# Patient Record
Sex: FEMALE | Race: ASIAN | NOT HISPANIC OR LATINO
[De-identification: names, ages, dates, MRNs, and addresses within clinical notes are randomized per-mention and may not be internally consistent; named-entity substitution may affect disease eponyms.]

---

## 2018-04-26 PROBLEM — Z00.00 ENCOUNTER FOR PREVENTIVE HEALTH EXAMINATION: Status: ACTIVE | Noted: 2018-04-26

## 2018-05-02 ENCOUNTER — APPOINTMENT (OUTPATIENT)
Dept: UROLOGY | Facility: CLINIC | Age: 63
End: 2018-05-02
Payer: MEDICAID

## 2018-05-02 VITALS
TEMPERATURE: 98.3 F | WEIGHT: 125 LBS | OXYGEN SATURATION: 98 % | SYSTOLIC BLOOD PRESSURE: 129 MMHG | HEART RATE: 91 BPM | DIASTOLIC BLOOD PRESSURE: 77 MMHG | BODY MASS INDEX: 22.15 KG/M2 | HEIGHT: 63 IN | RESPIRATION RATE: 16 BRPM

## 2018-05-02 DIAGNOSIS — I10 ESSENTIAL (PRIMARY) HYPERTENSION: ICD-10-CM

## 2018-05-02 PROCEDURE — 51798 US URINE CAPACITY MEASURE: CPT

## 2018-05-02 PROCEDURE — 99204 OFFICE O/P NEW MOD 45 MIN: CPT

## 2018-05-02 RX ORDER — CIPROFLOXACIN HYDROCHLORIDE 500 MG/1
500 TABLET, FILM COATED ORAL
Refills: 0 | Status: COMPLETED | COMMUNITY

## 2018-05-02 RX ORDER — DOCUSATE SODIUM 100 MG/1
100 CAPSULE, LIQUID FILLED ORAL
Refills: 0 | Status: ACTIVE | COMMUNITY

## 2018-05-02 RX ORDER — SULFAMETHOXAZOLE AND TRIMETHOPRIM 800; 160 MG/1; MG/1
800-160 TABLET ORAL
Refills: 0 | Status: COMPLETED | COMMUNITY

## 2018-05-04 ENCOUNTER — OTHER (OUTPATIENT)
Age: 63
End: 2018-05-04

## 2018-05-04 RX ORDER — ESTRADIOL 0.1 MG/G
0.1 CREAM VAGINAL
Qty: 1 | Refills: 3 | Status: DISCONTINUED | COMMUNITY
Start: 2018-05-02 | End: 2018-05-04

## 2018-05-06 ENCOUNTER — RESULT REVIEW (OUTPATIENT)
Age: 63
End: 2018-05-06

## 2018-05-06 LAB
APPEARANCE: ABNORMAL
BACTERIA UR CULT: ABNORMAL
BACTERIA: ABNORMAL
BILIRUBIN URINE: NEGATIVE
BLOOD URINE: NEGATIVE
CALCIUM OXALATE CRYSTALS: ABNORMAL
COLOR: YELLOW
GLUCOSE QUALITATIVE U: 1000 MG/DL
HYALINE CASTS: 0 /LPF
KETONES URINE: ABNORMAL
LEUKOCYTE ESTERASE URINE: ABNORMAL
MICROSCOPIC-UA: NORMAL
NITRITE URINE: POSITIVE
PH URINE: 5.5
PROTEIN URINE: ABNORMAL MG/DL
RED BLOOD CELLS URINE: 6 /HPF
SPECIFIC GRAVITY URINE: 1.02
SQUAMOUS EPITHELIAL CELLS: 2 /HPF
UROBILINOGEN URINE: NEGATIVE MG/DL
WHITE BLOOD CELLS URINE: 92 /HPF

## 2018-05-07 ENCOUNTER — MESSAGE (OUTPATIENT)
Age: 63
End: 2018-05-07

## 2018-09-05 ENCOUNTER — APPOINTMENT (OUTPATIENT)
Dept: UROLOGY | Facility: CLINIC | Age: 63
End: 2018-09-05
Payer: MEDICARE

## 2018-09-05 VITALS
BODY MASS INDEX: 21.97 KG/M2 | OXYGEN SATURATION: 99 % | SYSTOLIC BLOOD PRESSURE: 129 MMHG | WEIGHT: 124 LBS | DIASTOLIC BLOOD PRESSURE: 74 MMHG | HEART RATE: 93 BPM | HEIGHT: 63 IN | RESPIRATION RATE: 18 BRPM

## 2018-09-05 PROCEDURE — 99214 OFFICE O/P EST MOD 30 MIN: CPT

## 2018-09-10 ENCOUNTER — RESULT REVIEW (OUTPATIENT)
Age: 63
End: 2018-09-10

## 2018-09-10 LAB
APPEARANCE: ABNORMAL
BACTERIA UR CULT: ABNORMAL
BACTERIA: ABNORMAL
BILIRUBIN URINE: NEGATIVE
BLOOD URINE: ABNORMAL
COLOR: YELLOW
GLUCOSE QUALITATIVE U: 1000 MG/DL
KETONES URINE: NEGATIVE
LEUKOCYTE ESTERASE URINE: ABNORMAL
MICROSCOPIC-UA: NORMAL
NITRITE URINE: POSITIVE
PH URINE: 6
PROTEIN URINE: NEGATIVE MG/DL
RED BLOOD CELLS URINE: 2 /HPF
SPECIFIC GRAVITY URINE: 1.01
SQUAMOUS EPITHELIAL CELLS: 1 /HPF
UROBILINOGEN URINE: NEGATIVE MG/DL
WHITE BLOOD CELLS URINE: 334 /HPF

## 2018-09-19 ENCOUNTER — APPOINTMENT (OUTPATIENT)
Dept: UROLOGY | Facility: CLINIC | Age: 63
End: 2018-09-19
Payer: MEDICARE

## 2018-09-19 VITALS
HEART RATE: 102 BPM | RESPIRATION RATE: 17 BRPM | DIASTOLIC BLOOD PRESSURE: 73 MMHG | TEMPERATURE: 97.7 F | SYSTOLIC BLOOD PRESSURE: 147 MMHG | OXYGEN SATURATION: 97 %

## 2018-09-19 PROCEDURE — 52000 CYSTOURETHROSCOPY: CPT

## 2018-09-21 RX ORDER — SULFAMETHOXAZOLE AND TRIMETHOPRIM 800; 160 MG/1; MG/1
800-160 TABLET ORAL
Qty: 14 | Refills: 0 | Status: COMPLETED | COMMUNITY
Start: 2018-05-06 | End: 2018-09-21

## 2018-11-07 ENCOUNTER — APPOINTMENT (OUTPATIENT)
Dept: UROLOGY | Facility: CLINIC | Age: 63
End: 2018-11-07

## 2018-12-19 ENCOUNTER — APPOINTMENT (OUTPATIENT)
Age: 63
End: 2018-12-19
Payer: MEDICARE

## 2018-12-19 VITALS
TEMPERATURE: 98.4 F | SYSTOLIC BLOOD PRESSURE: 115 MMHG | DIASTOLIC BLOOD PRESSURE: 72 MMHG | BODY MASS INDEX: 21.61 KG/M2 | WEIGHT: 122 LBS | RESPIRATION RATE: 17 BRPM | HEART RATE: 91 BPM | OXYGEN SATURATION: 97 %

## 2018-12-19 PROCEDURE — 99213 OFFICE O/P EST LOW 20 MIN: CPT

## 2019-06-19 ENCOUNTER — APPOINTMENT (OUTPATIENT)
Age: 64
End: 2019-06-19
Payer: MEDICARE

## 2019-06-19 VITALS
SYSTOLIC BLOOD PRESSURE: 120 MMHG | RESPIRATION RATE: 17 BRPM | HEART RATE: 88 BPM | OXYGEN SATURATION: 99 % | DIASTOLIC BLOOD PRESSURE: 71 MMHG | TEMPERATURE: 98.2 F | BODY MASS INDEX: 21.08 KG/M2 | WEIGHT: 119 LBS

## 2019-06-19 PROCEDURE — 99214 OFFICE O/P EST MOD 30 MIN: CPT

## 2019-06-23 NOTE — PHYSICAL EXAM
[General Appearance - Well Developed] : well developed [General Appearance - Well Nourished] : well nourished [Normal Appearance] : normal appearance [Well Groomed] : well groomed [General Appearance - In No Acute Distress] : no acute distress [Costovertebral Angle Tenderness] : no ~M costovertebral angle tenderness [Abdomen Soft] : soft [Abdomen Tenderness] : non-tender [FreeTextEntry1] : deferred today [Urinary Bladder Findings] : the bladder was normal on palpation [Edema] : no peripheral edema [Respiration, Rhythm And Depth] : normal respiratory rhythm and effort [] : no respiratory distress [Exaggerated Use Of Accessory Muscles For Inspiration] : no accessory muscle use [Mood] : the mood was normal [Oriented To Time, Place, And Person] : oriented to person, place, and time [Not Anxious] : not anxious [Affect] : the affect was normal [No Palpable Adenopathy] : no palpable adenopathy [Normal Station and Gait] : the gait and station were normal for the patient's age [No Focal Deficits] : no focal deficits

## 2019-06-23 NOTE — HISTORY OF PRESENT ILLNESS
[FreeTextEntry1] : 62 yo Macedonian speaking F presents with persistent UTI. Had issues with recurrent UTI many years ago after her second child but was doing well for years with no issues. Starting last , has been having some dysuria and found to have UTI. Since then, she has been treated with antibiotics which helps with symptoms but will recur shortly after completing the antibiotics. Review of labworks shows perisistent e.coli UTI twice in February and most recently a few weeks ago. Last course of antibiotics was 10 days ago. Hasn't noticed exacerbation of LUTS but most bothersome is the urethral discomfort. Drinks multiple bottles of water per day and voids every 2 hrs or so. Denies any incontinence, history of gross hematuria, fever, chills, flank pain. Does have issues with constipation and will often have to perform enemas in order to feel comfortable. 2 children, , LMP was more than 10 yrs ago\par \par 18 Interval history: Since last visit, pt has been doing well with no minimal issues. Cysto at last visit was unremarkable\par \par 18 Interval history: 1 month ago had routine UA done with PCP and found to have UTI\par States that she was given abx but never started it because she didn't have any symptoms\par Has been completely asymptomatic for the last 6 months\par Shoulder surgery in  and has needed to take some pain meds recently for this\par Some constipation issues due to the pain meds\par No incontinence, no more urethral discomfort\par Has been double voiding

## 2019-06-23 NOTE — ASSESSMENT
[FreeTextEntry1] : 65 yo F with history of recurrent UTI, vaginal atrophy\par \par - Continue estrace\par - would not treat UTI unless symptomatic\par - reaffirmed behavioral modifications\par - FU in 6 months

## 2019-11-28 ENCOUNTER — TRANSCRIPTION ENCOUNTER (OUTPATIENT)
Age: 64
End: 2019-11-28

## 2019-11-29 ENCOUNTER — INPATIENT (INPATIENT)
Facility: HOSPITAL | Age: 64
LOS: 0 days | Discharge: HOME | DRG: 419 | End: 2019-11-30
Attending: SURGERY | Admitting: SURGERY
Payer: MEDICARE

## 2019-11-29 ENCOUNTER — RESULT REVIEW (OUTPATIENT)
Age: 64
End: 2019-11-29

## 2019-11-29 VITALS
DIASTOLIC BLOOD PRESSURE: 79 MMHG | HEART RATE: 78 BPM | TEMPERATURE: 98 F | SYSTOLIC BLOOD PRESSURE: 191 MMHG | WEIGHT: 119.93 LBS | RESPIRATION RATE: 18 BRPM | OXYGEN SATURATION: 99 %

## 2019-11-29 DIAGNOSIS — K81.9 CHOLECYSTITIS, UNSPECIFIED: ICD-10-CM

## 2019-11-29 DIAGNOSIS — Z98.890 OTHER SPECIFIED POSTPROCEDURAL STATES: Chronic | ICD-10-CM

## 2019-11-29 DIAGNOSIS — Z86.79 PERSONAL HISTORY OF OTHER DISEASES OF THE CIRCULATORY SYSTEM: Chronic | ICD-10-CM

## 2019-11-29 LAB
ALBUMIN SERPL ELPH-MCNC: 4 G/DL — SIGNIFICANT CHANGE UP (ref 3.3–5)
ALP SERPL-CCNC: 154 U/L — HIGH (ref 40–120)
ALT FLD-CCNC: 17 U/L — SIGNIFICANT CHANGE UP (ref 10–45)
ANION GAP SERPL CALC-SCNC: 16 MMOL/L — SIGNIFICANT CHANGE UP (ref 5–17)
APTT BLD: 30 SEC — SIGNIFICANT CHANGE UP (ref 27.5–36.3)
AST SERPL-CCNC: 18 U/L — SIGNIFICANT CHANGE UP (ref 10–40)
BASOPHILS # BLD AUTO: 0.02 K/UL — SIGNIFICANT CHANGE UP (ref 0–0.2)
BASOPHILS NFR BLD AUTO: 0.3 % — SIGNIFICANT CHANGE UP (ref 0–2)
BILIRUB SERPL-MCNC: 0.5 MG/DL — SIGNIFICANT CHANGE UP (ref 0.2–1.2)
BLD GP AB SCN SERPL QL: NEGATIVE — SIGNIFICANT CHANGE UP
BUN SERPL-MCNC: 19 MG/DL — SIGNIFICANT CHANGE UP (ref 7–23)
CALCIUM SERPL-MCNC: 9.2 MG/DL — SIGNIFICANT CHANGE UP (ref 8.4–10.5)
CHLORIDE SERPL-SCNC: 97 MMOL/L — SIGNIFICANT CHANGE UP (ref 96–108)
CO2 SERPL-SCNC: 23 MMOL/L — SIGNIFICANT CHANGE UP (ref 22–31)
CREAT SERPL-MCNC: 0.45 MG/DL — LOW (ref 0.5–1.3)
EOSINOPHIL # BLD AUTO: 0.07 K/UL — SIGNIFICANT CHANGE UP (ref 0–0.5)
EOSINOPHIL NFR BLD AUTO: 1.2 % — SIGNIFICANT CHANGE UP (ref 0–6)
GLUCOSE BLDC GLUCOMTR-MCNC: 135 MG/DL — HIGH (ref 70–99)
GLUCOSE BLDC GLUCOMTR-MCNC: 97 MG/DL — SIGNIFICANT CHANGE UP (ref 70–99)
GLUCOSE SERPL-MCNC: 236 MG/DL — HIGH (ref 70–99)
HCT VFR BLD CALC: 33.9 % — LOW (ref 34.5–45)
HGB BLD-MCNC: 11 G/DL — LOW (ref 11.5–15.5)
IMM GRANULOCYTES NFR BLD AUTO: 0.3 % — SIGNIFICANT CHANGE UP (ref 0–1.5)
INR BLD: 1.13 RATIO — SIGNIFICANT CHANGE UP (ref 0.88–1.16)
LIDOCAIN IGE QN: 43 U/L — SIGNIFICANT CHANGE UP (ref 7–60)
LYMPHOCYTES # BLD AUTO: 1.31 K/UL — SIGNIFICANT CHANGE UP (ref 1–3.3)
LYMPHOCYTES # BLD AUTO: 22.8 % — SIGNIFICANT CHANGE UP (ref 13–44)
MCHC RBC-ENTMCNC: 28 PG — SIGNIFICANT CHANGE UP (ref 27–34)
MCHC RBC-ENTMCNC: 32.4 GM/DL — SIGNIFICANT CHANGE UP (ref 32–36)
MCV RBC AUTO: 86.3 FL — SIGNIFICANT CHANGE UP (ref 80–100)
MONOCYTES # BLD AUTO: 0.39 K/UL — SIGNIFICANT CHANGE UP (ref 0–0.9)
MONOCYTES NFR BLD AUTO: 6.8 % — SIGNIFICANT CHANGE UP (ref 2–14)
NEUTROPHILS # BLD AUTO: 3.94 K/UL — SIGNIFICANT CHANGE UP (ref 1.8–7.4)
NEUTROPHILS NFR BLD AUTO: 68.6 % — SIGNIFICANT CHANGE UP (ref 43–77)
NRBC # BLD: 0 /100 WBCS — SIGNIFICANT CHANGE UP (ref 0–0)
PLATELET # BLD AUTO: 155 K/UL — SIGNIFICANT CHANGE UP (ref 150–400)
POTASSIUM SERPL-MCNC: 3.8 MMOL/L — SIGNIFICANT CHANGE UP (ref 3.5–5.3)
POTASSIUM SERPL-SCNC: 3.8 MMOL/L — SIGNIFICANT CHANGE UP (ref 3.5–5.3)
PROT SERPL-MCNC: 7.8 G/DL — SIGNIFICANT CHANGE UP (ref 6–8.3)
PROTHROM AB SERPL-ACNC: 13.1 SEC — HIGH (ref 10–12.9)
RBC # BLD: 3.93 M/UL — SIGNIFICANT CHANGE UP (ref 3.8–5.2)
RBC # FLD: 12.8 % — SIGNIFICANT CHANGE UP (ref 10.3–14.5)
RH IG SCN BLD-IMP: POSITIVE — SIGNIFICANT CHANGE UP
RH IG SCN BLD-IMP: POSITIVE — SIGNIFICANT CHANGE UP
SODIUM SERPL-SCNC: 136 MMOL/L — SIGNIFICANT CHANGE UP (ref 135–145)
TROPONIN T, HIGH SENSITIVITY RESULT: <6 NG/L — SIGNIFICANT CHANGE UP (ref 0–51)
WBC # BLD: 5.75 K/UL — SIGNIFICANT CHANGE UP (ref 3.8–10.5)
WBC # FLD AUTO: 5.75 K/UL — SIGNIFICANT CHANGE UP (ref 3.8–10.5)

## 2019-11-29 PROCEDURE — 47562 LAPAROSCOPIC CHOLECYSTECTOMY: CPT

## 2019-11-29 PROCEDURE — 88304 TISSUE EXAM BY PATHOLOGIST: CPT | Mod: 26

## 2019-11-29 PROCEDURE — 99285 EMERGENCY DEPT VISIT HI MDM: CPT

## 2019-11-29 PROCEDURE — 93010 ELECTROCARDIOGRAM REPORT: CPT

## 2019-11-29 PROCEDURE — 99221 1ST HOSP IP/OBS SF/LOW 40: CPT | Mod: 57

## 2019-11-29 PROCEDURE — 74177 CT ABD & PELVIS W/CONTRAST: CPT | Mod: 26

## 2019-11-29 PROCEDURE — 71045 X-RAY EXAM CHEST 1 VIEW: CPT | Mod: 26

## 2019-11-29 PROCEDURE — 76705 ECHO EXAM OF ABDOMEN: CPT | Mod: 26,RT

## 2019-11-29 RX ORDER — INSULIN LISPRO 100/ML
VIAL (ML) SUBCUTANEOUS
Refills: 0 | Status: DISCONTINUED | OUTPATIENT
Start: 2019-11-29 | End: 2019-11-30

## 2019-11-29 RX ORDER — ENOXAPARIN SODIUM 100 MG/ML
40 INJECTION SUBCUTANEOUS DAILY
Refills: 0 | Status: DISCONTINUED | OUTPATIENT
Start: 2019-11-29 | End: 2019-11-30

## 2019-11-29 RX ORDER — DEXTROSE 50 % IN WATER 50 %
25 SYRINGE (ML) INTRAVENOUS ONCE
Refills: 0 | Status: DISCONTINUED | OUTPATIENT
Start: 2019-11-29 | End: 2019-11-30

## 2019-11-29 RX ORDER — DEXTROSE 50 % IN WATER 50 %
12.5 SYRINGE (ML) INTRAVENOUS ONCE
Refills: 0 | Status: DISCONTINUED | OUTPATIENT
Start: 2019-11-29 | End: 2019-11-30

## 2019-11-29 RX ORDER — OXYCODONE HYDROCHLORIDE 5 MG/1
10 TABLET ORAL EVERY 6 HOURS
Refills: 0 | Status: DISCONTINUED | OUTPATIENT
Start: 2019-11-29 | End: 2019-11-30

## 2019-11-29 RX ORDER — ACETAMINOPHEN 500 MG
975 TABLET ORAL EVERY 6 HOURS
Refills: 0 | Status: DISCONTINUED | OUTPATIENT
Start: 2019-11-29 | End: 2019-11-29

## 2019-11-29 RX ORDER — SODIUM CHLORIDE 9 MG/ML
1000 INJECTION, SOLUTION INTRAVENOUS
Refills: 0 | Status: DISCONTINUED | OUTPATIENT
Start: 2019-11-29 | End: 2019-11-29

## 2019-11-29 RX ORDER — HYDROMORPHONE HYDROCHLORIDE 2 MG/ML
0.5 INJECTION INTRAMUSCULAR; INTRAVENOUS; SUBCUTANEOUS
Refills: 0 | Status: DISCONTINUED | OUTPATIENT
Start: 2019-11-29 | End: 2019-11-30

## 2019-11-29 RX ORDER — ASPIRIN/CALCIUM CARB/MAGNESIUM 324 MG
81 TABLET ORAL DAILY
Refills: 0 | Status: DISCONTINUED | OUTPATIENT
Start: 2019-11-29 | End: 2019-11-29

## 2019-11-29 RX ORDER — MORPHINE SULFATE 50 MG/1
4 CAPSULE, EXTENDED RELEASE ORAL ONCE
Refills: 0 | Status: DISCONTINUED | OUTPATIENT
Start: 2019-11-29 | End: 2019-11-29

## 2019-11-29 RX ORDER — SODIUM CHLORIDE 9 MG/ML
1000 INJECTION, SOLUTION INTRAVENOUS
Refills: 0 | Status: DISCONTINUED | OUTPATIENT
Start: 2019-11-29 | End: 2019-11-30

## 2019-11-29 RX ORDER — MORPHINE SULFATE 50 MG/1
2 CAPSULE, EXTENDED RELEASE ORAL ONCE
Refills: 0 | Status: DISCONTINUED | OUTPATIENT
Start: 2019-11-29 | End: 2019-11-29

## 2019-11-29 RX ORDER — DEXTROSE 50 % IN WATER 50 %
15 SYRINGE (ML) INTRAVENOUS ONCE
Refills: 0 | Status: DISCONTINUED | OUTPATIENT
Start: 2019-11-29 | End: 2019-11-30

## 2019-11-29 RX ORDER — ACETAMINOPHEN 500 MG
1000 TABLET ORAL EVERY 6 HOURS
Refills: 0 | Status: DISCONTINUED | OUTPATIENT
Start: 2019-11-29 | End: 2019-11-30

## 2019-11-29 RX ORDER — INSULIN LISPRO 100/ML
VIAL (ML) SUBCUTANEOUS AT BEDTIME
Refills: 0 | Status: DISCONTINUED | OUTPATIENT
Start: 2019-11-29 | End: 2019-11-30

## 2019-11-29 RX ORDER — AMLODIPINE BESYLATE 2.5 MG/1
5 TABLET ORAL DAILY
Refills: 0 | Status: DISCONTINUED | OUTPATIENT
Start: 2019-11-29 | End: 2019-11-30

## 2019-11-29 RX ORDER — OXYCODONE HYDROCHLORIDE 5 MG/1
5 TABLET ORAL EVERY 4 HOURS
Refills: 0 | Status: DISCONTINUED | OUTPATIENT
Start: 2019-11-29 | End: 2019-11-30

## 2019-11-29 RX ORDER — ONDANSETRON 8 MG/1
4 TABLET, FILM COATED ORAL ONCE
Refills: 0 | Status: COMPLETED | OUTPATIENT
Start: 2019-11-29 | End: 2019-11-29

## 2019-11-29 RX ORDER — GLUCAGON INJECTION, SOLUTION 0.5 MG/.1ML
1 INJECTION, SOLUTION SUBCUTANEOUS ONCE
Refills: 0 | Status: DISCONTINUED | OUTPATIENT
Start: 2019-11-29 | End: 2019-11-30

## 2019-11-29 RX ADMIN — ONDANSETRON 4 MILLIGRAM(S): 8 TABLET, FILM COATED ORAL at 07:00

## 2019-11-29 RX ADMIN — MORPHINE SULFATE 4 MILLIGRAM(S): 50 CAPSULE, EXTENDED RELEASE ORAL at 07:38

## 2019-11-29 RX ADMIN — MORPHINE SULFATE 4 MILLIGRAM(S): 50 CAPSULE, EXTENDED RELEASE ORAL at 07:18

## 2019-11-29 RX ADMIN — OXYCODONE HYDROCHLORIDE 5 MILLIGRAM(S): 5 TABLET ORAL at 18:23

## 2019-11-29 RX ADMIN — SODIUM CHLORIDE 30 MILLILITER(S): 9 INJECTION, SOLUTION INTRAVENOUS at 23:02

## 2019-11-29 RX ADMIN — HYDROMORPHONE HYDROCHLORIDE 0.5 MILLIGRAM(S): 2 INJECTION INTRAMUSCULAR; INTRAVENOUS; SUBCUTANEOUS at 23:15

## 2019-11-29 RX ADMIN — HYDROMORPHONE HYDROCHLORIDE 0.5 MILLIGRAM(S): 2 INJECTION INTRAMUSCULAR; INTRAVENOUS; SUBCUTANEOUS at 23:00

## 2019-11-29 RX ADMIN — MORPHINE SULFATE 2 MILLIGRAM(S): 50 CAPSULE, EXTENDED RELEASE ORAL at 07:10

## 2019-11-29 RX ADMIN — ONDANSETRON 4 MILLIGRAM(S): 8 TABLET, FILM COATED ORAL at 23:02

## 2019-11-29 RX ADMIN — OXYCODONE HYDROCHLORIDE 5 MILLIGRAM(S): 5 TABLET ORAL at 17:53

## 2019-11-29 RX ADMIN — AMLODIPINE BESYLATE 5 MILLIGRAM(S): 2.5 TABLET ORAL at 17:53

## 2019-11-29 RX ADMIN — MORPHINE SULFATE 2 MILLIGRAM(S): 50 CAPSULE, EXTENDED RELEASE ORAL at 07:00

## 2019-11-29 RX ADMIN — SODIUM CHLORIDE 100 MILLILITER(S): 9 INJECTION, SOLUTION INTRAVENOUS at 12:58

## 2019-11-29 NOTE — ED PROVIDER NOTE - ATTENDING CONTRIBUTION TO CARE
sudden onset RUQ/R lower rib pain along with n/v, rad to back. pt has hx of cholelithiasis - biggest concern is for biliary colic vs cholecystitis or pancreatitis. will do labs, RUQ u/s. if neg, may need further abd imaging.  will obtain cxr, trop, EKG unremarkable -low concern for pulmonary or cardiac pathology, not consistent with PE.     At the end of my shift, I signed off the care of the patient to oncoming physician. Plan is for await U/S, labs, re-eval. dispo pending w/u.

## 2019-11-29 NOTE — H&P ADULT - HISTORY OF PRESENT ILLNESS
ACS Surgery  [id number]    HPI:  65 yo woman with a hx of DM type II, HTN presents with RUQ pain beginning at 4 am today, described as 10/10, constant, with associated N/V, and constipation. She has felt pain in this area for 1 year but it was previously self-resolving.    ED Course: Afebrile, VSS. Labs wnl. RUQ US reveals hepatic steatosis and GB sludge without stones or cholecystitis; CT demonstrates cholelithiasis, a stone in the cystic duct, and signs of acute cholecystitis. Received morphine 6 mg, zofran.

## 2019-11-29 NOTE — H&P ADULT - ASSESSMENT
65 yo woman with a hx of DM type II, HTN presenting with symptomatic cholelithiasis with a stone in the gallbladder neck.    - admit to acute care surgery, Dr. Sotelo  - added on for OR, lap bhavna  - pain control  - NPO, IVF  - IV abx: cefotetan  - DVT PPx: lovenox  - ISS    Patient discussed with Dr. Sotelo    ACS  [id number] 63 yo woman with a hx of DM type II, HTN presenting with symptomatic cholelithiasis with a stone in the gallbladder neck.    - admit to acute care surgery, Dr. Sotelo  - added on for OR, lap bhavna  - pain control  - NPO, IVF  - no antibiotics indicated  - DVT PPx: lovenox  - ISS    Patient discussed with Dr. Sotelo    ACS  [id number] 63 yo woman with a hx of DM type II, HTN presenting with symptomatic cholelithiasis with a stone in the gallbladder neck.    - admit to acute care surgery, Dr. Sotelo  - added on for OR, lap bhavna  - pain control  - cont home ASA, amlodipine  - NPO, IVF  - no antibiotics indicated  - DVT PPx: lovenox  - ISS    Patient discussed with Dr. Sotelo    ACS  [id number] 63 yo woman with a hx of DM type II, HTN presenting with symptomatic cholelithiasis with a stone in the gallbladder neck.    - admit to acute care surgery, Dr. Sotelo  - added on for OR, lap bhavna  - pain control  - cont home amlodipine  - holding home enalapril, prophylactic ASA  - NPO, IVF  - no antibiotics indicated  - DVT PPx: lovenox  - ISS    Patient discussed with Dr. Sotelo    ACS  [id number] 63 yo woman with a hx of DM type II, HTN presenting with symptomatic cholelithiasis with a stone in the gallbladder neck.    - admit to acute care surgery, Dr. Sotelo  - added on for OR, lap bhavna  - Consent in chart, Serbian via  phone  - pain control  - cont home amlodipine  - holding home enalapril, prophylactic ASA  - NPO, IVF  - no antibiotics indicated  - DVT PPx: lovenox  - ISS    Patient discussed with Dr. Sotelo    ACS  [id number]

## 2019-11-29 NOTE — ED PROVIDER NOTE - OBJECTIVE STATEMENT
64F PMH HTN, cholelithiasis p/w R rib and RUQ pain since 4am (2hrs PTA). Pain woke her from sleep. Described as stabbing, radiates to R upper back. Worse with movement and with deep breaths. No improvement with Tylenol. Associated with n/v. Denies trauma to area. No f/c, CP, SOB, dysuria, constipation, diarrhea. 64F PMH HTN, DM, cholelithiasis p/w R lower rib and RUQ pain since 4am (2hrs PTA). Pain woke her from sleep. Described as stabbing, radiates to R upper back. Worse with movement and with deep breaths. No improvement with Tylenol. Associated with n/v. Denies trauma to area. No f/c, CP, SOB, dysuria, constipation, diarrhea.

## 2019-11-29 NOTE — ED ADULT TRIAGE NOTE - CHIEF COMPLAINT QUOTE
Patient c/o right lower chest vs RUQ pain accompanied with SOB/ nausea/vomiting and belching, symptoms started at 4 am

## 2019-11-29 NOTE — H&P ADULT - NSICDXPASTSURGICALHX_GEN_ALL_CORE_FT
PAST SURGICAL HISTORY:  H/O shoulder surgery     History of varicose veins of lower extremity s/p surgery in July 2019

## 2019-11-29 NOTE — ED ADULT NURSE NOTE - OBJECTIVE STATEMENT
63 y/o female history of HTN and DM presents to the ED from home c/o chest pain . Patient states that since 4 AM this morning she has had right sided chest pain that radiates down to her right upper abdomen and her back. Patient states that she has been feeling SOB. Patient states that she has been feeling nauseous and has been vomiting since 4 AM this morning. Describes pain as a "poking sensation". Patient states she takes baby aspirin. Denies blood in vomit. Denies sick contacts at home.  Denies fever, chills,  weakness, diarrhea/constipation, numbness/tingling, urinary s/s. Patient A&Ox3, in no respiratory distress. Strong peripheral pulses. Placed on CM in ED, NSR on CM. Abdomen soft and non distended. Tender to palpation of the right upper quadrant. EKG performed in triage.

## 2019-11-29 NOTE — ED PROVIDER NOTE - CLINICAL SUMMARY MEDICAL DECISION MAKING FREE TEXT BOX
Arik, PGY1 - 64F PMH HTN, cholelithiasis p/w R anterior rib/ RUQ pain x 2hrs PTA. Radiates to R upper back. +R anterior lower rib and RUQ TTP. DDx biliary colic/cholecystitis/cholangitis, pancreatitis, PUD, GERD, PNA, ACS/MI. Plan: labs, ekg, CXR, RUQUS, pain control, zofran. If CXR and RUQUS negative, will proceed to obtain CT A/P. Arik, PGY1 - 64F PMH HTN, cholelithiasis p/w R lower rib/ RUQ pain x 2hrs PTA. Radiates to R upper back. +R anterior lower rib and RUQ TTP. EKG NSR. DDx includes biliary colic/cholecystitis/cholangitis, pancreatitis, PUD, GERD, ACS/MI, PNA. Low suspicion for PE. Plan: labs, CXR, RUQUS, pain control, zofran. If CXR and RUQUS negative, may pursue CT A/P. Arik, PGY1 - 64F PMH HTN, DM, cholelithiasis p/w R lower rib/ RUQ pain x 2hrs PTA. Radiates to R upper back. +R anterior lower rib and RUQ TTP. EKG NSR. DDx includes biliary colic/cholecystitis/cholangitis, pancreatitis, PUD, GERD, ACS/MI, PNA. Low suspicion for PE. Plan: labs, CXR, RUQUS, pain control, zofran. If CXR and RUQUS negative, may pursue CT A/P.

## 2019-11-29 NOTE — PRE-ANESTHESIA EVALUATION ADULT - NSANTHOSAYNRD_GEN_A_CORE
No. MARY ANNE screening performed.  STOP BANG Legend: 0-2 = LOW Risk; 3-4 = INTERMEDIATE Risk; 5-8 = HIGH Risk

## 2019-11-29 NOTE — BRIEF OPERATIVE NOTE - OPERATION/FINDINGS
Small, cirrhotic-appearing liver. Tense, inflamed gallbladder aspirated to decompress the gallbladder prior to dissection. Critical view of safety obtained; cystic duct and cystic artery triply clipped and transected. No bile spillage.

## 2019-11-29 NOTE — ED PROVIDER NOTE - PROGRESS NOTE DETAILS
ATTG: : patient endorsed to me by Dr. Spear at 7 am. awaiting US results, and labs. still with pain after initial morphine dose. repeat medication and re eval. Lizeth, PGY-1: Stone in cystic duct per CT, GI and surgery paged. GI will come evaluate the patient. Lizeth, PGY-1: Stone in cystic duct per CT concerning for cholecystitis, GI and surgery paged. GI and surgery will come evaluate the patient. Lizeth, PGY-1: Stone in cystic duct per CT concerning for cholecystitis, afebrile/no WBC. GI and surgery paged. Spoke with GI fellow (who looked at CT w/ attending)--gallbladder "too hot" to touch by GI/gallbladder wall edema, "about to perforate", states surgery should come see the patient. Lizeth, PGY-1: Re-paged surgery. Lizeth, PGY-1: Spoke with surgery resident. Pending final recs, she will speak with her attending.

## 2019-11-29 NOTE — H&P ADULT - ATTENDING COMMENTS
Pt seen and examined. Agree with A/P. Taken to OR for lap bhavna, consistent with acute cholecystitis. Will advance diet, pain control, home in am.

## 2019-11-29 NOTE — ED PROVIDER NOTE - PHYSICAL EXAMINATION
I have reviewed the triage vital signs.  Const: AAOx3, sitting uncomfortably, in NAD  Eyes: PERRL, no conjunctival injection  HENT: NCAT, Neck supple without meningismus, oral mucosa moist  CV: RRR, no obvious murmurs, rubs, or gallops appreciated  Resp: CTAB, no respiratory distress, no wheezes, rales, or rhonchi  GI: Abdomen soft, nondistended, +RUQ TTP, no rebound, no guarding, no CVA tenderness  Extremities: No peripheral edema,  2+ radial and DP pulses  Skin: Warm, well perfused, no rash  MSK: +R lower anterior rib TTP. No gross deformities appreciated  Neuro: No focal sensory or motor deficits, CN 2-12 grossly intact  Psych: Appropriate mood and affect

## 2019-11-29 NOTE — H&P ADULT - NSHPPHYSICALEXAM_GEN_ALL_CORE
ICU Vital Signs Last 24 Hrs  T(C): 37.1 (29 Nov 2019 11:09), Max: 37.1 (29 Nov 2019 11:09)  T(F): 98.8 (29 Nov 2019 11:09), Max: 98.8 (29 Nov 2019 11:09)  HR: 89 (29 Nov 2019 11:09) (72 - 89)  BP: 132/76 (29 Nov 2019 11:09) (132/76 - 191/79)  BP(mean): --  ABP: --  ABP(mean): --  RR: 18 (29 Nov 2019 11:09) (14 - 18)  SpO2: 99% (29 Nov 2019 11:09) (99% - 99%)      GEN: NAD, resting quietly  PULM: symmetric chest rise bilaterally, no increased WOB  CV: regular rate, peripheral pulses intact  ABD: soft, non-distended, RUQ TTP, no rebound, no guarding, no peritoneal signs  EXTR: no cyanosis or edema, moving all extremities

## 2019-11-29 NOTE — ED PROVIDER NOTE - NS ED ROS FT
General: Denies fevers or chills  Eyes: Denies vision changes  ENMT: Denies trouble swallowing or speaking, or sore throat  CV: Denies chest pain or palpitations  Resp: Denies cough or SOB  GI: +Abdominal pain, nausea, vomiting. No diarrhea, or constipation   : Denies painful urination, increased urinary frequency, or blood in urine  Skin: Denies new rashes  Neuro: Denies headache, numbness, or weakness  MSK: +R anterior lower rib pain. Denies recent trauma or joint pain

## 2019-11-29 NOTE — H&P ADULT - NSHPLABSRESULTS_GEN_ALL_CORE
CBC (11-29 @ 07:12)                              11.0<L>                         5.75    )----------------(  155        68.6  % Neutrophils, 22.8  % Lymphocytes, ANC: 3.94                                33.9<L>                BMP (11-29 @ 07:12)             136     |  97      |  19    		Ca++ --      Ca 9.2                ---------------------------------( 236<H>		Mg --                 3.8     |  23      |  0.45<L>			Ph --        LFTs (11-29 @ 07:12)      TPro 7.8 / Alb 4.0 / TBili 0.5 / DBili -- / AST 18 / ALT 17 / AlkPhos 154<H>    Coags (11-29 @ 07:12)  aPTT 30.0 / INR 1.13 / PT 13.1<H>          IMAGING:  < from: CT Abdomen and Pelvis w/ IV Cont (11.29.19 @ 09:45) >    IMPRESSION:     Cholelithiasis with mild gallbladder wall edema. 4 mm cystic duct   calculus. These findings are concerning for acute cholecystitis.    The subcentimeter hypoechoic nodules seen on recent ultrasound are not   well visualized on this examination.    < end of copied text >

## 2019-11-30 ENCOUNTER — TRANSCRIPTION ENCOUNTER (OUTPATIENT)
Age: 64
End: 2019-11-30

## 2019-11-30 VITALS
RESPIRATION RATE: 18 BRPM | DIASTOLIC BLOOD PRESSURE: 61 MMHG | OXYGEN SATURATION: 95 % | HEART RATE: 85 BPM | SYSTOLIC BLOOD PRESSURE: 129 MMHG | TEMPERATURE: 98 F

## 2019-11-30 LAB
ANION GAP SERPL CALC-SCNC: 14 MMOL/L — SIGNIFICANT CHANGE UP (ref 5–17)
BUN SERPL-MCNC: 19 MG/DL — SIGNIFICANT CHANGE UP (ref 7–23)
CALCIUM SERPL-MCNC: 8.3 MG/DL — LOW (ref 8.4–10.5)
CHLORIDE SERPL-SCNC: 100 MMOL/L — SIGNIFICANT CHANGE UP (ref 96–108)
CO2 SERPL-SCNC: 23 MMOL/L — SIGNIFICANT CHANGE UP (ref 22–31)
CREAT SERPL-MCNC: 0.54 MG/DL — SIGNIFICANT CHANGE UP (ref 0.5–1.3)
GLUCOSE BLDC GLUCOMTR-MCNC: 127 MG/DL — HIGH (ref 70–99)
GLUCOSE BLDC GLUCOMTR-MCNC: 188 MG/DL — HIGH (ref 70–99)
GLUCOSE SERPL-MCNC: 143 MG/DL — HIGH (ref 70–99)
HCT VFR BLD CALC: 29.9 % — LOW (ref 34.5–45)
HGB BLD-MCNC: 9.7 G/DL — LOW (ref 11.5–15.5)
MAGNESIUM SERPL-MCNC: 2 MG/DL — SIGNIFICANT CHANGE UP (ref 1.6–2.6)
MCHC RBC-ENTMCNC: 28.3 PG — SIGNIFICANT CHANGE UP (ref 27–34)
MCHC RBC-ENTMCNC: 32.4 GM/DL — SIGNIFICANT CHANGE UP (ref 32–36)
MCV RBC AUTO: 87.2 FL — SIGNIFICANT CHANGE UP (ref 80–100)
PHOSPHATE SERPL-MCNC: 4.5 MG/DL — SIGNIFICANT CHANGE UP (ref 2.5–4.5)
PLATELET # BLD AUTO: 149 K/UL — LOW (ref 150–400)
POTASSIUM SERPL-MCNC: 3.5 MMOL/L — SIGNIFICANT CHANGE UP (ref 3.5–5.3)
POTASSIUM SERPL-SCNC: 3.5 MMOL/L — SIGNIFICANT CHANGE UP (ref 3.5–5.3)
RBC # BLD: 3.43 M/UL — LOW (ref 3.8–5.2)
RBC # FLD: 13.2 % — SIGNIFICANT CHANGE UP (ref 10.3–14.5)
SODIUM SERPL-SCNC: 137 MMOL/L — SIGNIFICANT CHANGE UP (ref 135–145)
WBC # BLD: 7.09 K/UL — SIGNIFICANT CHANGE UP (ref 3.8–10.5)
WBC # FLD AUTO: 7.09 K/UL — SIGNIFICANT CHANGE UP (ref 3.8–10.5)

## 2019-11-30 PROCEDURE — C1889: CPT

## 2019-11-30 PROCEDURE — 80048 BASIC METABOLIC PNL TOTAL CA: CPT

## 2019-11-30 PROCEDURE — 96374 THER/PROPH/DIAG INJ IV PUSH: CPT

## 2019-11-30 PROCEDURE — 71045 X-RAY EXAM CHEST 1 VIEW: CPT

## 2019-11-30 PROCEDURE — 86901 BLOOD TYPING SEROLOGIC RH(D): CPT

## 2019-11-30 PROCEDURE — 84484 ASSAY OF TROPONIN QUANT: CPT

## 2019-11-30 PROCEDURE — 96375 TX/PRO/DX INJ NEW DRUG ADDON: CPT

## 2019-11-30 PROCEDURE — 85027 COMPLETE CBC AUTOMATED: CPT

## 2019-11-30 PROCEDURE — 74177 CT ABD & PELVIS W/CONTRAST: CPT

## 2019-11-30 PROCEDURE — 82962 GLUCOSE BLOOD TEST: CPT

## 2019-11-30 PROCEDURE — 86900 BLOOD TYPING SEROLOGIC ABO: CPT

## 2019-11-30 PROCEDURE — 84100 ASSAY OF PHOSPHORUS: CPT

## 2019-11-30 PROCEDURE — 86850 RBC ANTIBODY SCREEN: CPT

## 2019-11-30 PROCEDURE — 93005 ELECTROCARDIOGRAM TRACING: CPT

## 2019-11-30 PROCEDURE — 85610 PROTHROMBIN TIME: CPT

## 2019-11-30 PROCEDURE — 88304 TISSUE EXAM BY PATHOLOGIST: CPT

## 2019-11-30 PROCEDURE — 80053 COMPREHEN METABOLIC PANEL: CPT

## 2019-11-30 PROCEDURE — 83735 ASSAY OF MAGNESIUM: CPT

## 2019-11-30 PROCEDURE — 83690 ASSAY OF LIPASE: CPT

## 2019-11-30 PROCEDURE — 76705 ECHO EXAM OF ABDOMEN: CPT

## 2019-11-30 PROCEDURE — 85730 THROMBOPLASTIN TIME PARTIAL: CPT

## 2019-11-30 PROCEDURE — 99285 EMERGENCY DEPT VISIT HI MDM: CPT | Mod: 25

## 2019-11-30 RX ORDER — POTASSIUM CHLORIDE 20 MEQ
20 PACKET (EA) ORAL
Refills: 0 | Status: COMPLETED | OUTPATIENT
Start: 2019-11-30 | End: 2019-11-30

## 2019-11-30 RX ORDER — OXYCODONE HYDROCHLORIDE 5 MG/1
1 TABLET ORAL
Qty: 10 | Refills: 0
Start: 2019-11-30

## 2019-11-30 RX ORDER — ASPIRIN/CALCIUM CARB/MAGNESIUM 324 MG
1 TABLET ORAL
Qty: 0 | Refills: 0 | DISCHARGE

## 2019-11-30 RX ORDER — TAMSULOSIN HYDROCHLORIDE 0.4 MG/1
0.4 CAPSULE ORAL ONCE
Refills: 0 | Status: COMPLETED | OUTPATIENT
Start: 2019-11-30 | End: 2019-11-30

## 2019-11-30 RX ORDER — TAMSULOSIN HYDROCHLORIDE 0.4 MG/1
0.4 CAPSULE ORAL AT BEDTIME
Refills: 0 | Status: DISCONTINUED | OUTPATIENT
Start: 2019-11-30 | End: 2019-11-30

## 2019-11-30 RX ORDER — LUBIPROSTONE 24 UG/1
1 CAPSULE, GELATIN COATED ORAL
Qty: 0 | Refills: 0 | DISCHARGE

## 2019-11-30 RX ADMIN — Medication 400 MILLIGRAM(S): at 03:22

## 2019-11-30 RX ADMIN — Medication 1000 MILLIGRAM(S): at 03:47

## 2019-11-30 RX ADMIN — TAMSULOSIN HYDROCHLORIDE 0.4 MILLIGRAM(S): 0.4 CAPSULE ORAL at 11:55

## 2019-11-30 RX ADMIN — Medication 400 MILLIGRAM(S): at 11:17

## 2019-11-30 RX ADMIN — Medication 20 MILLIEQUIVALENT(S): at 15:51

## 2019-11-30 RX ADMIN — Medication 1000 MILLIGRAM(S): at 11:33

## 2019-11-30 RX ADMIN — Medication 20 MILLIEQUIVALENT(S): at 11:17

## 2019-11-30 RX ADMIN — ENOXAPARIN SODIUM 40 MILLIGRAM(S): 100 INJECTION SUBCUTANEOUS at 11:18

## 2019-11-30 RX ADMIN — OXYCODONE HYDROCHLORIDE 5 MILLIGRAM(S): 5 TABLET ORAL at 08:02

## 2019-11-30 RX ADMIN — OXYCODONE HYDROCHLORIDE 5 MILLIGRAM(S): 5 TABLET ORAL at 08:32

## 2019-11-30 RX ADMIN — Medication 1: at 12:06

## 2019-11-30 NOTE — DISCHARGE NOTE PROVIDER - NSDCMRMEDTOKEN_GEN_ALL_CORE_FT
amLODIPine 5 mg oral tablet: 1 tab(s) orally once a day  bacitracin 500 units/g topical ointment: Apply topically to affected area 4 times a day  enalapril 10 mg oral tablet: 1 tab(s) orally once a day  glimepiride 2 mg oral tablet: 1 tab(s) orally once a day  metFORMIN 500 mg oral tablet: 1 tab(s) orally 2 times a day  oxyCODONE 5 mg oral tablet: 1 tab(s) orally every 6 hours MDD:4

## 2019-11-30 NOTE — DISCHARGE NOTE PROVIDER - NSDCFUADDAPPT_GEN_ALL_CORE_FT
Please follow up with Dr. Sotelo within 1 week after discharge from the hospital. You may call [phone] to schedule an appointment.    Please followup with your Primary Care Physician within one to two weeks to update your medical records regarding this hospitalization and to review all your current medications and dosages.  Call their office for appointment.

## 2019-11-30 NOTE — DISCHARGE NOTE PROVIDER - HOSPITAL COURSE
The patient had a laparoscopic cholecystectomy for symptomatic cholelithiasis with a stone in the gallbladder neck. Post operative the patient was sent to the PACU, the patient was hemodynamically stable and sent to a surgical floor. The patient's  pain was controlled by IV pain medications transitioned to po narcotics. The patient was advanced to regular diet and tolerated it well. The patient was hemodynamically stable and placed on home medications. The patient was told to follow up with Dr. Sotelo in 2 weeks and had no other issues.

## 2019-11-30 NOTE — PROGRESS NOTE ADULT - ASSESSMENT
64yF POD1 Laparoscopic cholecystectomy for symptomatic cholelithiasis with a stone in the gallbladder neck. hx of DM type II, HTN. Recovering appropriately on the floor.    - C/w Regular diet  - F/u UOP  - pain control  - cont home amlodipine  - holding home enalapril, prophylactic ASA  - no antibiotics indicated  - DVT PPx: lovenox  - ISS  - D/C home today    ACS  [id number]

## 2019-11-30 NOTE — PROGRESS NOTE ADULT - ATTENDING COMMENTS
S/p Lx CCy  Urinary retention this morning  Pt straight cathed  Will re-evaluate for D/c in afternoon  If still unable to void, will start flomax

## 2019-11-30 NOTE — DISCHARGE NOTE NURSING/CASE MANAGEMENT/SOCIAL WORK - PATIENT PORTAL LINK FT
You can access the FollowMyHealth Patient Portal offered by [facility] by registering at the following website: [email or web address]. By joining "[employer]’s FollowMyHealth portal, you will also be able to view your health information using other applications (apps) compatible with our system.

## 2019-11-30 NOTE — PROGRESS NOTE ADULT - SUBJECTIVE AND OBJECTIVE BOX
TRAUMA SURGERY DAILY PROGRESS NOTE:    Overnight:  Underwent laparoscopic cholecystectomy    Subjective:  Patient reports pain is well controlled. Denies N/V. Tolerating diet.       Vital Signs Last 24 Hrs  T(C): 36.8 (30 Nov 2019 05:19), Max: 37.4 (29 Nov 2019 17:52)  T(F): 98.2 (30 Nov 2019 05:19), Max: 99.3 (29 Nov 2019 17:52)  HR: 71 (30 Nov 2019 05:19) (71 - 89)  BP: 94/56 (30 Nov 2019 05:19) (94/56 - 176/76)  BP(mean): 90 (29 Nov 2019 23:30) (90 - 102)  RR: 18 (30 Nov 2019 05:19) (14 - 18)  SpO2: 96% (30 Nov 2019 05:19) (95% - 100%)    Exam:  Gen: NAD, resting in bed  Resp: Airway patent, non-labored respirations  Abd: Soft, appropriately tender, ND.  Umbilicus dressing c/d/I  Band aids over additional port sites c/d/i.  No signs of fluid collection or hematoma  Ext: No edema, WWP  Neuro: AAOx3, no focal deficits    LABS:                        11.0   5.75  )-----------( 155      ( 29 Nov 2019 07:12 )             33.9     11-29    136  |  97  |  19  ----------------------------<  236<H>  3.8   |  23  |  0.45<L>    Ca    9.2      29 Nov 2019 07:12    TPro  7.8  /  Alb  4.0  /  TBili  0.5  /  DBili  x   /  AST  18  /  ALT  17  /  AlkPhos  154<H>  11-29

## 2019-11-30 NOTE — PROVIDER CONTACT NOTE (OTHER) - SITUATION
CATHETER N/A 9/21/2018    LAPAROSCOPIC PERITONEAL DIALYSIS CATHETER REPLACEMENT performed by Suzan Lopez MD at [facility] ENDOSCOPY  01/06/2016    UPPER GASTROINTESTINAL ENDOSCOPY  01/29/2017    possible candida, otherwise normal appearing    VASCULAR SURGERY  aprx 2 years ago    2 stents placed, each side of groin       Immunization History:   Immunization History   Administered Date(s) Administered    Influenza Virus Vaccine 12/27/2012, 10/07/2014, 11/20/2015    Influenza, Intradermal, Quadrivalent, Preservative Free 11/16/2016    Influenza, MDCK Quadv, IM, PF (Flucelvax 4 yrs and older) 10/14/2017    Influenza, Chace Bulls, 6 mo and older, IM, PF (Flulaval, Fluarix) 09/15/2018    Pneumococcal 13-valent Conjugate ([id number]) 10/14/2017    Pneumococcal Polysaccharide ([id number]) 10/07/2014       Active Problems:  Patient Active Problem List   Diagnosis Code    Acute respiratory failure with hypoxia and hypercapnia ([facility]) J96.01, J96.02    Chronic dCHF (grade 2 LVDD) I50.9    Chronic obstructive pulmonary disease ([facility]) J44.9    PVD (peripheral vascular disease) ([facility]) I73.9    Cardiomyopathy ([facility] I42.9    Sleep apnea G47.30    Bicuspid aortic valve Q23.1    Bilateral hilar adenopathy syndrome R59.0    Claudication in peripheral vascular disease ([facility]) I73.9    PVD (peripheral vascular disease) ([facility] I73.9    Essential hypertension I10    Diabetic neuropathy ([facility]) E11.40    Type 2 diabetes, uncontrolled, with neuropathy ([facility]) E11.40, E11.65    Passive smoke exposure Z77.22    Depression with anxiety F41.8    Pneumonia of right upper lobe due to infectious organism ([facility] J18.1    DM (diabetes mellitus) ([facility] E11.9    Hypertensive heart disease with congestive heart failure with preserved left ventricular function ([facility]) I11.0, I50.30    CHF exacerbation ([facility]) I50.9    Chest pain R07.9    Coronary artery disease involving native coronary artery of native heart with angina pectoris ([facility] I25.119    Obesity (BMI 30-39. 9) E66.9    ZAINAB on CPAP G47.33, Z99.89    Degeneration of lumbar or lumbosacral intervertebral disc M51.37    Lumbar radiculopathy M54.16    Lumbosacral spondylosis without myelopathy C79.725    Biliary colic A87.79    Symptomatic cholelithiasis K80.20    Gastroparesis due to DM ([facility]) E11.43, K31.84    Angina, class IV ([facility]) I20.9    Dyspnea R06.00    Elevated brain natriuretic peptide (BNP) level R79.89    Dyslipidemia E78.5    Tobacco smoking Z72.0    Respiratory distress R06.03    Hypoxia R09.02    Chest pressure R07.89    Hypertensive urgency I16.0    Acute on chronic combined systolic and diastolic heart failure ([facility]) I50.43    Ischemic cardiomyopathy I25.5    Chronic renal failure, stage 5 ([facility]) N18.5    Tobacco abuse Z72.0    CKD stage 4 due to type 2 diabetes mellitus ([facility]) E11.22, N18.4    CVA (cerebral vascular accident) ([facility] I63.9    Arterial ischemic stroke, ICA, right, acute ([facility]) I63.231    Type 2 diabetes mellitus without complication, without long-term current use of insulin ([facility]) E11.9    HTN (hypertension), benign I10    ZAINAB (obstructive sleep apnea) G47.33    Diarrhea R19.7    Pleural effusion J90    Chronic anemia D64.9    Aortic valve stenosis I35.0    Hypervolemia E87.70    Hyperkalemia E87.5    Morbid obesity ([facility]) E66.01    Mucus plugging of bronchi J98.09    Hemodialysis-associated hypotension I95.3    Left arm pain M79.602    Dizziness R42    ESRD (end stage renal disease) on dialysis ([facility]) N18.6, Z99.2    Hypotension due to drugs I95.2    Acute diastolic CHF (congestive heart failure) ([facility]) I50.31    Neuromuscular disorder ([facility]) G70.9    Acute combined systolic and diastolic CHF, NYHA class 4 ([facility]) I50.41    Renovascular hypertension I15.0    Mixed hyperlipidemia E78.2    Cigarette nicotine dependence in remission F17.211    Peritoneal dialysis status ([facility] Z99.2 Bladder scan showed greater than 750cc of urine after post void. pt verbalizes feeling unable to empty bladder. CVSN:[id number]}    Routes of Feeding: {CHP DME Other Feedings:177881910}  Liquids: {Slp liquid thickness:09550}  Daily Fluid Restriction: {CHP DME Yes amt example:180769887}  Last Modified Barium Swallow with Video (Video Swallowing Test): {Done Not Done QRPJ:617407221}    Treatments at the Time of Hospital Discharge:   Respiratory Treatments: ***  Oxygen Therapy:  {Therapy; copd oxygen:26199}  Ventilator:    {MH CC Vent WZIT:776861315}    Rehab Therapies: {THERAPEUTIC INTERVENTION:0549408211}  Weight Bearing Status/Restrictions: [facility] CC Weight Bearin}  Other Medical Equipment (for information only, NOT a DME order):  {EQUIPMENT:490596837}  Other Treatments: ***    Patient's personal belongings (please select all that are sent with patient):  {CHP DME Belongings:213447295}    RN SIGNATURE:  {Esignature:787789327}    CASE MANAGEMENT/SOCIAL WORK SECTION    Inpatient Status Date: ***    Readmission Risk Assessment Score:  Readmission Risk              Risk of Unplanned Readmission:        52           Discharging to Facility/ Agency   Name:  [facility] care    Address: [address] [facility]  Phone: [phone]  Fax: [phone]  ·     Dialysis Facility (if applicable)   · Name:[facility]   · Address: [facility]   · Dialysis Schedule: [facility]   · Phone:[phone]  · QBF:[phone]    / signature: Electronically signed by Sasha Diego RN on 19 at 12:02 PM    PHYSICIAN SECTION    Prognosis: Fair    Condition at Discharge: Stable    Rehab Potential (if transferring to Rehab): Fair    Recommended Labs or Other Treatments After Discharge: home care: skilled nursing visits. Home Care Vitals Program for Management of COPD    Physician Certification: I certify the above information and transfer of Pramod Ornelas  is necessary for the continuing treatment of the diagnosis listed and that he requires Home Care for less 30 days.      Update Admission H&P: No change in H&P    PHYSICIAN SIGNATURE:  Electronically signed by Thierno Seo MD on 4/16/19 at 10:29 AM

## 2019-11-30 NOTE — DISCHARGE NOTE NURSING/CASE MANAGEMENT/SOCIAL WORK - NSDCFUADDAPPT_GEN_ALL_CORE_FT
Please follow up with Dr. Sotelo within 1 week after discharge from the hospital. You may call [phone] to schedule an appointment.    Please followup with your Primary Care Physician within one to two weeks to update your medical records regarding this hospitalization and to review all your current medications and dosages.  Call their office for appointment.

## 2019-11-30 NOTE — DISCHARGE NOTE PROVIDER - CARE PROVIDER_API CALL
Korey Sotelo)  Surgery  [address] [place] [address]  Phone: [phone]  Fax: [phone]  Follow Up Time: 1 week

## 2019-11-30 NOTE — DISCHARGE NOTE PROVIDER - NSDCFUADDINST_GEN_ALL_CORE_FT
You may take 650-1000 mg of tylenol every 4-6 hours. Do not exceed 4 grams of tylenol daily. Take oxycodone as needed for severe pain, one tab every 4-6 hours. Do not exceed 6 tabs daily.     You have bandages overlying your incisions called steri strips. Do not remove the steri strips. They will fall off on their own and if not, they will be removed in the office. When showering, avoid rubbing soap into the steri strips and pat dry afterwards. After surgery, some blood may escape from under the tape, which is normal.

## 2019-12-01 ENCOUNTER — OUTPATIENT (OUTPATIENT)
Dept: OUTPATIENT SERVICES | Facility: HOSPITAL | Age: 64
LOS: 1 days | End: 2019-12-01
Payer: MEDICARE

## 2019-12-01 DIAGNOSIS — Z86.79 PERSONAL HISTORY OF OTHER DISEASES OF THE CIRCULATORY SYSTEM: Chronic | ICD-10-CM

## 2019-12-01 DIAGNOSIS — Z98.890 OTHER SPECIFIED POSTPROCEDURAL STATES: Chronic | ICD-10-CM

## 2019-12-01 PROCEDURE — G9001: CPT

## 2019-12-03 LAB — SURGICAL PATHOLOGY STUDY: SIGNIFICANT CHANGE UP

## 2019-12-04 DIAGNOSIS — Z71.89 OTHER SPECIFIED COUNSELING: ICD-10-CM

## 2019-12-04 PROBLEM — I10 ESSENTIAL (PRIMARY) HYPERTENSION: Chronic | Status: ACTIVE | Noted: 2019-11-29

## 2019-12-04 PROBLEM — E11.9 TYPE 2 DIABETES MELLITUS WITHOUT COMPLICATIONS: Chronic | Status: ACTIVE | Noted: 2019-11-29

## 2019-12-04 PROBLEM — K80.20 CALCULUS OF GALLBLADDER WITHOUT CHOLECYSTITIS WITHOUT OBSTRUCTION: Chronic | Status: ACTIVE | Noted: 2019-11-29

## 2019-12-18 ENCOUNTER — APPOINTMENT (OUTPATIENT)
Dept: UROLOGY | Facility: CLINIC | Age: 64
End: 2019-12-18
Payer: MEDICARE

## 2019-12-18 VITALS
RESPIRATION RATE: 17 BRPM | HEART RATE: 95 BPM | SYSTOLIC BLOOD PRESSURE: 111 MMHG | BODY MASS INDEX: 20.55 KG/M2 | DIASTOLIC BLOOD PRESSURE: 67 MMHG | TEMPERATURE: 98.2 F | WEIGHT: 116 LBS | OXYGEN SATURATION: 99 %

## 2019-12-18 PROCEDURE — 99213 OFFICE O/P EST LOW 20 MIN: CPT

## 2019-12-19 ENCOUNTER — APPOINTMENT (OUTPATIENT)
Dept: TRAUMA SURGERY | Facility: CLINIC | Age: 64
End: 2019-12-19
Payer: MEDICARE

## 2019-12-19 VITALS
HEIGHT: 63 IN | DIASTOLIC BLOOD PRESSURE: 70 MMHG | HEART RATE: 87 BPM | BODY MASS INDEX: 20.02 KG/M2 | SYSTOLIC BLOOD PRESSURE: 116 MMHG | WEIGHT: 113 LBS

## 2019-12-19 DIAGNOSIS — K81.1 CHRONIC CHOLECYSTITIS: ICD-10-CM

## 2019-12-19 PROCEDURE — 99024 POSTOP FOLLOW-UP VISIT: CPT

## 2019-12-23 ENCOUNTER — RESULT REVIEW (OUTPATIENT)
Age: 64
End: 2019-12-23

## 2019-12-23 LAB
APPEARANCE: CLEAR
BACTERIA UR CULT: ABNORMAL
BACTERIA: ABNORMAL
BILIRUBIN URINE: NEGATIVE
BLOOD URINE: NEGATIVE
COLOR: NORMAL
GLUCOSE QUALITATIVE U: NEGATIVE
HYALINE CASTS: 0 /LPF
KETONES URINE: NEGATIVE
LEUKOCYTE ESTERASE URINE: ABNORMAL
MICROSCOPIC-UA: NORMAL
NITRITE URINE: POSITIVE
PH URINE: 6.5
PROTEIN URINE: NEGATIVE
RED BLOOD CELLS URINE: 2 /HPF
SPECIFIC GRAVITY URINE: 1.01
SQUAMOUS EPITHELIAL CELLS: 3 /HPF
UROBILINOGEN URINE: NORMAL
WHITE BLOOD CELLS URINE: 9 /HPF

## 2019-12-26 ENCOUNTER — RX CHANGE (OUTPATIENT)
Age: 64
End: 2019-12-26

## 2019-12-26 RX ORDER — NITROFURANTOIN (MONOHYDRATE/MACROCRYSTALS) 25; 75 MG/1; MG/1
100 CAPSULE ORAL
Qty: 14 | Refills: 0 | Status: DISCONTINUED | COMMUNITY
Start: 2019-12-23 | End: 2019-12-26

## 2020-01-01 NOTE — HISTORY OF PRESENT ILLNESS
[FreeTextEntry1] : 64 yo Czech speaking F presents with persistent UTI. Had issues with recurrent UTI many years ago after her second child but was doing well for years with no issues. Starting last , has been having some dysuria and found to have UTI. Since then, she has been treated with antibiotics which helps with symptoms but will recur shortly after completing the antibiotics. Review of labworks shows perisistent e.coli UTI twice in February and most recently a few weeks ago. Last course of antibiotics was 10 days ago. Hasn't noticed exacerbation of LUTS but most bothersome is the urethral discomfort. Drinks multiple bottles of water per day and voids every 2 hrs or so. Denies any incontinence, history of gross hematuria, fever, chills, flank pain. Does have issues with constipation and will often have to perform enemas in order to feel comfortable. 2 children, , LMP was more than 10 yrs ago\par \par 18 Interval history: Since last visit, pt has been doing well with no minimal issues. Cysto at last visit was unremarkable\par \par 19 Interval history: 1 month ago had routine UA done with PCP and found to have UTI\par States that she was given abx but never started it because she didn't have any symptoms\par Has been completely asymptomatic for the last 6 months\par Shoulder surgery in  and has needed to take some pain meds recently for this\par Some constipation issues due to the pain meds\par No incontinence, no more urethral discomfort\par Has been double voiding\par \par 19 Interval history: s/p cholecystectomy and had some postop urinary retention\par Now voiding well. Still doing double voids\par A little dysuria\par Still having constipation\par Drinking 7-8 cups of water

## 2020-01-01 NOTE — ASSESSMENT
[FreeTextEntry1] : 65 yo F with history of recurrent UTI, recent urinary retention and currently with dysuria\par \par - UA, culture\par - Reaffirmed behavioral modifications\par - Keep FU in 6 months but if symptoms persist, follow-up sooner

## 2020-01-01 NOTE — PHYSICAL EXAM
[General Appearance - Well Developed] : well developed [General Appearance - Well Nourished] : well nourished [General Appearance - In No Acute Distress] : no acute distress [Normal Appearance] : normal appearance [Well Groomed] : well groomed [Abdomen Soft] : soft [Abdomen Tenderness] : non-tender [Costovertebral Angle Tenderness] : no ~M costovertebral angle tenderness [Urinary Bladder Findings] : the bladder was normal on palpation [FreeTextEntry1] : deferred today [Edema] : no peripheral edema [Respiration, Rhythm And Depth] : normal respiratory rhythm and effort [] : no respiratory distress [Exaggerated Use Of Accessory Muscles For Inspiration] : no accessory muscle use [Mood] : the mood was normal [Oriented To Time, Place, And Person] : oriented to person, place, and time [Affect] : the affect was normal [Not Anxious] : not anxious [Normal Station and Gait] : the gait and station were normal for the patient's age [No Focal Deficits] : no focal deficits [No Palpable Adenopathy] : no palpable adenopathy

## 2020-06-17 ENCOUNTER — APPOINTMENT (OUTPATIENT)
Age: 65
End: 2020-06-17

## 2020-07-25 ENCOUNTER — TRANSCRIPTION ENCOUNTER (OUTPATIENT)
Age: 65
End: 2020-07-25

## 2020-07-25 ENCOUNTER — RESULT REVIEW (OUTPATIENT)
Age: 65
End: 2020-07-25

## 2020-07-25 ENCOUNTER — INPATIENT (INPATIENT)
Facility: HOSPITAL | Age: 65
LOS: 19 days | Discharge: HOME | DRG: 25 | End: 2020-08-14
Attending: NEUROLOGICAL SURGERY | Admitting: NEUROLOGICAL SURGERY
Payer: MEDICARE

## 2020-07-25 VITALS
HEIGHT: 62.2 IN | RESPIRATION RATE: 18 BRPM | SYSTOLIC BLOOD PRESSURE: 177 MMHG | HEART RATE: 88 BPM | DIASTOLIC BLOOD PRESSURE: 77 MMHG | OXYGEN SATURATION: 98 % | TEMPERATURE: 98 F | WEIGHT: 111.99 LBS

## 2020-07-25 DIAGNOSIS — S06.5X9A TRAUMATIC SUBDURAL HEMORRHAGE WITH LOSS OF CONSCIOUSNESS OF UNSPECIFIED DURATION, INITIAL ENCOUNTER: ICD-10-CM

## 2020-07-25 DIAGNOSIS — Z86.79 PERSONAL HISTORY OF OTHER DISEASES OF THE CIRCULATORY SYSTEM: Chronic | ICD-10-CM

## 2020-07-25 DIAGNOSIS — Z98.890 OTHER SPECIFIED POSTPROCEDURAL STATES: Chronic | ICD-10-CM

## 2020-07-25 LAB
ALBUMIN SERPL ELPH-MCNC: 4.2 G/DL — SIGNIFICANT CHANGE UP (ref 3.3–5)
ALP SERPL-CCNC: 141 U/L — HIGH (ref 40–120)
ALT FLD-CCNC: 31 U/L — SIGNIFICANT CHANGE UP (ref 10–45)
ANION GAP SERPL CALC-SCNC: 16 MMOL/L — SIGNIFICANT CHANGE UP (ref 5–17)
APTT BLD: 24.6 SEC — LOW (ref 27.5–35.5)
AST SERPL-CCNC: 34 U/L — SIGNIFICANT CHANGE UP (ref 10–40)
BASE EXCESS BLDV CALC-SCNC: 2.3 MMOL/L — HIGH (ref -2–2)
BASOPHILS # BLD AUTO: 0 K/UL — SIGNIFICANT CHANGE UP (ref 0–0.2)
BASOPHILS NFR BLD AUTO: 0 % — SIGNIFICANT CHANGE UP (ref 0–2)
BILIRUB SERPL-MCNC: 0.2 MG/DL — SIGNIFICANT CHANGE UP (ref 0.2–1.2)
BLD GP AB SCN SERPL QL: NEGATIVE — SIGNIFICANT CHANGE UP
BUN SERPL-MCNC: 17 MG/DL — SIGNIFICANT CHANGE UP (ref 7–23)
CA-I SERPL-SCNC: 1.23 MMOL/L — SIGNIFICANT CHANGE UP (ref 1.12–1.3)
CALCIUM SERPL-MCNC: 9.7 MG/DL — SIGNIFICANT CHANGE UP (ref 8.4–10.5)
CHLORIDE BLDV-SCNC: 104 MMOL/L — SIGNIFICANT CHANGE UP (ref 96–108)
CHLORIDE SERPL-SCNC: 100 MMOL/L — SIGNIFICANT CHANGE UP (ref 96–108)
CO2 BLDV-SCNC: 27 MMOL/L — SIGNIFICANT CHANGE UP (ref 22–30)
CO2 SERPL-SCNC: 24 MMOL/L — SIGNIFICANT CHANGE UP (ref 22–31)
CREAT SERPL-MCNC: 0.4 MG/DL — LOW (ref 0.5–1.3)
EOSINOPHIL # BLD AUTO: 0.43 K/UL — SIGNIFICANT CHANGE UP (ref 0–0.5)
EOSINOPHIL NFR BLD AUTO: 3 % — SIGNIFICANT CHANGE UP (ref 0–6)
GAS PNL BLDA: SIGNIFICANT CHANGE UP
GAS PNL BLDA: SIGNIFICANT CHANGE UP
GAS PNL BLDV: 140 MMOL/L — SIGNIFICANT CHANGE UP (ref 135–145)
GAS PNL BLDV: SIGNIFICANT CHANGE UP
GAS PNL BLDV: SIGNIFICANT CHANGE UP
GIANT PLATELETS BLD QL SMEAR: PRESENT — SIGNIFICANT CHANGE UP
GLUCOSE BLDV-MCNC: 230 MG/DL — HIGH (ref 70–99)
GLUCOSE SERPL-MCNC: 230 MG/DL — HIGH (ref 70–99)
HCO3 BLDV-SCNC: 26 MMOL/L — SIGNIFICANT CHANGE UP (ref 21–29)
HCT VFR BLD CALC: 34.1 % — LOW (ref 34.5–45)
HCT VFR BLDA CALC: 35 % — LOW (ref 39–50)
HGB BLD CALC-MCNC: 11.3 G/DL — LOW (ref 11.5–15.5)
HGB BLD-MCNC: 10.7 G/DL — LOW (ref 11.5–15.5)
INR BLD: 1.04 RATIO — SIGNIFICANT CHANGE UP (ref 0.88–1.16)
LACTATE BLDV-MCNC: 3.3 MMOL/L — HIGH (ref 0.7–2)
LG PLATELETS BLD QL AUTO: SLIGHT — SIGNIFICANT CHANGE UP
LYMPHOCYTES # BLD AUTO: 22 % — SIGNIFICANT CHANGE UP (ref 13–44)
LYMPHOCYTES # BLD AUTO: 3.13 K/UL — SIGNIFICANT CHANGE UP (ref 1–3.3)
MAGNESIUM SERPL-MCNC: 1.8 MG/DL — SIGNIFICANT CHANGE UP (ref 1.6–2.6)
MANUAL SMEAR VERIFICATION: SIGNIFICANT CHANGE UP
MCHC RBC-ENTMCNC: 25.3 PG — LOW (ref 27–34)
MCHC RBC-ENTMCNC: 31.4 GM/DL — LOW (ref 32–36)
MCV RBC AUTO: 80.6 FL — SIGNIFICANT CHANGE UP (ref 80–100)
METAMYELOCYTES # FLD: 1 % — HIGH (ref 0–0)
MONOCYTES # BLD AUTO: 0.71 K/UL — SIGNIFICANT CHANGE UP (ref 0–0.9)
MONOCYTES NFR BLD AUTO: 5 % — SIGNIFICANT CHANGE UP (ref 2–14)
NEUTROPHILS # BLD AUTO: 8.83 K/UL — HIGH (ref 1.8–7.4)
NEUTROPHILS NFR BLD AUTO: 62 % — SIGNIFICANT CHANGE UP (ref 43–77)
NRBC # BLD: 0 /100 — SIGNIFICANT CHANGE UP (ref 0–0)
PCO2 BLDV: 38 MMHG — SIGNIFICANT CHANGE UP (ref 35–50)
PH BLDV: 7.45 — SIGNIFICANT CHANGE UP (ref 7.35–7.45)
PLAT MORPH BLD: ABNORMAL
PLATELET # BLD AUTO: 206 K/UL — SIGNIFICANT CHANGE UP (ref 150–400)
PO2 BLDV: 72 MMHG — HIGH (ref 25–45)
POTASSIUM BLDV-SCNC: 3.3 MMOL/L — LOW (ref 3.5–5.3)
POTASSIUM SERPL-MCNC: 3.1 MMOL/L — LOW (ref 3.5–5.3)
POTASSIUM SERPL-SCNC: 3.1 MMOL/L — LOW (ref 3.5–5.3)
PROT SERPL-MCNC: 7.9 G/DL — SIGNIFICANT CHANGE UP (ref 6–8.3)
PROTHROM AB SERPL-ACNC: 12.3 SEC — SIGNIFICANT CHANGE UP (ref 10.6–13.6)
RBC # BLD: 4.23 M/UL — SIGNIFICANT CHANGE UP (ref 3.8–5.2)
RBC # FLD: 15.1 % — HIGH (ref 10.3–14.5)
RBC BLD AUTO: SIGNIFICANT CHANGE UP
RH IG SCN BLD-IMP: POSITIVE — SIGNIFICANT CHANGE UP
SAO2 % BLDV: 92 % — HIGH (ref 67–88)
SODIUM SERPL-SCNC: 140 MMOL/L — SIGNIFICANT CHANGE UP (ref 135–145)
VARIANT LYMPHS # BLD: 7 % — HIGH (ref 0–6)
WBC # BLD: 14.24 K/UL — HIGH (ref 3.8–10.5)
WBC # FLD AUTO: 14.24 K/UL — HIGH (ref 3.8–10.5)

## 2020-07-25 PROCEDURE — 71260 CT THORAX DX C+: CPT | Mod: 26

## 2020-07-25 PROCEDURE — 99223 1ST HOSP IP/OBS HIGH 75: CPT

## 2020-07-25 PROCEDURE — 88304 TISSUE EXAM BY PATHOLOGIST: CPT | Mod: 26

## 2020-07-25 PROCEDURE — 71045 X-RAY EXAM CHEST 1 VIEW: CPT | Mod: 26

## 2020-07-25 PROCEDURE — 74177 CT ABD & PELVIS W/CONTRAST: CPT | Mod: 26

## 2020-07-25 PROCEDURE — 31500 INSERT EMERGENCY AIRWAY: CPT

## 2020-07-25 PROCEDURE — 72125 CT NECK SPINE W/O DYE: CPT | Mod: 26

## 2020-07-25 PROCEDURE — 70450 CT HEAD/BRAIN W/O DYE: CPT | Mod: 26

## 2020-07-25 PROCEDURE — 99291 CRITICAL CARE FIRST HOUR: CPT | Mod: 25

## 2020-07-25 RX ORDER — ETOMIDATE 2 MG/ML
20 INJECTION INTRAVENOUS ONCE
Refills: 0 | Status: COMPLETED | OUTPATIENT
Start: 2020-07-25 | End: 2020-07-25

## 2020-07-25 RX ORDER — DESMOPRESSIN ACETATE 0.1 MG/1
16 TABLET ORAL ONCE
Refills: 0 | Status: DISCONTINUED | OUTPATIENT
Start: 2020-07-25 | End: 2020-07-25

## 2020-07-25 RX ORDER — VECURONIUM BROMIDE 20 MG/1
10 INJECTION, POWDER, FOR SOLUTION INTRAVENOUS ONCE
Refills: 0 | Status: COMPLETED | OUTPATIENT
Start: 2020-07-25 | End: 2020-07-25

## 2020-07-25 RX ORDER — SUCCINYLCHOLINE CHLORIDE 100 MG/5ML
100 SYRINGE (ML) INTRAVENOUS ONCE
Refills: 0 | Status: COMPLETED | OUTPATIENT
Start: 2020-07-25 | End: 2020-07-25

## 2020-07-25 RX ORDER — MIDAZOLAM HYDROCHLORIDE 1 MG/ML
2 INJECTION, SOLUTION INTRAMUSCULAR; INTRAVENOUS ONCE
Refills: 0 | Status: DISCONTINUED | OUTPATIENT
Start: 2020-07-25 | End: 2020-07-25

## 2020-07-25 RX ORDER — FENTANYL CITRATE 50 UG/ML
50 INJECTION INTRAVENOUS ONCE
Refills: 0 | Status: DISCONTINUED | OUTPATIENT
Start: 2020-07-25 | End: 2020-07-25

## 2020-07-25 RX ORDER — DESMOPRESSIN ACETATE 0.1 MG/1
30 TABLET ORAL ONCE
Refills: 0 | Status: DISCONTINUED | OUTPATIENT
Start: 2020-07-25 | End: 2020-07-25

## 2020-07-25 RX ADMIN — FENTANYL CITRATE 50 MICROGRAM(S): 50 INJECTION INTRAVENOUS at 20:27

## 2020-07-25 RX ADMIN — ETOMIDATE 20 MILLIGRAM(S): 2 INJECTION INTRAVENOUS at 20:11

## 2020-07-25 RX ADMIN — VECURONIUM BROMIDE 10 MILLIGRAM(S): 20 INJECTION, POWDER, FOR SOLUTION INTRAVENOUS at 20:28

## 2020-07-25 RX ADMIN — Medication 100 MILLIGRAM(S): at 20:11

## 2020-07-25 RX ADMIN — FENTANYL CITRATE 50 MICROGRAM(S): 50 INJECTION INTRAVENOUS at 21:00

## 2020-07-25 RX ADMIN — MIDAZOLAM HYDROCHLORIDE 2 MILLIGRAM(S): 1 INJECTION, SOLUTION INTRAMUSCULAR; INTRAVENOUS at 20:25

## 2020-07-25 NOTE — PRE-ANESTHESIA EVALUATION ADULT - NSANTHPMHFT_GEN_ALL_CORE
s/p fall 2 hrs ago no LOC; feels dizzy, alert and oriented x3 in triage; h/o psoriasis, cataract, s/p lap bhavna for cholecystitis

## 2020-07-25 NOTE — ED PROVIDER NOTE - ATTENDING CONTRIBUTION TO CARE
I performed a history and physical exam of the patient and discussed their management with the resident. I reviewed the resident's note and agree with the documented findings and plan of care. My medical decision making and observations are found above.    Patient is 65F hx dm, htn p/w head injury. Patient became acutely unresponsive in the ER, was wheeled to trauma bay. Was not protecting airway with GCS 5, motor 3 eyes 1, verbal1. Level 1 trauma activated. intubated for airway protection. primary survey notable for gcs 5 , sluggish 3+ pupils b/l, but otherwise unremarkable. Story per daughter - patient tripped over chair, hit back of head, unclear LOC, was vomiting enroute to ER and was initial awake an responsive during triage.    Gen: unresponsive, eyes closed, gargling upper airway noises  Head: NCAT  HEENT: no obvious foreign bodies. PERRL, oral mucosa moist, normal conjunctiva, oropharynx clear without exudate or erythema. no evidence of basilar skull fracture.   Lung: CTAB, no respiratory distress, no wheezing, rales, rhonchi  CV: normal s1/s2, rrr, no murmurs, Normal perfusion, pulses 2+ throughout  Abd: soft, NTND, no CVA tenderness  MSK: No edema, no visible deformities, full range of motion in all 4 extremities  Neuro: +babinski b/l, posturing in all extremities,   Skin: No rash     Patient is ill appearing, worsening mental status. intubated for airway protection. likely head bleed s/p mechanical fall. no other obvious signs of trauma and hemodynamically stable. level 1 trauma activation. primary and secondary survey intact. appreciate trauma/nsgy recs. admit I performed a history and physical exam of the patient and discussed their management with the PA. I reviewed the PA's note and agree with the documented findings and plan of care. My medical decision making and observations are found above.    Patient is 65F hx dm, htn p/w head injury. Patient became acutely unresponsive in the ER, was wheeled to trauma bay. Was not protecting airway with GCS 5, motor 3 eyes 1, verbal1. Level 1 trauma activated. intubated for airway protection. primary survey notable for gcs 5 , sluggish 3+ pupils b/l, but otherwise unremarkable. Story per daughter - patient tripped over chair, hit back of head, unclear LOC, was vomiting enroute to ER and was initial awake an responsive during triage.    Gen: unresponsive, eyes closed, gargling upper airway noises  Head: NCAT  HEENT: no obvious foreign bodies. PERRL, oral mucosa moist, normal conjunctiva, oropharynx clear without exudate or erythema. no evidence of basilar skull fracture.   Lung: CTAB, no respiratory distress, no wheezing, rales, rhonchi  CV: normal s1/s2, rrr, no murmurs, Normal perfusion, pulses 2+ throughout  Abd: soft, NTND, no CVA tenderness  MSK: No edema, no visible deformities, full range of motion in all 4 extremities  Neuro: +babinski b/l, posturing in all extremities,   Skin: No rash     Patient is ill appearing, worsening mental status. intubated for airway protection. likely head bleed s/p mechanical fall. no other obvious signs of trauma and hemodynamically stable. level 1 trauma activation. primary and secondary survey intact. appreciate trauma/nsgy recs. admit

## 2020-07-25 NOTE — ED ADULT NURSE NOTE - NS ED NURSE TRANSPORT WITH
Cardiac Monitor/Defib/ACLS/Rescue Kit/O2/BVM/ventilator/oxygen/IV pump/pulse ox/Blood Products/ACLS Rescue Kit

## 2020-07-25 NOTE — ED PROVIDER NOTE - CRITICAL CARE INDICATION, MLM
patient was critically ill... Patient was critically ill with a high probability of imminent or life threatening deterioration. acute sdh

## 2020-07-25 NOTE — ED PROVIDER NOTE - CADM POA PRESS ULCER

## 2020-07-25 NOTE — CHART NOTE - NSCHARTNOTEFT_GEN_A_CORE
CAPRINI SCORE [CLOT] Score on Admission for     AGE RELATED RISK FACTORS                                                       MOBILITY RELATED FACTORS  [ ] Age 41-60 years                                            (1 Point)                  [ ] Bed rest                                                        (1 Point)  [X] Age: 61-74 years                                           (2 Points)                [ ] Plaster cast                                                   (2 Points)  [ ] Age= 75 years                                              (3 Points)                  [ ] Bed bound for more than 72 hours         (2 Points)    DISEASE RELATED RISK FACTORS                                               GENDER SPECIFIC FACTORS  [ ] Edema in the lower extremities                       (1 Point)           [ ] Pregnancy                                                          (1 Point)  [X] Varicose veins                                               (1 Point)                  [ ] Post-partum < 6 weeks                                   (1 Point)             [ ] BMI > 25 Kg/m2                                            (1 Point)                  [ ] Hormonal therapy  or oral contraception   (1 Point)                 [ ] Sepsis (in the previous month)                        (1 Point)            [ ] History of pregnancy complications             (1 point)  [ ] Pneumonia or serious lung disease                                            [ ] Unexplained or recurrent               (1 Point)           (in the previous month)                               (1 Point)  [ ] Abnormal pulmonary function test                     (1 Point)                 SURGERY RELATED RISK FACTORS (include planned surgeries)  [ ] Acute myocardial infarction                              (1 Point)                 [ ]  Section                                             (1 Point)  [ ] Congestive heart failure (in the previous month)  (1 Point)        [ ] Minor surgery                                                  (1 Point)   [ ] Inflammatory bowel disease                             (1 Point)                 [ ] Arthroscopic surgery                                        (2 Points)  [ ] Central venous access                                      (2 Points)  [ ] History / presence of malignancy                   (2 points)   [X] General surgery lasting more than 45 minutes   (2 Points)       [ ] Stroke (in the previous month)                          (5 Points)               [ ] Elective arthroplasty                                         (5 Points)                                                                                                                                               HEMATOLOGY RELATED FACTORS                                                 TRAUMA RELATED RISK FACTORS  [ ] Prior episodes of VTE                                     (3 Points)                [ ] Fracture of the hip, pelvis, or leg                       (5 Points)  [ ] Positive family history for VTE                         (3 Points)             [ ] Acute spinal cord injury (in the previous month)  (5 Points)  [ ] Prothrombin 28037 A                                     (3 Points)                [ ] Paralysis  (less than 1 month)                             (5 Points)  [ ] Factor V Leiden                                             (3 Points)                   [ ] Multiple Trauma within 1 month                        (5 Points)  [ ] Lupus anticoagulants                                     (3 Points)                                                           [ ] Anticardiolipin antibodies                               (3 Points)                                                       [ ] High homocysteine in the blood                      (3 Points)                                             [ ] Other congenital or acquired thrombophilia      (3 Points)                                                [ ] Heparin induced thrombocytopenia                  (3 Points)                                          Total Score [   5      ]    Risk:  Very low 0   Low 1 to 2   Moderate 3 to 4   High =5       VTE Prophylaxis Recommendations:  [X] mechanical pneumatic compression devices                                      [ ] contraindicated: _____________________  [ ] chemoprophylaxis                                                                                    [X] contraindicated ________fresh brain bleed_____________    **** HIGH LIKELIHOOD DVT PRESENT ON ADMISSION  [X] (please order LE dopplers within 24 hours of admission)

## 2020-07-25 NOTE — ED ADULT NURSE REASSESSMENT NOTE - NS ED NURSE REASSESS COMMENT FT1
20:05 pt moved from Red over to critical D for decreased mental status and unresponsiveness.   20:12 level 1 trauma activated  20:13 pt intubated for airway protection- size 7.5ETT 26cm at lip- confirmed via ETCO2, b/l breath sounds and CXR.  20:30 pt taken to CT scan, taken up to OR accompanied by RN, EDT, Respiratory and trauma attending Jenaro.   20:50 emergent platelets administered as per MD Eason's orders. 2 RN verified prior to administration.   20:55 pt in OR      see trauma flowsheet.

## 2020-07-25 NOTE — ED PROVIDER NOTE - PROGRESS NOTE DETAILS
AG attg: patient initially seen in [facility] section of ER. Was wheeled into the ER by ancillary staff and noted to be alert and ?oriented. Upon evaluation by ER tech, patient was noted to have b/l eye tearing and became unresponsive at which point, providers were alerted and patient was wheeled into trauma bay. Patient had guarded neurological exam and was not protecting airway. Decision was made to intubate using glidoscope with head/neck stabilization given hx of trauma. Suspected head bleed given hx of mechanical fall, headstrike, vomiting. No other evidence of trauma on secondary exam. patient remained hemodynamically stable. pre and post intubation. Was brought to CT with trauma team/nsgy. Plan to go directly to OR given acute head bleed.

## 2020-07-25 NOTE — ED PROVIDER NOTE - OBJECTIVE STATEMENT
64 yo female with pmh DM, HTN brought in by family fro concern of HA, dizziness, multiple episodes of emesis s/p mechanical fall 2 hours PTA. Pt walked into triage, however before being examined pt became lethargic and unarousable, brought to CC room and intubated for airway protection and level 1 trauma activated. HPI obtained per daughter in law over the phone. Daughter in law states she fell over a toy chair, fell backwards striking her head, no apparent LOC however started to have worsening HA, lightheadedness and multiple episodes of emesis enroute. Pt on asa daily, no other AC use.

## 2020-07-25 NOTE — ED ADULT NURSE NOTE - NS ED NURSE DISCH DISPOSITION
"Chief Complaint   Patient presents with     Anxiety       Initial /60 (BP Location: Right arm, Patient Position: Chair, Cuff Size: Adult Regular)  Pulse 58  Temp 98.4  F (36.9  C) (Oral)  Resp 12  Wt 146 lb (66.2 kg)  BMI 21.56 kg/m2 Estimated body mass index is 21.56 kg/(m^2) as calculated from the following:    Height as of 5/15/17: 5' 9\" (1.753 m).    Weight as of this encounter: 146 lb (66.2 kg).  Medication Reconciliation: complete   Cristy Puente CMA      " Admitted

## 2020-07-25 NOTE — ED PROVIDER NOTE - PHYSICAL EXAMINATION
Gen: unresponsive, eyes closed, gargling upper airway noises  Head: NCAT  HEENT: no obvious foreign bodies. PERRL, oral mucosa moist, normal conjunctiva, oropharynx clear without exudate or erythema. no evidence of basilar skull fracture.   Lung: CTAB, no respiratory distress, no wheezing, rales, rhonchi  CV: normal s1/s2, rrr, no murmurs, Normal perfusion, pulses 2+ throughout  Abd: soft, NTND, no CVA tenderness  MSK: No edema, no visible deformities, full range of motion in all 4 extremities  Neuro: +babinski b/l, posturing in all extremities,   Skin: No rash

## 2020-07-25 NOTE — ED ADULT NURSE NOTE - OBJECTIVE STATEMENT
65y old female PMH HTN, DM, HLD walk in from triage c/o fall. As per patients family patient tripped over toy, fell and hit back of head on chair, no LOC< pt c/o dizziness, and on the way to hospital became nauseous and vomiting.  1950- Patient presented lethargic, was able to respond to 1st question  1952 shorty after became unresponsive, unresponsive to painful stimuli,   1953 Attending MD notified, patient bought over to critical care, access initiated, FS completed.   1955 report given to CHEKO Champion, placed on monitor, ECG performed, patient intubated, level one trauma activated.

## 2020-07-26 ENCOUNTER — TRANSCRIPTION ENCOUNTER (OUTPATIENT)
Age: 65
End: 2020-07-26

## 2020-07-26 LAB
A1C WITH ESTIMATED AVERAGE GLUCOSE RESULT: 7.2 % — HIGH (ref 4–5.6)
ALBUMIN SERPL ELPH-MCNC: 3.5 G/DL — SIGNIFICANT CHANGE UP (ref 3.3–5)
ALP SERPL-CCNC: 116 U/L — SIGNIFICANT CHANGE UP (ref 40–120)
ALT FLD-CCNC: 27 U/L — SIGNIFICANT CHANGE UP (ref 10–45)
ANION GAP SERPL CALC-SCNC: 13 MMOL/L — SIGNIFICANT CHANGE UP (ref 5–17)
ANION GAP SERPL CALC-SCNC: 19 MMOL/L — HIGH (ref 5–17)
APPEARANCE UR: CLEAR — SIGNIFICANT CHANGE UP
APTT BLD: 26.8 SEC — LOW (ref 27.5–35.5)
AST SERPL-CCNC: 63 U/L — HIGH (ref 10–40)
BACTERIA # UR AUTO: NEGATIVE — SIGNIFICANT CHANGE UP
BASOPHILS # BLD AUTO: 0.02 K/UL — SIGNIFICANT CHANGE UP (ref 0–0.2)
BASOPHILS NFR BLD AUTO: 0.1 % — SIGNIFICANT CHANGE UP (ref 0–2)
BILIRUB SERPL-MCNC: 0.4 MG/DL — SIGNIFICANT CHANGE UP (ref 0.2–1.2)
BILIRUB UR-MCNC: NEGATIVE — SIGNIFICANT CHANGE UP
BUN SERPL-MCNC: 14 MG/DL — SIGNIFICANT CHANGE UP (ref 7–23)
BUN SERPL-MCNC: 16 MG/DL — SIGNIFICANT CHANGE UP (ref 7–23)
CALCIUM SERPL-MCNC: 8.2 MG/DL — LOW (ref 8.4–10.5)
CALCIUM SERPL-MCNC: 8.6 MG/DL — SIGNIFICANT CHANGE UP (ref 8.4–10.5)
CHLORIDE SERPL-SCNC: 102 MMOL/L — SIGNIFICANT CHANGE UP (ref 96–108)
CHLORIDE SERPL-SCNC: 105 MMOL/L — SIGNIFICANT CHANGE UP (ref 96–108)
CO2 SERPL-SCNC: 19 MMOL/L — LOW (ref 22–31)
CO2 SERPL-SCNC: 23 MMOL/L — SIGNIFICANT CHANGE UP (ref 22–31)
COLOR SPEC: YELLOW — SIGNIFICANT CHANGE UP
CREAT SERPL-MCNC: 0.39 MG/DL — LOW (ref 0.5–1.3)
CREAT SERPL-MCNC: 0.39 MG/DL — LOW (ref 0.5–1.3)
DIFF PNL FLD: ABNORMAL
EOSINOPHIL # BLD AUTO: 0.06 K/UL — SIGNIFICANT CHANGE UP (ref 0–0.5)
EOSINOPHIL NFR BLD AUTO: 0.3 % — SIGNIFICANT CHANGE UP (ref 0–6)
EPI CELLS # UR: 3 /HPF — SIGNIFICANT CHANGE UP
ESTIMATED AVERAGE GLUCOSE: 160 MG/DL — HIGH (ref 68–114)
GAS PNL BLDA: SIGNIFICANT CHANGE UP
GLUCOSE BLDC GLUCOMTR-MCNC: 195 MG/DL — HIGH (ref 70–99)
GLUCOSE BLDC GLUCOMTR-MCNC: 207 MG/DL — HIGH (ref 70–99)
GLUCOSE BLDC GLUCOMTR-MCNC: 231 MG/DL — HIGH (ref 70–99)
GLUCOSE BLDC GLUCOMTR-MCNC: 300 MG/DL — HIGH (ref 70–99)
GLUCOSE BLDC GLUCOMTR-MCNC: 307 MG/DL — HIGH (ref 70–99)
GLUCOSE SERPL-MCNC: 231 MG/DL — HIGH (ref 70–99)
GLUCOSE SERPL-MCNC: 256 MG/DL — HIGH (ref 70–99)
GLUCOSE UR QL: ABNORMAL
HCT VFR BLD CALC: 21.9 % — LOW (ref 34.5–45)
HCT VFR BLD CALC: 27.3 % — LOW (ref 34.5–45)
HGB BLD-MCNC: 7.1 G/DL — LOW (ref 11.5–15.5)
HGB BLD-MCNC: 8.4 G/DL — LOW (ref 11.5–15.5)
HYALINE CASTS # UR AUTO: 3 /LPF — HIGH (ref 0–2)
IMM GRANULOCYTES NFR BLD AUTO: 0.7 % — SIGNIFICANT CHANGE UP (ref 0–1.5)
INR BLD: 1.2 RATIO — HIGH (ref 0.88–1.16)
KETONES UR-MCNC: ABNORMAL
LEUKOCYTE ESTERASE UR-ACNC: NEGATIVE — SIGNIFICANT CHANGE UP
LYMPHOCYTES # BLD AUTO: 22.6 % — SIGNIFICANT CHANGE UP (ref 13–44)
LYMPHOCYTES # BLD AUTO: 3.98 K/UL — HIGH (ref 1–3.3)
MAGNESIUM SERPL-MCNC: 1.6 MG/DL — SIGNIFICANT CHANGE UP (ref 1.6–2.6)
MAGNESIUM SERPL-MCNC: 2.1 MG/DL — SIGNIFICANT CHANGE UP (ref 1.6–2.6)
MCHC RBC-ENTMCNC: 25.2 PG — LOW (ref 27–34)
MCHC RBC-ENTMCNC: 26.2 PG — LOW (ref 27–34)
MCHC RBC-ENTMCNC: 30.8 GM/DL — LOW (ref 32–36)
MCHC RBC-ENTMCNC: 32.4 GM/DL — SIGNIFICANT CHANGE UP (ref 32–36)
MCV RBC AUTO: 80.8 FL — SIGNIFICANT CHANGE UP (ref 80–100)
MCV RBC AUTO: 82 FL — SIGNIFICANT CHANGE UP (ref 80–100)
MONOCYTES # BLD AUTO: 1.7 K/UL — HIGH (ref 0–0.9)
MONOCYTES NFR BLD AUTO: 9.6 % — SIGNIFICANT CHANGE UP (ref 2–14)
NEUTROPHILS # BLD AUTO: 11.73 K/UL — HIGH (ref 1.8–7.4)
NEUTROPHILS NFR BLD AUTO: 66.7 % — SIGNIFICANT CHANGE UP (ref 43–77)
NITRITE UR-MCNC: NEGATIVE — SIGNIFICANT CHANGE UP
NRBC # BLD: 0 /100 WBCS — SIGNIFICANT CHANGE UP (ref 0–0)
NRBC # BLD: 0 /100 WBCS — SIGNIFICANT CHANGE UP (ref 0–0)
PH UR: 6.5 — SIGNIFICANT CHANGE UP (ref 5–8)
PHOSPHATE SERPL-MCNC: 2.9 MG/DL — SIGNIFICANT CHANGE UP (ref 2.5–4.5)
PHOSPHATE SERPL-MCNC: 4.5 MG/DL — SIGNIFICANT CHANGE UP (ref 2.5–4.5)
PLATELET # BLD AUTO: 139 K/UL — LOW (ref 150–400)
PLATELET # BLD AUTO: 252 K/UL — SIGNIFICANT CHANGE UP (ref 150–400)
POTASSIUM SERPL-MCNC: 3.5 MMOL/L — SIGNIFICANT CHANGE UP (ref 3.5–5.3)
POTASSIUM SERPL-MCNC: 3.8 MMOL/L — SIGNIFICANT CHANGE UP (ref 3.5–5.3)
POTASSIUM SERPL-SCNC: 3.5 MMOL/L — SIGNIFICANT CHANGE UP (ref 3.5–5.3)
POTASSIUM SERPL-SCNC: 3.8 MMOL/L — SIGNIFICANT CHANGE UP (ref 3.5–5.3)
PROT SERPL-MCNC: 6.5 G/DL — SIGNIFICANT CHANGE UP (ref 6–8.3)
PROT UR-MCNC: ABNORMAL
PROTHROM AB SERPL-ACNC: 14.2 SEC — HIGH (ref 10.6–13.6)
RBC # BLD: 2.71 M/UL — LOW (ref 3.8–5.2)
RBC # BLD: 3.33 M/UL — LOW (ref 3.8–5.2)
RBC # FLD: 15.3 % — HIGH (ref 10.3–14.5)
RBC # FLD: 15.9 % — HIGH (ref 10.3–14.5)
RBC CASTS # UR COMP ASSIST: 4 /HPF — SIGNIFICANT CHANGE UP (ref 0–4)
SARS-COV-2 RNA SPEC QL NAA+PROBE: SIGNIFICANT CHANGE UP
SODIUM SERPL-SCNC: 138 MMOL/L — SIGNIFICANT CHANGE UP (ref 135–145)
SODIUM SERPL-SCNC: 143 MMOL/L — SIGNIFICANT CHANGE UP (ref 135–145)
SP GR SPEC: 1.03 — HIGH (ref 1.01–1.02)
UROBILINOGEN FLD QL: NEGATIVE — SIGNIFICANT CHANGE UP
WBC # BLD: 10.52 K/UL — HIGH (ref 3.8–10.5)
WBC # BLD: 17.62 K/UL — HIGH (ref 3.8–10.5)
WBC # FLD AUTO: 10.52 K/UL — HIGH (ref 3.8–10.5)
WBC # FLD AUTO: 17.62 K/UL — HIGH (ref 3.8–10.5)
WBC UR QL: 3 /HPF — SIGNIFICANT CHANGE UP (ref 0–5)

## 2020-07-26 PROCEDURE — 70450 CT HEAD/BRAIN W/O DYE: CPT | Mod: 26,76

## 2020-07-26 PROCEDURE — 99292 CRITICAL CARE ADDL 30 MIN: CPT

## 2020-07-26 PROCEDURE — 99291 CRITICAL CARE FIRST HOUR: CPT

## 2020-07-26 PROCEDURE — 71045 X-RAY EXAM CHEST 1 VIEW: CPT | Mod: 26,77

## 2020-07-26 PROCEDURE — 71045 X-RAY EXAM CHEST 1 VIEW: CPT | Mod: 26

## 2020-07-26 PROCEDURE — 61312 CRNEC/CRNOT STTL XDRL/SDRL: CPT

## 2020-07-26 RX ORDER — CHLORHEXIDINE GLUCONATE 213 G/1000ML
1 SOLUTION TOPICAL
Refills: 0 | Status: DISCONTINUED | OUTPATIENT
Start: 2020-07-26 | End: 2020-07-31

## 2020-07-26 RX ORDER — POTASSIUM CHLORIDE 20 MEQ
40 PACKET (EA) ORAL ONCE
Refills: 0 | Status: COMPLETED | OUTPATIENT
Start: 2020-07-26 | End: 2020-07-26

## 2020-07-26 RX ORDER — POLYETHYLENE GLYCOL 3350 17 G/17G
17 POWDER, FOR SOLUTION ORAL
Refills: 0 | Status: DISCONTINUED | OUTPATIENT
Start: 2020-07-26 | End: 2020-08-14

## 2020-07-26 RX ORDER — SODIUM CHLORIDE 9 MG/ML
1000 INJECTION, SOLUTION INTRAVENOUS
Refills: 0 | Status: DISCONTINUED | OUTPATIENT
Start: 2020-07-26 | End: 2020-07-27

## 2020-07-26 RX ORDER — CHLORHEXIDINE GLUCONATE 213 G/1000ML
15 SOLUTION TOPICAL EVERY 12 HOURS
Refills: 0 | Status: DISCONTINUED | OUTPATIENT
Start: 2020-07-26 | End: 2020-07-30

## 2020-07-26 RX ORDER — AMLODIPINE BESYLATE 2.5 MG/1
5 TABLET ORAL DAILY
Refills: 0 | Status: DISCONTINUED | OUTPATIENT
Start: 2020-07-26 | End: 2020-07-26

## 2020-07-26 RX ORDER — CEFAZOLIN SODIUM 1 G
2000 VIAL (EA) INJECTION ONCE
Refills: 0 | Status: COMPLETED | OUTPATIENT
Start: 2020-07-26 | End: 2020-07-26

## 2020-07-26 RX ORDER — CEFAZOLIN SODIUM 1 G
2000 VIAL (EA) INJECTION EVERY 8 HOURS
Refills: 0 | Status: COMPLETED | OUTPATIENT
Start: 2020-07-26 | End: 2020-07-26

## 2020-07-26 RX ORDER — MAGNESIUM SULFATE 500 MG/ML
2 VIAL (ML) INJECTION ONCE
Refills: 0 | Status: COMPLETED | OUTPATIENT
Start: 2020-07-26 | End: 2020-07-26

## 2020-07-26 RX ORDER — ACETAMINOPHEN 500 MG
650 TABLET ORAL EVERY 6 HOURS
Refills: 0 | Status: DISCONTINUED | OUTPATIENT
Start: 2020-07-26 | End: 2020-08-14

## 2020-07-26 RX ORDER — LEVETIRACETAM 250 MG/1
500 TABLET, FILM COATED ORAL EVERY 12 HOURS
Refills: 0 | Status: DISCONTINUED | OUTPATIENT
Start: 2020-07-26 | End: 2020-07-26

## 2020-07-26 RX ORDER — DEXTROSE MONOHYDRATE, SODIUM CHLORIDE, AND POTASSIUM CHLORIDE 50; .745; 4.5 G/1000ML; G/1000ML; G/1000ML
1000 INJECTION, SOLUTION INTRAVENOUS
Refills: 0 | Status: DISCONTINUED | OUTPATIENT
Start: 2020-07-26 | End: 2020-07-26

## 2020-07-26 RX ORDER — PANTOPRAZOLE SODIUM 20 MG/1
40 TABLET, DELAYED RELEASE ORAL DAILY
Refills: 0 | Status: DISCONTINUED | OUTPATIENT
Start: 2020-07-26 | End: 2020-07-31

## 2020-07-26 RX ORDER — CALCIUM GLUCONATE 100 MG/ML
2 VIAL (ML) INTRAVENOUS ONCE
Refills: 0 | Status: COMPLETED | OUTPATIENT
Start: 2020-07-26 | End: 2020-07-26

## 2020-07-26 RX ORDER — ACETAMINOPHEN 500 MG
650 TABLET ORAL EVERY 6 HOURS
Refills: 0 | Status: DISCONTINUED | OUTPATIENT
Start: 2020-07-26 | End: 2020-07-26

## 2020-07-26 RX ORDER — HUMAN INSULIN 100 [IU]/ML
3 INJECTION, SUSPENSION SUBCUTANEOUS EVERY 6 HOURS
Refills: 0 | Status: DISCONTINUED | OUTPATIENT
Start: 2020-07-26 | End: 2020-07-27

## 2020-07-26 RX ORDER — LEVETIRACETAM 250 MG/1
500 TABLET, FILM COATED ORAL EVERY 12 HOURS
Refills: 0 | Status: DISCONTINUED | OUTPATIENT
Start: 2020-07-26 | End: 2020-07-27

## 2020-07-26 RX ORDER — LEVETIRACETAM 250 MG/1
500 TABLET, FILM COATED ORAL
Refills: 0 | Status: DISCONTINUED | OUTPATIENT
Start: 2020-07-26 | End: 2020-07-26

## 2020-07-26 RX ORDER — CEFAZOLIN SODIUM 1 G
VIAL (EA) INJECTION
Refills: 0 | Status: COMPLETED | OUTPATIENT
Start: 2020-07-26 | End: 2020-07-26

## 2020-07-26 RX ORDER — HUMAN INSULIN 100 [IU]/ML
10 INJECTION, SUSPENSION SUBCUTANEOUS ONCE
Refills: 0 | Status: COMPLETED | OUTPATIENT
Start: 2020-07-26 | End: 2020-07-26

## 2020-07-26 RX ORDER — ACETAMINOPHEN 500 MG
1000 TABLET ORAL ONCE
Refills: 0 | Status: COMPLETED | OUTPATIENT
Start: 2020-07-26 | End: 2020-07-26

## 2020-07-26 RX ORDER — FENTANYL CITRATE 50 UG/ML
12.5 INJECTION INTRAVENOUS
Refills: 0 | Status: DISCONTINUED | OUTPATIENT
Start: 2020-07-26 | End: 2020-07-30

## 2020-07-26 RX ORDER — SENNA PLUS 8.6 MG/1
2 TABLET ORAL AT BEDTIME
Refills: 0 | Status: DISCONTINUED | OUTPATIENT
Start: 2020-07-26 | End: 2020-08-14

## 2020-07-26 RX ORDER — LABETALOL HCL 100 MG
10 TABLET ORAL EVERY 6 HOURS
Refills: 0 | Status: DISCONTINUED | OUTPATIENT
Start: 2020-07-26 | End: 2020-07-30

## 2020-07-26 RX ORDER — DEXMEDETOMIDINE HYDROCHLORIDE IN 0.9% SODIUM CHLORIDE 4 UG/ML
0.2 INJECTION INTRAVENOUS
Qty: 200 | Refills: 0 | Status: DISCONTINUED | OUTPATIENT
Start: 2020-07-26 | End: 2020-07-26

## 2020-07-26 RX ORDER — NICARDIPINE HYDROCHLORIDE 30 MG/1
5 CAPSULE, EXTENDED RELEASE ORAL
Qty: 40 | Refills: 0 | Status: DISCONTINUED | OUTPATIENT
Start: 2020-07-26 | End: 2020-07-26

## 2020-07-26 RX ORDER — INSULIN LISPRO 100/ML
VIAL (ML) SUBCUTANEOUS EVERY 6 HOURS
Refills: 0 | Status: DISCONTINUED | OUTPATIENT
Start: 2020-07-26 | End: 2020-08-10

## 2020-07-26 RX ADMIN — HUMAN INSULIN 3 UNIT(S): 100 INJECTION, SUSPENSION SUBCUTANEOUS at 17:10

## 2020-07-26 RX ADMIN — Medication 650 MILLIGRAM(S): at 21:20

## 2020-07-26 RX ADMIN — Medication 1000 MILLIGRAM(S): at 02:50

## 2020-07-26 RX ADMIN — Medication 4: at 07:29

## 2020-07-26 RX ADMIN — POLYETHYLENE GLYCOL 3350 17 GRAM(S): 17 POWDER, FOR SOLUTION ORAL at 17:06

## 2020-07-26 RX ADMIN — Medication 54 GRAM(S): at 11:57

## 2020-07-26 RX ADMIN — HUMAN INSULIN 10 UNIT(S): 100 INJECTION, SUSPENSION SUBCUTANEOUS at 23:28

## 2020-07-26 RX ADMIN — SODIUM CHLORIDE 100 MILLILITER(S): 9 INJECTION, SOLUTION INTRAVENOUS at 17:10

## 2020-07-26 RX ADMIN — Medication 100 MILLIGRAM(S): at 02:05

## 2020-07-26 RX ADMIN — Medication 100 MILLIGRAM(S): at 13:16

## 2020-07-26 RX ADMIN — Medication 200 GRAM(S): at 05:53

## 2020-07-26 RX ADMIN — Medication 100 MILLIGRAM(S): at 05:51

## 2020-07-26 RX ADMIN — Medication 100 MILLIGRAM(S): at 21:26

## 2020-07-26 RX ADMIN — CHLORHEXIDINE GLUCONATE 15 MILLILITER(S): 213 SOLUTION TOPICAL at 05:51

## 2020-07-26 RX ADMIN — SENNA PLUS 2 TABLET(S): 8.6 TABLET ORAL at 21:21

## 2020-07-26 RX ADMIN — PANTOPRAZOLE SODIUM 40 MILLIGRAM(S): 20 TABLET, DELAYED RELEASE ORAL at 11:51

## 2020-07-26 RX ADMIN — Medication 400 MILLIGRAM(S): at 02:20

## 2020-07-26 RX ADMIN — FENTANYL CITRATE 12.5 MICROGRAM(S): 50 INJECTION INTRAVENOUS at 17:00

## 2020-07-26 RX ADMIN — Medication 6: at 23:20

## 2020-07-26 RX ADMIN — Medication 10 MILLIGRAM(S): at 13:37

## 2020-07-26 RX ADMIN — LEVETIRACETAM 400 MILLIGRAM(S): 250 TABLET, FILM COATED ORAL at 05:51

## 2020-07-26 RX ADMIN — FENTANYL CITRATE 12.5 MICROGRAM(S): 50 INJECTION INTRAVENOUS at 16:45

## 2020-07-26 RX ADMIN — Medication 40 MILLIEQUIVALENT(S): at 23:28

## 2020-07-26 RX ADMIN — FENTANYL CITRATE 12.5 MICROGRAM(S): 50 INJECTION INTRAVENOUS at 12:40

## 2020-07-26 RX ADMIN — CHLORHEXIDINE GLUCONATE 15 MILLILITER(S): 213 SOLUTION TOPICAL at 17:07

## 2020-07-26 RX ADMIN — LEVETIRACETAM 400 MILLIGRAM(S): 250 TABLET, FILM COATED ORAL at 17:06

## 2020-07-26 RX ADMIN — Medication 2: at 17:10

## 2020-07-26 RX ADMIN — CHLORHEXIDINE GLUCONATE 1 APPLICATION(S): 213 SOLUTION TOPICAL at 21:21

## 2020-07-26 RX ADMIN — Medication 4: at 11:52

## 2020-07-26 RX ADMIN — POLYETHYLENE GLYCOL 3350 17 GRAM(S): 17 POWDER, FOR SOLUTION ORAL at 05:52

## 2020-07-26 RX ADMIN — HUMAN INSULIN 3 UNIT(S): 100 INJECTION, SUSPENSION SUBCUTANEOUS at 11:53

## 2020-07-26 RX ADMIN — FENTANYL CITRATE 12.5 MICROGRAM(S): 50 INJECTION INTRAVENOUS at 12:21

## 2020-07-26 NOTE — PATIENT PROFILE ADULT - NSPROHMSYMPCOND_GEN_A_NUR
2017  Ann-Marie Saavedra is a 33 y.o. female  who presents for IOL. Had an epidural with her previous pregnancy without issue.     Denies any neurologic, endocrine, cardiopulmonary, hepatorenal, or hematologic abnormalities.     OB History    Para Term  AB Living   2 1 1 0 0 1   SAB TAB Ectopic Multiple Live Births   0 0 0 0 1      # Outcome Date GA Lbr Gee/2nd Weight Sex Delivery Anes PTL Lv   2 Current            1 Term 09/15/15 40w0d  3.8 kg (8 lb 6 oz) F Vag-Spont EPI N MARILY          Wt Readings from Last 1 Encounters:   17 0842 71.9 kg (158 lb 8.2 oz)       BP Readings from Last 3 Encounters:   17 112/60   17 108/64   10/26/17 98/60       Patient Active Problem List   Diagnosis    Other specified  screening(V28.89)    Encounter for supervision of normal first pregnancy in third trimester    Status post normal vaginal delivery    Wound dehiscence in puerperium, perineal, delivered/postpartum    Elderly multigravida in first trimester    GBS (group B streptococcus) infection    Normal labor and delivery       Past Surgical History:   Procedure Laterality Date    CYST REMOVAL Right 2009    chest wall    FOOT TENDON SURGERY Left 2008    bunion       Social History     Social History    Marital status:      Spouse name: N/A    Number of children: N/A    Years of education: N/A     Occupational History     Other     Social History Main Topics    Smoking status: Never Smoker    Smokeless tobacco: Never Used    Alcohol use No    Drug use: No    Sexual activity: Not Currently     Partners: Male     Birth control/ protection: None     Other Topics Concern    Not on file     Social History Narrative    No narrative on file         Chemistry        Component Value Date/Time     2017 1409    K 3.6 2017 1409      04/18/2017 1409    CO2 27 04/18/2017 1409    BUN 11 04/18/2017 1409    CREATININE 0.7 04/18/2017 1409    GLU 79 04/18/2017 1409        Component Value Date/Time    CALCIUM 8.8 04/18/2017 1409    ESTGFRAFRICA >60 04/18/2017 1409    EGFRNONAA >60 04/18/2017 1409            Lab Results   Component Value Date    WBC 7.64 11/21/2017    HGB 12.1 11/21/2017    HCT 35.8 (L) 11/21/2017    MCV 87 11/21/2017     11/21/2017       Anesthesia Evaluation    I have reviewed the Patient Summary Reports.     I have reviewed the Medications.     Review of Systems  Anesthesia Hx:  No problems with previous Anesthesia  History of prior surgery of interest to airway management or planning: Denies Family Hx of Anesthesia complications.   Denies Personal Hx of Anesthesia complications.   Hematology/Oncology:  Hematology Normal   Oncology Normal     EENT/Dental:EENT/Dental Normal   Cardiovascular:  Cardiovascular Normal     Pulmonary:  Pulmonary Normal    Renal/:  Renal/ Normal     Hepatic/GI:  Hepatic/GI Normal    Musculoskeletal:  Musculoskeletal Normal    Neurological:  Neurology Normal    Endocrine:  Endocrine Normal    Psych:  Psychiatric Normal           Physical Exam  General:  Well nourished    Airway/Jaw/Neck:  Airway Findings: Mouth Opening: Normal Tongue: Normal  General Airway Assessment: Adult  Mallampati: II  Improves to II with phonation.  TM Distance: Normal, at least 6 cm      Dental:  Dental Findings: In tact   Chest/Lungs:  Chest/Lungs Findings: Normal Respiratory Rate     Heart/Vascular:  Heart Findings: Rate: Normal  Rhythm: Regular Rhythm        Mental Status:  Mental Status Findings:  Cooperative, Alert and Oriented         Anesthesia Plan  Type of Anesthesia, risks & benefits discussed:  Anesthesia Type:  CSE, epidural, general, spinal  Patient's Preference:   Intra-op Monitoring Plan: standard ASA monitors  Intra-op Monitoring Plan Comments:   Post Op Pain Control Plan:   Post Op Pain Control Plan  Comments:   Induction:   IV  Beta Blocker:  Patient is not currently on a Beta-Blocker (No further documentation required).       Informed Consent: Patient understands risks and agrees with Anesthesia plan.  Questions answered. Anesthesia consent signed with patient.  ASA Score: 2     Day of Surgery Review of History & Physical:    H&P update referred to the surgeon.         Ready For Surgery From Anesthesia Perspective.        Unable to assess patient currently intubated, no family at bedside

## 2020-07-26 NOTE — CONSULT NOTE ADULT - SUBJECTIVE AND OBJECTIVE BOX
GENERAL SURGERY TRAUMA SERVICE - CONSULT NOTE  --------------------------------------------------------------------------------------------    TRAUMA ACTIVATION LEVEL: I    MECHANISM OF INJURY:      [] Blunt:     [] Motor vehicle collision       [x] Fall       [] Pedestrian struck	      [] Motorcycle accident      [] Penetrating:     [] Gun shot wound       [] Stab wound        HPI:  65yF Hx DM, HTN, on ASA81 brought in by family for concern of HA, dizziness, multiple episodes of emesis s/p mechanical fall 2 hours PTA.  Per family patient fell over backwards and hit her head on the ground.  Denies LOC but had worsening HA, and repeated emesis following fall. Pt walked into triage, however pt became lethargic and unarousable, brought to CC room and intubated for airway protection and level 1 trauma activated.      PRIMARY SURVEY:   A - Intubated for airway protection  B - bilateral breath sounds and good chest rise  C - initial BP  BP: 177/77 (07-25-20 @ 20:41) , HR HR: 80 (07-26-20 @ 01:00)  , palpable pulses in all extremities  D - GCS 3T    Exposure obtained    SECONDARY SURVEY:  General: intubated, non-responsive  HEENT: Normocephalic, atraumatic,  Neck: Soft, midline trachea  Chest: No chest wall tenderness  Cardiac: S1, S2, RRR  Respiratory: Bilateral breath sounds clear and equal   Abdomen: Soft, non-distended, non-tender; no rebound or guarding; no palpable masses   Groin: Normal appearing  Extremities: Palpable radial & DP pulses bilaterally.   Back: No TTP; no palpable runoff/stepoff/deformity    ROS: 10-system review all negative except as noted in HPI.      PAST MEDICAL & SURGICAL HISTORY:  Diabetes  Cholelithiasis  Hypertension  History of varicose veins of lower extremity: s/p surgery in July 2019  H/O shoulder surgery    FAMILY HISTORY:  : Non-contributory, as reviewed with the patient and family.    SOCIAL HISTORY:    Non- smoker  Denies Hx of EtOH abuse  Denies ilicit drug use      ALLERGIES: No Known Allergies      HOME MEDICATIONS:  Home Medications:  amLODIPine 5 mg oral tablet: 1 tab(s) orally once a day (29 Nov 2019 12:18)  bacitracin 500 units/g topical ointment: Apply topically to affected area 4 times a day (29 Nov 2019 12:18)  enalapril 10 mg oral tablet: 1 tab(s) orally once a day (29 Nov 2019 12:18)  glimepiride 2 mg oral tablet: 1 tab(s) orally once a day (29 Nov 2019 12:18)  metFORMIN 500 mg oral tablet: 1 tab(s) orally 2 times a day (29 Nov 2019 12:18)      CURRENT MEDICATIONS  MEDICATIONS:  ---NEURO-  acetaminophen   Tablet .. 650 milliGRAM(s) Oral every 6 hours PRN  levETIRAcetam 500 milliGRAM(s) Oral two times a day  ---CV-  amLODIPine   Tablet 5 milliGRAM(s) Oral daily  enalapril 10 milliGRAM(s) Oral daily  ---PULM-  ---GI/FEN-  sodium chloride 0.9% with potassium chloride 20 mEq/L 1000 milliLiter(s) IV Continuous <Continuous>  ----  ---ID-   ---ENDO-  ---HEME-  ---OTHER-        --------------------------------------------------------------------------------------------    VITALS:   T(C): 36.2 (07-26-20 @ 01:00), Max: 36.6 (07-25-20 @ 19:27)  HR: 80 (07-26-20 @ 01:00) (80 - 88)  BP: 177/77 (07-25-20 @ 20:41) (177/77 - 210/81)  RR: 14 (07-26-20 @ 01:00) (14 - 19)  SpO2: 100% (07-26-20 @ 01:00) (98% - 100%)  CAPILLARY BLOOD GLUCOSE      POCT Blood Glucose.: 240 mg/dL (25 Jul 2020 19:55)    CAPILLARY BLOOD GLUCOSE      POCT Blood Glucose.: 240 mg/dL (25 Jul 2020 19:55)      Height (cm): 158 (07-25 @ 20:41)  Weight (kg): 50.8 (07-25 @ 20:41)  BMI (kg/m2): 20.3 (07-25 @ 20:41)  BSA (m2): 1.5 (07-25 @ 20:41)    --------------------------------------------------------------------------------------------    LABS  CBC (07-25 @ 20:28)                              10.7<L>                         14.24<H>  )----------------(  206        62.0  % Neutrophils, 22.0  % Lymphocytes, ANC: 8.83<H>                              34.1<L>    BMP (07-25 @ 20:28)             140     |  100     |  17    		Ca++ --      Ca 9.7                ---------------------------------( 230<H>		Mg 1.8                3.1<L>  |  24      |  0.40<L>			Ph --        LFTs (07-25 @ 20:28)      TPro 7.9 / Alb 4.2 / TBili 0.2 / DBili -- / AST 34 / ALT 31 / AlkPhos 141<H>    Coags (07-25 @ 20:28)  aPTT 24.6<L> / INR 1.04 / PT 12.3      ABG (07-25 @ 23:19)     7.45 / 32 / 270<H> / 22 / -.7 / 99<H>%     Lactate:    ABG (07-25 @ 21:33)     7.42 / 38 / 414<H> / 24 / .3 / 100<H>%     Lactate:      VBG (07-25 @ 20:20)     7.45 / 38 / 72<H> / 26 / 2.3<H> / 92<H>%     Lactate: 3.3<H>      Imaging  CTH w/ large SDH.  awaiting final read GENERAL SURGERY TRAUMA SERVICE - CONSULT NOTE  --------------------------------------------------------------------------------------------    TRAUMA ACTIVATION LEVEL: I    MECHANISM OF INJURY:      [] Blunt:     [] Motor vehicle collision       [x] Fall       [] Pedestrian struck	      [] Motorcycle accident      [] Penetrating:     [] Gun shot wound       [] Stab wound        HPI:  65yF Hx DM, HTN, on ASA81 brought in by family for concern of HA, dizziness, multiple episodes of emesis s/p mechanical fall 2 hours PTA.  Per family patient fell over backwards and hit her head on the ground.  Denies LOC but had worsening HA, and repeated emesis following fall. Pt walked into triage, however pt became lethargic and unarousable, brought to CC room and intubated for airway protection and level 1 trauma activated.      PRIMARY SURVEY:   A - Intubated for airway protection  B - bilateral breath sounds and good chest rise  C - initial BP  BP: 177/77 (07-25-20 @ 20:41) , HR HR: 80 (07-26-20 @ 01:00)  , palpable pulses in all extremities  D - GCS 5T    Exposure obtained    SECONDARY SURVEY:  General: intubated, non-responsive  HEENT: Normocephalic, atraumatic,  Neck: Soft, midline trachea  Chest: No chest wall tenderness  Cardiac: S1, S2, RRR  Respiratory: Bilateral breath sounds clear and equal   Abdomen: Soft, non-distended, non-tender; no rebound or guarding; no palpable masses   Groin: Normal appearing  Extremities: Palpable radial & DP pulses bilaterally.   Back: No TTP; no palpable runoff/stepoff/deformity    ROS: 10-system review all negative except as noted in HPI.      PAST MEDICAL & SURGICAL HISTORY:  Diabetes  Cholelithiasis  Hypertension  History of varicose veins of lower extremity: s/p surgery in July 2019  H/O shoulder surgery    FAMILY HISTORY:  : Non-contributory, as reviewed with the patient and family.    SOCIAL HISTORY:    Non- smoker  Denies Hx of EtOH abuse  Denies ilicit drug use      ALLERGIES: No Known Allergies      HOME MEDICATIONS:  Home Medications:  amLODIPine 5 mg oral tablet: 1 tab(s) orally once a day (29 Nov 2019 12:18)  bacitracin 500 units/g topical ointment: Apply topically to affected area 4 times a day (29 Nov 2019 12:18)  enalapril 10 mg oral tablet: 1 tab(s) orally once a day (29 Nov 2019 12:18)  glimepiride 2 mg oral tablet: 1 tab(s) orally once a day (29 Nov 2019 12:18)  metFORMIN 500 mg oral tablet: 1 tab(s) orally 2 times a day (29 Nov 2019 12:18)      CURRENT MEDICATIONS  MEDICATIONS:  ---NEURO-  acetaminophen   Tablet .. 650 milliGRAM(s) Oral every 6 hours PRN  levETIRAcetam 500 milliGRAM(s) Oral two times a day  ---CV-  amLODIPine   Tablet 5 milliGRAM(s) Oral daily  enalapril 10 milliGRAM(s) Oral daily  ---PULM-  ---GI/FEN-  sodium chloride 0.9% with potassium chloride 20 mEq/L 1000 milliLiter(s) IV Continuous <Continuous>  ----  ---ID-   ---ENDO-  ---HEME-  ---OTHER-        --------------------------------------------------------------------------------------------    VITALS:   T(C): 36.2 (07-26-20 @ 01:00), Max: 36.6 (07-25-20 @ 19:27)  HR: 80 (07-26-20 @ 01:00) (80 - 88)  BP: 177/77 (07-25-20 @ 20:41) (177/77 - 210/81)  RR: 14 (07-26-20 @ 01:00) (14 - 19)  SpO2: 100% (07-26-20 @ 01:00) (98% - 100%)  CAPILLARY BLOOD GLUCOSE      POCT Blood Glucose.: 240 mg/dL (25 Jul 2020 19:55)    CAPILLARY BLOOD GLUCOSE      POCT Blood Glucose.: 240 mg/dL (25 Jul 2020 19:55)      Height (cm): 158 (07-25 @ 20:41)  Weight (kg): 50.8 (07-25 @ 20:41)  BMI (kg/m2): 20.3 (07-25 @ 20:41)  BSA (m2): 1.5 (07-25 @ 20:41)    --------------------------------------------------------------------------------------------    LABS  CBC (07-25 @ 20:28)                              10.7<L>                         14.24<H>  )----------------(  206        62.0  % Neutrophils, 22.0  % Lymphocytes, ANC: 8.83<H>                              34.1<L>    BMP (07-25 @ 20:28)             140     |  100     |  17    		Ca++ --      Ca 9.7                ---------------------------------( 230<H>		Mg 1.8                3.1<L>  |  24      |  0.40<L>			Ph --        LFTs (07-25 @ 20:28)      TPro 7.9 / Alb 4.2 / TBili 0.2 / DBili -- / AST 34 / ALT 31 / AlkPhos 141<H>    Coags (07-25 @ 20:28)  aPTT 24.6<L> / INR 1.04 / PT 12.3      ABG (07-25 @ 23:19)     7.45 / 32 / 270<H> / 22 / -.7 / 99<H>%     Lactate:    ABG (07-25 @ 21:33)     7.42 / 38 / 414<H> / 24 / .3 / 100<H>%     Lactate:      VBG (07-25 @ 20:20)     7.45 / 38 / 72<H> / 26 / 2.3<H> / 92<H>%     Lactate: 3.3<H>      Imaging  CTH w/ large SDH.  awaiting final read

## 2020-07-26 NOTE — PROGRESS NOTE ADULT - ASSESSMENT
POD#0 L craniotomy for acute SDH evacuation    light sedation, pain control  keppra for seizure ppx x7d  -160  intubated, pressure support as tolerated  wean to extubate  tube feeding--hold early am for possible extubation  IVL  maintain euglycemia NPH, CAMERON  chemoppx start tomorrow POD#0 L craniotomy for acute SDH evacuation    not requiring sedation  pain control  keppra for seizure ppx x7d  -160  intubated, pressure support as tolerated  wean to extubate  tube feeding--hold early am for possible extubation  IVL  maintain euglycemia NPH, CAMERON  chemoppx start tomorrow

## 2020-07-26 NOTE — PROGRESS NOTE ADULT - SUBJECTIVE AND OBJECTIVE BOX
SUMMARY: 66 yo woman with pmh DM, HTN, on asa81 brought in by family fro concern of HA, dizziness, multiple episodes of emesis s/p mechanical fall 2 hours PTA (trip over baby carriage). Pt walked into triage, however pt became lethargic and unarousable, brought to CC room and intubated for airway protection and level 1 trauma activated.  CTH showing large acute SDH. On ASA, given DDAVP, 1U PLT.        ADMISSION SCORES:   GCS: 5 HH: MF: NIHSS: ICH Score:      OVERNIGHT EVENTS: Adm to NSCU post op, upon arrival, found to have anisocoric fixed pupils, taken to urgent CTH.: stable       ICU Vital Signs Last 24 Hrs  T(C): 36.2 (26 Jul 2020 01:00), Max: 36.6 (25 Jul 2020 19:27)  T(F): 97.1 (26 Jul 2020 01:00), Max: 97.9 (25 Jul 2020 19:27)  HR: 105 (26 Jul 2020 04:32) (70 - 105)  BP: 177/77 (25 Jul 2020 20:41) (177/77 - 210/81)  ABP: 115/53 (26 Jul 2020 03:00) (100/53 - 167/64)  ABP(mean): 73 (26 Jul 2020 03:00) (73 - 110)  RR: 17 (26 Jul 2020 03:00) (13 - 19)  SpO2: 100% (26 Jul 2020 04:32) (98% - 100%)      07-25-20 @ 07:01  -  07-26-20 @ 06:49  --------------------------------------------------------  IN: 453.4 mL / OUT: 720 mL / NET: -266.6 mL        Mode: AC/ CMV (Assist Control/ Continuous Mandatory Ventilation), RR (machine): 14, TV (machine): 400, FiO2: 30, PEEP: 5, ITime: 1, MAP: 8, PIP: 14    acetaminophen    Suspension .. 650 milliGRAM(s) Oral every 6 hours PRN  ceFAZolin   IVPB 2000 milliGRAM(s) IV Intermittent every 8 hours  ceFAZolin   IVPB      chlorhexidine 0.12% Liquid 15 milliLiter(s) Oral Mucosa every 12 hours  chlorhexidine 4% Liquid 1 Application(s) Topical <User Schedule>  dexMEDEtomidine Infusion 0.2 MICROgram(s)/kG/Hr (2.54 mL/Hr) IV Continuous <Continuous>  insulin lispro (HumaLOG) corrective regimen sliding scale   SubCutaneous every 6 hours  lactated ringers. 1000 milliLiter(s) (100 mL/Hr) IV Continuous <Continuous>  levETIRAcetam  IVPB 500 milliGRAM(s) IV Intermittent every 12 hours  niCARdipine Infusion 5 mG/Hr (25 mL/Hr) IV Continuous <Continuous>  pantoprazole  Injectable 40 milliGRAM(s) IV Push daily  polyethylene glycol 3350 17 Gram(s) Oral two times a day  senna 2 Tablet(s) Oral at bedtime      LABS:  Na: 143 (07-26 @ 01:59), 140 (07-25 @ 20:28)  K: 3.8 (07-26 @ 01:59), 3.1 (07-25 @ 20:28)  Cl: 105 (07-26 @ 01:59), 100 (07-25 @ 20:28)  CO2: 19 (07-26 @ 01:59), 24 (07-25 @ 20:28)  BUN: 16 (07-26 @ 01:59), 17 (07-25 @ 20:28)  Cr: 0.39 (07-26 @ 01:59), 0.40 (07-25 @ 20:28)  Glu: 231(07-26 @ 01:59), 230(07-25 @ 20:28)    Hgb: 8.4 (07-26 @ 01:59), 10.7 (07-25 @ 20:28)  Hct: 27.3 (07-26 @ 01:59), 34.1 (07-25 @ 20:28)  WBC: 17.62 (07-26 @ 01:59), 14.24 (07-25 @ 20:28)  Plt: 252 (07-26 @ 01:59), 206 (07-25 @ 20:28)    INR: 1.20 07-26-20 @ 01:59, 1.04 07-25-20 @ 20:28  PTT: 26.8 07-26-20 @ 01:59, 24.6 07-25-20 @ 20:28    LIVER FUNCTIONS - ( 26 Jul 2020 01:59 )  Alb: 3.5 g/dL / Pro: 6.5 g/dL / ALK PHOS: 116 U/L / ALT: 27 U/L / AST: 63 U/L / GGT: x           ABG - ( 26 Jul 2020 05:41 )  pH, Arterial: 7.42  pH, Blood: x     /  pCO2: 38    /  pO2: 155   / HCO3: 24    / Base Excess: .3    /  SaO2: 99              DEVICES:   [] Restraints [x] PIVs [x] ET tube [] central line [] PICC [x] arterial line [x] charles [] NGT/OGT [] EVD [] LD [] ALMA/HMV [] LiCOX [] ICP monitor [] Trach [] PEG [] Chest Tube [] other:    EXAMINATION:  General: intubated  HEENT: Anicteric sclerae  Cardiac: [id number]  Lungs: Clear  Abdomen: Soft, non-tender, +BS  Extremities: No c/c/e  Skin/Incision Site: Clean, dry and intact  Neurologic: no EO to noxious, L pupil 5mm reactive, R pupil 3mm sluggish, +corneal b/l, +gag/cough, +overbreathing vent, LUKARRIE LC, RUE WD, BLE WD SUMMARY: 66 yo woman with pmh DM, HTN, on asa81 brought in by family fro concern of HA, dizziness, multiple episodes of emesis s/p mechanical fall 2 hours PTA (trip over baby carriage). Pt walked into triage, however pt became lethargic and unarousable, brought to CC room and intubated for airway protection and level 1 trauma activated.  CTH showing large acute SDH. On ASA, given DDAVP, 1U PLT.        ADMISSION SCORES:   GCS: 5 HH: MF: NIHSS: ICH Score:      OVERNIGHT EVENTS: Adm to NSCU post op, upon arrival, found to have anisocoric fixed pupils, taken to urgent CTH.: stable       ICU Vital Signs Last 24 Hrs  T(C): 36.2 (26 Jul 2020 01:00), Max: 36.6 (25 Jul 2020 19:27)  T(F): 97.1 (26 Jul 2020 01:00), Max: 97.9 (25 Jul 2020 19:27)  HR: 105 (26 Jul 2020 04:32) (70 - 105)  BP: 177/77 (25 Jul 2020 20:41) (177/77 - 210/81)  ABP: 115/53 (26 Jul 2020 03:00) (100/53 - 167/64)  ABP(mean): 73 (26 Jul 2020 03:00) (73 - 110)  RR: 17 (26 Jul 2020 03:00) (13 - 19)  SpO2: 100% (26 Jul 2020 04:32) (98% - 100%)      07-25-20 @ 07:01  -  07-26-20 @ 06:49  --------------------------------------------------------  IN: 453.4 mL / OUT: 720 mL / NET: -266.6 mL        Mode: AC/ CMV (Assist Control/ Continuous Mandatory Ventilation), RR (machine): 14, TV (machine): 400, FiO2: 30, PEEP: 5, ITime: 1, MAP: 8, PIP: 14    acetaminophen    Suspension .. 650 milliGRAM(s) Oral every 6 hours PRN  ceFAZolin   IVPB 2000 milliGRAM(s) IV Intermittent every 8 hours  ceFAZolin   IVPB      chlorhexidine 0.12% Liquid 15 milliLiter(s) Oral Mucosa every 12 hours  chlorhexidine 4% Liquid 1 Application(s) Topical <User Schedule>  dexMEDEtomidine Infusion 0.2 MICROgram(s)/kG/Hr (2.54 mL/Hr) IV Continuous <Continuous>  insulin lispro (HumaLOG) corrective regimen sliding scale   SubCutaneous every 6 hours  lactated ringers. 1000 milliLiter(s) (100 mL/Hr) IV Continuous <Continuous>  levETIRAcetam  IVPB 500 milliGRAM(s) IV Intermittent every 12 hours  niCARdipine Infusion 5 mG/Hr (25 mL/Hr) IV Continuous <Continuous>  pantoprazole  Injectable 40 milliGRAM(s) IV Push daily  polyethylene glycol 3350 17 Gram(s) Oral two times a day  senna 2 Tablet(s) Oral at bedtime      LABS:  Na: 143 (07-26 @ 01:59), 140 (07-25 @ 20:28)  K: 3.8 (07-26 @ 01:59), 3.1 (07-25 @ 20:28)  Cl: 105 (07-26 @ 01:59), 100 (07-25 @ 20:28)  CO2: 19 (07-26 @ 01:59), 24 (07-25 @ 20:28)  BUN: 16 (07-26 @ 01:59), 17 (07-25 @ 20:28)  Cr: 0.39 (07-26 @ 01:59), 0.40 (07-25 @ 20:28)  Glu: 231(07-26 @ 01:59), 230(07-25 @ 20:28)    Hgb: 8.4 (07-26 @ 01:59), 10.7 (07-25 @ 20:28)  Hct: 27.3 (07-26 @ 01:59), 34.1 (07-25 @ 20:28)  WBC: 17.62 (07-26 @ 01:59), 14.24 (07-25 @ 20:28)  Plt: 252 (07-26 @ 01:59), 206 (07-25 @ 20:28)    INR: 1.20 07-26-20 @ 01:59, 1.04 07-25-20 @ 20:28  PTT: 26.8 07-26-20 @ 01:59, 24.6 07-25-20 @ 20:28    LIVER FUNCTIONS - ( 26 Jul 2020 01:59 )  Alb: 3.5 g/dL / Pro: 6.5 g/dL / ALK PHOS: 116 U/L / ALT: 27 U/L / AST: 63 U/L / GGT: x           ABG - ( 26 Jul 2020 05:41 )  pH, Arterial: 7.42  pH, Blood: x     /  pCO2: 38    /  pO2: 155   / HCO3: 24    / Base Excess: .3    /  SaO2: 99          DEVICES:   [] Restraints [x] PIVs [x] ET tube [] central line [] PICC [x] arterial line [x] charles [] NGT/OGT [] EVD [] LD [] ALMA/HMV [] LiCOX [] ICP monitor [] Trach [] PEG [] Chest Tube [] other:    EXAMINATION:  General: intubated  HEENT: Anicteric sclerae  Cardiac: [id number]  Lungs: Clear  Abdomen: Soft, non-tender, +BS  Extremities: No c/c/e  Skin/Incision Site: Clean, dry and intact  Neurologic: no EO to noxious, L pupil 5mm reactive, R pupil 3mm sluggish, +corneal b/l, +gag/cough, +overbreathing vent, [place]E LC, RUE WD, BLE WD SUMMARY: 64 yo woman with pmh DM, HTN, on asa81 brought in by family fro concern of HA, dizziness, multiple episodes of emesis s/p mechanical fall 2 hours PTA (trip over baby carriage). Pt walked into triage, however pt became lethargic and unarousable, brought to CC room and intubated for airway protection and level 1 trauma activated.  CTH showing large acute SDH. On ASA, given DDAVP, 1U PLT.        ADMISSION SCORES:   GCS: 5 HH: MF: NIHSS: ICH Score:      OVERNIGHT EVENTS: Adm to NSCU post op, upon arrival, found to have anisocoric fixed pupils, taken to urgent CTH.: stable       ICU Vital Signs Last 24 Hrs  T(C): 36.2 (26 Jul 2020 01:00), Max: 36.6 (25 Jul 2020 19:27)  T(F): 97.1 (26 Jul 2020 01:00), Max: 97.9 (25 Jul 2020 19:27)  HR: 105 (26 Jul 2020 04:32) (70 - 105)  BP: 177/77 (25 Jul 2020 20:41) (177/77 - 210/81)  ABP: 115/53 (26 Jul 2020 03:00) (100/53 - 167/64)  ABP(mean): 73 (26 Jul 2020 03:00) (73 - 110)  RR: 17 (26 Jul 2020 03:00) (13 - 19)  SpO2: 100% (26 Jul 2020 04:32) (98% - 100%)      07-25-20 @ 07:01  -  07-26-20 @ 06:49  --------------------------------------------------------  IN: 453.4 mL / OUT: 720 mL / NET: -266.6 mL        Mode: AC/ CMV (Assist Control/ Continuous Mandatory Ventilation), RR (machine): 14, TV (machine): 400, FiO2: 30, PEEP: 5, ITime: 1, MAP: 8, PIP: 14    acetaminophen    Suspension .. 650 milliGRAM(s) Oral every 6 hours PRN  ceFAZolin   IVPB 2000 milliGRAM(s) IV Intermittent every 8 hours  ceFAZolin   IVPB      chlorhexidine 0.12% Liquid 15 milliLiter(s) Oral Mucosa every 12 hours  chlorhexidine 4% Liquid 1 Application(s) Topical <User Schedule>  dexMEDEtomidine Infusion 0.2 MICROgram(s)/kG/Hr (2.54 mL/Hr) IV Continuous <Continuous>  insulin lispro (HumaLOG) corrective regimen sliding scale   SubCutaneous every 6 hours  lactated ringers. 1000 milliLiter(s) (100 mL/Hr) IV Continuous <Continuous>  levETIRAcetam  IVPB 500 milliGRAM(s) IV Intermittent every 12 hours  niCARdipine Infusion 5 mG/Hr (25 mL/Hr) IV Continuous <Continuous>  pantoprazole  Injectable 40 milliGRAM(s) IV Push daily  polyethylene glycol 3350 17 Gram(s) Oral two times a day  senna 2 Tablet(s) Oral at bedtime      LABS:  Na: 143 (07-26 @ 01:59), 140 (07-25 @ 20:28)  K: 3.8 (07-26 @ 01:59), 3.1 (07-25 @ 20:28)  Cl: 105 (07-26 @ 01:59), 100 (07-25 @ 20:28)  CO2: 19 (07-26 @ 01:59), 24 (07-25 @ 20:28)  BUN: 16 (07-26 @ 01:59), 17 (07-25 @ 20:28)  Cr: 0.39 (07-26 @ 01:59), 0.40 (07-25 @ 20:28)  Glu: 231(07-26 @ 01:59), 230(07-25 @ 20:28)    Hgb: 8.4 (07-26 @ 01:59), 10.7 (07-25 @ 20:28)  Hct: 27.3 (07-26 @ 01:59), 34.1 (07-25 @ 20:28)  WBC: 17.62 (07-26 @ 01:59), 14.24 (07-25 @ 20:28)  Plt: 252 (07-26 @ 01:59), 206 (07-25 @ 20:28)    INR: 1.20 07-26-20 @ 01:59, 1.04 07-25-20 @ 20:28  PTT: 26.8 07-26-20 @ 01:59, 24.6 07-25-20 @ 20:28    LIVER FUNCTIONS - ( 26 Jul 2020 01:59 )  Alb: 3.5 g/dL / Pro: 6.5 g/dL / ALK PHOS: 116 U/L / ALT: 27 U/L / AST: 63 U/L / GGT: x           ABG - ( 26 Jul 2020 05:41 )  pH, Arterial: 7.42  pH, Blood: x     /  pCO2: 38    /  pO2: 155   / HCO3: 24    / Base Excess: .3    /  SaO2: 99          DEVICES:   [] Restraints [x] PIVs [x] ET tube [] central line [] PICC [x] arterial line [x] charles [] NGT/OGT [] EVD [] LD [] ALMA/HMV [] LiCOX [] ICP monitor [] Trach [] PEG [] Chest Tube [] other:    EXAMINATION:  General: intubated  HEENT: Anicteric sclerae  Cardiac: [id number]  Lungs: Clear  Abdomen: Soft, non-tender, +BS  Extremities: No c/c/e  Skin/Incision Site: Clean, dry and intact  Neurologic: tried to  EO to noxious, Right 3 mm and left 4 mm reactive , follows commands in all 4 extremities, RUE spon AG, LUE localized, LEs wiggles toes

## 2020-07-26 NOTE — PROVIDER CONTACT NOTE (CRITICAL VALUE NOTIFICATION) - ACTION/TREATMENT ORDERED:
Lactated Ringers at 100mL/hr
PA stated she would review patient chart and place orders if necessary. Primary nurse made aware.

## 2020-07-26 NOTE — DISCHARGE NOTE NURSING/CASE MANAGEMENT/SOCIAL WORK - PATIENT PORTAL LINK FT
You can access the FollowMyHealth Patient Portal offered by [facility] by registering at the following website: [email or web address]. By joining [facility]’s FollowMyHealth portal, you will also be able to view your health information using other applications (apps) compatible with our system.

## 2020-07-26 NOTE — CHART NOTE - NSCHARTNOTEFT_GEN_A_CORE
TERTIARY TRAUMA SURVEY  ------------------------------------------------------------------------------------    Date of TTS: 07/26/2020  Time: 1645  Admit Date:  07/25/2020  Trauma Activation: Lvl 1  Admit GCS: 5T    HPI:  p ([id number])     HPI: Maria Esther Cline 64 yo female with pmh DM, HTN, on asa81 brought in by family fro concern of HA, dizziness, multiple episodes of emesis s/p mechanical fall 2 hours PTA (trip over baby carriage). Pt walked into triage, however pt became lethargic and unarousable, brought to CC room and intubated for airway protection and level 1 trauma activated.  No AC.    PHYSICAL EXAMINATION:   T(C): 36.6 (07-25-20 @ 20:41), Max: 36.6 (07-25-20 @ 19:27)  HR: 88 (07-25-20 @ 20:41) (87 - 88)  BP: 177/77 (07-25-20 @ 20:41) (177/77 - 210/81)  RR: 18 (07-25-20 @ 20:41) (16 - 19)  SpO2: 98% (07-25-20 @ 20:41) (98% - 100%)  Wt(kg): --Height (cm): 158 (07-25 @ 20:41)  Weight (kg): 50.8 (07-25 @ 20:41) (26 Jul 2020 00:00)      INTERVAL EVENTS:   Craniotomy    PAST MEDICAL & SURGICAL HISTORY:  Diabetes  Cholelithiasis  Hypertension  History of varicose veins of lower extremity: s/p surgery in July 2019  H/O shoulder surgery    ALLERGIES: No Known Allergies      CURRENT MEDICATIONS  MEDICATIONS (STANDING): ceFAZolin   IVPB 2000 milliGRAM(s) IV Intermittent every 8 hours  ceFAZolin   IVPB      dexMEDEtomidine Infusion 0.2 MICROgram(s)/kG/Hr IV Continuous <Continuous>  insulin lispro (HumaLOG) corrective regimen sliding scale   SubCutaneous every 6 hours  insulin NPH human recombinant 3 Unit(s) SubCutaneous every 6 hours  lactated ringers. 1000 milliLiter(s) IV Continuous <Continuous>  levETIRAcetam  IVPB 500 milliGRAM(s) IV Intermittent every 12 hours  niCARdipine Infusion 5 mG/Hr IV Continuous <Continuous>  pantoprazole  Injectable 40 milliGRAM(s) IV Push daily  polyethylene glycol 3350 17 Gram(s) Oral two times a day  senna 2 Tablet(s) Oral at bedtime    MEDICATIONS (PRN):acetaminophen    Suspension .. 650 milliGRAM(s) Oral every 6 hours PRN Temp greater or equal to 38C (100.4F), Mild Pain (1 - 3)  fentaNYL    Injectable 12.5 MICROGram(s) IV Push every 2 hours PRN Severe Pain (7 - 10)  labetalol Injectable 10 milliGRAM(s) IV Push every 6 hours PRN Systolic blood pressure > 160    -----------------------------------------------------------------------------------    VITAL SIGNS:  T(C): 37.6 (07-26-20 @ 15:00), Max: 37.6 (07-26-20 @ 15:00)  HR: 96 (07-26-20 @ 16:46) (70 - 106)  BP: 177/77 (07-25-20 @ 20:41) (177/77 - 210/81)  RR: 13 (07-26-20 @ 16:00) (13 - 19)  SpO2: 100% (07-26-20 @ 16:46) (98% - 100%)  CAPILLARY BLOOD GLUCOSE      POCT Blood Glucose.: 207 mg/dL (26 Jul 2020 11:51)  POCT Blood Glucose.: 231 mg/dL (26 Jul 2020 07:27)  POCT Blood Glucose.: 240 mg/dL (25 Jul 2020 19:55)    Drug Dosing Weight  Height (cm): 158 (25 Jul 2020 20:41)  Weight (kg): 50.8 (25 Jul 2020 20:41)  BMI (kg/m2): 20.3 (25 Jul 2020 20:41)  BSA (m2): 1.5 (25 Jul 2020 20:41)    07-25 @ 07:01  -  07-26 @ 07:00  --------------------------------------------------------  IN:    dexmedetomidine Infusion: 22.8 mL    IV PiggyBack: 250 mL    lactated ringers.: 600 mL    niCARdipine Infusion: 17 mL    sodium chloride 0.9% with potassium chloride 20 mEq/L: 75 mL    Solution: 150 mL  Total IN: 1114.8 mL    OUT:    Bulb: 75 mL    Indwelling Catheter - Urethral: 1095 mL  Total OUT: 1170 mL    Total NET: -55.2 mL      07-26 @ 07:01 - 07-26 @ 16:59  --------------------------------------------------------  IN:    dexmedetomidine Infusion: 7.6 mL    Glucerna: 110 mL    IV PiggyBack: 100 mL    lactated ringers.: 790 mL  Total IN: 1007.6 mL    OUT:    Bulb: 80 mL    Indwelling Catheter - Urethral: 690 mL  Total OUT: 770 mL    Total NET: 237.6 mL          PHYSICAL EXAM:    General: Intubated, arousable, following commands  HEENT: Craniotomy and drain in place  Neck: Soft, supple  Cardio: RRR, nml S1/S2  Resp: Good effort, CTA b/l  Thorax: No chest wall tenderness  Breast: No lesions/masses, no drainage  GI/Abd: Soft, NT/ND, no rebound/guarding, no masses palpated  Vascular: Extremities warm, brisk cap refill, B/l radial pulses palpable, b/l DP/PT palpable, no palpable abdominal pulsatile mass  Skin: Intact, no breakdown  Lymphatic/Nodes: No palpable lymphadenopathy  Musculoskeletal: All 4 extremities moving spontaneously, no limitations    LABS:  CBC (07-26 @ 01:59)                              8.4<L>                         17.62<H>  )----------------(  252        66.7  % Neutrophils, 22.6  % Lymphocytes, ANC: 11.73<H>                              27.3<L>  CBC (07-25 @ 20:28)                              10.7<L>                         14.24<H>  )----------------(  206        62.0  % Neutrophils, 22.0  % Lymphocytes, ANC: 8.83<H>                              34.1<L>    BMP (07-26 @ 01:59)             143     |  105     |  16    		Ca++ --      Ca 8.2<L>             ---------------------------------( 231<H>		Mg 1.6                3.8     |  19<L>   |  0.39<L>			Ph 4.5     BMP (07-25 @ 20:28)             140     |  100     |  17    		Ca++ --      Ca 9.7                ---------------------------------( 230<H>		Mg 1.8                3.1<L>  |  24      |  0.40<L>			Ph --        LFTs (07-26 @ 01:59)      TPro 6.5 / Alb 3.5 / TBili 0.4 / DBili -- / AST 63<H> / ALT 27 / AlkPhos 116  LFTs (07-25 @ 20:28)      TPro 7.9 / Alb 4.2 / TBili 0.2 / DBili -- / AST 34 / ALT 31 / AlkPhos 141<H>    Coags (07-26 @ 01:59)  aPTT 26.8<L> / INR 1.20<H> / PT 14.2<H>  Coags (07-25 @ 20:28)  aPTT 24.6<L> / INR 1.04 / PT 12.3      ABG (07-26 @ 05:41)     7.42 / 38 / 155<H> / 24 / .3 / 99<H>%     Lactate:    ABG (07-26 @ 01:58)     7.34<L> / 42 / 238<H> / 22 / -2.7<L> / 99<H>%     Lactate:      VBG (07-25 @ 20:20)     7.45 / 38 / 72<H> / 26 / 2.3<H> / 92<H>%     Lactate: 3.3<H>          ------------------------------------------------------------------------------------------  RADIOLOGICAL FINDINGS REVIEW:  ***  EXAM:  XR CHEST PORTABLE URGENT 1V                            PROCEDURE DATE:  07/25/2020            INTERPRETATION:  CLINICAL INFORMATION: Status post fall.    EXAM: Frontal radiograph of the chest.    COMPARISON: Chest radiograph from 11/29/2019.    FINDINGS:  Endotracheal tube terminates above the ross.  The lungs are clear.  There is no pleural effusion or pneumothorax.  The heart size is not well evaluated on this projection.  The visualized osseous and soft tissue structures demonstrate no acute pathology.    IMPRESSION:  Endotracheal tube terminates above the ross.    Clear lungs.        EXAM:  CT ABDOMEN AND PELVIS IC                          EXAM:  CT CHEST IC                            PROCEDURE DATE:  07/25/2020            INTERPRETATION:  CLINICAL INFORMATION: Trauma status post fall. Unresponsive.    COMPARISON: CT abdomen and pelvis 11/29/2019.    PROCEDURE:  CT of the Chest, Abdomen and Pelvis was performed with intravenous contrast.  Imaging was performed through the chest in the arterial phase followed by imaging of the abdomen and pelvis in the portal venous phase.  Intravenous contrast: 90 ml Omnipaque 350. 10 ml discarded.  Oral contrast: None.  Sagittal and coronal reformats were performed.    FINDINGS:  CHEST:  LUNGS AND LARGE AIRWAYS: Endotracheal tube terminates above the ross. Otherwise patent central airways. Bibasilar linear and subsegmental atelectasis.  PLEURA: No pleural effusion. No pneumothorax.  VESSELS: No thoracic aortic aneurysm, dissection or traumatic injury. The main pulmonary artery is normal in size.  HEART: Heart size is normal. No pericardial effusion.  MEDIASTINUM AND JESUS: No lymphadenopathy. Subcentimeter hypodensity in the left lobe of the thyroid is incompletely characterized.  CHEST WALL AND LOWER NECK: Within normal limits.    ABDOMEN AND PELVIS:  LIVER: Within normal limits.  BILE DUCTS: Normal caliber.  GALLBLADDER: Cholecystectomy.  SPLEEN: Within normal limits.  PANCREAS: Within normal limits.  ADRENALS: Within normal limits.  KIDNEYS/URETERS: The kidneys enhance symmetrically. No hydronephrosis. Left upper pole renal cyst. Additional subcentimeter left renal hypodensities, too small to characterize.    BLADDER: Distended bladder.  REPRODUCTIVE ORGANS: Uterus and adnexa within normal limits.    BOWEL: No bowel obstruction. No bowel wall thickening or inflammatory changes. Appendix is normal.  PERITONEUM: No ascites. No pneumoperitoneum.  VESSELS: Atherosclerosis. No abdominal aortic aneurysm, dissection or traumatic injury. The origins of the celiac axis, SMA, bilateral renal arteries and HERMILO are patent.  RETROPERITONEUM/LYMPH NODES: No lymphadenopathy.  ABDOMINAL WALL: Within normal limits.  BONES: No acute displaced fracture or dislocation. Degenerative changes of the spine. Small Schmorl's node superior endplate of T11.    IMPRESSION:  No acute traumatic injury within the chest, abdomen or pelvis.        EXAM:  CT CERVICAL SPINE                          EXAM:  CT BRAIN                            PROCEDURE DATE:  07/25/2020            INTERPRETATION:  INDICATIONS:  Evaluate for bleed or infarct; unresponsive    CT BRAIN:  TECHNIQUE:  Serial axial images were obtained from the skull base to the vertex without intravenous contrast. Sagittal and coronal reformatted images were obtained.    COMPARISON EXAMINATION: None.    FINDINGS:  VENTRICLES AND SULCI:  There is marked compression of the left lateral ventricle secondary to large subdural hemorrhage..  INTRA-AXIAL/EXTRA-AXIAL:  There is a large left-sided holohemispheric hyperdense subdural collection measuring up to 2.1 cm in greatest depth resulting in 1.9 cm of left to right midline shift. Subfalcine and transtentorial herniation is seen with effacement of the suprasellar cistern.  No mass or collection is seen. The basal cisterns are patent. There is a small amount of interhemispheric subdural blood.  VISUALIZED SINUSES:  Diffuse mucosal thickening of the bilateral paranasal sinuses..  VISUALIZED MASTOIDS: Clear. CALVARIUM:  Normal.  MISCELLANEOUS:  None.    IMPRESSION:  Large left-sided acute holohemispheric subdural hematoma measuring up to 2.1 cm in greatest depth with rightward subfalcine and transtentorial herniation.    CT CERVICAL SPINE:  TECHNIQUE:  Axial images were obtained using multislice helical technique.  Reformatted coronal and sagittal images were performed.    COMPARISON EXAMINATION:  None.    FINDINGS:  VERTEBRAL BODIES:  Normal.  ALIGNMENT:  No subluxations. Straightening of the normal cervical lordosis  INTERVERTEBRAL DISC SPACES: No significant disc bulge or focal disc herniation identified.  MISCELLANEOUS:  Partially visualized ET tube in the trachea.    IMPRESSION:  No fracture or traumatic subluxation.          EXAM:  CT BRAIN                            PROCEDURE DATE:  07/26/2020            INTERPRETATION:  INDICATIONS:  Status post evacuation of large left-sided subdural hemorrhage    CT BRAIN:  TECHNIQUE:  Serial axial images were obtained from the skull base to the vertex without intravenous contrast. Sagittal and coronal reformatted images were obtained.    Study was performed at 1:21 AM    COMPARISON EXAMINATION: Head CT scan from 7/25/2020    FINDINGS:  VENTRICLES AND SULCI:  Interval reexpansion of the left lateral ventricle after subdural hematoma evacuation.  INTRA-AXIAL:  No acute intraparenchymal hemorrhage. Interval decrease in left to right midline shift previously 1.9 cm currently 0.7 cm.  EXTRA-AXIAL Interval evacuation of a large left-sided holohemispheric hematoma with trace amount of residual left-sided hemorrhage. Expected postoperative changes including pneumocephaly. Intervally decreased effacement of the suprasellar cistern. Interval decrease in previously visualized uncal herniation. Interhemispheric acute subdural blood has increased currently measuring 4 mm in thickness compared with 2 mm.  VISUALIZED SINUSES:  Mild left and trace right maxillary mucosal thickening. Moderate bilateral mucosal thickening in additional paranasal sinuses.  VISUALIZED MASTOIDS:  Clear  CALVARIUM:  Left frontal and left parietal stephy holes. Left frontoparietal craniotomy.  MISCELLANEOUS:  Multiple staples overlie each subcutaneous tissues of the left scalp. Subgaleal drain in place.    IMPRESSION:  Status post evacuation of large left-sided holohemispheric subdural hematoma with expected postoperative changes.    Intervally decreased midline shift currently 0.7 cm previously 1.9 cm.    Additionally the previously visualized effacement of basal cisterns, and uncal herniation have improved.    Mildly increased interhemispheric subdural blood which may represent blood redistribution.        EXAM:  XR CHEST PORTABLE URGENT 1V                            PROCEDURE DATE:  07/26/2020            INTERPRETATION:  DATE OF STUDY: 7/26/2020    PRIOR:7/25/20.    CLINICAL INDICATION: ET tube placement.    TECHNIQUE: portable chest.    FINDINGS/  IMPRESSION:  ET tube tip is 3.8cm above the ross.  NG tube in stomach - tip is off the film.  The cardiomediastinal silhouette is within normal limits.  The lungs are clear. No pleural effusion or pneumothorax.  No acute bony finding.        EXAM:  CT BRAIN                            PROCEDURE DATE:  07/26/2020            INTERPRETATION:  INDICATIONS: Postop.    TECHNIQUE:  Serial axial images were obtained from the skull base to the vertex without intravenous contrast. Coronal and sagittal reformatted images were obtained.    Study was performed at 8:13 AM.    COMPARISON EXAMINATION: CT brain 7/25/2020. CT brain 7/26/2020 at 1:21 AM    FINDINGS:  VENTRICLES AND SULCI:  Unchanged ventricular size. No hydrocephalus.    INTRA-AXIAL:  No acute intraparenchymal hemorrhage. Similar to decreased midline shift, now measuring 0.6 cm from 0.7 cm on same-day CT and 1.9 cm on 7/25/2020.    EXTRA-AXIAL:  Redemonstrated trace residual left-sided extra-axial hemorrhage with expected postoperative changes including extensive pneumocephalus. Unchanged interhemispheric acute subdural blood, measuring 4 mm in maximal transverse thickness.    VISUALIZED SINUSES:  Mild left and trace right maxillary mucosal thickening. Moderate mucosal thickening in the ethmoid air cells.    VISUALIZED MASTOIDS:  Clear.    CALVARIUM:  Left frontoparietal craniotomy and left frontal and left parietal stephy holes.    MISCELLANEOUS:  Endotracheal tube. Nasoenteric tube. Subgaleal drain.      IMPRESSION:  Status post evacuation of large left subdural hematoma with expected postoperative changes.    Similar midline shift, currently measuring 0.6 cm.    Unchanged interhemispheric subdural blood, measuring up to 4 mm in greatest thickness.    List Injuries Identified to Date:    Large left-sided acute holohemispheric subdural hematoma measuring up to 2.1 cm in greatest depth with rightward subfalcine and transtentorial herniation      List Operative and Interventional Radiological Procedures:   Craniotomy or craniectomy with evacuation of supratentorial extradural or subdural hematoma      Consults (Date):  [X] Neurosurgery   [] Orthopedic Surgery  [] Spine Surgery  [] Plastic Surgery  [] ENT  [] Urology  [] PM&R  [] Social Work    INTERPRETATION/ASSESSMENT:   64 yo woman with pmh DM, HTN, on asa81 brought in by family fro concern of HA, dizziness, multiple episodes of emesis s/p mechanical fall 2 hours PTA (trip over baby carriage). Pt walked into triage, however pt became lethargic and unarousable, brought to CC room and intubated for airway protection and level 1 trauma activated.  CTH showing large acute SDH. On ASA, given DDAVP, 1U PLT.    PLAN:   - No additional injuries discovered on tertiary exam.  - Pt unable to participate in exam 2/2 intubation. Plan for quaternary exam at later date.     Roel Stubbs, PGY-1  Trauma Pager #[phone]

## 2020-07-26 NOTE — PROGRESS NOTE ADULT - SUBJECTIVE AND OBJECTIVE BOX
SUMMARY: 66 yo woman with pmh DM, HTN, on asa81 brought in by family fro concern of HA, dizziness, multiple episodes of emesis s/p mechanical fall 2 hours PTA (trip over baby carriage). Pt walked into triage, however pt became lethargic and unarousable, brought to CC room and intubated for airway protection and level 1 trauma activated.  CTH showing large acute SDH. On ASA, given DDAVP, 1U PLT.    OVERNIGHT EVENTS: Adm to NSCU post op, upon arrival, found to have anisocoric fixed pupils, taken to urgent CTH.     ADMISSION SCORES:   GCS: 5 HH: MF: NIHSS: ICH Score:    REVIEW OF SYSTEMS:     VITALS: [x] Reviewed    IMAGING/DATA: [x] Reviewed    IVF FLUIDS/MEDICATIONS: [x] Reviewed    ALLERGIES: Allergies    No Known Allergies    Intolerances    DEVICES:   [] Restraints [x] PIVs [x] ET tube [] central line [] PICC [x] arterial line [x] charles [] NGT/OGT [] EVD [] LD [] ALMA/HMV [] LiCOX [] ICP monitor [] Trach [] PEG [] Chest Tube [] other:    EXAMINATION:  General: intubated  HEENT: Anicteric sclerae  Cardiac: [id number]  Lungs: Clear  Abdomen: Soft, non-tender, +BS  Extremities: No c/c/e  Skin/Incision Site: Clean, dry and intact  Neurologic: no EO to noxious, L pupil 5mm reactive, R pupil 3mm sluggish, +corneal b/l, +gag/cough, +overbreathing vent, LUE LC, RUE WD, BLE WD

## 2020-07-26 NOTE — CONSULT NOTE ADULT - ASSESSMENT
65yF Hx DM, HTN, on ASA81 brought in by family for concern of HA, dizziness, multiple episodes of emesis s/p mechanical fall 2 hours PTA.  Pt walked into triage, however pt became lethargic and unarousable was upgraded to level I trauma. Patient intubated for airway protection. CTH w/ large SDH, brought to OR emergently by neurosx .    - Care per neurosurgery  - Pt will need tertiary survey      Case Discussed with Dr Sotelo  Trauma [phone]

## 2020-07-26 NOTE — H&P ADULT - HISTORY OF PRESENT ILLNESS
p (1480)     HPI: Maria Esther Cline 64 yo female with pmh DM, HTN, on asa81 brought in by family fro concern of HA, dizziness, multiple episodes of emesis s/p mechanical fall 2 hours PTA (trip over baby carriage). Pt walked into triage, however pt became lethargic and unarousable, brought to CC room and intubated for airway protection and level 1 trauma activated.  No AC.    PHYSICAL EXAMINATION:   T(C): 36.6 (07-25-20 @ 20:41), Max: 36.6 (07-25-20 @ 19:27)  HR: 88 (07-25-20 @ 20:41) (87 - 88)  BP: 177/77 (07-25-20 @ 20:41) (177/77 - 210/81)  RR: 18 (07-25-20 @ 20:41) (16 - 19)  SpO2: 98% (07-25-20 @ 20:41) (98% - 100%)  Wt(kg): --Height (cm): 158 (07-25 @ 20:41)  Weight (kg): 50.8 (07-25 @ 20:41)

## 2020-07-26 NOTE — H&P ADULT - ASSESSMENT
Maria Esther Cline 66 yo female with pmh DM, HTN, on asa81 brought in by family fro concern of HA, dizziness, multiple episodes of emesis s/p mechanical fall 2 hours PTA (trip over baby carriage). Pt walked into triage, however pt became lethargic and unarousable, brought to CC room and intubated for airway protection and level 1 trauma activated.  No AC.  CTH showed large left aSDH with shift.     PLAN: or o/n, cth am, keppra, sg drain, keppra Maria Esther Cline 66 yo female with pmh DM, HTN, on asa81 brought in by family fro concern of HA, dizziness, multiple episodes of emesis s/p mechanical fall 2 hours PTA (trip over baby carriage). Pt walked into triage, however pt became lethargic and unarousable, brought to CC room and intubated for airway protection and level 1 trauma activated.  No AC.  CTH showed large left aSDH with shift.     PLAN: or o/n, cth am, keppra, sg drain, keppra, maris ct cervical read

## 2020-07-26 NOTE — PATIENT PROFILE ADULT - NSPROGENSOURCEINFO_GEN_A_NUR
Unable to assess patient currently intubated, no family at bedside Unable to assess patient currently intubated, no family at bedside/family

## 2020-07-26 NOTE — H&P ADULT - ATTENDING COMMENTS
AS per above resident note, agree with evaluation and assessment.  PT deteriorated to GCS 5.  CT revealed large left acute SDH with midline shift.  No apparent underlying brain injury noted.  Pt taken to OR for emergent evacuation of SDH.  Consent obtained from family.

## 2020-07-26 NOTE — H&P ADULT - NSHPPHYSICALEXAM_GEN_ALL_CORE
EXAM: intubated, no eo to stim, pupils 3-4 bl extremely sluggish, positive gag, ext bue, TF BLE. PLAN: or o/n, cth am, keppra, sg drain, keppra

## 2020-07-26 NOTE — PROGRESS NOTE ADULT - SUBJECTIVE AND OBJECTIVE BOX
Pt seen and examined earlier with resident via telehealth video.  She was on CPAP and Precedex.  She is following command with toes, but not moving arms to our exam.  Dressing dry. Pupils mildly anisocoric but reactive.  Son at bedside.  CT today with decreased air compared with last night's scan and mass effect continues to improve interms of midline shift.  PT is improved. Will continue to observe for arm movement.  CT c-spine showed no intracannalicular spondylosis and no significant disc disease.  Will consider MRI if there is a continued concern.  Wean as tolerated.

## 2020-07-26 NOTE — PROGRESS NOTE ADULT - ASSESSMENT
traumatic acute SDH, midline shift, brain compression s/p craniotomy and evacuation    NEURO:  CT Head in AM, Surgical drains per NSGY  keppra for seizure ppx  precedex low dose for sedation  pain control  Activity: [] OOB as tolerated [x] Bedrest [] PT [] OT [] PMNR    PULM:  intubated  check ABG  VAP bundle    CV:  -150mmHg  nicardipine gtt prn  was briefly on phenylephrine post op for hypotension to systolics in 40-50's, so holding home antihypertensives--lisinopril and norvasc    RENAL:  IVF     GI:  Diet: NPO overnight  GI prophylaxis [] not indicated [x] PPI [] other:  Bowel regimen [] colace [x] senna [x] other: miralax    ENDO:   Goal euglycemia (-180)  CAMERON    HEME/ONC:  VTE prophylaxis: [x] SCDs [] chemoprophylaxis [x] hold chemoprophylaxis due to: fresh post op [] high risk of DVT/PE on admission due to:    ID:  Judi-op antibiotics    MISC:    SOCIAL/FAMILY:  [x] awaiting [] updated at bedside [] family meeting    CODE STATUS:  [x] Full Code [] DNR [] DNI [] Palliative/Comfort Care    DISPOSITION:  [x] ICU [] Stroke Unit [] Floor [] EMU [] RCU [] PCU    [x] Patient is at high risk of neurologic deterioration/death due to: post op hemorrhage, brain compression

## 2020-07-26 NOTE — PROGRESS NOTE ADULT - SUBJECTIVE AND OBJECTIVE BOX
remains intubated    vitals/labs/meds/imaging reviewed  *** remains intubated    vitals/labs/meds/imaging reviewed  intubated, examined in Georgian at bedside, nods appropriately, EO to tactile, oriented x3 given choice, RUE 4/5, LUE 4-/5, BLE 2/5 prox DF/PF 5/5

## 2020-07-26 NOTE — PROGRESS NOTE ADULT - ASSESSMENT
traumatic acute SDH, midline shift, brain compression s/p craniotomy and evacuation    NEURO:  CT Head in AM, Surgical drains per NSGY  keppra for seizure ppx  precedex low dose for sedation  pain control  Activity: [] OOB as tolerated [x] Bedrest [] PT [] OT [] PMNR    PULM:  intubated  check ABG  VAP bundle    CV:  -150mmHg  nicardipine gtt prn  was briefly on phenylephrine post op for hypotension to systolics in 40-50's, so holding home antihypertensives--lisinopril and norvasc    RENAL:  IVF     GI:  Diet: NPO overnight  GI prophylaxis [] not indicated [x] PPI [] other:  Bowel regimen [] colace [x] senna [x] other: miralax    ENDO:   Goal euglycemia (-180)  CAMERON    HEME/ONC:  VTE prophylaxis: [x] SCDs [] chemoprophylaxis [x] hold chemoprophylaxis due to: fresh post op [] high risk of DVT/PE on admission due to:    ID:  Judi-op antibiotics    MISC:    SOCIAL/FAMILY:  [x] awaiting [] updated at bedside [] family meeting    CODE STATUS:  [x] Full Code [] DNR [] DNI [] Palliative/Comfort Care    DISPOSITION:  [x] ICU [] Stroke Unit [] Floor [] EMU [] RCU [] PCU    [x] Patient is at high risk of neurologic deterioration/death due to: post op hemorrhage, brain compression traumatic acute SDH, midline shift, brain compression s/p craniotomy and evacuation    NEURO:  Q1 neuro checks   CT Head today stable. , Surgical drains per NSGY  keppra for seizure ppx  precedex low dose for sedation  pain control  Activity: [] OOB as tolerated [x] Bedrest [] PT [] OT [] PMNR    PULM:  intubated  check ABG  VAP bundle    CV:  -150mmHg  nicardipine gtt prn  was briefly on phenylephrine post op for hypotension to systolics in 40-50's, so holding home antihypertensives--lisinopril and norvasc    RENAL:  IVF     GI:  Diet: NPO overnight  GI prophylaxis [] not indicated [x] PPI [] other:  Bowel regimen [] colace [x] senna [x] other: miralax    ENDO:   Goal euglycemia (-180)  CAMERON    HEME/ONC:  VTE prophylaxis: [x] SCDs [] chemoprophylaxis [x] hold chemoprophylaxis due to: fresh post op [] high risk of DVT/PE on admission due to:    ID:  Judi-op antibiotics    MISC:    SOCIAL/FAMILY:  [x] awaiting [] updated at bedside [] family meeting    CODE STATUS:  [x] Full Code [] DNR [] DNI [] Palliative/Comfort Care    DISPOSITION:  [x] ICU [] Stroke Unit [] Floor [] EMU [] RCU [] PCU    [x] Patient is at high risk of neurologic deterioration/death due to: post op hemorrhage, brain compression traumatic acute SDH, midline shift, brain compression s/p craniotomy and evacuation    NEURO:  Q1 neuro checks   CT Head today stable. , Surgical drains per NSGY  keppra for seizure ppx  precedex as needed   pain control Fentanyl prn   Activity: [] OOB as tolerated [] Bedrest [x] PT [x] OT [] PMNR    PULM:  intubated  CPAp TRIAL   VAP bundle    CV:  -160mmHg  nicardipine gtt OFF   hole home BP meds for now     RENAL:  c/w IVF until at goal with TF     GI:  Diet: start Glucerna   GI prophylaxis [] not indicated [x] PPI [] other:  Bowel regimen [] colace [x] senna [x] other: miralax    ENDO:   Goal euglycemia (-180)  HBA1c 7.2   start NPH 3 Q 6  SSI moderate     HEME/ONC:  VTE prophylaxis: [x] SCDs [] chemoprophylaxis [x] hold chemoprophylaxis due to: fresh post op [] high risk of DVT/PE on admission due to:    ID:  Judi-op antibiotics    MISC:    SOCIAL/FAMILY:  [x] awaiting [] updated at bedside [] family meeting    CODE STATUS:  [x] Full Code [] DNR [] DNI [] Palliative/Comfort Care    DISPOSITION:  [x] ICU [] Stroke Unit [] Floor [] EMU [] RCU [] PCU    [x] Patient is at high risk of neurologic deterioration/death due to: post op hemorrhage, brain compression

## 2020-07-27 LAB
ANION GAP SERPL CALC-SCNC: 14 MMOL/L — SIGNIFICANT CHANGE UP (ref 5–17)
BUN SERPL-MCNC: 12 MG/DL — SIGNIFICANT CHANGE UP (ref 7–23)
CALCIUM SERPL-MCNC: 8.7 MG/DL — SIGNIFICANT CHANGE UP (ref 8.4–10.5)
CHLORIDE SERPL-SCNC: 105 MMOL/L — SIGNIFICANT CHANGE UP (ref 96–108)
CO2 SERPL-SCNC: 23 MMOL/L — SIGNIFICANT CHANGE UP (ref 22–31)
CREAT SERPL-MCNC: 0.36 MG/DL — LOW (ref 0.5–1.3)
GAS PNL BLDA: SIGNIFICANT CHANGE UP
GAS PNL BLDA: SIGNIFICANT CHANGE UP
GLUCOSE BLDC GLUCOMTR-MCNC: 160 MG/DL — HIGH (ref 70–99)
GLUCOSE BLDC GLUCOMTR-MCNC: 172 MG/DL — HIGH (ref 70–99)
GLUCOSE BLDC GLUCOMTR-MCNC: 197 MG/DL — HIGH (ref 70–99)
GLUCOSE BLDC GLUCOMTR-MCNC: 206 MG/DL — HIGH (ref 70–99)
GLUCOSE SERPL-MCNC: 168 MG/DL — HIGH (ref 70–99)
HCT VFR BLD CALC: 25.3 % — LOW (ref 34.5–45)
HGB BLD-MCNC: 8.3 G/DL — LOW (ref 11.5–15.5)
MAGNESIUM SERPL-MCNC: 2.2 MG/DL — SIGNIFICANT CHANGE UP (ref 1.6–2.6)
MCHC RBC-ENTMCNC: 26.9 PG — LOW (ref 27–34)
MCHC RBC-ENTMCNC: 32.8 GM/DL — SIGNIFICANT CHANGE UP (ref 32–36)
MCV RBC AUTO: 82.1 FL — SIGNIFICANT CHANGE UP (ref 80–100)
NRBC # BLD: 0 /100 WBCS — SIGNIFICANT CHANGE UP (ref 0–0)
PHOSPHATE SERPL-MCNC: 1.9 MG/DL — LOW (ref 2.5–4.5)
PLATELET # BLD AUTO: 126 K/UL — LOW (ref 150–400)
POTASSIUM SERPL-MCNC: 3.5 MMOL/L — SIGNIFICANT CHANGE UP (ref 3.5–5.3)
POTASSIUM SERPL-SCNC: 3.5 MMOL/L — SIGNIFICANT CHANGE UP (ref 3.5–5.3)
RBC # BLD: 3.08 M/UL — LOW (ref 3.8–5.2)
RBC # FLD: 15.7 % — HIGH (ref 10.3–14.5)
SODIUM SERPL-SCNC: 142 MMOL/L — SIGNIFICANT CHANGE UP (ref 135–145)
WBC # BLD: 10.99 K/UL — HIGH (ref 3.8–10.5)
WBC # FLD AUTO: 10.99 K/UL — HIGH (ref 3.8–10.5)

## 2020-07-27 PROCEDURE — 99292 CRITICAL CARE ADDL 30 MIN: CPT

## 2020-07-27 PROCEDURE — 99291 CRITICAL CARE FIRST HOUR: CPT

## 2020-07-27 RX ORDER — HUMAN INSULIN 100 [IU]/ML
6 INJECTION, SUSPENSION SUBCUTANEOUS EVERY 6 HOURS
Refills: 0 | Status: DISCONTINUED | OUTPATIENT
Start: 2020-07-27 | End: 2020-07-29

## 2020-07-27 RX ORDER — LEVETIRACETAM 250 MG/1
500 TABLET, FILM COATED ORAL
Refills: 0 | Status: DISCONTINUED | OUTPATIENT
Start: 2020-07-27 | End: 2020-07-31

## 2020-07-27 RX ORDER — POTASSIUM PHOSPHATE, MONOBASIC POTASSIUM PHOSPHATE, DIBASIC 236; 224 MG/ML; MG/ML
30 INJECTION, SOLUTION INTRAVENOUS ONCE
Refills: 0 | Status: COMPLETED | OUTPATIENT
Start: 2020-07-27 | End: 2020-07-28

## 2020-07-27 RX ORDER — ACETAMINOPHEN 500 MG
1000 TABLET ORAL ONCE
Refills: 0 | Status: COMPLETED | OUTPATIENT
Start: 2020-07-27 | End: 2020-07-27

## 2020-07-27 RX ADMIN — LEVETIRACETAM 400 MILLIGRAM(S): 250 TABLET, FILM COATED ORAL at 05:00

## 2020-07-27 RX ADMIN — SENNA PLUS 2 TABLET(S): 8.6 TABLET ORAL at 21:16

## 2020-07-27 RX ADMIN — Medication 400 MILLIGRAM(S): at 15:00

## 2020-07-27 RX ADMIN — HUMAN INSULIN 6 UNIT(S): 100 INJECTION, SUSPENSION SUBCUTANEOUS at 23:09

## 2020-07-27 RX ADMIN — LEVETIRACETAM 500 MILLIGRAM(S): 250 TABLET, FILM COATED ORAL at 18:04

## 2020-07-27 RX ADMIN — CHLORHEXIDINE GLUCONATE 15 MILLILITER(S): 213 SOLUTION TOPICAL at 05:00

## 2020-07-27 RX ADMIN — HUMAN INSULIN 3 UNIT(S): 100 INJECTION, SUSPENSION SUBCUTANEOUS at 05:00

## 2020-07-27 RX ADMIN — SODIUM CHLORIDE 100 MILLILITER(S): 9 INJECTION, SOLUTION INTRAVENOUS at 05:01

## 2020-07-27 RX ADMIN — POLYETHYLENE GLYCOL 3350 17 GRAM(S): 17 POWDER, FOR SOLUTION ORAL at 18:04

## 2020-07-27 RX ADMIN — Medication 2: at 18:04

## 2020-07-27 RX ADMIN — HUMAN INSULIN 3 UNIT(S): 100 INJECTION, SUSPENSION SUBCUTANEOUS at 11:12

## 2020-07-27 RX ADMIN — CHLORHEXIDINE GLUCONATE 1 APPLICATION(S): 213 SOLUTION TOPICAL at 21:16

## 2020-07-27 RX ADMIN — PANTOPRAZOLE SODIUM 40 MILLIGRAM(S): 20 TABLET, DELAYED RELEASE ORAL at 11:13

## 2020-07-27 RX ADMIN — CHLORHEXIDINE GLUCONATE 15 MILLILITER(S): 213 SOLUTION TOPICAL at 18:06

## 2020-07-27 RX ADMIN — Medication 2: at 23:09

## 2020-07-27 RX ADMIN — SODIUM CHLORIDE 100 MILLILITER(S): 9 INJECTION, SOLUTION INTRAVENOUS at 18:00

## 2020-07-27 RX ADMIN — HUMAN INSULIN 6 UNIT(S): 100 INJECTION, SUSPENSION SUBCUTANEOUS at 18:04

## 2020-07-27 RX ADMIN — Medication 4: at 11:12

## 2020-07-27 NOTE — PROGRESS NOTE ADULT - ASSESSMENT
traumatic acute SDH, midline shift, brain compression s/p craniotomy and evacuation    NEURO:  Q1 neuro checks   CT Head today stable. , Surgical drains per NSGY  keppra for seizure ppx  precedex as needed   pain control Fentanyl prn   Activity: [] OOB as tolerated [] Bedrest [x] PT [x] OT [] PMNR    PULM:  intubated  CPAp TRIAL   VAP bundle    CV:  -160mmHg  nicardipine gtt OFF   hole home BP meds for now     RENAL:  c/w IVF until at goal with TF     GI:  Diet: start Glucerna   GI prophylaxis [] not indicated [x] PPI [] other:  Bowel regimen [] colace [x] senna [x] other: miralax    ENDO:   Goal euglycemia (-180)  HBA1c 7.2   start NPH 3 Q 6  SSI moderate     HEME/ONC:  VTE prophylaxis: [x] SCDs [] chemoprophylaxis [x] hold chemoprophylaxis due to: fresh post op [] high risk of DVT/PE on admission due to:    ID:  Judi-op antibiotics    MISC:    SOCIAL/FAMILY:  [x] awaiting [] updated at bedside [] family meeting    CODE STATUS:  [x] Full Code [] DNR [] DNI [] Palliative/Comfort Care    DISPOSITION:  [x] ICU [] Stroke Unit [] Floor [] EMU [] RCU [] PCU    [x] Patient is at high risk of neurologic deterioration/death due to: post op hemorrhage, brain compression traumatic acute SDH, midline shift, brain compression s/p craniotomy and evacuation    NEURO:  Q1 neuro checks   CT Head today stable. , Surgical drains per NSGY  keppra for seizure ppx  precedex as needed   pain control Fentanyl prn   Activity: [] OOB as tolerated [] Bedrest [x] PT [x] OT [] PMNR    PULM:  intubated  CPAp TRIAL, wean as tolerated, likely extubation 7/28 when more awake.  VAP bundle    CV:  -160mmHg  nicardipine gtt OFF   hole home BP meds for now     RENAL:  lock ivf.    GI:  Diet: start Glucerna, hold at 6am for possible extubation  GI prophylaxis [] not indicated [x] PPI [] other:  Bowel regimen [] colace [x] senna [x] other: miralax    ENDO:   Goal euglycemia (-180)  HBA1c 7.2   start NPH 3 Q 6  SSI moderate     HEME/ONC:  VTE prophylaxis: [x] SCDs [] chemoprophylaxis [x] hold chemoprophylaxis due to: fresh post op [] high risk of DVT/PE on admission due to:    ID:  Judi-op antibiotics    MISC:    SOCIAL/FAMILY:  [x] awaiting [] updated at bedside [] family meeting    CODE STATUS:  [x] Full Code [] DNR [] DNI [] Palliative/Comfort Care    DISPOSITION:  [x] ICU [] Stroke Unit [] Floor [] EMU [] RCU [] PCU    [x] Patient is at high risk of neurologic deterioration/death due to: post op hemorrhage, brain compression

## 2020-07-27 NOTE — PROGRESS NOTE ADULT - SUBJECTIVE AND OBJECTIVE BOX
SUMMARY: 66 yo woman with pmh DM, HTN, on asa81 brought in by family fro concern of HA, dizziness, multiple episodes of emesis s/p mechanical fall 2 hours PTA (trip over baby carriage). Pt walked into triage, however pt became lethargic and unarousable, brought to CC room and intubated for airway protection and level 1 trauma activated.  CTH showing large acute SDH. On ASA, given DDAVP, 1U PLT.        ADMISSION SCORES:   GCS: 5 HH: MF: NIHSS: ICH Score:      OVERNIGHT EVENTS: Adm to NSCU post op, upon arrival, found to have anisocoric fixed pupils, taken to urgent CTH.: stable       ICU Vital Signs Last 24 Hrs  T(C): 37 (27 Jul 2020 03:00), Max: 38.3 (26 Jul 2020 21:00)  T(F): 98.6 (27 Jul 2020 03:00), Max: 100.9 (26 Jul 2020 21:00)  HR: 87 (27 Jul 2020 06:00) (81 - 106)  BP: --  BP(mean): --  ABP: 137/66 (27 Jul 2020 06:00) (100/68 - 187/73)  ABP(mean): 96 (27 Jul 2020 06:00) (71 - 120)  RR: 14 (27 Jul 2020 06:00) (13 - 21)  SpO2: 100% (27 Jul 2020 06:00) (100% - 100%)        Mode: AC/ CMV (Assist Control/ Continuous Mandatory Ventilation), RR (machine): 14, TV (machine): 400, FiO2: 30, PEEP: 5, ITime: 1, MAP: 8, PIP: 14  MEDICATIONS  (STANDING):  chlorhexidine 0.12% Liquid 15 milliLiter(s) Oral Mucosa every 12 hours  chlorhexidine 4% Liquid 1 Application(s) Topical <User Schedule>  insulin lispro (HumaLOG) corrective regimen sliding scale   SubCutaneous every 6 hours  insulin NPH human recombinant 3 Unit(s) SubCutaneous every 6 hours  lactated ringers. 1000 milliLiter(s) (100 mL/Hr) IV Continuous <Continuous>  levETIRAcetam  IVPB 500 milliGRAM(s) IV Intermittent every 12 hours  pantoprazole  Injectable 40 milliGRAM(s) IV Push daily  polyethylene glycol 3350 17 Gram(s) Oral two times a day  senna 2 Tablet(s) Oral at bedtime    MEDICATIONS  (PRN):  acetaminophen    Suspension .. 650 milliGRAM(s) Oral every 6 hours PRN Temp greater or equal to 38C (100.4F), Mild Pain (1 - 3)  fentaNYL    Injectable 12.5 MICROGram(s) IV Push every 2 hours PRN Severe Pain (7 - 10)  labetalol Injectable 10 milliGRAM(s) IV Push every 6 hours PRN Systolic blood pressure > 160                        8.3    10.99 )-----------( 126      ( 27 Jul 2020 05:44 )             25.3   07-26    138  |  102  |  14  ----------------------------<  256<H>  3.5   |  23  |  0.39<L>    Ca    8.6      26 Jul 2020 22:08  Phos  2.9     07-26  Mg     2.1     07-26    TPro  6.5  /  Alb  3.5  /  TBili  0.4  /  DBili  x   /  AST  63<H>  /  ALT  27  /  AlkPhos  116  07-26    DEVICES:   [] Restraints [x] PIVs [x] ET tube [] central line [] PICC [x] arterial line [x] charles [] NGT/OGT [] EVD [] LD [] ALMA/HMV [] LiCOX [] ICP monitor [] Trach [] PEG [] Chest Tube [] other:    EXAMINATION:  General: intubated  HEENT: Anicteric sclerae  Cardiac: [id number]  Lungs: Clear  Abdomen: Soft, non-tender, +BS  Extremities: No c/c/e  Skin/Incision Site: Clean, dry and intact  Neurologic: tried to  EO to noxious, Right 3 mm and left 4 mm reactive , follows commands in all 4 extremities, RUE spon AG, LUE localized, LEs wiggles toes SUMMARY: 66 yo woman with pmh DM, HTN, on asa81 brought in by family fro concern of HA, dizziness, multiple episodes of emesis s/p mechanical fall 2 hours PTA (trip over baby carriage). Pt walked into triage, however pt became lethargic and unarousable, brought to CC room and intubated for airway protection and level 1 trauma activated.  CTH showing large acute SDH. On ASA, given DDAVP, 1U PLT.        ADMISSION SCORES:   GCS: 5 HH: MF: NIHSS: ICH Score:      OVERNIGHT EVENTS: Adm to NSCU post op, upon arrival, found to have anisocoric fixed pupils, taken to urgent CTH.: stable       ICU Vital Signs Last 24 Hrs  T(C): 37 (27 Jul 2020 03:00), Max: 38.3 (26 Jul 2020 21:00)  T(F): 98.6 (27 Jul 2020 03:00), Max: 100.9 (26 Jul 2020 21:00)  HR: 87 (27 Jul 2020 06:00) (81 - 106)  BP: --  BP(mean): --  ABP: 137/66 (27 Jul 2020 06:00) (100/68 - 187/73)  ABP(mean): 96 (27 Jul 2020 06:00) (71 - 120)  RR: 14 (27 Jul 2020 06:00) (13 - 21)  SpO2: 100% (27 Jul 2020 06:00) (100% - 100%)        Mode: AC/ CMV (Assist Control/ Continuous Mandatory Ventilation), RR (machine): 14, TV (machine): 400, FiO2: 30, PEEP: 5, ITime: 1, MAP: 8, PIP: 14  MEDICATIONS  (STANDING):  chlorhexidine 0.12% Liquid 15 milliLiter(s) Oral Mucosa every 12 hours  chlorhexidine 4% Liquid 1 Application(s) Topical <User Schedule>  insulin lispro (HumaLOG) corrective regimen sliding scale   SubCutaneous every 6 hours  insulin NPH human recombinant 3 Unit(s) SubCutaneous every 6 hours  lactated ringers. 1000 milliLiter(s) (100 mL/Hr) IV Continuous <Continuous>  levETIRAcetam  IVPB 500 milliGRAM(s) IV Intermittent every 12 hours  pantoprazole  Injectable 40 milliGRAM(s) IV Push daily  polyethylene glycol 3350 17 Gram(s) Oral two times a day  senna 2 Tablet(s) Oral at bedtime    MEDICATIONS  (PRN):  acetaminophen    Suspension .. 650 milliGRAM(s) Oral every 6 hours PRN Temp greater or equal to 38C (100.4F), Mild Pain (1 - 3)  fentaNYL    Injectable 12.5 MICROGram(s) IV Push every 2 hours PRN Severe Pain (7 - 10)  labetalol Injectable 10 milliGRAM(s) IV Push every 6 hours PRN Systolic blood pressure > 160                        8.3    10.99 )-----------( 126      ( 27 Jul 2020 05:44 )             25.3   07-26    138  |  102  |  14  ----------------------------<  256<H>  3.5   |  23  |  0.39<L>    Ca    8.6      26 Jul 2020 22:08  Phos  2.9     07-26  Mg     2.1     07-26    TPro  6.5  /  Alb  3.5  /  TBili  0.4  /  DBili  x   /  AST  63<H>  /  ALT  27  /  AlkPhos  116  07-26    DEVICES:   [] Restraints [x] PIVs [x] ET tube [] central line [] PICC [x] arterial line [x] charles [] NGT/OGT [] EVD [] LD [] ALMA/HMV [] LiCOX [] ICP monitor [] Trach [] PEG [] Chest Tube [] other:    EXAMINATION:  General: intubated  HEENT: Anicteric sclerae  Cardiac: [id number]  Lungs: Clear  Abdomen: Soft, non-tender, +BS  Extremities: No c/c/e  Skin/Incision Site: Clean, dry and intact  Neurologic: tried to  EO to noxious, Right 3 mm and left 4 mm reactive , follows commands  intermittently, antigravity LUE, LEs wiggles toes w/ use of Latvian .  lethargic.

## 2020-07-27 NOTE — PROGRESS NOTE ADULT - ASSESSMENT
Adult Inpatient Plan of Care  Plan of Care Review  No significant events overnight.  Pt. Awake most of the night.  Hydralazine given once for map greater than 90.  Other VSS.          POD#0 L craniotomy for acute SDH evacuation    -drain management,   -wean to extubate  -

## 2020-07-27 NOTE — PROGRESS NOTE ADULT - ASSESSMENT
left subdural hematoma with brain compression, post-operative day 1 from evacuation  - -160mmHg  - seizure prophylaxis  - wean vent as tolerated; possible extubation in AM  - consider neurostimulant if mental status does not improve and no other cause of encephalopathy found  - monitor drain output

## 2020-07-27 NOTE — PROGRESS NOTE ADULT - SUBJECTIVE AND OBJECTIVE BOX
Less arousable, but did follow intermittently with significant stimulus Less arousable, but did follow intermittently with significant stimulus    Opens eyes spontaneously, follows in Indonesian, moves all limbs spontaneously and strongly

## 2020-07-27 NOTE — PROGRESS NOTE ADULT - SUBJECTIVE AND OBJECTIVE BOX
Patient seen and examined at bedside.    --Anticoagulation--    T(C): 37.3 (07-26-20 @ 23:00), Max: 38.3 (07-26-20 @ 21:00)  HR: 103 (07-27-20 @ 00:10) (70 - 106)  BP: --  RR: 20 (07-27-20 @ 00:00) (13 - 21)  SpO2: 100% (07-27-20 @ 00:10) (100% - 100%)  Wt(kg): --    Exam:    intubated, examined in Maori at bedside, nods appropriately, R EO to pain, oriented x3 given choice, RUE 4/5, LUE 4-/5, BLE 2/5 prox DF/PF 5/5

## 2020-07-27 NOTE — PROGRESS NOTE ADULT - ATTENDING COMMENTS
PT seen and examined today with resident.  Agree with above resident evaluation and assessment.  PT opens right eye a little and shows fingers on both hands and moves some toes.  Pupils reactive and mildly anisocoric. Currently being weaned.  Will continue to observe for improvements in mental status.

## 2020-07-28 LAB
ANION GAP SERPL CALC-SCNC: 11 MMOL/L — SIGNIFICANT CHANGE UP (ref 5–17)
APPEARANCE UR: ABNORMAL
BACTERIA # UR AUTO: NEGATIVE — SIGNIFICANT CHANGE UP
BILIRUB UR-MCNC: NEGATIVE — SIGNIFICANT CHANGE UP
BUN SERPL-MCNC: 13 MG/DL — SIGNIFICANT CHANGE UP (ref 7–23)
CALCIUM SERPL-MCNC: 8.8 MG/DL — SIGNIFICANT CHANGE UP (ref 8.4–10.5)
CHLORIDE SERPL-SCNC: 105 MMOL/L — SIGNIFICANT CHANGE UP (ref 96–108)
CO2 SERPL-SCNC: 24 MMOL/L — SIGNIFICANT CHANGE UP (ref 22–31)
COLOR SPEC: SIGNIFICANT CHANGE UP
CREAT SERPL-MCNC: 0.35 MG/DL — LOW (ref 0.5–1.3)
DIFF PNL FLD: ABNORMAL
EPI CELLS # UR: 2 /HPF — SIGNIFICANT CHANGE UP
GLUCOSE BLDC GLUCOMTR-MCNC: 134 MG/DL — HIGH (ref 70–99)
GLUCOSE BLDC GLUCOMTR-MCNC: 165 MG/DL — HIGH (ref 70–99)
GLUCOSE BLDC GLUCOMTR-MCNC: 195 MG/DL — HIGH (ref 70–99)
GLUCOSE BLDC GLUCOMTR-MCNC: 203 MG/DL — HIGH (ref 70–99)
GLUCOSE SERPL-MCNC: 199 MG/DL — HIGH (ref 70–99)
GLUCOSE UR QL: ABNORMAL
GRAM STN FLD: SIGNIFICANT CHANGE UP
HCT VFR BLD CALC: 26.9 % — LOW (ref 34.5–45)
HGB BLD-MCNC: 8.7 G/DL — LOW (ref 11.5–15.5)
HYALINE CASTS # UR AUTO: 1 /LPF — SIGNIFICANT CHANGE UP (ref 0–2)
KETONES UR-MCNC: SIGNIFICANT CHANGE UP
LEUKOCYTE ESTERASE UR-ACNC: NEGATIVE — SIGNIFICANT CHANGE UP
MAGNESIUM SERPL-MCNC: 2.2 MG/DL — SIGNIFICANT CHANGE UP (ref 1.6–2.6)
MCHC RBC-ENTMCNC: 26.9 PG — LOW (ref 27–34)
MCHC RBC-ENTMCNC: 32.3 GM/DL — SIGNIFICANT CHANGE UP (ref 32–36)
MCV RBC AUTO: 83 FL — SIGNIFICANT CHANGE UP (ref 80–100)
NITRITE UR-MCNC: NEGATIVE — SIGNIFICANT CHANGE UP
NRBC # BLD: 0 /100 WBCS — SIGNIFICANT CHANGE UP (ref 0–0)
PH UR: 7 — SIGNIFICANT CHANGE UP (ref 5–8)
PHOSPHATE SERPL-MCNC: 2.5 MG/DL — SIGNIFICANT CHANGE UP (ref 2.5–4.5)
PLATELET # BLD AUTO: 127 K/UL — LOW (ref 150–400)
POTASSIUM SERPL-MCNC: 3.8 MMOL/L — SIGNIFICANT CHANGE UP (ref 3.5–5.3)
POTASSIUM SERPL-SCNC: 3.8 MMOL/L — SIGNIFICANT CHANGE UP (ref 3.5–5.3)
PROT UR-MCNC: 100 — SIGNIFICANT CHANGE UP
RBC # BLD: 3.24 M/UL — LOW (ref 3.8–5.2)
RBC # FLD: 15.8 % — HIGH (ref 10.3–14.5)
RBC CASTS # UR COMP ASSIST: 3 /HPF — SIGNIFICANT CHANGE UP (ref 0–4)
SODIUM SERPL-SCNC: 140 MMOL/L — SIGNIFICANT CHANGE UP (ref 135–145)
SP GR SPEC: 1.02 — SIGNIFICANT CHANGE UP (ref 1.01–1.02)
SPECIMEN SOURCE: SIGNIFICANT CHANGE UP
SURGICAL PATHOLOGY STUDY: SIGNIFICANT CHANGE UP
UROBILINOGEN FLD QL: ABNORMAL
WBC # BLD: 10.09 K/UL — SIGNIFICANT CHANGE UP (ref 3.8–10.5)
WBC # FLD AUTO: 10.09 K/UL — SIGNIFICANT CHANGE UP (ref 3.8–10.5)
WBC UR QL: 2 /HPF — SIGNIFICANT CHANGE UP (ref 0–5)

## 2020-07-28 PROCEDURE — 99291 CRITICAL CARE FIRST HOUR: CPT

## 2020-07-28 PROCEDURE — 99292 CRITICAL CARE ADDL 30 MIN: CPT

## 2020-07-28 PROCEDURE — 70450 CT HEAD/BRAIN W/O DYE: CPT | Mod: 26

## 2020-07-28 PROCEDURE — 71045 X-RAY EXAM CHEST 1 VIEW: CPT | Mod: 26

## 2020-07-28 RX ORDER — POTASSIUM CHLORIDE 20 MEQ
20 PACKET (EA) ORAL ONCE
Refills: 0 | Status: COMPLETED | OUTPATIENT
Start: 2020-07-28 | End: 2020-07-28

## 2020-07-28 RX ORDER — AMLODIPINE BESYLATE 2.5 MG/1
5 TABLET ORAL DAILY
Refills: 0 | Status: DISCONTINUED | OUTPATIENT
Start: 2020-07-28 | End: 2020-08-11

## 2020-07-28 RX ORDER — AMANTADINE HCL 100 MG
100 CAPSULE ORAL EVERY 12 HOURS
Refills: 0 | Status: DISCONTINUED | OUTPATIENT
Start: 2020-07-28 | End: 2020-08-04

## 2020-07-28 RX ADMIN — Medication 650 MILLIGRAM(S): at 22:03

## 2020-07-28 RX ADMIN — Medication 20 MILLIEQUIVALENT(S): at 23:34

## 2020-07-28 RX ADMIN — FENTANYL CITRATE 12.5 MICROGRAM(S): 50 INJECTION INTRAVENOUS at 02:53

## 2020-07-28 RX ADMIN — POLYETHYLENE GLYCOL 3350 17 GRAM(S): 17 POWDER, FOR SOLUTION ORAL at 17:05

## 2020-07-28 RX ADMIN — HUMAN INSULIN 6 UNIT(S): 100 INJECTION, SUSPENSION SUBCUTANEOUS at 17:05

## 2020-07-28 RX ADMIN — HUMAN INSULIN 6 UNIT(S): 100 INJECTION, SUSPENSION SUBCUTANEOUS at 05:13

## 2020-07-28 RX ADMIN — FENTANYL CITRATE 12.5 MICROGRAM(S): 50 INJECTION INTRAVENOUS at 02:38

## 2020-07-28 RX ADMIN — Medication 2: at 17:05

## 2020-07-28 RX ADMIN — Medication 4: at 23:34

## 2020-07-28 RX ADMIN — CHLORHEXIDINE GLUCONATE 15 MILLILITER(S): 213 SOLUTION TOPICAL at 17:05

## 2020-07-28 RX ADMIN — LEVETIRACETAM 500 MILLIGRAM(S): 250 TABLET, FILM COATED ORAL at 17:08

## 2020-07-28 RX ADMIN — FENTANYL CITRATE 12.5 MICROGRAM(S): 50 INJECTION INTRAVENOUS at 06:25

## 2020-07-28 RX ADMIN — Medication 650 MILLIGRAM(S): at 00:40

## 2020-07-28 RX ADMIN — Medication 650 MILLIGRAM(S): at 01:10

## 2020-07-28 RX ADMIN — POTASSIUM PHOSPHATE, MONOBASIC POTASSIUM PHOSPHATE, DIBASIC 83.33 MILLIMOLE(S): 236; 224 INJECTION, SOLUTION INTRAVENOUS at 00:38

## 2020-07-28 RX ADMIN — HUMAN INSULIN 6 UNIT(S): 100 INJECTION, SUSPENSION SUBCUTANEOUS at 11:15

## 2020-07-28 RX ADMIN — Medication 100 MILLIGRAM(S): at 17:04

## 2020-07-28 RX ADMIN — SENNA PLUS 2 TABLET(S): 8.6 TABLET ORAL at 21:31

## 2020-07-28 RX ADMIN — CHLORHEXIDINE GLUCONATE 1 APPLICATION(S): 213 SOLUTION TOPICAL at 21:31

## 2020-07-28 RX ADMIN — LEVETIRACETAM 500 MILLIGRAM(S): 250 TABLET, FILM COATED ORAL at 05:14

## 2020-07-28 RX ADMIN — HUMAN INSULIN 6 UNIT(S): 100 INJECTION, SUSPENSION SUBCUTANEOUS at 23:34

## 2020-07-28 RX ADMIN — AMLODIPINE BESYLATE 5 MILLIGRAM(S): 2.5 TABLET ORAL at 21:31

## 2020-07-28 RX ADMIN — CHLORHEXIDINE GLUCONATE 15 MILLILITER(S): 213 SOLUTION TOPICAL at 05:13

## 2020-07-28 RX ADMIN — Medication 650 MILLIGRAM(S): at 21:33

## 2020-07-28 RX ADMIN — Medication 2: at 05:13

## 2020-07-28 RX ADMIN — POLYETHYLENE GLYCOL 3350 17 GRAM(S): 17 POWDER, FOR SOLUTION ORAL at 05:14

## 2020-07-28 RX ADMIN — Medication 10 MILLIGRAM(S): at 21:31

## 2020-07-28 RX ADMIN — Medication 650 MILLIGRAM(S): at 11:00

## 2020-07-28 RX ADMIN — FENTANYL CITRATE 12.5 MICROGRAM(S): 50 INJECTION INTRAVENOUS at 06:40

## 2020-07-28 RX ADMIN — PANTOPRAZOLE SODIUM 40 MILLIGRAM(S): 20 TABLET, DELAYED RELEASE ORAL at 11:15

## 2020-07-28 NOTE — DIETITIAN INITIAL EVALUATION ADULT. - ENTERAL
1) If EN to continue, recommend resume Glucerna 1.2 at 50ml/hr x 24 hrs. Will provide: (based on dosing wt 50.8kg): 1200ml, 1440kcal (28.3kcal/kg) and 72g protein (1.4g protein/kg)

## 2020-07-28 NOTE — PROGRESS NOTE ADULT - ASSESSMENT
traumatic acute SDH, midline shift, brain compression s/p craniotomy and evacuation    NEURO:  Q1 neuro checks   CT Head today stable. , Surgical drains per NSGY  keppra for seizure ppx  precedex as needed   pain control Fentanyl prn   Activity: [] OOB as tolerated [] Bedrest [x] PT [x] OT [] PMNR    PULM:  intubated  CPAp TRIAL, wean as tolerated, likely extubation 7/28 when more awake.  VAP bundle    CV:  -160mmHg  nicardipine gtt OFF   hole home BP meds for now     RENAL:  lock ivf.    GI:  Diet: start Glucerna, hold at 6am for possible extubation  GI prophylaxis [] not indicated [x] PPI [] other:  Bowel regimen [] colace [x] senna [x] other: miralax    ENDO:   Goal euglycemia (-180)  HBA1c 7.2   start NPH 3 Q 6  SSI moderate     HEME/ONC:  VTE prophylaxis: [x] SCDs [] chemoprophylaxis [x] hold chemoprophylaxis due to: fresh post op [] high risk of DVT/PE on admission due to:    ID:  afebrile    MISC:    SOCIAL/FAMILY:  [x] awaiting [] updated at bedside [] family meeting    CODE STATUS:  [x] Full Code [] DNR [] DNI [] Palliative/Comfort Care    DISPOSITION:  [x] ICU [] Stroke Unit [] Floor [] EMU [] RCU [] PCU    [x] Patient is at high risk of neurologic deterioration/death due to: post op hemorrhage, brain compression traumatic acute SDH, midline shift, brain compression s/p craniotomy and evacuation    NEURO:  Q1 neuro checks   CT Head today stable. , Surgical drains per NSGY  keppra for seizure ppx  precedex as needed   amantadine 100 mg po bid  ct scan of head today showed acute subdural collection  Activity: [] OOB as tolerated [] Bedrest [x] PT [x] OT [] PMNR    PULM:  intubated  CPAp TRIAL, wean as tolerated, likely extubation 7/28 when more awake.  VAP bundle    CV:  -160mmHg  nicardipine gtt OFF   hole home BP meds for now     RENAL:  lock ivf.    GI:  Diet: start Glucerna, hold at 6am for possible extubation  GI prophylaxis [] not indicated [x] PPI [] other:  Bowel regimen [] colace [x] senna [x] other: miralax    ENDO:   Goal euglycemia (-180)  HBA1c 7.2   start NPH 6 Q 6  SSI moderate     HEME/ONC:  hold vte ppx as per NS team  VTE prophylaxis: [x] SCDs [] chemoprophylaxis [x] hold chemoprophylaxis due to: fresh post op [] high risk of DVT/PE on admission due to:    ID:  afebrile    MISC:    SOCIAL/FAMILY:  [x] awaiting [] updated at bedside [] family meeting    CODE STATUS:  [x] Full Code [] DNR [] DNI [] Palliative/Comfort Care    DISPOSITION:  [x] ICU [] Stroke Unit [] Floor [] EMU [] RCU [] PCU    [x] Patient is at high risk of neurologic deterioration/death due to: post op hemorrhage, brain compression

## 2020-07-28 NOTE — DIETITIAN INITIAL EVALUATION ADULT. - OTHER INFO
Pt is a 66 yo F with PMH: DM, HTN; brought in by family for concern of HA, dizziness, multiple episodes of emesis s/p mechanical fall 2 hours PTA. Become lethargic and unarousable upon arrival to hospital. Intubated for airway protection. CTH indicated large acute SDH with midline shift, brain compression s/p craniotomy and evacuation.    Pt observed resting in bed; intubated. Unable to participate in nutrition evaluation 2/2 same. Baseline chewing/swallowing tolerance and provision of nutrient intake unknown at this time. No documented food allergies.     Pt currently NPO for possible extubation. Was previously prescribed Glucerna 1.2 at 50ml/hr x 24 hrs; initiated 7/26. Goal rate met 7/27.  Pt prescribed Humalog and NPH to promote optimal glycemic control. Noted with hx of DM with A1c 7.2%. H&P home medication list inclusive of Metformin, Glimepiride.      Dosing wt (7/25): 111.8 lbs.  Wt trends (per [facility]): (9/5/18): 124 lbs; (12/20/18): 122 lbs; (6/19/19): 119 lbs; (11/29/19): Pt is a 66 yo F with PMH: DM, HTN; brought in by family for concern of HA, dizziness, multiple episodes of emesis s/p mechanical fall 2 hours PTA. Become lethargic and unarousable upon arrival to hospital. Intubated for airway protection. CTH indicated large acute SDH with midline shift, brain compression s/p craniotomy and evacuation.    Pt observed resting in bed; intubated. Unable to participate in nutrition evaluation 2/2 same. Baseline chewing/swallowing tolerance and provision of nutrient intake unknown at this time. No documented food allergies.     Pt currently NPO for possible extubation. Was previously prescribed Glucerna 1.2 at 50ml/hr x 24 hrs; initiated 7/26. Goal rate met 7/27.  Pt prescribed Humalog and NPH to promote optimal glycemic control. Noted with hx of DM with A1c 7.2%. H&P home medication list inclusive of Metformin and Glimepiride.     Dosing wt (7/25): 111.8 lbs.  Wt trends (per [facility] HIE): (9/5/18): 124 lbs; (12/20/18): 122 lbs; (6/19/19): 119 lbs; (11/29/19): 119.2 lbs; (12/19/19): 116 lbs; (7/26/20): 112 lbs.  Indicates overall wt loss of 7.2 lbs/6.0% x ~8 months. Downward trend noted.

## 2020-07-28 NOTE — PROGRESS NOTE ADULT - SUBJECTIVE AND OBJECTIVE BOX
Less arousable during the day so started on amantadine. Less arousable during the day so started on amantadine. However, wakefulness similar to last night on my exam.     Eyes open to voice, follows in Bengali (thumbs up b/l, 2 fingers b/l, raise arms/legs b/l), all limbs antigravity

## 2020-07-28 NOTE — DIETITIAN INITIAL EVALUATION ADULT. - PERTINENT LABORATORY DATA
(7/27): Hgb 8.7, Hct 26.9, POCT blood glucose 165, 160, Cr 0.36, Glu 168, Phos 1.9; (7/26): A1c 7.2, Estimated Average Glucose 160

## 2020-07-28 NOTE — PROGRESS NOTE ADULT - SUBJECTIVE AND OBJECTIVE BOX
SUMMARY: 66 yo woman with pmh DM, HTN, on asa81 brought in by family fro concern of HA, dizziness, multiple episodes of emesis s/p mechanical fall 2 hours PTA (trip over baby carriage). Pt walked into triage, however pt became lethargic and unarousable, brought to CC room and intubated for airway protection and level 1 trauma activated.  CTH showing large acute SDH. On ASA, given DDAVP, 1U PLT.        ADMISSION SCORES:   GCS: 5 HH: MF: NIHSS: ICH Score:      OVERNIGHT EVENTS: agitated recieved fentayl 12.5 mg ivp x 1 @ 6  am    ICU Vital Signs Last 24 Hrs  T(C): 37.4 (28 Jul 2020 07:00), Max: 38.1 (28 Jul 2020 00:40)  T(F): 99.4 (28 Jul 2020 07:00), Max: 100.6 (28 Jul 2020 00:40)  HR: 91 (28 Jul 2020 08:24) (78 - 103)  BP: 159/72 (28 Jul 2020 08:00) (111/54 - 166/75)  BP(mean): 98 (28 Jul 2020 08:00) (72 - 108)  ABP: 153/94 (27 Jul 2020 11:00) (151/63 - 160/86)  ABP(mean): 116 (27 Jul 2020 11:00) (98 - 119)  RR: 14 (28 Jul 2020 08:00) (12 - 19)  SpO2: 100% (28 Jul 2020 08:24) (99% - 100%)      07-27-20 @ 07:01  -  07-28-20 @ 07:00  --------------------------------------------------------  IN: 2089.8 mL / OUT: 2145 mL / NET: -55.2 mL      Mode: CPAP with PS, FiO2: 30, PEEP: 5, PS: 5, MAP: 7, PIP: 10  acetaminophen    Suspension .. 650 milliGRAM(s) Oral every 6 hours PRN  chlorhexidine 0.12% Liquid 15 milliLiter(s) Oral Mucosa every 12 hours  chlorhexidine 4% Liquid 1 Application(s) Topical <User Schedule>  fentaNYL    Injectable 12.5 MICROGram(s) IV Push every 2 hours PRN  insulin lispro (HumaLOG) corrective regimen sliding scale   SubCutaneous every 6 hours  insulin NPH human recombinant 6 Unit(s) SubCutaneous every 6 hours  labetalol Injectable 10 milliGRAM(s) IV Push every 6 hours PRN  levETIRAcetam  Solution 500 milliGRAM(s) Oral two times a day  pantoprazole  Injectable 40 milliGRAM(s) IV Push daily  polyethylene glycol 3350 17 Gram(s) Oral two times a day  senna 2 Tablet(s) Oral at bedtime                          8.7    10.09 )-----------( 127      ( 27 Jul 2020 23:37 )             26.9     07-27    142  |  105  |  12  ----------------------------<  168<H>  3.5   |  23  |  0.36<L>    Ca    8.7      27 Jul 2020 21:44  Phos  1.9     07-27  Mg     2.2     07-27        ABG - ( 27 Jul 2020 15:10 )  pH, Arterial: 7.48  pH, Blood: x     /  pCO2: 35    /  pO2: 136   / HCO3: 25    / Base Excess: 2.3   /  SaO2: 99          DEVICES:   [] Restraints [x] PIVs [x] ET tube [] central line [] PICC [x] arterial line [x] charles [] NGT/OGT [] EVD [] LD [] ALMA/HMV [] LiCOX [] ICP monitor [] Trach [] PEG [] Chest Tube [] other:    EXAMINATION:  General: intubated  HEENT: Anicteric sclerae  Cardiac: [id number]  Lungs: Clear  Abdomen: Soft, non-tender, +BS  Extremities: No c/c/e  Skin/Incision Site: Clean, dry and intact  Neurologic: tried to  EO to noxious, Right 3 mm and left 4 mm reactive , follows commands  intermittently, antigravity LUE, LEs wiggles toes w/ use of Yakut .  lethargic.

## 2020-07-28 NOTE — PROGRESS NOTE ADULT - SUBJECTIVE AND OBJECTIVE BOX
Patient seen and examined at bedside.    --Anticoagulation--    T(C): 37.6 (07-27-20 @ 23:00), Max: 37.9 (07-27-20 @ 17:00)  HR: 91 (07-27-20 @ 23:00) (78 - 103)  BP: 161/72 (07-27-20 @ 23:00) (111/54 - 161/72)  RR: 17 (07-27-20 @ 23:00) (13 - 19)  SpO2: 100% (07-27-20 @ 23:00) (99% - 100%)  Wt(kg): --    Exam:    intubated, examined in Faroese at bedside, nods appropriately, EO to voice, FC x4, RUE 4/5, LUE 4-/5, BLE 2/5 prox DF/PF 5/5

## 2020-07-28 NOTE — DIETITIAN INITIAL EVALUATION ADULT. - PHYSICAL APPEARANCE
contracted No indication of body fat or msucle mass depletion noted based on visual observation; Nutrition Focused Physical Assessment deferred at this time./debilitated

## 2020-07-28 NOTE — DIETITIAN INITIAL EVALUATION ADULT. - ADD RECOMMEND
1) Defer initiation of PO diet vs. EN feeds to medical team. 2) If PO diet, recommend consistent carbohydrate diet; defer consistency to medical team, SLP. 3) Monitor GI tolerance. RD to remain available to adjust EN formulary, volume/rate PRN. 4) Obtain further subjective wt/diet history from pt/family PRN. 5) Monitor wt trends, nutrition related labs, skin integrity, hydration status and bowel regularity.

## 2020-07-28 NOTE — DIETITIAN INITIAL EVALUATION ADULT. - ENERGY NEEDS
Ht: 62"  Wt: 112 lbs  BMI: 20.4  kg/m2   IBW: 110 lbs (+/-10%)     102 % IBW  Edema: none noted           Skin: surgical incision left crani 7/26

## 2020-07-28 NOTE — PROGRESS NOTE ADULT - ASSESSMENT
Acute left subdural hematoma status post evacuation  - amantadine for wakefulness  - wean to extubate  - -160mmHg  - Seizure prophylaxis  - Glucose control  - Monitor drain output

## 2020-07-28 NOTE — PROGRESS NOTE ADULT - SUBJECTIVE AND OBJECTIVE BOX
PT febrile to 101.  Cultures pending. On CPAP.  Opens eyes to light stim, moves right hand and feet to command.  CT today with decreased subdural air.  Some residual blood on brain surface.  Residual shift unchanged. Continue close monitoring and weaning efforts.

## 2020-07-29 LAB
ANION GAP SERPL CALC-SCNC: 10 MMOL/L — SIGNIFICANT CHANGE UP (ref 5–17)
APTT BLD: 28.9 SEC — SIGNIFICANT CHANGE UP (ref 27.5–35.5)
BASOPHILS # BLD AUTO: 0.02 K/UL — SIGNIFICANT CHANGE UP (ref 0–0.2)
BASOPHILS NFR BLD AUTO: 0.2 % — SIGNIFICANT CHANGE UP (ref 0–2)
BUN SERPL-MCNC: 21 MG/DL — SIGNIFICANT CHANGE UP (ref 7–23)
CALCIUM SERPL-MCNC: 8.9 MG/DL — SIGNIFICANT CHANGE UP (ref 8.4–10.5)
CHLORIDE SERPL-SCNC: 103 MMOL/L — SIGNIFICANT CHANGE UP (ref 96–108)
CO2 SERPL-SCNC: 26 MMOL/L — SIGNIFICANT CHANGE UP (ref 22–31)
CREAT SERPL-MCNC: 0.37 MG/DL — LOW (ref 0.5–1.3)
EOSINOPHIL # BLD AUTO: 0.21 K/UL — SIGNIFICANT CHANGE UP (ref 0–0.5)
EOSINOPHIL NFR BLD AUTO: 2.3 % — SIGNIFICANT CHANGE UP (ref 0–6)
GLUCOSE BLDC GLUCOMTR-MCNC: 141 MG/DL — HIGH (ref 70–99)
GLUCOSE BLDC GLUCOMTR-MCNC: 191 MG/DL — HIGH (ref 70–99)
GLUCOSE BLDC GLUCOMTR-MCNC: 234 MG/DL — HIGH (ref 70–99)
GLUCOSE BLDC GLUCOMTR-MCNC: 239 MG/DL — HIGH (ref 70–99)
GLUCOSE SERPL-MCNC: 231 MG/DL — HIGH (ref 70–99)
HCT VFR BLD CALC: 27 % — LOW (ref 34.5–45)
HCT VFR BLD CALC: 29.2 % — LOW (ref 34.5–45)
HEPARINASE TEG R TIME: 2.7 MIN (ref 4.3–8.3)
HGB BLD-MCNC: 8.7 G/DL — LOW (ref 11.5–15.5)
HGB BLD-MCNC: 9.3 G/DL — LOW (ref 11.5–15.5)
IMM GRANULOCYTES NFR BLD AUTO: 0.4 % — SIGNIFICANT CHANGE UP (ref 0–1.5)
INR BLD: 1.13 RATIO — SIGNIFICANT CHANGE UP (ref 0.88–1.16)
LYMPHOCYTES # BLD AUTO: 1.6 K/UL — SIGNIFICANT CHANGE UP (ref 1–3.3)
LYMPHOCYTES # BLD AUTO: 17.8 % — SIGNIFICANT CHANGE UP (ref 13–44)
MAGNESIUM SERPL-MCNC: 2.4 MG/DL — SIGNIFICANT CHANGE UP (ref 1.6–2.6)
MCHC RBC-ENTMCNC: 26.5 PG — LOW (ref 27–34)
MCHC RBC-ENTMCNC: 26.9 PG — LOW (ref 27–34)
MCHC RBC-ENTMCNC: 31.8 GM/DL — LOW (ref 32–36)
MCHC RBC-ENTMCNC: 32.2 GM/DL — SIGNIFICANT CHANGE UP (ref 32–36)
MCV RBC AUTO: 83.2 FL — SIGNIFICANT CHANGE UP (ref 80–100)
MCV RBC AUTO: 83.3 FL — SIGNIFICANT CHANGE UP (ref 80–100)
MONOCYTES # BLD AUTO: 0.74 K/UL — SIGNIFICANT CHANGE UP (ref 0–0.9)
MONOCYTES NFR BLD AUTO: 8.2 % — SIGNIFICANT CHANGE UP (ref 2–14)
NEUTROPHILS # BLD AUTO: 6.36 K/UL — SIGNIFICANT CHANGE UP (ref 1.8–7.4)
NEUTROPHILS NFR BLD AUTO: 71.1 % — SIGNIFICANT CHANGE UP (ref 43–77)
NRBC # BLD: 0 /100 WBCS — SIGNIFICANT CHANGE UP (ref 0–0)
NRBC # BLD: 0 /100 WBCS — SIGNIFICANT CHANGE UP (ref 0–0)
PHOSPHATE SERPL-MCNC: 3.3 MG/DL — SIGNIFICANT CHANGE UP (ref 2.5–4.5)
PLATELET # BLD AUTO: 180 K/UL — SIGNIFICANT CHANGE UP (ref 150–400)
PLATELET # BLD AUTO: 188 K/UL — SIGNIFICANT CHANGE UP (ref 150–400)
PLATELET MAPPING ACTF MAX AMPLITUDE: 21.5 MM (ref 2–19)
PLATELET MAPPING ADP MAXIMUM AMPLITUDE: 64.8 MM — SIGNIFICANT CHANGE UP (ref 45–69)
PLATELET MAPPING ADP PERCENT INHIBITION: 8.5 % — SIGNIFICANT CHANGE UP (ref 0–17)
PLATELET MAPPING HKH MAXIMUM AMPLITUDE: 68.8 MM (ref 53–68)
PLATELET RESPONSE ASPIRIN RESULT: 614 ARU — SIGNIFICANT CHANGE UP (ref 350–700)
POTASSIUM SERPL-MCNC: 4.3 MMOL/L — SIGNIFICANT CHANGE UP (ref 3.5–5.3)
POTASSIUM SERPL-SCNC: 4.3 MMOL/L — SIGNIFICANT CHANGE UP (ref 3.5–5.3)
PROTHROM AB SERPL-ACNC: 13.4 SEC — SIGNIFICANT CHANGE UP (ref 10.6–13.6)
RAPIDTEG MAXIMUM AMPLITUDE: 67.7 MM — SIGNIFICANT CHANGE UP (ref 52–70)
RBC # BLD: 3.24 M/UL — LOW (ref 3.8–5.2)
RBC # BLD: 3.51 M/UL — LOW (ref 3.8–5.2)
RBC # FLD: 15.9 % — HIGH (ref 10.3–14.5)
RBC # FLD: 16.1 % — HIGH (ref 10.3–14.5)
SODIUM SERPL-SCNC: 139 MMOL/L — SIGNIFICANT CHANGE UP (ref 135–145)
TEG FUNCTIONAL FIBRINOGEN: 27.8 MM — SIGNIFICANT CHANGE UP (ref 15–32)
TEG MAXIMUM AMPLITUDE: 68.5 MM — SIGNIFICANT CHANGE UP (ref 52–69)
TEG REACTION TIME: 2.5 MIN (ref 4.6–9.1)
WBC # BLD: 5.56 K/UL — SIGNIFICANT CHANGE UP (ref 3.8–10.5)
WBC # BLD: 8.97 K/UL — SIGNIFICANT CHANGE UP (ref 3.8–10.5)
WBC # FLD AUTO: 5.56 K/UL — SIGNIFICANT CHANGE UP (ref 3.8–10.5)
WBC # FLD AUTO: 8.97 K/UL — SIGNIFICANT CHANGE UP (ref 3.8–10.5)

## 2020-07-29 PROCEDURE — 70450 CT HEAD/BRAIN W/O DYE: CPT | Mod: 26

## 2020-07-29 PROCEDURE — 99292 CRITICAL CARE ADDL 30 MIN: CPT

## 2020-07-29 PROCEDURE — 71045 X-RAY EXAM CHEST 1 VIEW: CPT | Mod: 26

## 2020-07-29 PROCEDURE — 99291 CRITICAL CARE FIRST HOUR: CPT

## 2020-07-29 RX ORDER — DEXAMETHASONE 0.5 MG/5ML
4 ELIXIR ORAL EVERY 8 HOURS
Refills: 0 | Status: DISCONTINUED | OUTPATIENT
Start: 2020-07-31 | End: 2020-07-31

## 2020-07-29 RX ORDER — DEXAMETHASONE 0.5 MG/5ML
ELIXIR ORAL
Refills: 0 | Status: DISCONTINUED | OUTPATIENT
Start: 2020-07-29 | End: 2020-07-31

## 2020-07-29 RX ORDER — DEXAMETHASONE 0.5 MG/5ML
4 ELIXIR ORAL EVERY 6 HOURS
Refills: 0 | Status: DISCONTINUED | OUTPATIENT
Start: 2020-07-29 | End: 2020-07-31

## 2020-07-29 RX ORDER — DEXAMETHASONE 0.5 MG/5ML
ELIXIR ORAL
Refills: 0 | Status: DISCONTINUED | OUTPATIENT
Start: 2020-07-29 | End: 2020-07-29

## 2020-07-29 RX ORDER — DEXAMETHASONE 0.5 MG/5ML
4 ELIXIR ORAL EVERY 6 HOURS
Refills: 0 | Status: DISCONTINUED | OUTPATIENT
Start: 2020-07-29 | End: 2020-07-29

## 2020-07-29 RX ORDER — HUMAN INSULIN 100 [IU]/ML
10 INJECTION, SUSPENSION SUBCUTANEOUS EVERY 6 HOURS
Refills: 0 | Status: DISCONTINUED | OUTPATIENT
Start: 2020-07-29 | End: 2020-07-29

## 2020-07-29 RX ORDER — HUMAN INSULIN 100 [IU]/ML
12 INJECTION, SUSPENSION SUBCUTANEOUS EVERY 6 HOURS
Refills: 0 | Status: DISCONTINUED | OUTPATIENT
Start: 2020-07-29 | End: 2020-07-30

## 2020-07-29 RX ADMIN — HUMAN INSULIN 12 UNIT(S): 100 INJECTION, SUSPENSION SUBCUTANEOUS at 23:20

## 2020-07-29 RX ADMIN — Medication 100 MILLIGRAM(S): at 17:41

## 2020-07-29 RX ADMIN — PANTOPRAZOLE SODIUM 40 MILLIGRAM(S): 20 TABLET, DELAYED RELEASE ORAL at 12:50

## 2020-07-29 RX ADMIN — POLYETHYLENE GLYCOL 3350 17 GRAM(S): 17 POWDER, FOR SOLUTION ORAL at 17:41

## 2020-07-29 RX ADMIN — SENNA PLUS 2 TABLET(S): 8.6 TABLET ORAL at 21:23

## 2020-07-29 RX ADMIN — Medication 2: at 12:51

## 2020-07-29 RX ADMIN — Medication 4: at 17:41

## 2020-07-29 RX ADMIN — CHLORHEXIDINE GLUCONATE 15 MILLILITER(S): 213 SOLUTION TOPICAL at 17:45

## 2020-07-29 RX ADMIN — Medication 100 MILLIGRAM(S): at 05:16

## 2020-07-29 RX ADMIN — POLYETHYLENE GLYCOL 3350 17 GRAM(S): 17 POWDER, FOR SOLUTION ORAL at 05:15

## 2020-07-29 RX ADMIN — Medication 10 MILLIGRAM(S): at 05:15

## 2020-07-29 RX ADMIN — Medication 4: at 23:20

## 2020-07-29 RX ADMIN — AMLODIPINE BESYLATE 5 MILLIGRAM(S): 2.5 TABLET ORAL at 06:10

## 2020-07-29 RX ADMIN — CHLORHEXIDINE GLUCONATE 1 APPLICATION(S): 213 SOLUTION TOPICAL at 21:23

## 2020-07-29 RX ADMIN — Medication 4 MILLIGRAM(S): at 23:09

## 2020-07-29 RX ADMIN — HUMAN INSULIN 6 UNIT(S): 100 INJECTION, SUSPENSION SUBCUTANEOUS at 12:52

## 2020-07-29 RX ADMIN — CHLORHEXIDINE GLUCONATE 15 MILLILITER(S): 213 SOLUTION TOPICAL at 05:15

## 2020-07-29 RX ADMIN — HUMAN INSULIN 10 UNIT(S): 100 INJECTION, SUSPENSION SUBCUTANEOUS at 17:44

## 2020-07-29 RX ADMIN — LEVETIRACETAM 500 MILLIGRAM(S): 250 TABLET, FILM COATED ORAL at 17:41

## 2020-07-29 RX ADMIN — LEVETIRACETAM 500 MILLIGRAM(S): 250 TABLET, FILM COATED ORAL at 05:15

## 2020-07-29 RX ADMIN — Medication 4 MILLIGRAM(S): at 18:25

## 2020-07-29 NOTE — PROGRESS NOTE ADULT - SUBJECTIVE AND OBJECTIVE BOX
65 year old female with PMHx of DM, HTN, on asa81 brought in by family for concern of HA, dizziness, multiple episodes of emesis s/p mechanical fall (trip over baby carriage). Pt walked into triage, however pt became lethargic and unarousable, brought back and intubated for airway protection and level 1 trauma activated.      PHYSICAL EXAMINATION:   T(C): 36.6 (07-25-20 @ 20:41), Max: 36.6 (07-25-20 @ 19:27)  HR: 88 (07-25-20 @ 20:41) (87 - 88)  BP: 177/77 (07-25-20 @ 20:41) (177/77 - 210/81)  RR: 18 (07-25-20 @ 20:41) (16 - 19)  SpO2: 98% (07-25-20 @ 20:41) (98% - 100%)  Wt(kg): --Height (cm): 158 (07-25 @ 20:41)  Weight (kg): 50.8 (07-25 @ 20:41) (26 Jul 2020 00:00)    Craniotomy or craniectomy with evacuation of supratentorial extradural or subdural hematoma    Vital Signs Last 24 Hrs  T(C): 37.3 (29 Jul 2020 03:00), Max: 38.4 (28 Jul 2020 11:00)  T(F): 99.1 (29 Jul 2020 03:00), Max: 101.1 (28 Jul 2020 11:00)  HR: 96 (29 Jul 2020 06:00) (80 - 103)  BP: 164/80 (29 Jul 2020 06:00) (103/57 - 173/73)  BP(mean): 104 (29 Jul 2020 06:00) (71 - 109)  RR: 17 (29 Jul 2020 06:00) (13 - 24)  SpO2: 100% (29 Jul 2020 06:00) (99% - 100%)                        8.7    10.09 )-----------( 127      ( 27 Jul 2020 23:37 )             26.9    07-28    140  |  105  |  13  ----------------------------<  199<H>  3.8   |  24  |  0.35<L>    Ca    8.8      28 Jul 2020 22:37  Phos  2.5     07-28  Mg     2.2     07-28     NEUROIMAGING: < from: CT Head No Cont (07.28.20 @ 08:48) >  IMPRESSION: Decreased extra-axial air compared with the prior 7/26/2020. Status post left subdural evacuation with left-sided craniotomy. Postop changes with some residual air and hemorrhage remain. Stable midline shift roughly 7 mm to the right    PHYSICAL EXAM:    General: No Acute Distress     Neurological: Intubated, EO to noxious, Right 3 mm and left 4 mm reactive , follows commands  intermittently, antigravity LUE, LEs wiggles toes w/ use of [place] .  lethargic.  Pulmonary: Clear to Auscultation, No Rales, No Rhonchi, No Wheezes     Cardiovascular: S1, S2, Regular Rate and Rhythm     Gastrointestinal: Soft, Nontender, Nondistended     Incision: Clean and dry     MEDICATIONS:   Antibiotics:    Neuro:  acetaminophen    Suspension .. 650 milliGRAM(s) Oral every 6 hours PRN Temp greater or equal to 38C (100.4F), Mild Pain (1 - 3)  amantadine 100 milliGRAM(s) Oral every 12 hours  fentaNYL    Injectable 12.5 MICROGram(s) IV Push every 2 hours PRN Severe Pain (7 - 10)  levETIRAcetam  Solution 500 milliGRAM(s) Oral two times a day    Anticoagulation:    Cardiology:  amLODIPine   Tablet 5 milliGRAM(s) Oral daily  enalapril 10 milliGRAM(s) Oral daily  labetalol Injectable 10 milliGRAM(s) IV Push every 6 hours PRN    Endo:   insulin lispro (HumaLOG) corrective regimen sliding scale   SubCutaneous every 6 hours  insulin NPH human recombinant 6 Unit(s) SubCutaneous every 6 hours    Pulm:    GI/:  pantoprazole  Injectable 40 milliGRAM(s) IV Push daily  polyethylene glycol 3350 17 Gram(s) Oral two times a day  senna 2 Tablet(s) Oral at bedtime    Other:  chlorhexidine 0.12% Liquid 15 milliLiter(s) Oral Mucosa every 12 hours  chlorhexidine 4% Liquid 1 Application(s) Topical <User Schedule> 65 year old female with PMHx of DM, HTN, on asa81 brought in by family for concern of HA, dizziness, multiple episodes of emesis s/p mechanical fall (trip over baby carriage). Pt walked into triage, however pt became lethargic and unarousable, brought back and intubated for airway protection and level 1 trauma activated.      PHYSICAL EXAMINATION:   T(C): 36.6 (07-25-20 @ 20:41), Max: 36.6 (07-25-20 @ 19:27)  HR: 88 (07-25-20 @ 20:41) (87 - 88)  BP: 177/77 (07-25-20 @ 20:41) (177/77 - 210/81)  RR: 18 (07-25-20 @ 20:41) (16 - 19)  SpO2: 98% (07-25-20 @ 20:41) (98% - 100%)  Wt(kg): --Height (cm): 158 (07-25 @ 20:41)  Weight (kg): 50.8 (07-25 @ 20:41) (26 Jul 2020 00:00)    Craniotomy or craniectomy with evacuation of supratentorial extradural or subdural hematoma    Vital Signs Last 24 Hrs  T(C): 37.3 (29 Jul 2020 03:00), Max: 38.4 (28 Jul 2020 11:00)  T(F): 99.1 (29 Jul 2020 03:00), Max: 101.1 (28 Jul 2020 11:00)  HR: 96 (29 Jul 2020 06:00) (80 - 103)  BP: 164/80 (29 Jul 2020 06:00) (103/57 - 173/73)  BP(mean): 104 (29 Jul 2020 06:00) (71 - 109)  RR: 17 (29 Jul 2020 06:00) (13 - 24)  SpO2: 100% (29 Jul 2020 06:00) (99% - 100%)                        8.7    10.09 )-----------( 127      ( 27 Jul 2020 23:37 )             26.9    07-28    140  |  105  |  13  ----------------------------<  199<H>  3.8   |  24  |  0.35<L>    Ca    8.8      28 Jul 2020 22:37  Phos  2.5     07-28  Mg     2.2     07-28     NEUROIMAGING: < from: CT Head No Cont (07.28.20 @ 08:48) >  IMPRESSION: Decreased extra-axial air compared with the prior 7/26/2020. Status post left subdural evacuation with left-sided craniotomy. Postop changes with some residual air and hemorrhage remain. Stable midline shift roughly 7 mm to the right    PHYSICAL EXAM:    General: No Acute Distress     Neurological: Intubated, EO to voice, Right 3 mm and left 4 mm reactive, follows commands with Swedish , shows two fingers and wiggles toes lift antigravity, JENSEN antigravity    Pulmonary: Clear to Auscultation, No Rales, No Rhonchi, No Wheezes     Cardiovascular: S1, S2, Regular Rate and Rhythm     Gastrointestinal: Soft, Nontender, Nondistended     Incision: Clean and dry     MEDICATIONS:   Antibiotics:    Neuro:  acetaminophen    Suspension .. 650 milliGRAM(s) Oral every 6 hours PRN Temp greater or equal to 38C (100.4F), Mild Pain (1 - 3)  amantadine 100 milliGRAM(s) Oral every 12 hours  fentaNYL    Injectable 12.5 MICROGram(s) IV Push every 2 hours PRN Severe Pain (7 - 10)  levETIRAcetam  Solution 500 milliGRAM(s) Oral two times a day    Anticoagulation:    Cardiology:  amLODIPine   Tablet 5 milliGRAM(s) Oral daily  enalapril 10 milliGRAM(s) Oral daily  labetalol Injectable 10 milliGRAM(s) IV Push every 6 hours PRN    Endo:   insulin lispro (HumaLOG) corrective regimen sliding scale   SubCutaneous every 6 hours  insulin NPH human recombinant 6 Unit(s) SubCutaneous every 6 hours    Pulm:    GI/:  pantoprazole  Injectable 40 milliGRAM(s) IV Push daily  polyethylene glycol 3350 17 Gram(s) Oral two times a day  senna 2 Tablet(s) Oral at bedtime    Other:  chlorhexidine 0.12% Liquid 15 milliLiter(s) Oral Mucosa every 12 hours  chlorhexidine 4% Liquid 1 Application(s) Topical <User Schedule>

## 2020-07-29 NOTE — PROGRESS NOTE ADULT - ASSESSMENT
Traumatic acute SDH, midline shift, brain compression s/p craniotomy and evacuation    NEURO:  Q1 neuro checks   CT Head today. will follow up if we can d/c SD ALMA   Kecharu for seizure ppx  Precedex as needed   Amantadine 100 mg po bid  Activity: [] OOB as tolerated [] Bedrest [x] PT [x] OT [] PMNR    PULM:  intubated  CPAp TRIAL, wean as tolerated, likely extubation 7/28 when more awake.  VAP bundle    CV:  -160mmHg  HTN: Norvasc, enalapril, labetalol PRN     RENAL:  IVL     GI:  Diet: start Glucerna, hold at 6am for possible extubation  GI prophylaxis [] not indicated [x] PPI [] other:  Bowel regimen [] colace [x] senna [x] other: miralax    ENDO:   Goal euglycemia (-180)  HBA1c 7.2   NPH 6 Q 6, HISS     HEME/ONC:  hold vte ppx as per NS team  VTE prophylaxis: [x] SCDs [] chemoprophylaxis [x] hold chemoprophylaxis due to: fresh post op [] high risk of DVT/PE on admission due to:    ID:  Afebrile    SOCIAL/FAMILY:  [x] awaiting [] updated at bedside [] family meeting    CODE STATUS:  [x] Full Code [] DNR [] DNI [] Palliative/Comfort Care    DISPOSITION:  [x] ICU [] Stroke Unit [] Floor [] EMU [] RCU [] PCU    [x] Patient is at high risk of neurologic deterioration/death due to: post op hemorrhage, brain compression

## 2020-07-29 NOTE — PROGRESS NOTE ADULT - SUBJECTIVE AND OBJECTIVE BOX
Waxing and waning exam. CT with increased extra-axial heme. Started on steroids. Waxing and waning exam. CT with increased extra-axial heme. Started on steroids.     With Telugu  (see RN documentation for  ID), follows some commands (two fingers on right, wiggles b/l toes), moves all limbs noxious, moves right side to command

## 2020-07-29 NOTE — PHYSICAL THERAPY INITIAL EVALUATION ADULT - ADDITIONAL COMMENTS
Unable to obtain social hx, pt lethargic and intubated. Per SW note: Prior to admission pt reports being independent of all ADL's & functional mobility without AD. Pt resides in house with spouse, 4 steps to enter, 1 flight to bedroom.

## 2020-07-29 NOTE — PROGRESS NOTE ADULT - SUBJECTIVE AND OBJECTIVE BOX
Pt is on vent.  Arousable and has been following commands in [place].  She moves the left arm and right toes for me today.  CT showed slight change in residual midline shift from 6.5 to 8 mm and more filling of prior extraaxial space with fluid/blood mixed with residual air.  ARU was aspirin non-therapeutic and TEG maximal amplitude was within normal limits indicating adequate platelet function.  Consider starting dex 4 q 6 with one week taper to attempt to avert a more chronic collection post op but to monitor glucoses carefully given DM.  Pt has had no fever since yesterday, Cx neg so far.  VTE chemoppx contraindicated at this time.  Continue mechanical PPX.

## 2020-07-29 NOTE — PROGRESS NOTE ADULT - ASSESSMENT
Left subdural hemtaom Left subdural hematoma status post evacuation  - CT head in AM for stability  - Steroids per NSGY  - Glycemic control with NPH  - Wean to extubate  - -160mmHg on amlodipine and enalapril  - Possible d/c surgical drain in AM

## 2020-07-29 NOTE — PHYSICAL THERAPY INITIAL EVALUATION ADULT - FOLLOWS COMMANDS/ANSWERS QUESTIONS, REHAB EVAL
25% of the time/able to follow single-step instructions able to follow single-step instructions/75% of the time

## 2020-07-29 NOTE — PROGRESS NOTE ADULT - SUBJECTIVE AND OBJECTIVE BOX
Patient seen and examined at bedside.    --Anticoagulation--    T(C): 37.6 (07-27-20 @ 23:00), Max: 37.9 (07-27-20 @ 17:00)  HR: 91 (07-27-20 @ 23:00) (78 - 103)  BP: 161/72 (07-27-20 @ 23:00) (111/54 - 161/72)  RR: 17 (07-27-20 @ 23:00) (13 - 19)  SpO2: 100% (07-27-20 @ 23:00) (99% - 100%)  Wt(kg): --    Exam:    intubated, examined in Upper sorbian at bedside, nods appropriately, EO to voice, FC x4,

## 2020-07-30 ENCOUNTER — TRANSCRIPTION ENCOUNTER (OUTPATIENT)
Age: 65
End: 2020-07-30

## 2020-07-30 LAB
ANION GAP SERPL CALC-SCNC: 13 MMOL/L — SIGNIFICANT CHANGE UP (ref 5–17)
BLD GP AB SCN SERPL QL: NEGATIVE — SIGNIFICANT CHANGE UP
BUN SERPL-MCNC: 27 MG/DL — HIGH (ref 7–23)
CALCIUM SERPL-MCNC: 9.4 MG/DL — SIGNIFICANT CHANGE UP (ref 8.4–10.5)
CHLORIDE SERPL-SCNC: 102 MMOL/L — SIGNIFICANT CHANGE UP (ref 96–108)
CO2 SERPL-SCNC: 25 MMOL/L — SIGNIFICANT CHANGE UP (ref 22–31)
CREAT SERPL-MCNC: 0.34 MG/DL — LOW (ref 0.5–1.3)
CULTURE RESULTS: NO GROWTH — SIGNIFICANT CHANGE UP
GLUCOSE BLDC GLUCOMTR-MCNC: 158 MG/DL — HIGH (ref 70–99)
GLUCOSE BLDC GLUCOMTR-MCNC: 169 MG/DL — HIGH (ref 70–99)
GLUCOSE BLDC GLUCOMTR-MCNC: 212 MG/DL — HIGH (ref 70–99)
GLUCOSE BLDC GLUCOMTR-MCNC: 268 MG/DL — HIGH (ref 70–99)
GLUCOSE SERPL-MCNC: 186 MG/DL — HIGH (ref 70–99)
HCT VFR BLD CALC: 30.2 % — LOW (ref 34.5–45)
HGB BLD-MCNC: 9.6 G/DL — LOW (ref 11.5–15.5)
MAGNESIUM SERPL-MCNC: 2.4 MG/DL — SIGNIFICANT CHANGE UP (ref 1.6–2.6)
MCHC RBC-ENTMCNC: 26.3 PG — LOW (ref 27–34)
MCHC RBC-ENTMCNC: 31.8 GM/DL — LOW (ref 32–36)
MCV RBC AUTO: 82.7 FL — SIGNIFICANT CHANGE UP (ref 80–100)
NRBC # BLD: 0 /100 WBCS — SIGNIFICANT CHANGE UP (ref 0–0)
PHOSPHATE SERPL-MCNC: 3.1 MG/DL — SIGNIFICANT CHANGE UP (ref 2.5–4.5)
PLATELET # BLD AUTO: 231 K/UL — SIGNIFICANT CHANGE UP (ref 150–400)
POTASSIUM SERPL-MCNC: 3.6 MMOL/L — SIGNIFICANT CHANGE UP (ref 3.5–5.3)
POTASSIUM SERPL-SCNC: 3.6 MMOL/L — SIGNIFICANT CHANGE UP (ref 3.5–5.3)
RBC # BLD: 3.65 M/UL — LOW (ref 3.8–5.2)
RBC # FLD: 15.7 % — HIGH (ref 10.3–14.5)
RH IG SCN BLD-IMP: POSITIVE — SIGNIFICANT CHANGE UP
SARS-COV-2 RNA SPEC QL NAA+PROBE: SIGNIFICANT CHANGE UP
SODIUM SERPL-SCNC: 140 MMOL/L — SIGNIFICANT CHANGE UP (ref 135–145)
SPECIMEN SOURCE: SIGNIFICANT CHANGE UP
WBC # BLD: 7.1 K/UL — SIGNIFICANT CHANGE UP (ref 3.8–10.5)
WBC # FLD AUTO: 7.1 K/UL — SIGNIFICANT CHANGE UP (ref 3.8–10.5)

## 2020-07-30 PROCEDURE — 71045 X-RAY EXAM CHEST 1 VIEW: CPT | Mod: 26

## 2020-07-30 PROCEDURE — 99291 CRITICAL CARE FIRST HOUR: CPT

## 2020-07-30 PROCEDURE — 70450 CT HEAD/BRAIN W/O DYE: CPT | Mod: 26

## 2020-07-30 PROCEDURE — 99292 CRITICAL CARE ADDL 30 MIN: CPT

## 2020-07-30 RX ORDER — HUMAN INSULIN 100 [IU]/ML
15 INJECTION, SUSPENSION SUBCUTANEOUS EVERY 6 HOURS
Refills: 0 | Status: DISCONTINUED | OUTPATIENT
Start: 2020-07-30 | End: 2020-08-02

## 2020-07-30 RX ORDER — POTASSIUM CHLORIDE 20 MEQ
40 PACKET (EA) ORAL ONCE
Refills: 0 | Status: COMPLETED | OUTPATIENT
Start: 2020-07-30 | End: 2020-07-30

## 2020-07-30 RX ADMIN — Medication 4 MILLIGRAM(S): at 13:12

## 2020-07-30 RX ADMIN — Medication 4 MILLIGRAM(S): at 17:18

## 2020-07-30 RX ADMIN — HUMAN INSULIN 15 UNIT(S): 100 INJECTION, SUSPENSION SUBCUTANEOUS at 23:49

## 2020-07-30 RX ADMIN — PANTOPRAZOLE SODIUM 40 MILLIGRAM(S): 20 TABLET, DELAYED RELEASE ORAL at 13:11

## 2020-07-30 RX ADMIN — Medication 6: at 05:21

## 2020-07-30 RX ADMIN — Medication 4 MILLIGRAM(S): at 23:44

## 2020-07-30 RX ADMIN — POLYETHYLENE GLYCOL 3350 17 GRAM(S): 17 POWDER, FOR SOLUTION ORAL at 05:22

## 2020-07-30 RX ADMIN — HUMAN INSULIN 15 UNIT(S): 100 INJECTION, SUSPENSION SUBCUTANEOUS at 17:19

## 2020-07-30 RX ADMIN — HUMAN INSULIN 15 UNIT(S): 100 INJECTION, SUSPENSION SUBCUTANEOUS at 13:14

## 2020-07-30 RX ADMIN — Medication 10 MILLIGRAM(S): at 05:26

## 2020-07-30 RX ADMIN — Medication 40 MILLIEQUIVALENT(S): at 23:44

## 2020-07-30 RX ADMIN — Medication 4 MILLIGRAM(S): at 05:22

## 2020-07-30 RX ADMIN — CHLORHEXIDINE GLUCONATE 15 MILLILITER(S): 213 SOLUTION TOPICAL at 05:22

## 2020-07-30 RX ADMIN — CHLORHEXIDINE GLUCONATE 1 APPLICATION(S): 213 SOLUTION TOPICAL at 21:19

## 2020-07-30 RX ADMIN — CHLORHEXIDINE GLUCONATE 15 MILLILITER(S): 213 SOLUTION TOPICAL at 17:18

## 2020-07-30 RX ADMIN — LEVETIRACETAM 500 MILLIGRAM(S): 250 TABLET, FILM COATED ORAL at 17:18

## 2020-07-30 RX ADMIN — AMLODIPINE BESYLATE 5 MILLIGRAM(S): 2.5 TABLET ORAL at 06:16

## 2020-07-30 RX ADMIN — Medication 100 MILLIGRAM(S): at 17:18

## 2020-07-30 RX ADMIN — Medication 4: at 23:48

## 2020-07-30 RX ADMIN — Medication 2: at 13:12

## 2020-07-30 RX ADMIN — Medication 2: at 17:19

## 2020-07-30 RX ADMIN — Medication 100 MILLIGRAM(S): at 05:26

## 2020-07-30 RX ADMIN — HUMAN INSULIN 15 UNIT(S): 100 INJECTION, SUSPENSION SUBCUTANEOUS at 05:21

## 2020-07-30 RX ADMIN — POLYETHYLENE GLYCOL 3350 17 GRAM(S): 17 POWDER, FOR SOLUTION ORAL at 17:18

## 2020-07-30 RX ADMIN — LEVETIRACETAM 500 MILLIGRAM(S): 250 TABLET, FILM COATED ORAL at 05:22

## 2020-07-30 RX ADMIN — SENNA PLUS 2 TABLET(S): 8.6 TABLET ORAL at 21:19

## 2020-07-30 NOTE — PROGRESS NOTE ADULT - ASSESSMENT
Traumatic acute SDH, midline shift, brain compression s/p craniotomy and evacuation POD#4     NEURO:  Q1 neuro checks   CT Head today. will follow up SD collection, plan for drain per SHAREE Trotter for seizure ppx  Precedex as needed   Amantadine 100 mg po bid  Activity: [] OOB as tolerated [] Bedrest [x] PT [x] OT [] PMNR    PULM:  intubated  CPAp TRIAL, wean as tolerated    CV:  -160mmHg  HTN: Norvasc, enalapril, labetalol PRN     RENAL:  IVL     GI:  Diet: start Glucerna, hold at 6am for possible extubation  GI prophylaxis [] not indicated [x] PPI [] other:  Bowel regimen [] colace [x] senna [x] other: miralax    ENDO:   Goal euglycemia (-180)  HBA1c 7.2   NPH 6 Q 6, HISS     HEME/ONC:  hold vte ppx as per NS team  VTE prophylaxis: [x] SCDs [] chemoprophylaxis [x] hold chemoprophylaxis due to: fresh post op [] high risk of DVT/PE on admission due to:    ID:  Afebrile    SOCIAL/FAMILY:  [x] awaiting [] updated at bedside [] family meeting    CODE STATUS:  [x] Full Code [] DNR [] DNI [] Palliative/Comfort Care    DISPOSITION:  [x] ICU [] Stroke Unit [] Floor [] EMU [] RCU [] PCU    [x] Patient is at high risk of neurologic deterioration/death due to: post op hemorrhage, brain compression Traumatic acute SDH, midline shift, brain compression s/p craniotomy and evacuation POD#4     NEURO:  Q1 neuro checks   CT Head today. will follow up SD collection, plan for drain per SHAREE Trotter for seizure ppx  Amantadine 100 mg po bid  Activity: [] OOB as tolerated [] Bedrest [x] PT [x] OT [] PMNR    PULM:  Intubated  CPAP and wean as tolerated    CV:  -160mmHg  HTN: Norvasc, enalapril    RENAL:  IVL     GI:  Diet: start Glucerna, hold at 6am for possible extubation  GI prophylaxis [] not indicated [x] PPI [] other:  Bowel regimen [] colace [x] senna [x] other: miralax    ENDO:   Goal euglycemia (-180)  HBA1c 7.2   NPH 15Q6, HISS, monitor FS      HEME/ONC:  Hold VTE ppx as per NS team  VTE prophylaxis: [x] SCDs [] chemoprophylaxis [x] hold chemoprophylaxis due to: fresh post op [] high risk of DVT/PE on admission due to:    ID:  Afebrile    SOCIAL/FAMILY:  [x] awaiting [] updated at bedside [] family meeting    CODE STATUS:  [x] Full Code [] DNR [] DNI [] Palliative/Comfort Care    DISPOSITION:  [x] ICU [] Stroke Unit [] Floor [] EMU [] RCU [] PCU    [x] Patient is at high risk of neurologic deterioration/death due to: post op hemorrhage, brain compression Traumatic acute SDH, midline shift, brain compression s/p craniotomy and evacuation POD#4   7/30 Extubated     NEURO:  Q1 neuro checks   CT Head today. will follow up SD collection, plan for drain per SHAREE Trotter for seizure ppx  Amantadine 100 mg po bid  Activity: [] OOB as tolerated [] Bedrest [x] PT [x] OT [] PMNR    PULM:  Extubated and tolerating on RA     CV:  -160mmHg  HTN: Norvasc, enalapril    RENAL:  IVL     GI:  Diet: start Glucerna, hold at 6am for possible extubation  GI prophylaxis [] not indicated [x] PPI [] other:  Bowel regimen [] colace [x] senna [x] other: miralax    ENDO:   Goal euglycemia (-180)  HBA1c 7.2   NPH 15Q6, HISS, monitor FS      HEME/ONC:  Hold VTE ppx as per NS team  VTE prophylaxis: [x] SCDs [] chemoprophylaxis [x] hold chemoprophylaxis due to: fresh post op [] high risk of DVT/PE on admission due to:    ID:  Afebrile    SOCIAL/FAMILY:  [x] awaiting [] updated at bedside [] family meeting    CODE STATUS:  [x] Full Code [] DNR [] DNI [] Palliative/Comfort Care    DISPOSITION:  [x] ICU [] Stroke Unit [] Floor [] EMU [] RCU [] PCU    [x] Patient is at high risk of neurologic deterioration/death due to: post op hemorrhage, brain compression

## 2020-07-30 NOTE — CHART NOTE - NSCHARTNOTEFT_GEN_A_CORE
VANESSA [id number]      Drain type: []SD [x]SG [x] ALMA [] HMV [] Lumbar drain [] EVD [] ICP Transfer [] Abd drain     Patient's position while drain removed: Flat in bed    [x] Patient tolerated well [x] No complications [] complications:     Exit Site secured with: [x] _2_ staples [] __ suture (please specify how many of each)

## 2020-07-30 NOTE — PROGRESS NOTE ADULT - SUBJECTIVE AND OBJECTIVE BOX
Patient seen and examined at bedside.    --Anticoagulation--    ICU Vital Signs Last 24 Hrs  T(C): 37.4 (29 Jul 2020 23:00), Max: 37.4 (29 Jul 2020 23:00)  T(F): 99.3 (29 Jul 2020 23:00), Max: 99.3 (29 Jul 2020 23:00)  HR: 86 (30 Jul 2020 00:00) (85 - 108)  BP: 152/76 (29 Jul 2020 23:00) (103/57 - 164/80)  BP(mean): 98 (29 Jul 2020 23:00) (71 - 104)  ABP: --  ABP(mean): --  RR: 20 (29 Jul 2020 23:00) (13 - 22)  SpO2: 100% (30 Jul 2020 00:00) (100% - 100%)    Exam:    intubated, EO, examined in Lao at bedside, nods appropriately, EO to voice, FC x4,

## 2020-07-30 NOTE — PROGRESS NOTE ADULT - ASSESSMENT
Left subdural hematoma status post evacuation  - CT head in AM for stability; if increased size collection, will return to OR  - Steroids per NSGY  - Glycemic control with NPH  - Monitor airway given recent extubation  - -160mmHg on amlodipine and enalapril

## 2020-07-30 NOTE — AIRWAY REMOVAL NOTE  ADULT & PEDS - ARTIFICAL AIRWAY REMOVAL COMMENTS
Written order for extubation verified.The patient was identified by full name and birth date compared to the identification band. Present during the procedure was Vicki GHOSH

## 2020-07-30 NOTE — PROGRESS NOTE ADULT - SUBJECTIVE AND OBJECTIVE BOX
CT head with slightly increased subdural collection. However, patient with good exam and extubated. CT head with slightly increased subdural collection. However, patient with good exam and extubated.    Awake, alert, fully oriented, follows, drifts in both arms, appears to move left better than right but roughly 4/5 throughout CT head with slightly increased subdural collection. However, patient with good exam and extubated.    Awakens to voice, intermittently fully oriented but only oriented to self for me, follows, drifts in both arms, appears to move left better than right but roughly 4/5 throughout

## 2020-07-30 NOTE — PROGRESS NOTE ADULT - SUBJECTIVE AND OBJECTIVE BOX
Pt seen and examined with resident and ICU attending via video telehealth.  Pt is extubated and comfortable appearing.  She is answering questions and following commands with all 4 extremities, ? mild right drift and mild right UE weakness.  CT today showed no change in midline shift, but some further accumulation of subacute/chronic fluid in the superior parietal subdural space.  Although there are mild changes on the current CT of concern, pt has improved significantly from a clinical perspective.  Pt is currently on decadron and glucoses are being strictly monitored.  She has been afebrile.  Tentative OR tomorrow for reexploration pending AM imaging and continued evaluation of exam. If pt appears clinically well with stable CT, will likely to continue observing on steroid taper for slow regression of extraaxial collection. Pt seen and examined with resident and ICU attending via video telehealth.  Pt is extubated and comfortable appearing.  She is answering questions and following commands with all 4 extremities, ? mild right drift and mild right UE weakness.  CT today showed no change in midline shift, but some further accumulation of subacute/chronic fluid in the superior parietal subdural space.  Although there are mild changes on the current CT of concern, pt has improved significantly from a clinical perspective.  Pt is currently on decadron and glucoses are being strictly monitored.  She has been afebrile.  Tentative OR tomorrow for reexploration pending AM imaging and continued evaluation of exam. If pt appears clinically well with stable CT, will likely to continue observing on steroid taper for slow regression of extraaxial collection.	Spoke with daughter.

## 2020-07-30 NOTE — PROGRESS NOTE ADULT - SUBJECTIVE AND OBJECTIVE BOX
TRAUMA Surgery Tertiary SURVEY  ------------------------------------------------------------------------------------    Date of TTS: 20 @ 13:25  Admit Date:    Trauma Activation:  I  Admit GCS: 5T    HPI:  65yF Hx DM, HTN, on ASA81 brought in by family for concern of HA, dizziness, multiple episodes of emesis s/p mechanical fall 2 hours PTA.  Pt walked into triage, however pt became lethargic and unarousable was upgraded to level I trauma. Patient intubated for airway protection. CTH w/ large SDH, brought to OR emergently by neurosx s/p crani on .    Patient extubated this AM.       PHYSICAL EXAMINATION:   T(C): 36.6 (20 @ 20:41), Max: 36.6 (20 @ 19:27)  HR: 88 (20 @ 20:41) (87 - 88)  BP: 177/77 (20 @ 20:41) (177/77 - 210/81)  RR: 18 (20 @ 20:41) (16 - 19)  SpO2: 98% (20 @ 20:41) (98% - 100%)  Wt(kg): --Height (cm): 158 ( 20:41)  Weight (kg): 50.8 ( @ 20:41) (2020 00:00)    General:Following commands w/ German   HEENT: S/P craniotomy and drain in place  Neck: Soft, supple  Cardio: RRR, nml S1/S2  Resp: Good effort, CTA b/l  Thorax: No chest wall tenderness  Breast: No lesions/masses, no drainage  GI/Abd: Soft, NT/ND, no rebound/guarding, no masses palpated  Vascular: Extremities warm, brisk cap refill, B/l radial pulses palpable, b/l DP/PT palpable, no palpable abdominal pulsatile mass  Skin: Intact, no breakdown  Lymphatic/Nodes: No palpable lymphadenopathy  Musculoskeletal: All 4 extremities moving spontaneously, no limitations    PAST MEDICAL & SURGICAL HISTORY:  Diabetes  Cholelithiasis  Hypertension  History of varicose veins of lower extremity: s/p surgery in 2019  H/O shoulder surgery    FAMILY HISTORY:  Family history not pertinent as reviewed with the patient and family    ALLERGIES:   No Known Allergies    CURRENT MEDICATIONS  MEDICATIONS (STANDING): amantadine 100 milliGRAM(s) Oral every 12 hours  amLODIPine   Tablet 5 milliGRAM(s) Oral daily  dexAMETHasone  Injectable 4 milliGRAM(s) IV Push every 6 hours  dexAMETHasone  Injectable   IV Push   enalapril 10 milliGRAM(s) Oral daily  insulin lispro (HumaLOG) corrective regimen sliding scale   SubCutaneous every 6 hours  insulin NPH human recombinant 15 Unit(s) SubCutaneous every 6 hours  levETIRAcetam  Solution 500 milliGRAM(s) Oral two times a day  pantoprazole  Injectable 40 milliGRAM(s) IV Push daily  polyethylene glycol 3350 17 Gram(s) Oral two times a day  senna 2 Tablet(s) Oral at bedtime    MEDICATIONS (PRN):acetaminophen    Suspension .. 650 milliGRAM(s) Oral every 6 hours PRN Temp greater or equal to 38C (100.4F), Mild Pain (1 - 3)     @ 07:01  -   @ 07:00  --------------------------------------------------------  IN:    Enteral Tube Flush: 75 mL    Glucerna: 800 mL  Total IN: 875 mL    OUT:    Bulb: 30 mL    Intermittent Catheterization - Urethral: 1350 mL    Voided: 200 mL  Total OUT: 1580 mL    Total NET: -705 mL      LABS:  CBC ( @ 22:01)                              8.7<L>                         5.56    )----------------(  180        --    % Neutrophils, --    % Lymphocytes, ANC: --                                  27.0<L>  CBC ( @ 10:09)                              9.3<L>                         8.97    )----------------(  188        71.1  % Neutrophils, 17.8  % Lymphocytes, ANC: 6.36                                29.2<L>    BMP ( @ 22:01)             139     |  103     |  21    		Ca++ --      Ca 8.9                ---------------------------------( 231<H>		Mg 2.4                4.3     |  26      |  0.37<L>			Ph 3.3         Coags ( @ 10:09)  aPTT 28.9 / INR 1.13 / PT 13.4      MICROBIOLOGY:  Urinalysis ( @ 11:37):     Color: Light Yellow / Appearance: Slightly Turbid<!> / S.018 / pH: 7.0 / Gluc: 200 mg/dL<!> / Ketones: Trace / Bili: Negative / Urobili: 2 mg/dL<!> / Protein :100 / Nitrites: Negative / Leuk.Est: Negative / RBC: 3 / WBC: 2 / Sq Epi:  / Non Sq Epi: 2 / Bacteria Negative       -> .Blood Blood-Peripheral Culture ( @ 15:07)     NG    NG    No growth to date.    -> Bronch Wash Combicath Culture ( @ 15:05)       No polymorphonuclear cells seen per low power field  No squamous epithelial cells per low power field  No organisms seen per oil power field    NG    No growth to date.    -> .Blood Blood-Arterial Culture ( @ 05:20)     NG    NG    No growth to date.      RADIOLOGICAL FINDINGS REVIEW:     PROCEDURE DATE:  2020            INTERPRETATION:  CLINICAL INFORMATION: Status post fall.    EXAM: Frontal radiograph of the chest.    COMPARISON: Chest radiograph from 2019.    FINDINGS:  Endotracheal tube terminates above the ross.  The lungs are clear.  There is no pleural effusion or pneumothorax.  The heart size is not well evaluated on this projection.  The visualized osseous and soft tissue structures demonstrate no acute pathology.    IMPRESSION:  Endotracheal tube terminates above the ross.    Clear lungs.        EXAM:  CT ABDOMEN AND PELVIS IC                          EXAM:  CT CHEST IC                            PROCEDURE DATE:  2020            INTERPRETATION:  CLINICAL INFORMATION: Trauma status post fall. Unresponsive.    COMPARISON: CT abdomen and pelvis 2019.    PROCEDURE:  CT of the Chest, Abdomen and Pelvis was performed with intravenous contrast.  Imaging was performed through the chest in the arterial phase followed by imaging of the abdomen and pelvis in the portal venous phase.  Intravenous contrast: 90 ml Omnipaque 350. 10 ml discarded.  Oral contrast: None.  Sagittal and coronal reformats were performed.    FINDINGS:  CHEST:  LUNGS AND LARGE AIRWAYS: Endotracheal tube terminates above the ross. Otherwise patent central airways. Bibasilar linear and subsegmental atelectasis.  PLEURA: No pleural effusion. No pneumothorax.  VESSELS: No thoracic aortic aneurysm, dissection or traumatic injury. The main pulmonary artery is normal in size.  HEART: Heart size is normal. No pericardial effusion.  MEDIASTINUM AND JESUS: No lymphadenopathy. Subcentimeter hypodensity in the left lobe of the thyroid is incompletely characterized.  CHEST WALL AND LOWER NECK: Within normal limits.    ABDOMEN AND PELVIS:  LIVER: Within normal limits.  BILE DUCTS: Normal caliber.  GALLBLADDER: Cholecystectomy.  SPLEEN: Within normal limits.  PANCREAS: Within normal limits.  ADRENALS: Within normal limits.  KIDNEYS/URETERS: The kidneys enhance symmetrically. No hydronephrosis. Left upper pole renal cyst. Additional subcentimeter left renal hypodensities, too small to characterize.    BLADDER: Distended bladder.  REPRODUCTIVE ORGANS: Uterus and adnexa within normal limits.    BOWEL: No bowel obstruction. No bowel wall thickening or inflammatory changes. Appendix is normal.  PERITONEUM: No ascites. No pneumoperitoneum.  VESSELS: Atherosclerosis. No abdominal aortic aneurysm, dissection or traumatic injury. The origins of the celiac axis, SMA, bilateral renal arteries and HERMILO are patent.  RETROPERITONEUM/LYMPH NODES: No lymphadenopathy.  ABDOMINAL WALL: Within normal limits.  BONES: No acute displaced fracture or dislocation. Degenerative changes of the spine. Small Schmorl's node superior endplate of T11.    IMPRESSION:  No acute traumatic injury within the chest, abdomen or pelvis.        EXAM:  CT CERVICAL SPINE                          EXAM:  CT BRAIN                            PROCEDURE DATE:  2020            INTERPRETATION:  INDICATIONS:  Evaluate for bleed or infarct; unresponsive    CT BRAIN:  TECHNIQUE:  Serial axial images were obtained from the skull base to the vertex without intravenous contrast. Sagittal and coronal reformatted images were obtained.    COMPARISON EXAMINATION: None.    FINDINGS:  VENTRICLES AND SULCI:  There is marked compression of the left lateral ventricle secondary to large subdural hemorrhage..  INTRA-AXIAL/EXTRA-AXIAL:  There is a large left-sided holohemispheric hyperdense subdural collection measuring up to 2.1 cm in greatest depth resulting in 1.9 cm of left to right midline shift. Subfalcine and transtentorial herniation is seen with effacement of the suprasellar cistern.  No mass or collection is seen. The basal cisterns are patent. There is a small amount of interhemispheric subdural blood.  VISUALIZED SINUSES:  Diffuse mucosal thickening of the bilateral paranasal sinuses..  VISUALIZED MASTOIDS: Clear. CALVARIUM:  Normal.  MISCELLANEOUS:  None.    IMPRESSION:  Large left-sided acute holohemispheric subdural hematoma measuring up to 2.1 cm in greatest depth with rightward subfalcine and transtentorial herniation.    CT CERVICAL SPINE:  TECHNIQUE:  Axial images were obtained using multislice helical technique.  Reformatted coronal and sagittal images were performed.    COMPARISON EXAMINATION:  None.    FINDINGS:  VERTEBRAL BODIES:  Normal.  ALIGNMENT:  No subluxations. Straightening of the normal cervical lordosis  INTERVERTEBRAL DISC SPACES: No significant disc bulge or focal disc herniation identified.  MISCELLANEOUS:  Partially visualized ET tube in the trachea.    IMPRESSION:  No fracture or traumatic subluxation.          EXAM:  CT BRAIN                            PROCEDURE DATE:  2020            INTERPRETATION:  INDICATIONS:  Status post evacuation of large left-sided subdural hemorrhage    CT BRAIN:  TECHNIQUE:  Serial axial images were obtained from the skull base to the vertex without intravenous contrast. Sagittal and coronal reformatted images were obtained.    Study was performed at 1:21 AM    COMPARISON EXAMINATION: Head CT scan from 2020    FINDINGS:  VENTRICLES AND SULCI:  Interval reexpansion of the left lateral ventricle after subdural hematoma evacuation.  INTRA-AXIAL:  No acute intraparenchymal hemorrhage. Interval decrease in left to right midline shift previously 1.9 cm currently 0.7 cm.  EXTRA-AXIAL Interval evacuation of a large left-sided holohemispheric hematoma with trace amount of residual left-sided hemorrhage. Expected postoperative changes including pneumocephaly. Intervally decreased effacement of the suprasellar cistern. Interval decrease in previously visualized uncal herniation. Interhemispheric acute subdural blood has increased currently measuring 4 mm in thickness compared with 2 mm.  VISUALIZED SINUSES:  Mild left and trace right maxillary mucosal thickening. Moderate bilateral mucosal thickening in additional paranasal sinuses.  VISUALIZED MASTOIDS:  Clear  CALVARIUM:  Left frontal and left parietal stephy holes. Left frontoparietal craniotomy.  MISCELLANEOUS:  Multiple staples overlie each subcutaneous tissues of the left scalp. Subgaleal drain in place.    IMPRESSION:  Status post evacuation of large left-sided holohemispheric subdural hematoma with expected postoperative changes.    Intervally decreased midline shift currently 0.7 cm previously 1.9 cm.    Additionally the previously visualized effacement of basal cisterns, and uncal herniation have improved.    Mildly increased interhemispheric subdural blood which may represent blood redistribution.        EXAM:  XR CHEST PORTABLE URGENT 1V                            PROCEDURE DATE:  2020            INTERPRETATION:  DATE OF STUDY: 2020    PRIOR:20.    CLINICAL INDICATION: ET tube placement.    TECHNIQUE: portable chest.    FINDINGS/  IMPRESSION:  ET tube tip is 3.8cm above the ross.  NG tube in stomach - tip is off the film.  The cardiomediastinal silhouette is within normal limits.  The lungs are clear. No pleural effusion or pneumothorax.  No acute bony finding.        EXAM:  CT BRAIN                            PROCEDURE DATE:  2020            INTERPRETATION:  INDICATIONS: Postop.    TECHNIQUE:  Serial axial images were obtained from the skull base to the vertex without intravenous contrast. Coronal and sagittal reformatted images were obtained.    Study was performed at 8:13 AM.    COMPARISON EXAMINATION: CT brain 2020. CT brain 2020 at 1:21 AM    FINDINGS:  VENTRICLES AND SULCI:  Unchanged ventricular size. No hydrocephalus.    INTRA-AXIAL:  No acute intraparenchymal hemorrhage. Similar to decreased midline shift, now measuring 0.6 cm from 0.7 cm on same-day CT and 1.9 cm on 2020.    EXTRA-AXIAL:  Redemonstrated trace residual left-sided extra-axial hemorrhage with expected postoperative changes including extensive pneumocephalus. Unchanged interhemispheric acute subdural blood, measuring 4 mm in maximal transverse thickness.    VISUALIZED SINUSES:  Mild left and trace right maxillary mucosal thickening. Moderate mucosal thickening in the ethmoid air cells.    VISUALIZED MASTOIDS:  Clear.    CALVARIUM:  Left frontoparietal craniotomy and left frontal and left parietal stephy holes.    MISCELLANEOUS:  Endotracheal tube. Nasoenteric tube. Subgaleal drain.      IMPRESSION:  Status post evacuation of large left subdural hematoma with expected postoperative changes.    Similar midline shift, currently measuring 0.6 cm.    Unchanged interhemispheric subdural blood, measuring up to 4 mm in greatest thickness.    List Injuries Identified to Date:    Large left-sided acute holohemispheric subdural hematoma measuring up to 2.1 cm    Consults (Date):  [X] Neurosurgery   [] Orthopedic Surgery  [] Spine Surgery  [] Plastic Surgery  [] ENT  [] Urology  [] PM&R  [] Social Work    INTERPRETATION/ASSESSMENT:   65yF Hx DM, HTN, on ASA81 brought in by family for concern of HA, dizziness, multiple episodes of emesis s/p mechanical fall 2 hours PTA.  Pt walked into triage, however pt became lethargic and unarousable was upgraded to level I trauma. Patient intubated for airway protection. CTH w/ large SDH, brought to OR emergently by neurosx s/p crani on .    Patient extubated this AM.       PLAN:   - No additional injuries discovered on tertiary exam.  - No additional trauma surgery needs  - Care per neurosurgery    Trauma [id number]

## 2020-07-30 NOTE — PROGRESS NOTE ADULT - SUBJECTIVE AND OBJECTIVE BOX
65 year old female with PMHx of DM, HTN, on asa81 brought in by family for concern of HA, dizziness, multiple episodes of emesis s/p mechanical fall (trip over baby carriage). Pt walked into triage, however pt became lethargic and unarousable, brought back and intubated for airway protection and level 1 trauma activated.    Vital Signs Last 24 Hrs  T(C): 37.2 (30 Jul 2020 03:00), Max: 37.4 (29 Jul 2020 23:00)  T(F): 99 (30 Jul 2020 03:00), Max: 99.3 (29 Jul 2020 23:00)  HR: 101 (30 Jul 2020 06:00) (74 - 108)  BP: 161/79 (30 Jul 2020 06:00) (99/58 - 161/79)  BP(mean): 102 (30 Jul 2020 06:00) (72 - 103)  RR: 15 (30 Jul 2020 06:00) (14 - 20)  SpO2: 85% (30 Jul 2020 06:00) (85% - 100%)                        8.7    5.56  )-----------( 180      ( 29 Jul 2020 22:01 )             27.0    07-29    139  |  103  |  21  ----------------------------<  231<H>  4.3   |  26  |  0.37<L>    Ca    8.9      29 Jul 2020 22:01  Phos  3.3     07-29  Mg     2.4     07-29    PT/INR - ( 29 Jul 2020 10:09 )   PT: 13.4 sec;   INR: 1.13 ratio    PTT - ( 29 Jul 2020 10:09 )  PTT:28.9 sec      DRAIN OUTPUT: SG ALMA (85)    NEUROIMAGING: < from: CT Head No Cont (07.29.20 @ 08:14) >  IMPRESSION:  Increased mixed density extra-axial fluid and hemorrhage along the left convexity. Increased left-to-right midline shift now 1 cm. Small parafalcine and left tentorial subdural hemorrhage, unchanged.    PHYSICAL EXAM:    General: No Acute Distress     Neurological:  Intubated, EO to voice, Right 3 mm and left 4 mm reactive, follows commands with Wol[place] , shows two fingers and wiggles toes lift antigravity, JENSEN antigravity    Pulmonary: Clear to Auscultation, No Rales, No Rhonchi, No Wheezes     Cardiovascular: S1, S2, Regular Rate and Rhythm     Gastrointestinal: Soft, Nontender, Nondistended     Incision: Clean and dry     MEDICATIONS:   Antibiotics:    Neuro:  acetaminophen    Suspension .. 650 milliGRAM(s) Oral every 6 hours PRN Temp greater or equal to 38C (100.4F), Mild Pain (1 - 3)  amantadine 100 milliGRAM(s) Oral every 12 hours  fentaNYL    Injectable 12.5 MICROGram(s) IV Push every 2 hours PRN Severe Pain (7 - 10)  levETIRAcetam  Solution 500 milliGRAM(s) Oral two times a day    Anticoagulation:    Cardiology:  amLODIPine   Tablet 5 milliGRAM(s) Oral daily  enalapril 10 milliGRAM(s) Oral daily  labetalol Injectable 10 milliGRAM(s) IV Push every 6 hours PRN    Endo:   dexAMETHasone  Injectable 4 milliGRAM(s) IV Push every 6 hours  dexAMETHasone  Injectable   IV Push   insulin lispro (HumaLOG) corrective regimen sliding scale   SubCutaneous every 6 hours  insulin NPH human recombinant 15 Unit(s) SubCutaneous every 6 hours    Pulm:    GI/:  pantoprazole  Injectable 40 milliGRAM(s) IV Push daily  polyethylene glycol 3350 17 Gram(s) Oral two times a day  senna 2 Tablet(s) Oral at bedtime    Other:  chlorhexidine 0.12% Liquid 15 milliLiter(s) Oral Mucosa every 12 hours  chlorhexidine 4% Liquid 1 Application(s) Topical <User Schedule> 65 year old female with PMHx of DM, HTN, on asa81 brought in by family for concern of HA, dizziness, multiple episodes of emesis s/p mechanical fall (trip over baby carriage). Pt walked into triage, however pt became lethargic and unarousable, brought back and intubated for airway protection and level 1 trauma activated.    Vital Signs Last 24 Hrs  T(C): 37.2 (30 Jul 2020 03:00), Max: 37.4 (29 Jul 2020 23:00)  T(F): 99 (30 Jul 2020 03:00), Max: 99.3 (29 Jul 2020 23:00)  HR: 101 (30 Jul 2020 06:00) (74 - 108)  BP: 161/79 (30 Jul 2020 06:00) (99/58 - 161/79)  BP(mean): 102 (30 Jul 2020 06:00) (72 - 103)  RR: 15 (30 Jul 2020 06:00) (14 - 20)  SpO2: 85% (30 Jul 2020 06:00) (85% - 100%)                        8.7    5.56  )-----------( 180      ( 29 Jul 2020 22:01 )             27.0    07-29    139  |  103  |  21  ----------------------------<  231<H>  4.3   |  26  |  0.37<L>    Ca    8.9      29 Jul 2020 22:01  Phos  3.3     07-29  Mg     2.4     07-29    PT/INR - ( 29 Jul 2020 10:09 )   PT: 13.4 sec;   INR: 1.13 ratio    PTT - ( 29 Jul 2020 10:09 )  PTT:28.9 sec      DRAIN OUTPUT: SG ALMA (85)    NEUROIMAGING: < from: CT Head No Cont (07.29.20 @ 08:14) >  IMPRESSION:  Increased mixed density extra-axial fluid and hemorrhage along the left convexity. Increased left-to-right midline shift now 1 cm. Small parafalcine and left tentorial subdural hemorrhage, unchanged.    PHYSICAL EXAM:    General: No Acute Distress     Neurological:  Intubated, EO to voice, Right 3 mm and left 4 mm reactive, follows commands with Slovenian , shows two fingers and wiggles toes lift antigravity, JENSEN antigravity    Pulmonary: Clear to Auscultation, No Rales, No Rhonchi, No Wheezes     Cardiovascular: S1, S2, Regular Rate and Rhythm     Gastrointestinal: Soft, Nontender, Nondistended     Incision: Clean and dry     MEDICATIONS:   Antibiotics:    Neuro:  acetaminophen    Suspension .. 650 milliGRAM(s) Oral every 6 hours PRN Temp greater or equal to 38C (100.4F), Mild Pain (1 - 3)  amantadine 100 milliGRAM(s) Oral every 12 hours  fentaNYL    Injectable 12.5 MICROGram(s) IV Push every 2 hours PRN Severe Pain (7 - 10)  levETIRAcetam  Solution 500 milliGRAM(s) Oral two times a day    Anticoagulation:    Cardiology:  amLODIPine   Tablet 5 milliGRAM(s) Oral daily  enalapril 10 milliGRAM(s) Oral daily    Endo:   dexAMETHasone  Injectable 4 milliGRAM(s) IV Push every 6 hours  dexAMETHasone  Injectable   IV Push   insulin lispro (HumaLOG) corrective regimen sliding scale   SubCutaneous every 6 hours  insulin NPH human recombinant 15 Unit(s) SubCutaneous every 6 hours    Pulm:    GI/:  pantoprazole  Injectable 40 milliGRAM(s) IV Push daily  polyethylene glycol 3350 17 Gram(s) Oral two times a day  senna 2 Tablet(s) Oral at bedtime    Other:  chlorhexidine 0.12% Liquid 15 milliLiter(s) Oral Mucosa every 12 hours  chlorhexidine 4% Liquid 1 Application(s) Topical <User Schedule> 65 year old female with PMHx of DM, HTN, on asa81 brought in by family for concern of HA, dizziness, multiple episodes of emesis s/p mechanical fall (trip over baby carriage). Pt walked into triage, however pt became lethargic and unarousable, brought back and intubated for airway protection and level 1 trauma activated.    Vital Signs Last 24 Hrs  T(C): 37.2 (30 Jul 2020 03:00), Max: 37.4 (29 Jul 2020 23:00)  T(F): 99 (30 Jul 2020 03:00), Max: 99.3 (29 Jul 2020 23:00)  HR: 101 (30 Jul 2020 06:00) (74 - 108)  BP: 161/79 (30 Jul 2020 06:00) (99/58 - 161/79)  BP(mean): 102 (30 Jul 2020 06:00) (72 - 103)  RR: 15 (30 Jul 2020 06:00) (14 - 20)  SpO2: 85% (30 Jul 2020 06:00) (85% - 100%)                        8.7    5.56  )-----------( 180      ( 29 Jul 2020 22:01 )             27.0    07-29    139  |  103  |  21  ----------------------------<  231<H>  4.3   |  26  |  0.37<L>    Ca    8.9      29 Jul 2020 22:01  Phos  3.3     07-29  Mg     2.4     07-29    PT/INR - ( 29 Jul 2020 10:09 )   PT: 13.4 sec;   INR: 1.13 ratio    PTT - ( 29 Jul 2020 10:09 )  PTT:28.9 sec      DRAIN OUTPUT: SG ALMA (85)    NEUROIMAGING: < from: CT Head No Cont (07.29.20 @ 08:14) >  IMPRESSION:  Increased mixed density extra-axial fluid and hemorrhage along the left convexity. Increased left-to-right midline shift now 1 cm. Small parafalcine and left tentorial subdural hemorrhage, unchanged.    PHYSICAL EXAM:    General: No Acute Distress     Neurological: Ox3, FC, JENSEN antigravity    Pulmonary: Clear to Auscultation, No Rales, No Rhonchi, No Wheezes     Cardiovascular: S1, S2, Regular Rate and Rhythm     Gastrointestinal: Soft, Nontender, Nondistended     Incision: Clean and dry     MEDICATIONS:   Antibiotics:    Neuro:  acetaminophen    Suspension .. 650 milliGRAM(s) Oral every 6 hours PRN Temp greater or equal to 38C (100.4F), Mild Pain (1 - 3)  amantadine 100 milliGRAM(s) Oral every 12 hours  fentaNYL    Injectable 12.5 MICROGram(s) IV Push every 2 hours PRN Severe Pain (7 - 10)  levETIRAcetam  Solution 500 milliGRAM(s) Oral two times a day    Anticoagulation:    Cardiology:  amLODIPine   Tablet 5 milliGRAM(s) Oral daily  enalapril 10 milliGRAM(s) Oral daily    Endo:   dexAMETHasone  Injectable 4 milliGRAM(s) IV Push every 6 hours  dexAMETHasone  Injectable   IV Push   insulin lispro (HumaLOG) corrective regimen sliding scale   SubCutaneous every 6 hours  insulin NPH human recombinant 15 Unit(s) SubCutaneous every 6 hours    Pulm:    GI/:  pantoprazole  Injectable 40 milliGRAM(s) IV Push daily  polyethylene glycol 3350 17 Gram(s) Oral two times a day  senna 2 Tablet(s) Oral at bedtime    Other:  chlorhexidine 0.12% Liquid 15 milliLiter(s) Oral Mucosa every 12 hours  chlorhexidine 4% Liquid 1 Application(s) Topical <User Schedule> 65 year old female with PMHx of DM, HTN, on asa81 brought in by family for concern of HA, dizziness, multiple episodes of emesis s/p mechanical fall (trip over baby carriage). Pt walked into triage, however pt became lethargic and unarousable, brought back and intubated for airway protection and level 1 trauma activated.  CT today shows increased size of subdural collection.     Vital Signs Last 24 Hrs  T(C): 37.2 (30 Jul 2020 03:00), Max: 37.4 (29 Jul 2020 23:00)  T(F): 99 (30 Jul 2020 03:00), Max: 99.3 (29 Jul 2020 23:00)  HR: 101 (30 Jul 2020 06:00) (74 - 108)  BP: 161/79 (30 Jul 2020 06:00) (99/58 - 161/79)  BP(mean): 102 (30 Jul 2020 06:00) (72 - 103)  RR: 15 (30 Jul 2020 06:00) (14 - 20)  SpO2: 85% (30 Jul 2020 06:00) (85% - 100%)                        8.7    5.56  )-----------( 180      ( 29 Jul 2020 22:01 )             27.0    07-29    139  |  103  |  21  ----------------------------<  231<H>  4.3   |  26  |  0.37<L>    Ca    8.9      29 Jul 2020 22:01  Phos  3.3     07-29  Mg     2.4     07-29    PT/INR - ( 29 Jul 2020 10:09 )   PT: 13.4 sec;   INR: 1.13 ratio    PTT - ( 29 Jul 2020 10:09 )  PTT:28.9 sec      DRAIN OUTPUT: SG ALMA (85)    NEUROIMAGING: < from: CT Head No Cont (07.29.20 @ 08:14) >  IMPRESSION:  Increased mixed density extra-axial fluid and hemorrhage along the left convexity. Increased left-to-right midline shift now 1 cm. Small parafalcine and left tentorial subdural hemorrhage, unchanged.    PHYSICAL EXAM:    General: No Acute Distress     Neurological: Ox3, FC, JENSEN antigravity    Pulmonary: Clear to Auscultation, No Rales, No Rhonchi, No Wheezes     Cardiovascular: S1, S2, Regular Rate and Rhythm     Gastrointestinal: Soft, Nontender, Nondistended     Incision: Clean and dry     MEDICATIONS:   Antibiotics:    Neuro:  acetaminophen    Suspension .. 650 milliGRAM(s) Oral every 6 hours PRN Temp greater or equal to 38C (100.4F), Mild Pain (1 - 3)  amantadine 100 milliGRAM(s) Oral every 12 hours  fentaNYL    Injectable 12.5 MICROGram(s) IV Push every 2 hours PRN Severe Pain (7 - 10)  levETIRAcetam  Solution 500 milliGRAM(s) Oral two times a day    Anticoagulation:    Cardiology:  amLODIPine   Tablet 5 milliGRAM(s) Oral daily  enalapril 10 milliGRAM(s) Oral daily    Endo:   dexAMETHasone  Injectable 4 milliGRAM(s) IV Push every 6 hours  dexAMETHasone  Injectable   IV Push   insulin lispro (HumaLOG) corrective regimen sliding scale   SubCutaneous every 6 hours  insulin NPH human recombinant 15 Unit(s) SubCutaneous every 6 hours    Pulm:    GI/:  pantoprazole  Injectable 40 milliGRAM(s) IV Push daily  polyethylene glycol 3350 17 Gram(s) Oral two times a day  senna 2 Tablet(s) Oral at bedtime    Other:  chlorhexidine 0.12% Liquid 15 milliLiter(s) Oral Mucosa every 12 hours  chlorhexidine 4% Liquid 1 Application(s) Topical <User Schedule>

## 2020-07-30 NOTE — PROGRESS NOTE ADULT - ATTENDING COMMENTS
In [place]ish, oriented x3, EO to voice, effort dependent strength exam, LUE/LLE 4+/5, RUE/RLE 4/5

## 2020-07-30 NOTE — CHART NOTE - NSCHARTNOTEFT_GEN_A_CORE
Nutrition Follow Up Note     Patient seen for: nutrition follow up on ICU    Source: patient, medical record, communication with team.     Chart reviewed, events noted. Pt is a 64 yo F with PMH: DM, HTN; brought in by family for concern of HA, dizziness, multiple episodes of emesis s/p mechanical fall. Became lethargic in ED; intubated for airway protection. Noted with a traumatic acute SDH, midline shift, brain compression s/p craniotomy and evacuation. Pt extubated this AM (7/30). SG and ALMA drained removed (7/30). Continues on Decadron as ordered; Humalog and Humulin for glycemic control.     Diet Order: Diet, NPO:   Tube Feeding Modality: Nasogastric  Glucerna 1.2 Regino (GLUCERNARTH)  Total Volume for 24 Hours (mL): 1200  Continuous  Starting Tube Feed Rate {mL per Hour}: 20  Increase Tube Feed Rate by (mL): 10     Every 4 hours  Until Goal Tube Feed Rate (mL per Hour): 50  Tube Feed Duration (in Hours): 24  Tube Feed Start Time: 11:30 (07-26-20 @ 11:08)  Diet, NPO:   NPO for Procedure/Test     NPO Start Date: 30-Jul-2020,   NPO Start Time: 04:00  Except Medications (07-30-20 @ 02:08)    CURRENT EN ORDER PROVIDES: Pt currently NPO for s/p extubation. NGT remains in place. Was previously receiving Glucerna 1.2 at 50ml/hr x 24 hrs. Provides: 1200ml, 1440kcal and 72g protein.     EN provision (per nursing flow sheet):  (7/30): 200ml (thus far today; held for extubation)  (7/29): 800ml (held midday for ? possible extubation)  (7/28): 800ml (held midday for possible extubation)    Last BM: No documented BM's recorded in-house thus far. On bowel regimen: Miralax, senna    Anthropometric Measurements:   Height (cm): 158 (07-25-20 @ 20:41)  Weight (kg): 50.8 (07-25-20 @ 20:41)  BMI (kg/m2): 20.3 (07-25-20 @ 20:41)    Medications: MEDICATIONS  (STANDING):  amantadine 100 milliGRAM(s) Oral every 12 hours  amLODIPine   Tablet 5 milliGRAM(s) Oral daily  chlorhexidine 0.12% Liquid 15 milliLiter(s) Oral Mucosa every 12 hours  chlorhexidine 4% Liquid 1 Application(s) Topical <User Schedule>  dexAMETHasone  Injectable 4 milliGRAM(s) IV Push every 6 hours  dexAMETHasone  Injectable   IV Push   enalapril 10 milliGRAM(s) Oral daily  insulin lispro (HumaLOG) corrective regimen sliding scale   SubCutaneous every 6 hours  insulin NPH human recombinant 15 Unit(s) SubCutaneous every 6 hours  levETIRAcetam  Solution 500 milliGRAM(s) Oral two times a day  pantoprazole  Injectable 40 milliGRAM(s) IV Push daily  polyethylene glycol 3350 17 Gram(s) Oral two times a day  senna 2 Tablet(s) Oral at bedtime    MEDICATIONS  (PRN):  acetaminophen    Suspension .. 650 milliGRAM(s) Oral every 6 hours PRN Temp greater or equal to 38C (100.4F), Mild Pain (1 - 3)    Labs: 07-29 @ 22:01: Sodium 139, Potassium 4.3, Calcium 8.9, Magnesium 2.4, Phosphorus 3.3, BUN 21, Creatinine 0.37<L>, Glucose 231<H>, Alk Phos --, ALT/SGPT --, AST/SGOT --, Albumin --, Prealbumin --, Total Bilirubin --, Hemoglobin 8.7<L>, Hematocrit 27.0<L>, Ferritin --, C-Reactive Protein --, Creatine Kinase <<27>    POCT Blood Glucose.: 169 mg/dL (07-30-20 @ 12:55)  POCT Blood Glucose.: 268 mg/dL (07-30-20 @ 05:01)  POCT Blood Glucose.: 239 mg/dL (07-29-20 @ 23:12)  POCT Blood Glucose.: 234 mg/dL (07-29-20 @ 17:30)    Skin per nursing documentation: no pressure injuries documented; surgical incision left crani 7/26.   Edema: 1+ generalized     Estimated Needs: (dosing wt 112 lbs/50.8kg)  Energy: (25-30kcal/kg): 1270-1524kcal  Protein: (1.2-1.4g protein/kg): 61-71g protein  Michael State Equation (REE):     Previous Nutrition Diagnosis: Increased nutrient needs  Nutrition Diagnosis is: Remains appropriate, ongoing with provision of EN (however held at this time s/p extubation).     New Nutrition Diagnosis:      Recommended Interventions:   1. If EN feeds continue, consider resume Glucerna 1.2 at 50ml/hr x 24 hrs. Provides (based on dosing wt 50.8kg): 1200ml, 1440kcal (28kcal/kg) and 72g protein (1.4g protein/kg)  2. Defer diet advancement (PO diet) to medical team, SLP. If PO diet initiated, consider consistent carbohydrate diet. Defer consistency to medical team, SLP.   3. Monitor GI tolerance. RD to remain available to adjust EN formulary, volume/rate PRN (if resumed).   4. Obtain subjective wt/diet history PRN - will return tomorrow, as pt with medical team present at time of RD visit.       Monitoring and Evaluation:   Continue to monitor nutrition provision and tolerance, weights, labs, skin integrity.   RD remains available upon request and will follow up per protocol.    Alison Kleiner RD, [facility] ([phone]) Nutrition Follow Up Note     Patient seen for: nutrition follow up on ICU    Source: patient, medical record, communication with team.     Chart reviewed, events noted. Pt is a 66 yo F with PMH: DM, HTN; brought in by family for concern of HA, dizziness, multiple episodes of emesis s/p mechanical fall. Became lethargic in ED; intubated for airway protection. Noted with a traumatic acute SDH, midline shift, brain compression s/p craniotomy and evacuation. Pt extubated this AM (7/30). SG and ALMA drained removed (7/30). Continues on Decadron as ordered; Humalog and Humulin for glycemic control.     Diet Order: Diet, NPO:   Tube Feeding Modality: Nasogastric  Glucerna 1.2 Regino (GLUCERNARTH)  Total Volume for 24 Hours (mL): 1200  Continuous  Starting Tube Feed Rate {mL per Hour}: 20  Increase Tube Feed Rate by (mL): 10     Every 4 hours  Until Goal Tube Feed Rate (mL per Hour): 50  Tube Feed Duration (in Hours): 24  Tube Feed Start Time: 11:30 (07-26-20 @ 11:08)  Diet, NPO:   NPO for Procedure/Test     NPO Start Date: 30-Jul-2020,   NPO Start Time: 04:00  Except Medications (07-30-20 @ 02:08)    CURRENT EN ORDER PROVIDES: Pt currently NPO for s/p extubation. NGT remains in place. Was previously receiving Glucerna 1.2 at 50ml/hr x 24 hrs. Provides: 1200ml, 1440kcal and 72g protein.     EN provision (per nursing flow sheet):  (7/30): 200ml (thus far today; held for extubation)  (7/29): 800ml (held midday for ? possible extubation)  (7/28): 800ml (held midday for possible extubation)    Last BM: No documented BM's recorded in-house thus far. On bowel regimen: Miralax, senna    Anthropometric Measurements:   Height (cm): 158 (07-25-20 @ 20:41)  Weight (kg): 50.8 (07-25-20 @ 20:41)  BMI (kg/m2): 20.3 (07-25-20 @ 20:41)    Medications: MEDICATIONS  (STANDING):  amantadine 100 milliGRAM(s) Oral every 12 hours  amLODIPine   Tablet 5 milliGRAM(s) Oral daily  chlorhexidine 0.12% Liquid 15 milliLiter(s) Oral Mucosa every 12 hours  chlorhexidine 4% Liquid 1 Application(s) Topical <User Schedule>  dexAMETHasone  Injectable 4 milliGRAM(s) IV Push every 6 hours  dexAMETHasone  Injectable   IV Push   enalapril 10 milliGRAM(s) Oral daily  insulin lispro (HumaLOG) corrective regimen sliding scale   SubCutaneous every 6 hours  insulin NPH human recombinant 15 Unit(s) SubCutaneous every 6 hours  levETIRAcetam  Solution 500 milliGRAM(s) Oral two times a day  pantoprazole  Injectable 40 milliGRAM(s) IV Push daily  polyethylene glycol 3350 17 Gram(s) Oral two times a day  senna 2 Tablet(s) Oral at bedtime    MEDICATIONS  (PRN):  acetaminophen    Suspension .. 650 milliGRAM(s) Oral every 6 hours PRN Temp greater or equal to 38C (100.4F), Mild Pain (1 - 3)    Labs: 07-29 @ 22:01: Sodium 139, Potassium 4.3, Calcium 8.9, Magnesium 2.4, Phosphorus 3.3, BUN 21, Creatinine 0.37<L>, Glucose 231<H>, Alk Phos --, ALT/SGPT --, AST/SGOT --, Albumin --, Prealbumin --, Total Bilirubin --, Hemoglobin 8.7<L>, Hematocrit 27.0<L>, Ferritin --, C-Reactive Protein --, Creatine Kinase <<27>    POCT Blood Glucose.: 169 mg/dL (07-30-20 @ 12:55)  POCT Blood Glucose.: 268 mg/dL (07-30-20 @ 05:01)  POCT Blood Glucose.: 239 mg/dL (07-29-20 @ 23:12)  POCT Blood Glucose.: 234 mg/dL (07-29-20 @ 17:30)    Skin per nursing documentation: no pressure injuries documented; surgical incision left crani 7/26.   Edema: 1+ generalized     Estimated Needs: (dosing wt 112 lbs/50.8kg)  Energy: (25-30kcal/kg): 1270-1524kcal  Protein: (1.2-1.4g protein/kg): 61-71g protein  Michael State Equation (REE):     Previous Nutrition Diagnosis: Increased nutrient needs  Nutrition Diagnosis is: Remains appropriate, ongoing with provision of EN (however held at this time s/p extubation).     New Nutrition Diagnosis:      Recommended Interventions:   1. If EN feeds continue, consider resume Glucerna 1.2 at 50ml/hr x 24 hrs. Provides (based on dosing wt 50.8kg): 1200ml, 1440kcal (28kcal/kg) and 72g protein (1.4g protein/kg)  2. Defer diet advancement (PO diet) to medical team, SLP. If PO diet initiated, consider consistent carbohydrate diet. Defer consistency to medical team, SLP.   3. Monitor GI tolerance. RD to remain available to adjust EN formulary, volume/rate PRN (if resumed). Bowel regimen as per discretion of team; no BM's recorded thus far.   4. Obtain subjective wt/diet history PRN - will return tomorrow, as pt with medical team present at time of RD visit.       Monitoring and Evaluation:   Continue to monitor nutrition provision and tolerance, weights, labs, skin integrity.   RD remains available upon request and will follow up per protocol.    Alison Kleiner RD, [facility] ([phone])

## 2020-07-31 ENCOUNTER — APPOINTMENT (OUTPATIENT)
Dept: NEUROSURGERY | Facility: CLINIC | Age: 65
End: 2020-07-31
Payer: MEDICARE

## 2020-07-31 LAB
GAS PNL BLDA: SIGNIFICANT CHANGE UP
GLUCOSE BLDC GLUCOMTR-MCNC: 107 MG/DL — HIGH (ref 70–99)
GLUCOSE BLDC GLUCOMTR-MCNC: 117 MG/DL — HIGH (ref 70–99)
GLUCOSE BLDC GLUCOMTR-MCNC: 140 MG/DL — HIGH (ref 70–99)
GLUCOSE BLDC GLUCOMTR-MCNC: 155 MG/DL — HIGH (ref 70–99)
GLUCOSE BLDC GLUCOMTR-MCNC: 159 MG/DL — HIGH (ref 70–99)

## 2020-07-31 PROCEDURE — 99291 CRITICAL CARE FIRST HOUR: CPT

## 2020-07-31 PROCEDURE — 70450 CT HEAD/BRAIN W/O DYE: CPT | Mod: 26

## 2020-07-31 PROCEDURE — 61312 CRNEC/CRNOT STTL XDRL/SDRL: CPT | Mod: 58

## 2020-07-31 PROCEDURE — 99292 CRITICAL CARE ADDL 30 MIN: CPT

## 2020-07-31 PROCEDURE — 95720 EEG PHY/QHP EA INCR W/VEEG: CPT

## 2020-07-31 RX ORDER — CHLORHEXIDINE GLUCONATE 213 G/1000ML
1 SOLUTION TOPICAL
Refills: 0 | Status: DISCONTINUED | OUTPATIENT
Start: 2020-07-31 | End: 2020-08-05

## 2020-07-31 RX ORDER — HUMAN INSULIN 100 [IU]/ML
7 INJECTION, SUSPENSION SUBCUTANEOUS ONCE
Refills: 0 | Status: COMPLETED | OUTPATIENT
Start: 2020-07-31 | End: 2020-07-31

## 2020-07-31 RX ORDER — DEXTROSE MONOHYDRATE, SODIUM CHLORIDE, AND POTASSIUM CHLORIDE 50; .745; 4.5 G/1000ML; G/1000ML; G/1000ML
1000 INJECTION, SOLUTION INTRAVENOUS
Refills: 0 | Status: DISCONTINUED | OUTPATIENT
Start: 2020-07-31 | End: 2020-08-02

## 2020-07-31 RX ORDER — CEFAZOLIN SODIUM 1 G
2000 VIAL (EA) INJECTION EVERY 8 HOURS
Refills: 0 | Status: COMPLETED | OUTPATIENT
Start: 2020-07-31 | End: 2020-07-31

## 2020-07-31 RX ORDER — LABETALOL HCL 100 MG
10 TABLET ORAL EVERY 6 HOURS
Refills: 0 | Status: DISCONTINUED | OUTPATIENT
Start: 2020-07-31 | End: 2020-08-13

## 2020-07-31 RX ORDER — LEVETIRACETAM 250 MG/1
500 TABLET, FILM COATED ORAL ONCE
Refills: 0 | Status: COMPLETED | OUTPATIENT
Start: 2020-07-31 | End: 2020-07-31

## 2020-07-31 RX ORDER — LEVETIRACETAM 250 MG/1
750 TABLET, FILM COATED ORAL
Refills: 0 | Status: DISCONTINUED | OUTPATIENT
Start: 2020-07-31 | End: 2020-08-06

## 2020-07-31 RX ORDER — LEVETIRACETAM 250 MG/1
500 TABLET, FILM COATED ORAL
Refills: 0 | Status: DISCONTINUED | OUTPATIENT
Start: 2020-07-31 | End: 2020-07-31

## 2020-07-31 RX ADMIN — Medication 10 MILLIGRAM(S): at 05:47

## 2020-07-31 RX ADMIN — Medication 100 MILLIGRAM(S): at 05:10

## 2020-07-31 RX ADMIN — AMLODIPINE BESYLATE 5 MILLIGRAM(S): 2.5 TABLET ORAL at 05:10

## 2020-07-31 RX ADMIN — Medication 2: at 13:08

## 2020-07-31 RX ADMIN — HUMAN INSULIN 7 UNIT(S): 100 INJECTION, SUSPENSION SUBCUTANEOUS at 05:14

## 2020-07-31 RX ADMIN — Medication 100 MILLIGRAM(S): at 16:06

## 2020-07-31 RX ADMIN — Medication 650 MILLIGRAM(S): at 17:00

## 2020-07-31 RX ADMIN — POLYETHYLENE GLYCOL 3350 17 GRAM(S): 17 POWDER, FOR SOLUTION ORAL at 17:27

## 2020-07-31 RX ADMIN — DEXTROSE MONOHYDRATE, SODIUM CHLORIDE, AND POTASSIUM CHLORIDE 75 MILLILITER(S): 50; .745; 4.5 INJECTION, SOLUTION INTRAVENOUS at 17:34

## 2020-07-31 RX ADMIN — SENNA PLUS 2 TABLET(S): 8.6 TABLET ORAL at 22:22

## 2020-07-31 RX ADMIN — LEVETIRACETAM 750 MILLIGRAM(S): 250 TABLET, FILM COATED ORAL at 05:47

## 2020-07-31 RX ADMIN — CHLORHEXIDINE GLUCONATE 1 APPLICATION(S): 213 SOLUTION TOPICAL at 22:21

## 2020-07-31 RX ADMIN — Medication 100 MILLIGRAM(S): at 23:19

## 2020-07-31 RX ADMIN — LEVETIRACETAM 750 MILLIGRAM(S): 250 TABLET, FILM COATED ORAL at 17:27

## 2020-07-31 RX ADMIN — Medication 100 MILLIGRAM(S): at 17:27

## 2020-07-31 RX ADMIN — Medication 4 MILLIGRAM(S): at 05:10

## 2020-07-31 RX ADMIN — Medication 650 MILLIGRAM(S): at 16:33

## 2020-07-31 RX ADMIN — LEVETIRACETAM 400 MILLIGRAM(S): 250 TABLET, FILM COATED ORAL at 05:05

## 2020-07-31 RX ADMIN — PANTOPRAZOLE SODIUM 40 MILLIGRAM(S): 20 TABLET, DELAYED RELEASE ORAL at 11:42

## 2020-07-31 RX ADMIN — Medication 5 MILLIGRAM(S): at 17:27

## 2020-07-31 NOTE — SWALLOW BEDSIDE ASSESSMENT ADULT - SWALLOW EVAL: DIAGNOSIS
Consult received and appreciated. Chart reviewed and case d/w TANNER Champion. Attempted to see Pt for bedsided swalloe evaluation. However, Pt with change in status this morning, currently off the unit for procedure. Will reattempt evaluation tomorrow, pending medical status. Consult received and appreciated. Chart reviewed and case d/w TANNER Champion. Attempted to see Pt for bedside swallow evaluation. However, Pt with change in status this morning, currently off the unit for procedure. Will reattempt evaluation tomorrow, pending medical status.

## 2020-07-31 NOTE — PROGRESS NOTE ADULT - ASSESSMENT
s/p L craniotomy for acute SDH evacuation    -extubated  -pre op for tomorrow for possible eval of collection  -HCT in am

## 2020-07-31 NOTE — BRIEF OPERATIVE NOTE - NSICDXBRIEFPROCEDURE_GEN_ALL_CORE_FT
PROCEDURES:  Craniotomy or craniectomy with evacuation of supratentorial extradural or subdural hematoma 26-Jul-2020 00:10:28  Alexandr Cline
PROCEDURES:  Craniotomy or craniectomy with evacuation of supratentorial extradural or subdural hematoma 26-Jul-2020 00:10:28  Alexandr Cline

## 2020-07-31 NOTE — PROVIDER CONTACT NOTE (CHANGE IN STATUS NOTIFICATION) - ASSESSMENT
Left pupil 4RB, right pupil 2RB, pt obtunded, arousal to repetitive vigorous stimulation, not following commands on any extremity, localizing pain b/l upper and lower, afebrile

## 2020-07-31 NOTE — PROVIDER CONTACT NOTE (CHANGE IN STATUS NOTIFICATION) - ACTION/TREATMENT ORDERED:
team assessed at bedside, pt taken to stat CT scan, no further interventions ordered, will continue to monitor

## 2020-07-31 NOTE — CHART NOTE - NSCHARTNOTEFT_GEN_A_CORE
PRELIMINARY EEG REPORT    EEG reviewed 18:23  Date: 07-31-20      - No epileptiform pattern or seizure seen.        Oscar Hendrix MD PGY-5  Epilepsy Fellow    Prelim read based on fellow review. Final read pending attending review

## 2020-07-31 NOTE — PRE-ANESTHESIA EVALUATION ADULT - NSANTHOSAYNRD_GEN_A_CORE
No. MARY ANNE screening performed.  STOP BANG Legend: 0-2 = LOW Risk; 3-4 = INTERMEDIATE Risk; 5-8 = HIGH Risk
No. MARY ANNE screening performed.  STOP BANG Legend: 0-2 = LOW Risk; 3-4 = INTERMEDIATE Risk; 5-8 = HIGH Risk

## 2020-07-31 NOTE — PROGRESS NOTE ADULT - ASSESSMENT
Left subdural hematoma status post evacuation  - CT head in AM for stability  - Monitor drain output  - Steroids per NSGY  - Glycemic control with NPH  - Monitor airway   - -160mmHg on amlodipine and enalapril

## 2020-07-31 NOTE — EEG REPORT - NS EEG TEXT BOX
[facility]   COMPREHENSIVE EPILEPSY CENTER   REPORT OF ROUTINE VIDEO EEG     [facility]: [address] [place] [address] [address], [place] [address], Ph#: [phone]  [facility]: [address], [place] [address], Ph#: [phone]  [facility]: [address] [place] [address], Ph#: [phone]    Patient Name: VANESSA MATHEW  Age and : 65y (51)  MRN #: [id number]  Location: [place] [address]  Referring Physician: Donal Harris    Study Date: 20    _____________________________________________________________  TECHNICAL INFORMATION    Placement and Labeling of Electrodes:  The EEG was performed utilizing 20 channels referential EEG connections (coronal over temporal over parasagittal montage) using all standard 10-20 electrode placements with EKG.  Recording was at a sampling rate of 256 samples per second per channel.  Time synchronized digital video recording was done simultaneously with EEG recording.  A low light infrared camera was used for low light recording.  Nura and seizure detection algorithms were utilized.    _____________________________________________________________  HISTORY    Patient is a 65y old  Female who presents with a chief complaint of     PERTINENT MEDICATION:  levETIRAcetam  Solution 750 milliGRAM(s) Oral two times a day    _____________________________________________________________  STUDY INTERPRETATION    Findings: The background was continuous, spontaneously variable and reactive.  8 Hz activity, with amplitude to 30 uV, that attenuated to eye opening.  Low amplitude frontal beta was noted in wakefulness.    Background Slowing:  Background predominantly consisted of theta, and alpha    Focal Slowing:   Intermittent focal slowing noted over the left fronto central temporal area    Sleep Background:  Drowsiness and stage II sleep transients were not recorded.    Other Non-Epileptiform Findings:  Breach effect max in left posterior parasagittal region characterized by higher amplitude, sharply contoured waves and fast activities.    Interictal Epileptiform Activity:   None were present.    Events:  Clinical events: None recorded.  Seizures: None recorded.    Activation Procedures:   Hyperventilation was not performed.    Photic stimulation was not performed.     Artifacts:  Intermittent myogenic and movement artifacts were noted.    ECG:  The heart rate on single channel ECG was predominantly between 80-90 BPM.    _____________________________________________________________  EEG SUMMARY/CLASSIFICATION    Abnormal EEG in an altered / a sedated patient.  - Intermittent focal slowing noted over the left fronto central temporal area  - Breach effect max in left posterior parasagittal region   - Mild generalized slowing.    _____________________________________________________________  EEG IMPRESSION/CLINICAL CORRELATE    Abnormal EEG study.  1. Left hemispheric cortical functional or structural abnormality  2. nonspecific diffuse or multifocal cerebral dysfunction.   3 Skull defect max in the left posterior parasagittal region   4 No epileptiform pattern or seizure seen.    Oscar Hendrix MD PGY-5  [facility]   COMPREHENSIVE EPILEPSY CENTER   REPORT OF ROUTINE VIDEO EEG     [facility]: [address] [place] [address] [address], [place] [address], Ph#: [phone]  [facility]: [address], [place] [address], Ph#: [phone]  [facility]: [address] [place] [address], Ph#: [phone]    Patient Name: VANESSA MATHEW  Age and : 65y (51)  MRN #: [id number]  Location: [place] [address]  Referring Physician: Donal Harris    Study Date: 20    _____________________________________________________________  TECHNICAL INFORMATION    Placement and Labeling of Electrodes:  The EEG was performed utilizing 20 channels referential EEG connections (coronal over temporal over parasagittal montage) using all standard 10-20 electrode placements with EKG.  Recording was at a sampling rate of 256 samples per second per channel.  Time synchronized digital video recording was done simultaneously with EEG recording.  A low light infrared camera was used for low light recording.  Nura and seizure detection algorithms were utilized.    _____________________________________________________________  HISTORY    Patient is a 65y old  Female who presents with a chief complaint of     PERTINENT MEDICATION:  levETIRAcetam  Solution 750 milliGRAM(s) Oral two times a day    _____________________________________________________________  STUDY INTERPRETATION    Findings: The background was continuous, spontaneously variable and reactive.  8 Hz activity, with amplitude to 30 uV, that attenuated to eye opening.  Low amplitude frontal beta was noted in wakefulness.    Background Slowing:  Background predominantly consisted of theta, and alpha    Focal Slowing:   Intermittent focal slowing noted over the left fronto central temporal area    Sleep Background:  Drowsiness and stage II sleep transients were not recorded.    Other Non-Epileptiform Findings:  Breach effect max in left posterior parasagittal region characterized by higher amplitude, sharply contoured waves and fast activities.    Interictal Epileptiform Activity:   None were present.    Events:  Clinical events: None recorded.  Seizures: None recorded.    Activation Procedures:   Hyperventilation was not performed.    Photic stimulation was not performed.     Artifacts:  Intermittent myogenic and movement artifacts were noted.    ECG:  The heart rate on single channel ECG was predominantly between 80-90 BPM.    _____________________________________________________________  EEG SUMMARY/CLASSIFICATION    Abnormal EEG in an altered / a sedated patient.  - Intermittent focal slowing noted over the left fronto central temporal area  - Breach effect max in left posterior parasagittal region   - Mild generalized slowing.    _____________________________________________________________  EEG IMPRESSION/CLINICAL CORRELATE    Abnormal EEG study.  1. Left hemispheric cortical functional or structural abnormality  2. nonspecific diffuse or multifocal cerebral dysfunction.   3 Skull defect max in the left posterior parasagittal region   4 No epileptiform pattern or seizure seen.    Oscar Hendrix MD PGY-5  Epilepsy Fellow

## 2020-07-31 NOTE — PROVIDER CONTACT NOTE (CHANGE IN STATUS NOTIFICATION) - ASSESSMENT
Pt requires repeated stimulation for arousal with  phone, pt non-verbal, L pupil 4RB, R pupil 3RB, following commands on b/l lower extremities and b/l uppers after multiple attempts with , slow to follow on right upper, pt afebrile

## 2020-07-31 NOTE — PROGRESS NOTE ADULT - SUBJECTIVE AND OBJECTIVE BOX
Overnight: stoped following commands, ct head done which showed stable left subdural collection with mid line shift and brain compression   7/31 taken back to the OR     Vital Signs Last 24 Hrs  T(C): 36.4 (31 Jul 2020 03:00), Max: 37.3 (30 Jul 2020 15:00)  T(F): 97.6 (31 Jul 2020 03:00), Max: 99.1 (30 Jul 2020 15:00)  HR: 68 (31 Jul 2020 07:33) (68 - 113)  BP: 124/65 (31 Jul 2020 07:33) (109/61 - 159/77)  BP(mean): 82 (31 Jul 2020 07:33) (76 - 102)  RR: 11 (31 Jul 2020 07:33) (11 - 23)  SpO2: 100% (31 Jul 2020 07:33) (99% - 100%)    PHYSICAL EXAM:    Constitutional: No Acute Distress     Neurological:     Incision:   LABS:                        9.6    7.10  )-----------( 231      ( 30 Jul 2020 21:41 )             30.2    07-30    140  |  102  |  27<H>  ----------------------------<  186<H>  3.6   |  25  |  0.34<L>    Ca    9.4      30 Jul 2020 21:41  Phos  3.1     07-30  Mg     2.4     07-30    PT/INR - ( 29 Jul 2020 10:09 )   PT: 13.4 sec;   INR: 1.13 ratio         PTT - ( 29 Jul 2020 10:09 )  PTT:28.9 sec    07-30 @ 07:01  -  07-31 @ 07:00  --------------------------------------------------------  IN: 390 mL / OUT: 1300 mL / NET: -910 mL        MEDICATIONS:  Anticoagulation:     Antibiotics:    Endo:  dexAMETHasone  Injectable 4 milliGRAM(s) IV Push every 6 hours  dexAMETHasone  Injectable   IV Push   dexAMETHasone  Injectable 4 milliGRAM(s) IV Push every 8 hours  insulin lispro (HumaLOG) corrective regimen sliding scale   SubCutaneous every 6 hours  insulin NPH human recombinant 15 Unit(s) SubCutaneous every 6 hours    Neuro:  acetaminophen    Suspension .. 650 milliGRAM(s) Oral every 6 hours PRN Temp greater or equal to 38C (100.4F), Mild Pain (1 - 3)  amantadine 100 milliGRAM(s) Oral every 12 hours  levETIRAcetam  Solution 750 milliGRAM(s) Oral two times a day    Cardiac:  amLODIPine   Tablet 5 milliGRAM(s) Oral daily  enalapril 10 milliGRAM(s) Oral daily    Pulm:    GI/:  pantoprazole  Injectable 40 milliGRAM(s) IV Push daily  polyethylene glycol 3350 17 Gram(s) Oral two times a day  senna 2 Tablet(s) Oral at bedtime    Other:   chlorhexidine 4% Liquid 1 Application(s) Topical <User Schedule>    DIET: NPO    IMAGING: Overnight: stoped following commands, ct head done which showed stable left subdural collection with mid line shift and brain compression   7/31 taken back to the OR for left craniotomy for reexploration of subdural hematoma     Vital Signs Last 24 Hrs  T(C): 36.4 (31 Jul 2020 03:00), Max: 37.3 (30 Jul 2020 15:00)  T(F): 97.6 (31 Jul 2020 03:00), Max: 99.1 (30 Jul 2020 15:00)  HR: 68 (31 Jul 2020 07:33) (68 - 113)  BP: 124/65 (31 Jul 2020 07:33) (109/61 - 159/77)  BP(mean): 82 (31 Jul 2020 07:33) (76 - 102)  RR: 11 (31 Jul 2020 07:33) (11 - 23)  SpO2: 100% (31 Jul 2020 07:33) (99% - 100%)    PHYSICAL EXAM:    Constitutional: No Acute Distress     Neurological:     Incision: c/d/i  LABS:                        9.6    7.10  )-----------( 231      ( 30 Jul 2020 21:41 )             30.2    07-30    140  |  102  |  27<H>  ----------------------------<  186<H>  3.6   |  25  |  0.34<L>    Ca    9.4      30 Jul 2020 21:41  Phos  3.1     07-30  Mg     2.4     07-30    PT/INR - ( 29 Jul 2020 10:09 )   PT: 13.4 sec;   INR: 1.13 ratio         PTT - ( 29 Jul 2020 10:09 )  PTT:28.9 sec    07-30 @ 07:01  -  07-31 @ 07:00  --------------------------------------------------------  IN: 390 mL / OUT: 1300 mL / NET: -910 mL    Drains: SD  SG    MEDICATIONS:  Anticoagulation:     Antibiotics:    Endo:  dexAMETHasone  Injectable 4 milliGRAM(s) IV Push every 6 hours  dexAMETHasone  Injectable   IV Push   dexAMETHasone  Injectable 4 milliGRAM(s) IV Push every 8 hours  insulin lispro (HumaLOG) corrective regimen sliding scale   SubCutaneous every 6 hours  insulin NPH human recombinant 15 Unit(s) SubCutaneous every 6 hours    Neuro:  acetaminophen    Suspension .. 650 milliGRAM(s) Oral every 6 hours PRN Temp greater or equal to 38C (100.4F), Mild Pain (1 - 3)  amantadine 100 milliGRAM(s) Oral every 12 hours  levETIRAcetam  Solution 750 milliGRAM(s) Oral two times a day    Cardiac:  amLODIPine   Tablet 5 milliGRAM(s) Oral daily  enalapril 10 milliGRAM(s) Oral daily    Pulm:    GI/:  pantoprazole  Injectable 40 milliGRAM(s) IV Push daily  polyethylene glycol 3350 17 Gram(s) Oral two times a day  senna 2 Tablet(s) Oral at bedtime    Other:   chlorhexidine 4% Liquid 1 Application(s) Topical <User Schedule>    DIET: NPO    IMAGING: Overnight: stoped following commands, ct head done which showed stable left subdural collection with mid line shift and brain compression   7/31 taken back to the OR for left craniotomy for reexploration of subdural hematoma     Vital Signs Last 24 Hrs  T(C): 36.4 (31 Jul 2020 03:00), Max: 37.3 (30 Jul 2020 15:00)  T(F): 97.6 (31 Jul 2020 03:00), Max: 99.1 (30 Jul 2020 15:00)  HR: 68 (31 Jul 2020 07:33) (68 - 113)  BP: 124/65 (31 Jul 2020 07:33) (109/61 - 159/77)  BP(mean): 82 (31 Jul 2020 07:33) (76 - 102)  RR: 11 (31 Jul 2020 07:33) (11 - 23)  SpO2: 100% (31 Jul 2020 07:33) (99% - 100%)    PHYSICAL EXAM:    Constitutional: No Acute Distress     Neurological: lethargic, no EO to stim, mouths name, month/year, not oriented to place, b/l pupils 4mm round and reactive, LUE 4/5, RUE 4-/5, BLE AG    Incision: c/d/i  LABS:                        9.6    7.10  )-----------( 231      ( 30 Jul 2020 21:41 )             30.2    07-30    140  |  102  |  27<H>  ----------------------------<  186<H>  3.6   |  25  |  0.34<L>    Ca    9.4      30 Jul 2020 21:41  Phos  3.1     07-30  Mg     2.4     07-30    PT/INR - ( 29 Jul 2020 10:09 )   PT: 13.4 sec;   INR: 1.13 ratio         PTT - ( 29 Jul 2020 10:09 )  PTT:28.9 sec    07-30 @ 07:01  -  07-31 @ 07:00  --------------------------------------------------------  IN: 390 mL / OUT: 1300 mL / NET: -910 mL    Drains: SD  SG    MEDICATIONS:  Anticoagulation:     Antibiotics:    Endo:  dexAMETHasone  Injectable 4 milliGRAM(s) IV Push every 6 hours  dexAMETHasone  Injectable   IV Push   dexAMETHasone  Injectable 4 milliGRAM(s) IV Push every 8 hours  insulin lispro (HumaLOG) corrective regimen sliding scale   SubCutaneous every 6 hours  insulin NPH human recombinant 15 Unit(s) SubCutaneous every 6 hours    Neuro:  acetaminophen    Suspension .. 650 milliGRAM(s) Oral every 6 hours PRN Temp greater or equal to 38C (100.4F), Mild Pain (1 - 3)  amantadine 100 milliGRAM(s) Oral every 12 hours  levETIRAcetam  Solution 750 milliGRAM(s) Oral two times a day    Cardiac:  amLODIPine   Tablet 5 milliGRAM(s) Oral daily  enalapril 10 milliGRAM(s) Oral daily    Pulm:    GI/:  pantoprazole  Injectable 40 milliGRAM(s) IV Push daily  polyethylene glycol 3350 17 Gram(s) Oral two times a day  senna 2 Tablet(s) Oral at bedtime    Other:   chlorhexidine 4% Liquid 1 Application(s) Topical <User Schedule>    DIET: NPO    IMAGING:

## 2020-07-31 NOTE — PROVIDER CONTACT NOTE (CHANGE IN STATUS NOTIFICATION) - ACTION/TREATMENT ORDERED:
TANNER Steinberg assessed pt at bedside, pt has scheduled CT scan in AM, no further interventions ordered, will continue to monitor

## 2020-07-31 NOTE — PROGRESS NOTE ADULT - SUBJECTIVE AND OBJECTIVE BOX
Taken to OR; post-op exam noted to be similar to pre-op exam. Placed on EEG: thus far, negative for seizure Taken to OR; post-op exam noted to be similar to pre-op exam. Placed on EEG: thus far, negative for seizure    Eyes closed, does not open, oriented to self and hospital in Polish, follows commands in all limbs, left pupil larger (4mm) than right (3mm) but both briskly reactive, left side 4/5, right side 4-/5

## 2020-07-31 NOTE — PROVIDER CONTACT NOTE (CHANGE IN STATUS NOTIFICATION) - ASSESSMENT
Left pupil 4RB, right pupil 3RB, vigorous stimulation required for pt to open eyes, more sluggish than 0300 assessment to follow on all extremities

## 2020-07-31 NOTE — BRIEF OPERATIVE NOTE - OPERATION/FINDINGS
sdh
EBL 50, mild subdural collection/clot evacuated, no evidence of membrane formation, dura primarily closed, water-tight closure, bone flap replaced. No complications.

## 2020-07-31 NOTE — PROGRESS NOTE ADULT - SUBJECTIVE AND OBJECTIVE BOX
Patient seen and examined at bedside.    --Anticoagulation--    ICU Vital Signs Last 24 Hrs  T(C): 37.4 (29 Jul 2020 23:00), Max: 37.4 (29 Jul 2020 23:00)  T(F): 99.3 (29 Jul 2020 23:00), Max: 99.3 (29 Jul 2020 23:00)  HR: 86 (30 Jul 2020 00:00) (85 - 108)  BP: 152/76 (29 Jul 2020 23:00) (103/57 - 164/80)  BP(mean): 98 (29 Jul 2020 23:00) (71 - 104)  ABP: --  ABP(mean): --  RR: 20 (29 Jul 2020 23:00) (13 - 22)  SpO2: 100% (30 Jul 2020 00:00) (100% - 100%)    Exam:    EO, aaox1,JENSEN AG, Fc x4

## 2020-07-31 NOTE — PROGRESS NOTE ADULT - SUBJECTIVE AND OBJECTIVE BOX
Pt was noted to be poorly responsive tomorrow, moving only to noxious and increase in left pupillary size, though reactive.  CT scan was unchanged.  Given potential for seizure, persistent mass effect, we felt that reexploration was warranted.  Consent obtained from family and pt taken urgently to operating.

## 2020-08-01 LAB
ANION GAP SERPL CALC-SCNC: 12 MMOL/L — SIGNIFICANT CHANGE UP (ref 5–17)
ANION GAP SERPL CALC-SCNC: 13 MMOL/L — SIGNIFICANT CHANGE UP (ref 5–17)
BUN SERPL-MCNC: 12 MG/DL — SIGNIFICANT CHANGE UP (ref 7–23)
BUN SERPL-MCNC: 24 MG/DL — HIGH (ref 7–23)
CALCIUM SERPL-MCNC: 8.4 MG/DL — SIGNIFICANT CHANGE UP (ref 8.4–10.5)
CALCIUM SERPL-MCNC: 8.5 MG/DL — SIGNIFICANT CHANGE UP (ref 8.4–10.5)
CHLORIDE SERPL-SCNC: 105 MMOL/L — SIGNIFICANT CHANGE UP (ref 96–108)
CHLORIDE SERPL-SCNC: 105 MMOL/L — SIGNIFICANT CHANGE UP (ref 96–108)
CO2 SERPL-SCNC: 21 MMOL/L — LOW (ref 22–31)
CO2 SERPL-SCNC: 22 MMOL/L — SIGNIFICANT CHANGE UP (ref 22–31)
CREAT SERPL-MCNC: 0.32 MG/DL — LOW (ref 0.5–1.3)
CREAT SERPL-MCNC: 0.33 MG/DL — LOW (ref 0.5–1.3)
CULTURE RESULTS: SIGNIFICANT CHANGE UP
CULTURE RESULTS: SIGNIFICANT CHANGE UP
GLUCOSE BLDC GLUCOMTR-MCNC: 112 MG/DL — HIGH (ref 70–99)
GLUCOSE BLDC GLUCOMTR-MCNC: 114 MG/DL — HIGH (ref 70–99)
GLUCOSE BLDC GLUCOMTR-MCNC: 125 MG/DL — HIGH (ref 70–99)
GLUCOSE BLDC GLUCOMTR-MCNC: 174 MG/DL — HIGH (ref 70–99)
GLUCOSE SERPL-MCNC: 121 MG/DL — HIGH (ref 70–99)
GLUCOSE SERPL-MCNC: 122 MG/DL — HIGH (ref 70–99)
HCT VFR BLD CALC: 26.7 % — LOW (ref 34.5–45)
HCV AB S/CO SERPL IA: 0.06 S/CO — SIGNIFICANT CHANGE UP (ref 0–0.99)
HCV AB SERPL-IMP: SIGNIFICANT CHANGE UP
HGB BLD-MCNC: 8.5 G/DL — LOW (ref 11.5–15.5)
MAGNESIUM SERPL-MCNC: 2.1 MG/DL — SIGNIFICANT CHANGE UP (ref 1.6–2.6)
MAGNESIUM SERPL-MCNC: 2.2 MG/DL — SIGNIFICANT CHANGE UP (ref 1.6–2.6)
MCHC RBC-ENTMCNC: 26.6 PG — LOW (ref 27–34)
MCHC RBC-ENTMCNC: 31.8 GM/DL — LOW (ref 32–36)
MCV RBC AUTO: 83.4 FL — SIGNIFICANT CHANGE UP (ref 80–100)
NRBC # BLD: 0 /100 WBCS — SIGNIFICANT CHANGE UP (ref 0–0)
PHOSPHATE SERPL-MCNC: 2.1 MG/DL — LOW (ref 2.5–4.5)
PHOSPHATE SERPL-MCNC: 2.6 MG/DL — SIGNIFICANT CHANGE UP (ref 2.5–4.5)
PLATELET # BLD AUTO: 271 K/UL — SIGNIFICANT CHANGE UP (ref 150–400)
POTASSIUM SERPL-MCNC: 3.6 MMOL/L — SIGNIFICANT CHANGE UP (ref 3.5–5.3)
POTASSIUM SERPL-MCNC: 3.7 MMOL/L — SIGNIFICANT CHANGE UP (ref 3.5–5.3)
POTASSIUM SERPL-SCNC: 3.6 MMOL/L — SIGNIFICANT CHANGE UP (ref 3.5–5.3)
POTASSIUM SERPL-SCNC: 3.7 MMOL/L — SIGNIFICANT CHANGE UP (ref 3.5–5.3)
RBC # BLD: 3.2 M/UL — LOW (ref 3.8–5.2)
RBC # FLD: 15.5 % — HIGH (ref 10.3–14.5)
SODIUM SERPL-SCNC: 139 MMOL/L — SIGNIFICANT CHANGE UP (ref 135–145)
SODIUM SERPL-SCNC: 139 MMOL/L — SIGNIFICANT CHANGE UP (ref 135–145)
SPECIMEN SOURCE: SIGNIFICANT CHANGE UP
SPECIMEN SOURCE: SIGNIFICANT CHANGE UP
WBC # BLD: 12.31 K/UL — HIGH (ref 3.8–10.5)
WBC # FLD AUTO: 12.31 K/UL — HIGH (ref 3.8–10.5)

## 2020-08-01 PROCEDURE — 70450 CT HEAD/BRAIN W/O DYE: CPT | Mod: 26

## 2020-08-01 PROCEDURE — 99292 CRITICAL CARE ADDL 30 MIN: CPT

## 2020-08-01 PROCEDURE — 95720 EEG PHY/QHP EA INCR W/VEEG: CPT

## 2020-08-01 PROCEDURE — 99291 CRITICAL CARE FIRST HOUR: CPT

## 2020-08-01 RX ORDER — OXYCODONE HYDROCHLORIDE 5 MG/1
5 TABLET ORAL EVERY 4 HOURS
Refills: 0 | Status: DISCONTINUED | OUTPATIENT
Start: 2020-08-01 | End: 2020-08-08

## 2020-08-01 RX ORDER — POTASSIUM CHLORIDE 20 MEQ
20 PACKET (EA) ORAL ONCE
Refills: 0 | Status: COMPLETED | OUTPATIENT
Start: 2020-08-01 | End: 2020-08-01

## 2020-08-01 RX ORDER — POTASSIUM CHLORIDE 20 MEQ
40 PACKET (EA) ORAL ONCE
Refills: 0 | Status: COMPLETED | OUTPATIENT
Start: 2020-08-01 | End: 2020-08-01

## 2020-08-01 RX ORDER — POTASSIUM PHOSPHATE, MONOBASIC POTASSIUM PHOSPHATE, DIBASIC 236; 224 MG/ML; MG/ML
15 INJECTION, SOLUTION INTRAVENOUS ONCE
Refills: 0 | Status: COMPLETED | OUTPATIENT
Start: 2020-08-01 | End: 2020-08-01

## 2020-08-01 RX ORDER — ONDANSETRON 8 MG/1
4 TABLET, FILM COATED ORAL ONCE
Refills: 0 | Status: DISCONTINUED | OUTPATIENT
Start: 2020-08-01 | End: 2020-08-01

## 2020-08-01 RX ADMIN — SENNA PLUS 2 TABLET(S): 8.6 TABLET ORAL at 21:01

## 2020-08-01 RX ADMIN — POLYETHYLENE GLYCOL 3350 17 GRAM(S): 17 POWDER, FOR SOLUTION ORAL at 17:46

## 2020-08-01 RX ADMIN — AMLODIPINE BESYLATE 5 MILLIGRAM(S): 2.5 TABLET ORAL at 05:06

## 2020-08-01 RX ADMIN — POLYETHYLENE GLYCOL 3350 17 GRAM(S): 17 POWDER, FOR SOLUTION ORAL at 05:07

## 2020-08-01 RX ADMIN — OXYCODONE HYDROCHLORIDE 5 MILLIGRAM(S): 5 TABLET ORAL at 07:00

## 2020-08-01 RX ADMIN — Medication 1 DROP(S): at 21:01

## 2020-08-01 RX ADMIN — OXYCODONE HYDROCHLORIDE 5 MILLIGRAM(S): 5 TABLET ORAL at 21:02

## 2020-08-01 RX ADMIN — Medication 40 MILLIEQUIVALENT(S): at 05:05

## 2020-08-01 RX ADMIN — CHLORHEXIDINE GLUCONATE 1 APPLICATION(S): 213 SOLUTION TOPICAL at 21:00

## 2020-08-01 RX ADMIN — OXYCODONE HYDROCHLORIDE 5 MILLIGRAM(S): 5 TABLET ORAL at 06:22

## 2020-08-01 RX ADMIN — Medication 20 MILLIEQUIVALENT(S): at 23:45

## 2020-08-01 RX ADMIN — Medication 650 MILLIGRAM(S): at 00:44

## 2020-08-01 RX ADMIN — POTASSIUM PHOSPHATE, MONOBASIC POTASSIUM PHOSPHATE, DIBASIC 62.5 MILLIMOLE(S): 236; 224 INJECTION, SOLUTION INTRAVENOUS at 23:45

## 2020-08-01 RX ADMIN — Medication 2: at 18:45

## 2020-08-01 RX ADMIN — Medication 10 MILLIGRAM(S): at 01:23

## 2020-08-01 RX ADMIN — Medication 100 MILLIGRAM(S): at 17:46

## 2020-08-01 RX ADMIN — Medication 650 MILLIGRAM(S): at 15:50

## 2020-08-01 RX ADMIN — LEVETIRACETAM 750 MILLIGRAM(S): 250 TABLET, FILM COATED ORAL at 05:04

## 2020-08-01 RX ADMIN — Medication 100 MILLIGRAM(S): at 05:07

## 2020-08-01 RX ADMIN — Medication 1 DROP(S): at 17:47

## 2020-08-01 RX ADMIN — Medication 650 MILLIGRAM(S): at 01:44

## 2020-08-01 RX ADMIN — Medication 650 MILLIGRAM(S): at 15:20

## 2020-08-01 RX ADMIN — DEXTROSE MONOHYDRATE, SODIUM CHLORIDE, AND POTASSIUM CHLORIDE 75 MILLILITER(S): 50; .745; 4.5 INJECTION, SOLUTION INTRAVENOUS at 05:05

## 2020-08-01 RX ADMIN — Medication 10 MILLIGRAM(S): at 05:05

## 2020-08-01 RX ADMIN — LEVETIRACETAM 750 MILLIGRAM(S): 250 TABLET, FILM COATED ORAL at 17:46

## 2020-08-01 RX ADMIN — HUMAN INSULIN 15 UNIT(S): 100 INJECTION, SUSPENSION SUBCUTANEOUS at 18:45

## 2020-08-01 RX ADMIN — OXYCODONE HYDROCHLORIDE 5 MILLIGRAM(S): 5 TABLET ORAL at 22:02

## 2020-08-01 RX ADMIN — DEXTROSE MONOHYDRATE, SODIUM CHLORIDE, AND POTASSIUM CHLORIDE 75 MILLILITER(S): 50; .745; 4.5 INJECTION, SOLUTION INTRAVENOUS at 19:30

## 2020-08-01 NOTE — PROGRESS NOTE ADULT - ATTENDING COMMENTS
Pt seen and examined late this morning, arousable, follows simple commands in English, all 4s.  EEG neg so far.  ALMA with 35 cc subdural, HV >100 cc serosang.  CT today with drain in expanded subdural space.  There is left epidural fluid.  Shift improved at 6 mm.   Glucose under better control.  Continue close monitoring.

## 2020-08-01 NOTE — SWALLOW BEDSIDE ASSESSMENT ADULT - ASR SWALLOW ASPIRATION MONITOR
throat clearing/upper respiratory infection/cough/pneumonia/gurgly voice/change of breathing pattern/fever

## 2020-08-01 NOTE — SWALLOW BEDSIDE ASSESSMENT ADULT - SLP PERTINENT HISTORY OF CURRENT PROBLEM
64 yo female with pmh DM, HTN, on asa81 brought in by family fro concern of HA, dizziness, multiple episodes of emesis s/p mechanical fall 2 hours PTA (trip over baby carriage). Pt walked into triage, however pt became lethargic and unarousable, brought to CC room and intubated for airway protection and level 1 trauma activated. 7/25: CTH: Large left-sided acute holohemispheric subdural hematoma measuring up to 2.1 cm in greatest depth with rightward subfalcine and transtentorial herniation. 7/26: s/p craniotomy and evacuation. 7/30: CT today shows increased size of subdural collection. s/p extubation. 7/31: Overnight stopped following commands, ct head done which showed stable left subdural collection with mid line shift and brain compression, taken back to the OR for left craniotomy for reexploration of SDH.
64 yo female with pmh DM, HTN, on asa81 brought in by family fro concern of HA, dizziness, multiple episodes of emesis s/p mechanical fall 2 hours PTA (trip over baby carriage). Pt walked into triage, however pt became lethargic and unarousable, brought to CC room and intubated for airway protection and level 1 trauma activated. 7/25: CTH: Large left-sided acute holohemispheric subdural hematoma measuring up to 2.1 cm in greatest depth with rightward subfalcine and transtentorial herniation. 7/26: s/p craniotomy and evacuation. 7/30: CT today shows increased size of subdural collection. s/p extubation. 7/31: Overnight stopped following commands, ct head done which showed stable left subdural collection with mid line shift and brain compression, taken back to the OR for left craniotomy for reexploration of SDH.

## 2020-08-01 NOTE — PROGRESS NOTE ADULT - ASSESSMENT
Left subdural hematoma status post evacuation and RTOR  - Monitor drain output  - Glycemic control with NPH  - -160mmHg on amlodipine and enalapril  - Possible seizure: on EEG, continue levetiracetam

## 2020-08-01 NOTE — SWALLOW BEDSIDE ASSESSMENT ADULT - COMMENTS
Per NSICU note 8/1, pt is now s/p Re-explor Crani for Evacuation of residual SDH.    CXR 7/30 showed "clear lungs."

## 2020-08-01 NOTE — PROGRESS NOTE ADULT - SUBJECTIVE AND OBJECTIVE BOX
CT head with decreased shift. CT head with decreased shift.     Waves to us from door, follows commands, hypophonic and sparse verbalization, moves all limbs spontaneously, right side slightly weaker than left

## 2020-08-01 NOTE — SWALLOW BEDSIDE ASSESSMENT ADULT - SPECIFY REASON(S)
To subjectively assess swallow function; r/o dysphagia
to subjectively assess swallow safety/function; r/o dysphagia.
Ftsg Text: The defect edges were debeveled with a #15 scalpel blade.  Given the location of the defect, shape of the defect and the proximity to free margins a full thickness skin graft was deemed most appropriate.  Using a sterile surgical marker, the primary defect shape was transferred to the donor site. The area thus outlined was incised deep to adipose tissue with a #15 scalpel blade.  The harvested graft was then trimmed of adipose tissue until only dermis and epidermis was left.  The skin margins of the secondary defect were undermined to an appropriate distance in all directions utilizing iris scissors.  The secondary defect was closed with interrupted buried subcutaneous sutures.  The skin edges were then re-apposed with running  sutures.  The skin graft was then placed in the primary defect and oriented appropriately.

## 2020-08-01 NOTE — PROGRESS NOTE ADULT - SUBJECTIVE AND OBJECTIVE BOX
65 year old female with PMHx of DM, HTN, on  ASAS 81 brought in by family for concern of HA, dizziness, multiple episodes of emesis s/p mechanical fall (trip over baby carriage). Pt walked into triage, however pt became lethargic and unarousable, brought back and intubated for airway protection and level 1 trauma activated.   7/26 S/P Lt Crani for SDH Evacuation  7/31 RTOR for Evacuation of residual heme    OVERNIGHT EVENTS: went to OR yesterday for Evacuation of residual SDH    VITALS SIGNS:   T(C): 36.5 (01 Aug 2020 03:00), Max: 37 (31 Jul 2020 10:38)  T(F): 97.7 (01 Aug 2020 03:00), Max: 98.6 (31 Jul 2020 10:38)  HR: 74 (01 Aug 2020 06:00) (58 - 78)  BP: 124/65 (31 Jul 2020 07:33) (124/65 - 124/65)  BP(mean): 82 (31 Jul 2020 07:33) (82 - 82)  RR: 15 (01 Aug 2020 06:00) (10 - 15)  SpO2: 100% (01 Aug 2020 06:00) (100% - 100%)    PHYSICAL EXAM:    General: No Acute Distress     Neurological:     Pulmonary: Clear to Auscultation, No Rales, No Rhonchi, No Wheezes     Cardiovascular: S1, S2, Regular Rate and Rhythm     Gastrointestinal: Soft, Nontender, Nondistended     Incision:     LABS:                        8.5    12.31 )-----------( 271      ( 01 Aug 2020 01:00 )             26.7    08-01    139  |  105  |  24<H>  ----------------------------<  121<H>  3.6   |  21<L>  |  0.33<L>    Ca    8.5      01 Aug 2020 01:00  Phos  2.6     08-01  Mg     2.2     08-01      COVID-19 PCR: NotDetec (30 Jul 2020 20:06)      Culture - Blood (07.28.20 @ 15:07)    Specimen Source: .Blood Blood-Peripheral    Culture Results:   No growth to date.    Culture - Blood (07.28.20 @ 15:07)    Specimen Source: .Blood Blood-Peripheral    Culture Results:   No growth to date.    Culture - Bronchial (07.28.20 @ 15:05)    Gram Stain:   No polymorphonuclear cells seen per low power field  No squamous epithelial cells per low power field  No organisms seen per oil power field    Specimen Source: Bronch Wash Combicath    Culture Results:   No growth    CAPILLARY BLOOD GLUCOSE      POCT Blood Glucose.: 112 mg/dL (01 Aug 2020 05:21)  POCT Blood Glucose.: 107 mg/dL (31 Jul 2020 23:13)  POCT Blood Glucose.: 140 mg/dL (31 Jul 2020 17:30)  POCT Blood Glucose.: 155 mg/dL (31 Jul 2020 13:07)  POCT Blood Glucose.: 159 mg/dL (31 Jul 2020 11:40)      I&O:   I&O's Detail    31 Jul 2020 07:01  -  01 Aug 2020 07:00  --------------------------------------------------------  IN:    Enteral Tube Flush: 290 mL    IV PiggyBack: 100 mL    sodium chloride 0.9% with potassium chloride 20 mEq/L: 1425 mL  Total IN: 1815 mL    OUT:    Drain: 45 mL    Drain: 33 mL    Indwelling Catheter - Urethral: 525 mL    Intermittent Catheterization - Urethral: 900 mL  Total OUT: 1503 mL    Total NET: 312 mL      DRAINS: HMV (45), ALMA (33)    MEDICATIONS:  Antibiotics:    Neuro:  acetaminophen    Suspension .. 650 milliGRAM(s) Oral every 6 hours PRN Temp greater or equal to 38C (100.4F), Mild Pain (1 - 3)  amantadine 100 milliGRAM(s) Oral every 12 hours  levETIRAcetam  Solution 750 milliGRAM(s) Oral two times a day  oxyCODONE    IR 5 milliGRAM(s) Oral every 4 hours PRN Moderate Pain (4 - 6)    Cardiac:  amLODIPine   Tablet 5 milliGRAM(s) Oral daily  enalapril 10 milliGRAM(s) Oral daily  labetalol Injectable 10 milliGRAM(s) IV Push every 6 hours PRN Systolic blood pressure > 160    Pulm:    GI/:  bisacodyl 5 milliGRAM(s) Oral every 12 hours PRN Constipation  polyethylene glycol 3350 17 Gram(s) Oral two times a day  senna 2 Tablet(s) Oral at bedtime    Other:   chlorhexidine 4% Liquid 1 Application(s) Topical <User Schedule>  insulin lispro (HumaLOG) corrective regimen sliding scale   SubCutaneous every 6 hours  insulin NPH human recombinant 15 Unit(s) SubCutaneous every 6 hours  sodium chloride 0.9% with potassium chloride 20 mEq/L 1000 milliLiter(s) IV Continuous <Continuous>    DIET: [] Regular [] CCD [] Renal [] Puree [] Dysphagia [] Tube Feeds: NPO    IMAGING:   CT Head No Cont (07.31.20 @ 04:48)   Slight decrease mass effect on the left lateral ventricle with fluid hemorrhage and air appreciated status post left craniotomy        CT Head No Cont (07.30.20 @ 09:09)   Slightly increased size of the left frontal parietal subdural collection compared with 7/29/2020 with no change in mass effect and midline shift to the right.        CT Head No Cont (07.29.20 @ 08:14)   Increased mixed density extra-axial fluid and hemorrhage along the left convexity. Increased left-to-right midline shift now 1 cm. Small parafalcine and left tentorial subdural hemorrhage, unchanged.      CT Head No Cont (07.28.20 @ 08:48)   Decreased extra-axial air compared with the prior 7/26/2020. Status post left subdural evacuation with left-sided craniotomy. Postop changes with some residual air and hemorrhage remain. Stable midline shift roughly 7 mm to the right        CT Head No Cont (07.26.20 @ 01:25)   Status post evacuation of large left-sided holohemispheric subdural hematoma with expected postoperative changes.    Intervally decreased midline shift currently 0.7 cm previously 1.9 cm.    Additionally the previously visualized effacement of basal cisterns, and uncal herniation have improved.    Mildly increased interhemispheric subdural blood which may represent blood redistribution.      CT Head No Cont (07.25.20 @ 20:59)   Large left-sided acute holohemispheric subdural hematoma measuring up to 2.1 cm in greatest depth with rightwardsubfalcine and transtentorial herniation. 65 year old female with PMHx of DM, HTN, on  ASAS 81 brought in by family for concern of HA, dizziness, multiple episodes of emesis s/p mechanical fall (trip over baby carriage). Pt walked into triage, however pt became lethargic and unarousable, brought back and intubated for airway protection and level 1 trauma activated.   7/26 S/P Lt Crani for SDH Evacuation  7/31 RTOR for Evacuation of residual heme    OVERNIGHT EVENTS: went to OR yesterday for Evacuation of residual SDH    VITALS SIGNS:   T(C): 36.5 (01 Aug 2020 03:00), Max: 37 (31 Jul 2020 10:38)  T(F): 97.7 (01 Aug 2020 03:00), Max: 98.6 (31 Jul 2020 10:38)  HR: 74 (01 Aug 2020 06:00) (58 - 78)  BP: 124/65 (31 Jul 2020 07:33) (124/65 - 124/65)  BP(mean): 82 (31 Jul 2020 07:33) (82 - 82)  RR: 15 (01 Aug 2020 06:00) (10 - 15)  SpO2: 100% (01 Aug 2020 06:00) (100% - 100%)    PHYSICAL EXAM:    General: No Acute Distress     Neurological: examined in Arabic at bedside, EO to voice, oriented x3, LUE 5/5, RUE 4/5, LLE 5/5, RLE 4+/5    Pulmonary: Clear to Auscultation, No Rales, No Rhonchi, No Wheezes     Cardiovascular: S1, S2, Regular Rate and Rhythm     Gastrointestinal: Soft, Nontender, Nondistended     Incision:     LABS:                        8.5    12.31 )-----------( 271      ( 01 Aug 2020 01:00 )             26.7    08-01    139  |  105  |  24<H>  ----------------------------<  121<H>  3.6   |  21<L>  |  0.33<L>    Ca    8.5      01 Aug 2020 01:00  Phos  2.6     08-01  Mg     2.2     08-01      COVID-19 PCR: NotDetec (30 Jul 2020 20:06)      Culture - Blood (07.28.20 @ 15:07)    Specimen Source: .Blood Blood-Peripheral    Culture Results:   No growth to date.    Culture - Blood (07.28.20 @ 15:07)    Specimen Source: .Blood Blood-Peripheral    Culture Results:   No growth to date.    Culture - Bronchial (07.28.20 @ 15:05)    Gram Stain:   No polymorphonuclear cells seen per low power field  No squamous epithelial cells per low power field  No organisms seen per oil power field    Specimen Source: Bronch Wash Combicath    Culture Results:   No growth    CAPILLARY BLOOD GLUCOSE      POCT Blood Glucose.: 112 mg/dL (01 Aug 2020 05:21)  POCT Blood Glucose.: 107 mg/dL (31 Jul 2020 23:13)  POCT Blood Glucose.: 140 mg/dL (31 Jul 2020 17:30)  POCT Blood Glucose.: 155 mg/dL (31 Jul 2020 13:07)  POCT Blood Glucose.: 159 mg/dL (31 Jul 2020 11:40)      I&O:   I&O's Detail    31 Jul 2020 07:01  -  01 Aug 2020 07:00  --------------------------------------------------------  IN:    Enteral Tube Flush: 290 mL    IV PiggyBack: 100 mL    sodium chloride 0.9% with potassium chloride 20 mEq/L: 1425 mL  Total IN: 1815 mL    OUT:    Drain: 45 mL    Drain: 33 mL    Indwelling Catheter - Urethral: 525 mL    Intermittent Catheterization - Urethral: 900 mL  Total OUT: 1503 mL    Total NET: 312 mL      DRAINS: HMV (45), ALMA (33)    MEDICATIONS:  Antibiotics:    Neuro:  acetaminophen    Suspension .. 650 milliGRAM(s) Oral every 6 hours PRN Temp greater or equal to 38C (100.4F), Mild Pain (1 - 3)  amantadine 100 milliGRAM(s) Oral every 12 hours  levETIRAcetam  Solution 750 milliGRAM(s) Oral two times a day  oxyCODONE    IR 5 milliGRAM(s) Oral every 4 hours PRN Moderate Pain (4 - 6)    Cardiac:  amLODIPine   Tablet 5 milliGRAM(s) Oral daily  enalapril 10 milliGRAM(s) Oral daily  labetalol Injectable 10 milliGRAM(s) IV Push every 6 hours PRN Systolic blood pressure > 160    Pulm:    GI/:  bisacodyl 5 milliGRAM(s) Oral every 12 hours PRN Constipation  polyethylene glycol 3350 17 Gram(s) Oral two times a day  senna 2 Tablet(s) Oral at bedtime    Other:   chlorhexidine 4% Liquid 1 Application(s) Topical <User Schedule>  insulin lispro (HumaLOG) corrective regimen sliding scale   SubCutaneous every 6 hours  insulin NPH human recombinant 15 Unit(s) SubCutaneous every 6 hours  sodium chloride 0.9% with potassium chloride 20 mEq/L 1000 milliLiter(s) IV Continuous <Continuous>    DIET: [] Regular [] CCD [] Renal [] Puree [] Dysphagia [] Tube Feeds: NPO    IMAGING:   CT Head No Cont (07.31.20 @ 04:48)   Slight decrease mass effect on the left lateral ventricle with fluid hemorrhage and air appreciated status post left craniotomy        CT Head No Cont (07.30.20 @ 09:09)   Slightly increased size of the left frontal parietal subdural collection compared with 7/29/2020 with no change in mass effect and midline shift to the right.        CT Head No Cont (07.29.20 @ 08:14)   Increased mixed density extra-axial fluid and hemorrhage along the left convexity. Increased left-to-right midline shift now 1 cm. Small parafalcine and left tentorial subdural hemorrhage, unchanged.      CT Head No Cont (07.28.20 @ 08:48)   Decreased extra-axial air compared with the prior 7/26/2020. Status post left subdural evacuation with left-sided craniotomy. Postop changes with some residual air and hemorrhage remain. Stable midline shift roughly 7 mm to the right        CT Head No Cont (07.26.20 @ 01:25)   Status post evacuation of large left-sided holohemispheric subdural hematoma with expected postoperative changes.    Intervally decreased midline shift currently 0.7 cm previously 1.9 cm.    Additionally the previously visualized effacement of basal cisterns, and uncal herniation have improved.    Mildly increased interhemispheric subdural blood which may represent blood redistribution.      CT Head No Cont (07.25.20 @ 20:59)   Large left-sided acute holohemispheric subdural hematoma measuring up to 2.1 cm in greatest depth with rightwardsubfalcine and transtentorial herniation.

## 2020-08-01 NOTE — SWALLOW BEDSIDE ASSESSMENT ADULT - SWALLOW EVAL: DIAGNOSIS
Pt encountered asleep in bed, minimally arousable upon verbal greeting at increased loudness, benefiting from noxious stimuli via sternal rub and repeated verbal cues to "open eyes" along with tactile facial stim. Pt is primarily Greek speaking and benefited from [employer] via video conference (ID#[id number]). RR 17-20, spO2 %, on 2L nasal cannula. Pt is hypophonic suspected related to current weak and deconditioned state. Pt with intermittent attempts to follow commands and waxing/waning level of alertness. Given current lethargic state, PO trials contraindicated at this time. Discussed with team, will re-attempt as schedule permits. CHACHA Yarbrough in agreement with proposed plan.

## 2020-08-01 NOTE — PROGRESS NOTE ADULT - ASSESSMENT
ASSESSMENT:  65 year old female with PMHx of DM, HTN, on  ASAS 81 brought in by family for concern of HA, dizziness, multiple episodes of emesis s/p mechanical fall (trip over baby carriage). Pt walked into triage, however pt became lethargic and unarousable, brought back and intubated for airway protection and level 1 trauma activated.   7/26 S/P Lt Crani for SDH Evacuation  7/30 Extubated   7/31 RTOR for Evacuation of residual heme    PROCEDURE: 7/26 S/P Lt Crani for SDH Evacuation                       7/31 S/P Re-explor Crani for Evacuation of resiudal SDH    POD# 6/1    PLAN:  NEURO:  Q1 neuro checks   Keppra for seizure ppx increased 7/21 for possible seizures   Amantadine 100 mg po bid  EEG in progress  CT brain today  Oxys and Tylenol prn pain  Activity: [] OOB as tolerated [] Bedrest [x] PT [x] OT [] PMNR    PULM:  Extubated 7/30   Reextubated in OR 7/31    CV:  -160mmHg  HTN: Norvasc, enalapril    RENAL:  IVF: NS+K @ 75    GI:  Diet: restart glucerna after OR   GI prophylaxis [X] not indicated [] PPI [] other:  Bowel regimen [] colace [x] senna [x] other: miralax    ENDO:   Goal euglycemia (-180)  HBA1c 7.2   NPH 15Q6, HISS, monitor FS      HEME/ONC:  Hold VTE ppx as per NS team  VTE prophylaxis: [x] SCDs [] chemoprophylaxis [x] hold chemoprophylaxis due to: fresh post op [] high risk of DVT/PE on admission due to:    ID:  Afebrile    SOCIAL/FAMILY:  [x] awaiting [] updated at bedside [] family meeting    CODE STATUS:  [x] Full Code [] DNR [] DNI [] Palliative/Comfort Care    DISPOSITION:  [x] ICU [] Stroke Unit [] Floor [] EMU [] RCU [] PCU    [x] Patient is at high risk of neurologic deterioration/death due to: post op hemorrhage, brain compression ASSESSMENT:  65 year old female with PMHx of DM, HTN, on  ASAS 81 brought in by family for concern of HA, dizziness, multiple episodes of emesis s/p mechanical fall (trip over baby carriage). Pt walked into triage, however pt became lethargic and unarousable, brought back and intubated for airway protection and level 1 trauma activated.   7/26 S/P Lt Crani for SDH Evacuation  7/30 Extubated   7/31 RTOR for Evacuation of residual heme    PROCEDURE: 7/26 S/P Lt Crani for SDH Evacuation                       7/31 S/P Re-explor Crani for Evacuation of resiudal SDH    POD# 6/1    PLAN:  NEURO:  Q1 neuro checks   Keppra for seizure ppx increased 7/21 for possible seizures   Amantadine 100 mg po bid  EEG in progress  CT brain today  Oxys and Tylenol prn pain  SD drain monitor output  Activity: [x] OOB as tolerated [] Bedrest [x] PT [x] OT [] PMNR    PULM:  Extubated 7/30   Reextubated in OR 7/31    CV:  -160mmHg  HTN: Norvasc, enalapril    RENAL:  IVF: NS+K @ 75    GI:  Diet: restart glucerna after OR   GI prophylaxis [X] not indicated [] PPI [] other:  Bowel regimen [] colace [x] senna [x] other: miralax    ENDO:   Goal euglycemia (-180)  HBA1c 7.2   NPH 15Q6, HISS, monitor FS      HEME/ONC:  Hold VTE ppx as per NS team  VTE prophylaxis: [x] SCDs [] chemoprophylaxis [x] hold chemoprophylaxis due to: fresh post op [] high risk of DVT/PE on admission due to:    ID:  Afebrile    SOCIAL/FAMILY:  [x] awaiting [] updated at bedside [] family meeting    CODE STATUS:  [x] Full Code [] DNR [] DNI [] Palliative/Comfort Care    DISPOSITION:  [x] ICU [] Stroke Unit [] Floor [] EMU [] RCU [] PCU    [x] Patient is at high risk of neurologic deterioration/death due to: post op hemorrhage, brain compression ASSESSMENT:  65 year old female with PMHx of DM, HTN, on  ASAS 81 brought in by family for concern of HA, dizziness, multiple episodes of emesis s/p mechanical fall (trip over baby carriage). Pt walked into triage, however pt became lethargic and unarousable, brought back and intubated for airway protection and level 1 trauma activated.   7/26 S/P Lt Crani for SDH Evacuation  7/30 Extubated   7/31 RTOR for Evacuation of residual heme    PROCEDURE: 7/26 S/P Lt Crani for SDH Evacuation                       7/31 S/P Re-explor Crani for Evacuation of resiudal SDH    POD# 6/1    PLAN:  NEURO:  Q1 neuro checks   Keppra for seizure ppx increased 7/21 for possible seizures   Amantadine 100 mg po bid  EEG in progress  CT brain today  Oxys and Tylenol prn pain  SD drain monitor output  Activity: [x] OOB as tolerated [] Bedrest [x] PT [x] OT [] PMNR    PULM:  Extubated 7/30   Reextubated in OR 7/31    CV:  -160mmHg  HTN: Norvasc, enalapril    RENAL:  IVF: NS+K @ 75    GI:  Diet: restart glucerna after OR   swallow eval  GI prophylaxis [X] not indicated [] PPI [] other:  Bowel regimen [] colace [x] senna [x] other: miralax    ENDO:   Goal euglycemia (-180)  HBA1c 7.2   NPH 15Q6, HISS, monitor FS      HEME/ONC:  Hold VTE ppx as per NS team  VTE prophylaxis: [x] SCDs [] chemoprophylaxis [x] hold chemoprophylaxis due to: fresh post op [] high risk of DVT/PE on admission due to:    ID:  Afebrile    SOCIAL/FAMILY:  [x] awaiting [] updated at bedside [] family meeting    CODE STATUS:  [x] Full Code [] DNR [] DNI [] Palliative/Comfort Care    DISPOSITION:  [x] ICU [] Stroke Unit [] Floor [] EMU [] RCU [] PCU    [x] Patient is at high risk of neurologic deterioration/death due to: post op hemorrhage, brain compression

## 2020-08-01 NOTE — CHART NOTE - NSCHARTNOTEFT_GEN_A_CORE
PRELIMINARY EEG REPORT    EEG reviewed to 22:10  Date: 08-01-20    - No epileptiform pattern or seizure seen.    Oscar Hendrix MD PGY-5  Epilepsy Fellow     This prelim read is based on fellow review. Final read pending attending review

## 2020-08-01 NOTE — PROGRESS NOTE ADULT - SUBJECTIVE AND OBJECTIVE BOX
Patient seen and examined at bedside.    Exam:    EO spon, aaox1, more talkative, nods appropriately, JENSEN AG, Fc x4

## 2020-08-01 NOTE — EEG REPORT - NS EEG TEXT BOX
_____________________________________________________________  HISTORY    Patient is a 65y old  Female who presents with a chief complaint of     PERTINENT MEDICATION:  levETIRAcetam  Solution 750 milliGRAM(s) Oral two times a day    _____________________________________________________________  STUDY INTERPRETATION    Findings: The background was continuous, spontaneously variable and reactive.  8 Hz activity, with amplitude to 30 uV, that attenuated to eye opening.  Low amplitude frontal beta was noted in wakefulness.    Background Slowing:  Background predominantly consisted of theta, and alpha with period of superimposed polymorphic delta    Focal Slowing:   Intermittent focal slowing noted over the left fronto central temporal area    Sleep Background:  Drowsiness and stage II sleep transients were not recorded.    Other Non-Epileptiform Findings:  Previously seen Breach effect not well visualized in this study    Interictal Epileptiform Activity:   None were present.    Events:  Clinical events: None recorded.  Seizures: None recorded.    Activation Procedures:   Hyperventilation was not performed.    Photic stimulation was not performed.     Artifacts:  Intermittent myogenic and movement artifacts were noted.    ECG:  The heart rate on single channel ECG was predominantly between 80-90 BPM.    _____________________________________________________________  EEG SUMMARY/CLASSIFICATION    Abnormal EEG in an altered / a sedated patient.  - Intermittent focal slowing noted over the left fronto central temporal area  - Mild to moderate generalized slowing.    _____________________________________________________________  EEG IMPRESSION/CLINICAL CORRELATE    Abnormal EEG study.  1. Left hemispheric cortical functional or structural abnormality  2. nonspecific diffuse or multifocal cerebral dysfunction.    3 No epileptiform pattern or seizure seen.    Oscar Hendrix MD PGY-5  Epilepsy Fellow  	  This prelim read is based on fellow review. Final read pending attending review _____________________________________________________________  HISTORY    Patient is a 65y old  Female who presents with a chief complaint of encephalopathy    PERTINENT MEDICATION:  levETIRAcetam  Solution 750 milliGRAM(s) Oral two times a day    _____________________________________________________________  STUDY INTERPRETATION    Findings: The background was continuous, spontaneously variable and reactive.  8 Hz activity, with amplitude to 30 uV, that attenuated to eye opening.  Low amplitude frontal beta was noted in wakefulness.    Background Slowing:  Background predominantly consisted of theta, and alpha with period of superimposed polymorphic delta    Focal Slowing:   Intermittent focal slowing noted over the left fronto central temporal area    Sleep Background:  Drowsiness and stage II sleep transients were not recorded.    Other Non-Epileptiform Findings:  Previously seen Breach effect not well visualized in this study    Interictal Epileptiform Activity:   None were present.    Events:  Clinical events: None recorded.  Seizures: None recorded.    Activation Procedures:   Hyperventilation was not performed.    Photic stimulation was not performed.     Artifacts:  Intermittent myogenic and movement artifacts were noted.    ECG:  The heart rate on single channel ECG was predominantly between 80-90 BPM.    _____________________________________________________________  EEG SUMMARY/CLASSIFICATION    Abnormal EEG in an altered / a sedated patient.  - Intermittent focal slowing noted over the left fronto central temporal area  - Mild generalized slowing.    _____________________________________________________________  EEG IMPRESSION/CLINICAL CORRELATE    Abnormal EEG study.  1. Left hemispheric cortical functional abnormality  2. nonspecific diffuse or multifocal cerebral dysfunction.    3 No epileptiform pattern or seizure seen.    Oscar Hendrix MD PGY-5  Epilepsy Fellow  	  Arthur Franklin MD   Epilepsy Attending.

## 2020-08-01 NOTE — SWALLOW BEDSIDE ASSESSMENT ADULT - SWALLOW EVAL: RECOMMENDED DIET
Continue NPO w/ TF NPO, with non-oral nutrition/hydration/medications. Reduced level of alertness does not warrant safe and adequate PO intake at this time.

## 2020-08-01 NOTE — PROGRESS NOTE ADULT - ASSESSMENT
s/p L craniotomy for acute SDH evacuation    -extubated  -post op from re exploration of crani/ evac of collection. Doing better post op.

## 2020-08-02 LAB
ANION GAP SERPL CALC-SCNC: 15 MMOL/L — SIGNIFICANT CHANGE UP (ref 5–17)
APTT BLD: 25.4 SEC — LOW (ref 27.5–35.5)
BUN SERPL-MCNC: 13 MG/DL — SIGNIFICANT CHANGE UP (ref 7–23)
CALCIUM SERPL-MCNC: 9.3 MG/DL — SIGNIFICANT CHANGE UP (ref 8.4–10.5)
CHLORIDE SERPL-SCNC: 101 MMOL/L — SIGNIFICANT CHANGE UP (ref 96–108)
CO2 SERPL-SCNC: 24 MMOL/L — SIGNIFICANT CHANGE UP (ref 22–31)
CREAT SERPL-MCNC: 0.35 MG/DL — LOW (ref 0.5–1.3)
CULTURE RESULTS: SIGNIFICANT CHANGE UP
CULTURE RESULTS: SIGNIFICANT CHANGE UP
GLUCOSE BLDC GLUCOMTR-MCNC: 107 MG/DL — HIGH (ref 70–99)
GLUCOSE BLDC GLUCOMTR-MCNC: 130 MG/DL — HIGH (ref 70–99)
GLUCOSE BLDC GLUCOMTR-MCNC: 153 MG/DL — HIGH (ref 70–99)
GLUCOSE BLDC GLUCOMTR-MCNC: 173 MG/DL — HIGH (ref 70–99)
GLUCOSE SERPL-MCNC: 107 MG/DL — HIGH (ref 70–99)
HCT VFR BLD CALC: 30.5 % — LOW (ref 34.5–45)
HGB BLD-MCNC: 9.7 G/DL — LOW (ref 11.5–15.5)
INR BLD: 1.1 RATIO — SIGNIFICANT CHANGE UP (ref 0.88–1.16)
MAGNESIUM SERPL-MCNC: 2.4 MG/DL — SIGNIFICANT CHANGE UP (ref 1.6–2.6)
MCHC RBC-ENTMCNC: 26.7 PG — LOW (ref 27–34)
MCHC RBC-ENTMCNC: 31.8 GM/DL — LOW (ref 32–36)
MCV RBC AUTO: 84 FL — SIGNIFICANT CHANGE UP (ref 80–100)
NRBC # BLD: 0 /100 WBCS — SIGNIFICANT CHANGE UP (ref 0–0)
PHOSPHATE SERPL-MCNC: 3.4 MG/DL — SIGNIFICANT CHANGE UP (ref 2.5–4.5)
PLATELET # BLD AUTO: 342 K/UL — SIGNIFICANT CHANGE UP (ref 150–400)
POTASSIUM SERPL-MCNC: 3.6 MMOL/L — SIGNIFICANT CHANGE UP (ref 3.5–5.3)
POTASSIUM SERPL-SCNC: 3.6 MMOL/L — SIGNIFICANT CHANGE UP (ref 3.5–5.3)
PROTHROM AB SERPL-ACNC: 13 SEC — SIGNIFICANT CHANGE UP (ref 10.6–13.6)
RBC # BLD: 3.63 M/UL — LOW (ref 3.8–5.2)
RBC # FLD: 16.1 % — HIGH (ref 10.3–14.5)
SODIUM SERPL-SCNC: 140 MMOL/L — SIGNIFICANT CHANGE UP (ref 135–145)
SPECIMEN SOURCE: SIGNIFICANT CHANGE UP
SPECIMEN SOURCE: SIGNIFICANT CHANGE UP
WBC # BLD: 11.18 K/UL — HIGH (ref 3.8–10.5)
WBC # FLD AUTO: 11.18 K/UL — HIGH (ref 3.8–10.5)

## 2020-08-02 PROCEDURE — 70450 CT HEAD/BRAIN W/O DYE: CPT | Mod: 26

## 2020-08-02 PROCEDURE — 99291 CRITICAL CARE FIRST HOUR: CPT

## 2020-08-02 PROCEDURE — 71045 X-RAY EXAM CHEST 1 VIEW: CPT | Mod: 26

## 2020-08-02 PROCEDURE — 95720 EEG PHY/QHP EA INCR W/VEEG: CPT

## 2020-08-02 PROCEDURE — 99292 CRITICAL CARE ADDL 30 MIN: CPT

## 2020-08-02 RX ORDER — DOXAZOSIN MESYLATE 4 MG
2 TABLET ORAL AT BEDTIME
Refills: 0 | Status: DISCONTINUED | OUTPATIENT
Start: 2020-08-02 | End: 2020-08-03

## 2020-08-02 RX ORDER — POTASSIUM CHLORIDE 20 MEQ
40 PACKET (EA) ORAL ONCE
Refills: 0 | Status: COMPLETED | OUTPATIENT
Start: 2020-08-02 | End: 2020-08-03

## 2020-08-02 RX ORDER — HUMAN INSULIN 100 [IU]/ML
10 INJECTION, SUSPENSION SUBCUTANEOUS EVERY 6 HOURS
Refills: 0 | Status: DISCONTINUED | OUTPATIENT
Start: 2020-08-02 | End: 2020-08-03

## 2020-08-02 RX ADMIN — Medication 650 MILLIGRAM(S): at 12:30

## 2020-08-02 RX ADMIN — Medication 650 MILLIGRAM(S): at 01:50

## 2020-08-02 RX ADMIN — Medication 2: at 06:11

## 2020-08-02 RX ADMIN — Medication 100 MILLIGRAM(S): at 05:17

## 2020-08-02 RX ADMIN — Medication 1 DROP(S): at 22:53

## 2020-08-02 RX ADMIN — Medication 650 MILLIGRAM(S): at 11:34

## 2020-08-02 RX ADMIN — POLYETHYLENE GLYCOL 3350 17 GRAM(S): 17 POWDER, FOR SOLUTION ORAL at 05:17

## 2020-08-02 RX ADMIN — AMLODIPINE BESYLATE 5 MILLIGRAM(S): 2.5 TABLET ORAL at 05:17

## 2020-08-02 RX ADMIN — Medication 2: at 11:39

## 2020-08-02 RX ADMIN — Medication 1 DROP(S): at 05:17

## 2020-08-02 RX ADMIN — Medication 650 MILLIGRAM(S): at 00:50

## 2020-08-02 RX ADMIN — HUMAN INSULIN 15 UNIT(S): 100 INJECTION, SUSPENSION SUBCUTANEOUS at 06:12

## 2020-08-02 RX ADMIN — Medication 1 DROP(S): at 11:35

## 2020-08-02 RX ADMIN — LEVETIRACETAM 750 MILLIGRAM(S): 250 TABLET, FILM COATED ORAL at 17:57

## 2020-08-02 RX ADMIN — OXYCODONE HYDROCHLORIDE 5 MILLIGRAM(S): 5 TABLET ORAL at 02:50

## 2020-08-02 RX ADMIN — Medication 650 MILLIGRAM(S): at 23:50

## 2020-08-02 RX ADMIN — Medication 1 DROP(S): at 01:01

## 2020-08-02 RX ADMIN — Medication 1 DROP(S): at 17:57

## 2020-08-02 RX ADMIN — Medication 650 MILLIGRAM(S): at 23:20

## 2020-08-02 RX ADMIN — CHLORHEXIDINE GLUCONATE 1 APPLICATION(S): 213 SOLUTION TOPICAL at 22:54

## 2020-08-02 RX ADMIN — Medication 2 MILLIGRAM(S): at 22:54

## 2020-08-02 RX ADMIN — Medication 1 DROP(S): at 16:57

## 2020-08-02 RX ADMIN — HUMAN INSULIN 15 UNIT(S): 100 INJECTION, SUSPENSION SUBCUTANEOUS at 00:01

## 2020-08-02 RX ADMIN — Medication 100 MILLIGRAM(S): at 17:58

## 2020-08-02 RX ADMIN — LEVETIRACETAM 750 MILLIGRAM(S): 250 TABLET, FILM COATED ORAL at 05:17

## 2020-08-02 RX ADMIN — HUMAN INSULIN 15 UNIT(S): 100 INJECTION, SUSPENSION SUBCUTANEOUS at 17:57

## 2020-08-02 RX ADMIN — HUMAN INSULIN 10 UNIT(S): 100 INJECTION, SUSPENSION SUBCUTANEOUS at 23:30

## 2020-08-02 RX ADMIN — OXYCODONE HYDROCHLORIDE 5 MILLIGRAM(S): 5 TABLET ORAL at 03:50

## 2020-08-02 RX ADMIN — HUMAN INSULIN 15 UNIT(S): 100 INJECTION, SUSPENSION SUBCUTANEOUS at 11:39

## 2020-08-02 NOTE — PROGRESS NOTE ADULT - SUBJECTIVE AND OBJECTIVE BOX
65 year old female with PMHx of DM, HTN, on  ASAS 81 brought in by family for concern of HA, dizziness, multiple episodes of emesis s/p mechanical fall (trip over baby carriage). Pt walked into triage, however pt became lethargic and unarousable, brought back and intubated for airway protection and level 1 trauma activated.   7/26 S/P Lt Crani for SDH Evacuation  7/31 RTOR for Evacuation of residual heme    PHYSICAL EXAM: *Estonian speaking  General: Calm, laying in bed  HEENT: MMM  Neuro:  -Mental status- No acute distress, EO to voice  -CN- PERRL 3mm, EOMI, tongue midline, face symmetric  -Motor-  RUE 4/5  RLE 4/5  LUE and LLE 5/5  -Sensation- intact to LT   -Coordination- no dysmetria noted

## 2020-08-02 NOTE — OCCUPATIONAL THERAPY INITIAL EVALUATION ADULT - PRECAUTIONS/LIMITATIONS, REHAB EVAL
65 year old female with PMHx of DM, HTN, on ASAS 81 brought in by family for concern of HA, dizziness, multiple episodes of emesis s/p mechanical fall (trip over baby carriage). Pt walked into triage, however pt became lethargic and unarousable, brought back and intubated for airway protection and level 1 trauma activated.  On 7/26 pt underwent Lt Crani for SDH Evacuation. She was extubated on 7/30. On 7/31- RTOR for Evacuation of residual heme./fall precautions

## 2020-08-02 NOTE — PHYSICAL THERAPY INITIAL EVALUATION ADULT - TRANSFER SAFETY CONCERNS NOTED: SIT/STAND, REHAB EVAL
decreased weight-shifting ability/decreased sequencing ability
losing balance/decreased safety awareness/decreased weight-shifting ability/decreased balance during turns

## 2020-08-02 NOTE — PHYSICAL THERAPY INITIAL EVALUATION ADULT - IMPAIRMENTS FOUND, PT EVAL
arousal, attention, and cognition/aerobic capacity/endurance/gait, locomotion, and balance/cognitive impairment/muscle strength/poor safety awareness
aerobic capacity/endurance/muscle strength/gait, locomotion, and balance

## 2020-08-02 NOTE — PROGRESS NOTE ADULT - SUBJECTIVE AND OBJECTIVE BOX
65 year old female with PMHx of DM, HTN, on  ASAS 81 brought in by family for concern of HA, dizziness, multiple episodes of emesis s/p mechanical fall (trip over baby carriage). Pt walked into triage, however pt became lethargic and unarousable, brought back and intubated for airway protection and level 1 trauma activated.   7/26 S/P Lt Crani for SDH Evacuation  7/31 RTOR for Evacuation of residual heme    OVERNIGHT EVENTS: none    VITALS SIGNS:   T(C): 36.7 (02 Aug 2020 03:00), Max: 37.2 (01 Aug 2020 15:00)  T(F): 98.1 (02 Aug 2020 03:00), Max: 98.9 (01 Aug 2020 15:00)  HR: 94 (02 Aug 2020 06:00) (66 - 97)  BP: --  BP(mean): --  ABP: 155/97 (02 Aug 2020 06:00) (112/57 - 160/69)  ABP(mean): 123 (02 Aug 2020 06:00) (75 - 143)  RR: 14 (02 Aug 2020 06:00) (9 - 19)  SpO2: 100% (02 Aug 2020 06:00) (99% - 100%)      PHYSICAL EXAM:    General: No Acute Distress     Neurological: examined in Spanish at bedside, EO to voice, oriented x3, LUE 5/5, RUE 4/5, LLE 5/5, RLE 4+/5    Pulmonary: Clear to Auscultation, No Rales, No Rhonchi, No Wheezes     Cardiovascular: S1, S2, Regular Rate and Rhythm     Gastrointestinal: Soft, Nontender, Nondistended     Incision: dressing in place, clean and dry; +drains    LABS:                                   8.5    12.31 )-----------( 271      ( 01 Aug 2020 01:00 )             26.7   08-01    139  |  105  |  12  ----------------------------<  122<H>  3.7   |  22  |  0.32<L>    Ca    8.4      01 Aug 2020 21:47  Phos  2.1     08-01  Mg     2.1     08-01      COVID-19 PCR: NotDetec (30 Jul 2020 20:06)      Culture - Blood (07.28.20 @ 15:07)    Specimen Source: .Blood Blood-Peripheral    Culture Results:   No growth to date.    Culture - Blood (07.28.20 @ 15:07)    Specimen Source: .Blood Blood-Peripheral    Culture Results:   No growth to date.    Culture - Bronchial (07.28.20 @ 15:05)    Gram Stain:   No polymorphonuclear cells seen per low power field  No squamous epithelial cells per low power field  No organisms seen per oil power field    Specimen Source: Bronch Wash Combicath    Culture Results:   No growth    CAPILLARY BLOOD GLUCOSE    POCT Blood Glucose.: 153 mg/dL (02 Aug 2020 05:54)  POCT Blood Glucose.: 114 mg/dL (01 Aug 2020 23:47)  POCT Blood Glucose.: 174 mg/dL (01 Aug 2020 18:34)  POCT Blood Glucose.: 125 mg/dL (01 Aug 2020 11:53)        I&O:   I&O's Detail    01 Aug 2020 07:01  -  02 Aug 2020 07:00  --------------------------------------------------------  IN:    Enteral Tube Flush: 400 mL    Glucerna: 440 mL    IV PiggyBack: 250 mL    sodium chloride 0.9% with potassium chloride 20 mEq/L: 1650 mL  Total IN: 2740 mL    OUT:    Drain: 7 mL    Drain: 18 mL    Intermittent Catheterization - Urethral: 2850 mL  Total OUT: 2875 mL    Total NET: -135 mL      DRAINS: HMV (18), ALMA (7)    MEDICATIONS:  MEDICATIONS  (STANDING):  amantadine 100 milliGRAM(s) Oral every 12 hours  amLODIPine   Tablet 5 milliGRAM(s) Oral daily  artificial tears (preservative free) Ophthalmic Solution 1 Drop(s) Both EYES every 4 hours  chlorhexidine 4% Liquid 1 Application(s) Topical <User Schedule>  insulin lispro (HumaLOG) corrective regimen sliding scale   SubCutaneous every 6 hours  insulin NPH human recombinant 15 Unit(s) SubCutaneous every 6 hours  levETIRAcetam  Solution 750 milliGRAM(s) Oral two times a day  polyethylene glycol 3350 17 Gram(s) Oral two times a day  senna 2 Tablet(s) Oral at bedtime    MEDICATIONS  (PRN):  acetaminophen    Suspension .. 650 milliGRAM(s) Oral every 6 hours PRN Temp greater or equal to 38C (100.4F), Mild Pain (1 - 3)  bisacodyl 5 milliGRAM(s) Oral every 12 hours PRN Constipation  labetalol Injectable 10 milliGRAM(s) IV Push every 6 hours PRN Systolic blood pressure > 160  oxyCODONE    IR 5 milliGRAM(s) Oral every 4 hours PRN Moderate Pain (4 - 6)    DIET: [] Regular [] CCD [] Renal [] Puree [] Dysphagia [X] Tube Feeds: Glucerna    IMAGING:     CT Head No Cont (08.01.20 @ 09:17)   Interval placement of left-sided subdural drain.  Decreased rightward midline shift, currently measuring 6 mm, previously measuring 9 mm.  No effacement of the basal cisterns. No hydrocephalus.      CT Head No Cont (07.31.20 @ 04:48)   Slight decrease mass effect on the left lateral ventricle with fluid hemorrhage and air appreciated status post left craniotomy        CT Head No Cont (07.30.20 @ 09:09)   Slightly increased size of the left frontal parietal subdural collection compared with 7/29/2020 with no change in mass effect and midline shift to the right.        CT Head No Cont (07.29.20 @ 08:14)   Increased mixed density extra-axial fluid and hemorrhage along the left convexity. Increased left-to-right midline shift now 1 cm. Small parafalcine and left tentorial subdural hemorrhage, unchanged.      CT Head No Cont (07.28.20 @ 08:48)   Decreased extra-axial air compared with the prior 7/26/2020. Status post left subdural evacuation with left-sided craniotomy. Postop changes with some residual air and hemorrhage remain. Stable midline shift roughly 7 mm to the right        CT Head No Cont (07.26.20 @ 01:25)   Status post evacuation of large left-sided holohemispheric subdural hematoma with expected postoperative changes.  Intervally decreased midline shift currently 0.7 cm previously 1.9 cm.  Additionally the previously visualized effacement of basal cisterns, and uncal herniation have improved.  Mildly increased interhemispheric subdural blood which may represent blood redistribution.      CT Head No Cont (07.25.20 @ 20:59)   Large left-sided acute holohemispheric subdural hematoma measuring up to 2.1 cm in greatest depth with rightwardsubfalcine and transtentorial herniation.

## 2020-08-02 NOTE — PHYSICAL THERAPY INITIAL EVALUATION ADULT - BED MOBILITY TRAINING, PT EVAL
GOAL: Pt will perform bed mobility w/ modA, in 4 weeks.
GOAL: Pt will perform bed mobility with supervision within 4 weeks.

## 2020-08-02 NOTE — PHYSICAL THERAPY INITIAL EVALUATION ADULT - PRECAUTIONS/LIMITATIONS, REHAB EVAL
fall precautions/surgical precautions/CONT: No AC. CTH showed large left aSDH with shift. Pt s/p L craniotomy for SDH evacuation on 7/26/20. CTH on 7/29: Increased mixed density extra-axial fluid and hemorrhage along the left convexity. Increased left-to-right midline shift now 1 cm. Small parafalcine and left tentorial subdural hemorrhage, unchanged. CXR: (-).
surgical precautions/fall precautions/CONT: No AC. CTH showed large left aSDH with shift. Pt s/p L craniotomy for SDH evacuation on 7/26/20. CTH on 7/29: Increased mixed density extra-axial fluid and hemorrhage along the left convexity. Increased left-to-right midline shift now 1 cm. Small parafalcine and left tentorial subdural hemorrhage, unchanged. CXR: (-).

## 2020-08-02 NOTE — OCCUPATIONAL THERAPY INITIAL EVALUATION ADULT - ADL RETRAINING, OT EVAL
Goal: Pt will perform UB/LB dressing with supervision within 4 weeks. Pt will toilet self with supervision within 4 weeks.

## 2020-08-02 NOTE — PHYSICAL THERAPY INITIAL EVALUATION ADULT - GAIT TRAINING, PT EVAL
GOAL: Pt will ambulate 75 feet w/ modA, w/use of appropriate assistive device in 4 weeks
GOAL: Pt will ambulate 150 feet w/ CGA, w/use of appropriate assistive device in 4 weeks

## 2020-08-02 NOTE — OCCUPATIONAL THERAPY INITIAL EVALUATION ADULT - GENERAL OBSERVATIONS, REHAB EVAL
Pt received supine in bed with +hemovac drain to surgery site, +ALMA drain, +cardiac monitor, +L radial [place], +NG tube.  Estonian  utilized t/o session.

## 2020-08-02 NOTE — PHYSICAL THERAPY INITIAL EVALUATION ADULT - PHYSICAL ASSIST/NONPHYSICAL ASSIST, REHAB EVAL
1 person assist/verbal cues/nonverbal cues (demo/gestures)
1 person assist/verbal cues/nonverbal cues (demo/gestures)

## 2020-08-02 NOTE — PHYSICAL THERAPY INITIAL EVALUATION ADULT - ASR EQUIP NEEDS DISCH PT EVAL
TBD @ Acute Rehab If home, DME: RW, 3:1 commode and shower chair. Pt would require ambulette services to get into home (4 steps to enter).

## 2020-08-02 NOTE — PHYSICAL THERAPY INITIAL EVALUATION ADULT - PHYSICAL ASSIST/NONPHYSICAL ASSIST: SIT/STAND, REHAB EVAL
2 person assist/nonverbal cues (demo/gestures)/verbal cues
verbal cues/1 person assist/nonverbal cues (demo/gestures)

## 2020-08-02 NOTE — EEG REPORT - NS EEG TEXT BOX
Start: 8/1/2020 08:00  End: 8/2/2020 08:00  Duration: 24h  _____________________________________________________________  HISTORY    Patient is a 65y old  Female who presents with a chief complaint of encephalopathy    PERTINENT MEDICATION:  levETIRAcetam  Solution 750 milliGRAM(s) Oral two times a day    _____________________________________________________________  STUDY INTERPRETATION    Findings: The background was continuous, spontaneously variable and reactive.  8 Hz activity, with amplitude to 30 uV, that attenuated to eye opening.  Low amplitude frontal beta was noted in wakefulness.    Background Slowing:  Background predominantly consisted of theta, and alpha with period of superimposed polymorphic delta    Focal Slowing:   Intermittent focal slowing noted over the left fronto central temporal area    Sleep Background:  Drowsiness was characterized by slowing of the awake background. Stage II sleep was characterized by symmetrical vertex waves and spindles.     Other Non-Epileptiform Findings:  Previously seen Breach effect not well visualized in this study    Interictal Epileptiform Activity:   None were present.    Events:  Clinical events: None recorded.  Seizures: None recorded.    Activation Procedures:   Hyperventilation was not performed.    Photic stimulation was not performed.     Artifacts:  Intermittent myogenic and movement artifacts were noted.    ECG:  The heart rate on single channel ECG was predominantly between 80-90 BPM.    _____________________________________________________________  EEG SUMMARY/CLASSIFICATION    Abnormal EEG in an altered / a sedated patient.  - Intermittent focal slowing noted over the left fronto central temporal area    _____________________________________________________________  EEG IMPRESSION/CLINICAL CORRELATE    Abnormal EEG study.  1. Left hemispheric cortical functional abnormality  2. No epileptiform pattern or seizure seen.    Atrhur Franklin MD   Epilepsy Attending.

## 2020-08-02 NOTE — PHYSICAL THERAPY INITIAL EVALUATION ADULT - GENERAL OBSERVATIONS, REHAB EVAL
Pt received semi-supine in bed, +Kiswahili speaking,  Homero used ID#[id number], +NGT, +tele, +cont pulse ox, +BP cuff, VSS, +subdural ALMA drain, +HMV, agreeable to participate in PT evaluation
Pt received supine in bed, +Serbian speaking,  Jp used ID#[id number], +intubated to vent, +NGT, +b/l UE wrist restraints, +IVL's, +tele, +BP cuff, +cont pulse ox, A&Ox0, agreeable to physical therapy eval, delma 30 min.

## 2020-08-02 NOTE — PROGRESS NOTE ADULT - ASSESSMENT
65 year old female with PMHx of DM, HTN, on  ASA presented with SDH requiring reevacuation    Q1 neuro checks   Keppra for seizure ppx increased 7/21 for possible seizures   Amantadine 100 mg po bid  EEG - no seizures seen  Oxys and Tylenol prn pain  ALMA drain and hemovac per neurosurgery- no output 65 year old female with PMHx of DM, HTN, on  ASA presented with SDH requiring reevacuation    Q1 neuro checks   Keppra for seizure ppx increased 7/21 for possible seizures   Amantadine 100 mg po bid  EEG - no seizures seen  Oxys and Tylenol prn pain  ALMA drain and hemovac per neurosurgery- no output  Pulled on NGT so replaced- CXR pending  speech and swallow pending 65 year old female with PMHx of DM, HTN, on  ASA presented with SDH requiring reevacuation    Q2hr neuro checks, protected sleep time   Keppra for seizure ppx increased 7/21 for possible seizures   Amantadine 100 mg po bid  EEG - no seizures seen  Oxys and Tylenol prn pain  ALMA drain and hemovac per neurosurgery- no output  Pulled on NGT so replaced- CXR pending  speech and swallow pending

## 2020-08-02 NOTE — PROGRESS NOTE ADULT - SUBJECTIVE AND OBJECTIVE BOX
Visit Summary: Patient seen and evaluated at bedside.    Overnight Events: none    Exam:  T(C): 36.8 (08-01-20 @ 23:00), Max: 37.2 (08-01-20 @ 15:00)  HR: 73 (08-02-20 @ 00:00) (58 - 97)  BP: --  RR: 10 (08-02-20 @ 00:00) (10 - 19)  SpO2: 100% (08-02-20 @ 00:00) (99% - 100%)  Wt(kg): --      Waves to us from door, follows commands, hypophonic and sparse verbalization, moves all limbs spontaneously, right side slightly weaker than left                        8.5    12.31 )-----------( 271      ( 01 Aug 2020 01:00 )             26.7     08-01    139  |  105  |  12  ----------------------------<  122<H>  3.7   |  22  |  0.32<L>    Ca    8.4      01 Aug 2020 21:47  Phos  2.1     08-01  Mg     2.1     08-01

## 2020-08-02 NOTE — PHYSICAL THERAPY INITIAL EVALUATION ADULT - TRANSFER TRAINING, PT EVAL
GOAL: Pt will perform ALL transfers with modA, w/use of appropriate assistive device as needed, in 4 weeks.
GOAL: Pt will perform ALL transfers with CGA, w/use of appropriate assistive device as needed, in 4 weeks.

## 2020-08-02 NOTE — PHYSICAL THERAPY INITIAL EVALUATION ADULT - PERTINENT HX OF CURRENT PROBLEM, REHAB EVAL
66 y/o F, Greenlandic speaking, PMH: DM, HTN, on asa81 brought in by family for concern of HA, dizziness, multiple episodes of emesis s/p mechanical fall 2 hours PTA (trip over baby carriage). Pt walked into triage, however pt became lethargic and unarousable, brought to CC room and intubated for airway protection and level 1 trauma activated.
64 y/o F, Vietnamese speaking, PMH: DM, HTN, on asa81 brought in by family for concern of HA, dizziness, multiple episodes of emesis s/p mechanical fall 2 hours PTA (trip over baby carriage). Pt walked into triage, however pt became lethargic and unarousable, brought to CC room and intubated for airway protection and level 1 trauma activated.

## 2020-08-02 NOTE — PHYSICAL THERAPY INITIAL EVALUATION ADULT - ADDITIONAL COMMENTS
Per SW note: Prior to admission pt reports being independent of all ADL's & functional mobility without AD. Pt resides in house with spouse, 4 steps to enter, 1 flight to bedroom.

## 2020-08-02 NOTE — PHYSICAL THERAPY INITIAL EVALUATION ADULT - PLANNED THERAPY INTERVENTIONS, PT EVAL
strengthening/gait training/bed mobility training/transfer training
transfer training/gait training/strengthening/bed mobility training

## 2020-08-02 NOTE — PHYSICAL THERAPY INITIAL EVALUATION ADULT - IMPAIRMENTS CONTRIBUTING TO GAIT DEVIATIONS, PT EVAL
impaired balance/impaired coordination/impaired motor control/decreased strength
impaired coordination/impaired balance/decreased strength/impaired motor control

## 2020-08-02 NOTE — PHYSICAL THERAPY INITIAL EVALUATION ADULT - IMPAIRMENTS CONTRIBUTING IMPAIRED BED MOBILITY, REHAB EVAL
impaired coordination/impaired motor control/decreased strength/impaired balance
impaired coordination/impaired balance/decreased strength/impaired motor control

## 2020-08-02 NOTE — PHYSICAL THERAPY INITIAL EVALUATION ADULT - BALANCE DISTURBANCE, IDENTIFIED IMPAIRMENT CONTRIBUTE, REHAB EVAL
impaired coordination/impaired postural control/decreased strength/impaired motor control
impaired motor control/decreased strength

## 2020-08-02 NOTE — PHYSICAL THERAPY INITIAL EVALUATION ADULT - CRITERIA FOR SKILLED THERAPEUTIC INTERVENTIONS
impairments found
functional limitations in following categories/impairments found/anticipated discharge recommendation

## 2020-08-02 NOTE — PHYSICAL THERAPY INITIAL EVALUATION ADULT - DISCHARGE DISPOSITION, PT EVAL
Acute Rehab/rehabilitation facility Acute Rehab. Addendum: if home, pt would require 24/7 assist with all ADL's and functional mobility and home PT./rehabilitation facility

## 2020-08-02 NOTE — OCCUPATIONAL THERAPY INITIAL EVALUATION ADULT - ADDITIONAL COMMENTS
65 year old female with PMHx of DM, HTN, on  ASAS 81 brought in by family for concern of HA, dizziness, multiple episodes of emesis s/p mechanical fall (trip over baby carriage). Pt walked into triage, however pt became lethargic and unarousable, brought back and intubated for airway protection and level 1 trauma activated.   7/26 S/P Lt Crani for SDH Evacuation  7/30 Extubated   7/31 RTOR for Evacuation of residual heme

## 2020-08-02 NOTE — PHYSICAL THERAPY INITIAL EVALUATION ADULT - STRENGTHENING, PT EVAL
GOAL: Pt will improve bilateral LE strength to 3+/5, for increased limb stability, to improve gait in 4 weeks.
GOAL: Pt will improve bilateral LE strength to 4/5, for increased limb stability, to improve gait in 4 weeks.

## 2020-08-02 NOTE — PHYSICAL THERAPY INITIAL EVALUATION ADULT - IMPAIRED TRANSFERS: SIT/STAND, REHAB EVAL
impaired balance/decreased strength/impaired motor control/impaired coordination
impaired balance/decreased strength/impaired motor control/impaired coordination

## 2020-08-02 NOTE — PHYSICAL THERAPY INITIAL EVALUATION ADULT - GAIT DEVIATIONS NOTED, PT EVAL
decreased kya/decreased step length/decreased stride length/decreased weight-shifting ability
decreased step length/decreased weight-shifting ability/decreased kya/decreased stride length

## 2020-08-02 NOTE — PHYSICAL THERAPY INITIAL EVALUATION ADULT - PHYSICAL ASSIST/NONPHYSICAL ASSIST: STAND/SIT, REHAB EVAL
2 person assist/nonverbal cues (demo/gestures)/verbal cues
nonverbal cues (demo/gestures)/1 person assist/verbal cues

## 2020-08-03 LAB
ANION GAP SERPL CALC-SCNC: 11 MMOL/L — SIGNIFICANT CHANGE UP (ref 5–17)
BUN SERPL-MCNC: 15 MG/DL — SIGNIFICANT CHANGE UP (ref 7–23)
CALCIUM SERPL-MCNC: 8.6 MG/DL — SIGNIFICANT CHANGE UP (ref 8.4–10.5)
CHLORIDE SERPL-SCNC: 101 MMOL/L — SIGNIFICANT CHANGE UP (ref 96–108)
CO2 SERPL-SCNC: 25 MMOL/L — SIGNIFICANT CHANGE UP (ref 22–31)
CREAT SERPL-MCNC: 0.37 MG/DL — LOW (ref 0.5–1.3)
GLUCOSE BLDC GLUCOMTR-MCNC: 114 MG/DL — HIGH (ref 70–99)
GLUCOSE BLDC GLUCOMTR-MCNC: 123 MG/DL — HIGH (ref 70–99)
GLUCOSE BLDC GLUCOMTR-MCNC: 196 MG/DL — HIGH (ref 70–99)
GLUCOSE SERPL-MCNC: 231 MG/DL — HIGH (ref 70–99)
HCT VFR BLD CALC: 25.1 % — LOW (ref 34.5–45)
HGB BLD-MCNC: 8.1 G/DL — LOW (ref 11.5–15.5)
MAGNESIUM SERPL-MCNC: 2.2 MG/DL — SIGNIFICANT CHANGE UP (ref 1.6–2.6)
MCHC RBC-ENTMCNC: 27 PG — SIGNIFICANT CHANGE UP (ref 27–34)
MCHC RBC-ENTMCNC: 32.3 GM/DL — SIGNIFICANT CHANGE UP (ref 32–36)
MCV RBC AUTO: 83.7 FL — SIGNIFICANT CHANGE UP (ref 80–100)
NRBC # BLD: 0 /100 WBCS — SIGNIFICANT CHANGE UP (ref 0–0)
PHOSPHATE SERPL-MCNC: 3.4 MG/DL — SIGNIFICANT CHANGE UP (ref 2.5–4.5)
PLATELET # BLD AUTO: 261 K/UL — SIGNIFICANT CHANGE UP (ref 150–400)
POTASSIUM SERPL-MCNC: 4 MMOL/L — SIGNIFICANT CHANGE UP (ref 3.5–5.3)
POTASSIUM SERPL-SCNC: 4 MMOL/L — SIGNIFICANT CHANGE UP (ref 3.5–5.3)
RBC # BLD: 3 M/UL — LOW (ref 3.8–5.2)
RBC # FLD: 16.4 % — HIGH (ref 10.3–14.5)
SODIUM SERPL-SCNC: 137 MMOL/L — SIGNIFICANT CHANGE UP (ref 135–145)
WBC # BLD: 8.18 K/UL — SIGNIFICANT CHANGE UP (ref 3.8–10.5)
WBC # FLD AUTO: 8.18 K/UL — SIGNIFICANT CHANGE UP (ref 3.8–10.5)

## 2020-08-03 PROCEDURE — 99221 1ST HOSP IP/OBS SF/LOW 40: CPT

## 2020-08-03 PROCEDURE — 71045 X-RAY EXAM CHEST 1 VIEW: CPT | Mod: 26,76

## 2020-08-03 PROCEDURE — 99292 CRITICAL CARE ADDL 30 MIN: CPT

## 2020-08-03 PROCEDURE — 99291 CRITICAL CARE FIRST HOUR: CPT

## 2020-08-03 PROCEDURE — 70450 CT HEAD/BRAIN W/O DYE: CPT | Mod: 26

## 2020-08-03 RX ORDER — DOXAZOSIN MESYLATE 4 MG
1 TABLET ORAL AT BEDTIME
Refills: 0 | Status: DISCONTINUED | OUTPATIENT
Start: 2020-08-03 | End: 2020-08-03

## 2020-08-03 RX ORDER — SODIUM CHLORIDE 9 MG/ML
500 INJECTION INTRAMUSCULAR; INTRAVENOUS; SUBCUTANEOUS ONCE
Refills: 0 | Status: COMPLETED | OUTPATIENT
Start: 2020-08-03 | End: 2020-08-03

## 2020-08-03 RX ORDER — SODIUM CHLORIDE 9 MG/ML
500 INJECTION INTRAMUSCULAR; INTRAVENOUS; SUBCUTANEOUS ONCE
Refills: 0 | Status: DISCONTINUED | OUTPATIENT
Start: 2020-08-03 | End: 2020-08-03

## 2020-08-03 RX ADMIN — Medication 650 MILLIGRAM(S): at 14:40

## 2020-08-03 RX ADMIN — Medication 1 DROP(S): at 17:23

## 2020-08-03 RX ADMIN — LEVETIRACETAM 750 MILLIGRAM(S): 250 TABLET, FILM COATED ORAL at 08:05

## 2020-08-03 RX ADMIN — Medication 1 DROP(S): at 09:28

## 2020-08-03 RX ADMIN — Medication 100 MILLIGRAM(S): at 08:05

## 2020-08-03 RX ADMIN — Medication 100 MILLIGRAM(S): at 17:23

## 2020-08-03 RX ADMIN — Medication 40 MILLIEQUIVALENT(S): at 00:56

## 2020-08-03 RX ADMIN — Medication 1 DROP(S): at 06:39

## 2020-08-03 RX ADMIN — Medication 1 DROP(S): at 02:38

## 2020-08-03 RX ADMIN — SODIUM CHLORIDE 1000 MILLILITER(S): 9 INJECTION INTRAMUSCULAR; INTRAVENOUS; SUBCUTANEOUS at 06:00

## 2020-08-03 RX ADMIN — OXYCODONE HYDROCHLORIDE 5 MILLIGRAM(S): 5 TABLET ORAL at 00:00

## 2020-08-03 RX ADMIN — SODIUM CHLORIDE 500 MILLILITER(S): 9 INJECTION INTRAMUSCULAR; INTRAVENOUS; SUBCUTANEOUS at 01:15

## 2020-08-03 RX ADMIN — Medication 1 DROP(S): at 13:58

## 2020-08-03 RX ADMIN — CHLORHEXIDINE GLUCONATE 1 APPLICATION(S): 213 SOLUTION TOPICAL at 22:27

## 2020-08-03 RX ADMIN — Medication 1 DROP(S): at 22:27

## 2020-08-03 RX ADMIN — Medication 2: at 17:21

## 2020-08-03 RX ADMIN — OXYCODONE HYDROCHLORIDE 5 MILLIGRAM(S): 5 TABLET ORAL at 03:30

## 2020-08-03 RX ADMIN — Medication 650 MILLIGRAM(S): at 14:42

## 2020-08-03 RX ADMIN — LEVETIRACETAM 750 MILLIGRAM(S): 250 TABLET, FILM COATED ORAL at 17:23

## 2020-08-03 NOTE — PROGRESS NOTE ADULT - ASSESSMENT
ASSESSMENT:  65 year old female with PMHx of DM, HTN, on  ASAS 81 brought in by family for concern of HA, dizziness, multiple episodes of emesis s/p mechanical fall (trip over baby carriage). Pt walked into triage, however pt became lethargic and unarousable, brought back and intubated for airway protection and level 1 trauma activated.   7/26 S/P Lt Crani for SDH Evacuation  7/30 Extubated   7/31 RTOR for Evacuation of residual heme    PROCEDURE: 7/26 S/P Lt Crani for SDH Evacuation                       7/31 S/P Re-explor Crani for Evacuation of resiudal SDH    POD# 7/2    PLAN:  NEURO:  Q1 neuro checks   Keppra for seizure ppx increased 7/21 for possible seizures   Amantadine 100 mg po bid  EEG - no seizures seen  CT brain yesterday was stable  Oxys and Tylenol prn pain  Monitor drain outputs; will f/up with NSx about ?d/c drains  Activity: [x] OOB as tolerated [] Bedrest [x] PT [x] OT [] PMNR    PULM:  Extubated 7/30   Reextubated in OR 7/31    CV:  -160mmHg  HTN: Norvasc, enalapril    RENAL:  IVF: IVL  Hypokalemia, got Kphos     GI:  Diet: On glucerna TF  Failed S&S eval yesterday- will re-eval on Monday  GI prophylaxis [X] not indicated [] PPI [] other:  Bowel regimen [] colace [x] senna [x] other: miralax    ENDO:   Goal euglycemia (-180)  HBA1c 7.2   NPH 15Q6, HISS, monitor FS      HEME/ONC:  Hold VTE ppx as per NS team  VTE prophylaxis: [x] SCDs [] chemoprophylaxis [x] hold chemoprophylaxis due to: fresh post op [] high risk of DVT/PE on admission due to:    ID:  Afebrile    SOCIAL/FAMILY:  [x] awaiting [] updated at bedside [] family meeting    CODE STATUS:  [x] Full Code [] DNR [] DNI [] Palliative/Comfort Care    DISPOSITION:  [x] ICU [] Stroke Unit [] Floor [] EMU [] RCU [] PCU    [x] Patient is at high risk of neurologic deterioration/death due to: post op hemorrhage, brain compression

## 2020-08-03 NOTE — PROVIDER CONTACT NOTE (CHANGE IN STATUS NOTIFICATION) - ACTION/TREATMENT ORDERED:
500cc bolus ordered, team assessed pt at bedside, bedside CT scheduled for AM @0730, no further interventions ordered, will continue to monitor

## 2020-08-03 NOTE — CHART NOTE - NSCHARTNOTEFT_GEN_A_CORE
66 yo female with PMHx DM, HTN, on asa81 brought in by family fro concern of HA, dizziness, multiple episodes of emesis s/p mechanical fall 2 hours PTA (trip over baby carriage). Pt walked into triage, however pt became lethargic and unarousable, brought to CC room and intubated for airway protection and level 1 trauma activated. 7/25: CTH: Large left-sided acute holohemispheric subdural hematoma measuring up to 2.1 cm in greatest depth with rightward subfalcine and transtentorial herniation. 7/26: s/p craniotomy and evacuation. 7/30: CT today shows increased size of subdural collection. s/p extubation. 7/31: Overnight stopped following commands, ct head done which showed stable left subdural collection with mid line shift and brain compression, taken back to the OR for left craniotomy for reexploration of SDH.    This service is following up to re-assess for readiness to introduce PO intake. Pt with improved level of alertness, A&Ox3, spO2%  on room air, able to follow simple commands and verbally responsive to simple questions. Pt is primarily Albanian speaking and daughter Tatum was present bedside who served as . Pt vocal quality mildly hyophonic with reduced loudness. No oral structure abnormalities were seen and facial symmetry WNL. Lingual strength mildly reduced for protrusion tasks. Oral care was administered following visualization of white coating on lingual surface and all contents were removed via yankauer after observed anterior loss suggestive of reduced labial strength. She was brought to an upright position for safe administration of PO trials. Pt required max verbal prompts to maintain orientation to feeding task and "keep eyes open."     Pt presents with an oropharyngeal dysphagia marked by suspected posterior loss prior to delayed initiation resulting in overt s/s penetration/aspiration for crushed ice chips and honey coated teaspoon. Pt family educated regarding next steps and that this service will follow up to re-assess in the coming days pending improvement in overall medical picture. Discussed with TANNER Hernandez who was in agreement with proposed plan.    1. Continue NPO, with non-oral nutrition/hydration/medications.   2. Strict reflux/aspiration precautions, and for enteral feeds.  3. This service will follow up while patient is in house.    Ben Conti MA, CCC-SLP  Speech-Language Pathologist  Pager # [phone]

## 2020-08-03 NOTE — PROGRESS NOTE ADULT - SUBJECTIVE AND OBJECTIVE BOX
Visit Summary: Patient seen and evaluated at bedside.    Overnight Events: none      eo spon, follows commands, hypophonic and sparse verbalization, moves all limbs spontaneously, right side slightly weaker than left

## 2020-08-03 NOTE — PROGRESS NOTE ADULT - SUBJECTIVE AND OBJECTIVE BOX
65 year old female with PMHx of DM, HTN, on  ASAS 81 brought in by family for concern of HA, dizziness, multiple episodes of emesis s/p mechanical fall (trip over baby carriage). Pt walked into triage, however pt became lethargic and unarousable, brought back and intubated for airway protection and level 1 trauma activated.   7/26 S/P Lt Crani for SDH Evacuation  7/31 RTOR for Evacuation of residual heme    Events: hypotension with cardura so stopped  waxing and waning exam  failed sp/sw  drains pulled    PHYSICAL EXAM: *Khmer speaking  General: Calm, laying in bed  HEENT: MMM  Neuro:  -Mental status- No acute distress, EO to voice  -CN- PERRL 3mm, EOMI, tongue midline, face symmetric  -Motor-  RUE 4/5  RLE 4/5  LUE and LLE 5/5  -Sensation- intact to LT   -Coordination- no dysmetria noted

## 2020-08-03 NOTE — PROGRESS NOTE ADULT - SUBJECTIVE AND OBJECTIVE BOX
65 year old female with PMHx of DM, HTN, on  ASAS 81 brought in by family for concern of HA, dizziness, multiple episodes of emesis s/p mechanical fall (trip over baby carriage). Pt walked into triage, however pt became lethargic and unarousable, brought back and intubated for airway protection and level 1 trauma activated.   7/26 S/P Lt Crani for SDH Evacuation  7/31 RTOR for Evacuation of residual heme    OVERNIGHT EVENTS: none    ICU Vital Signs Last 24 Hrs  T(C): 36.6 (03 Aug 2020 03:00), Max: 36.9 (02 Aug 2020 23:00)  T(F): 97.8 (03 Aug 2020 03:00), Max: 98.4 (02 Aug 2020 23:00)  HR: 81 (03 Aug 2020 06:00) (81 - 112)  BP: 87/50 (03 Aug 2020 06:00) (80/53 - 165/84)  BP(mean): 62 (03 Aug 2020 06:00) (62 - 107)  ABP: --  ABP(mean): --  RR: 11 (03 Aug 2020 06:00) (11 - 19)  SpO2: 100% (03 Aug 2020 06:00) (98% - 100%)      PHYSICAL EXAM:    General: No Acute Distress     Neurological: examined in Georgian at bedside, EO to voice, oriented x3, LUE 5/5, RUE 4/5, LLE 5/5, RLE 4+/5    Pulmonary: Clear to Auscultation, No Rales, No Rhonchi, No Wheezes     Cardiovascular: S1, S2, Regular Rate and Rhythm     Gastrointestinal: Soft, Nontender, Nondistended     Incision: dressing in place, clean and dry; +drains                          9.7    11.18 )-----------( 342      ( 02 Aug 2020 22:50 )             30.5   08-02    140  |  101  |  13  ----------------------------<  107<H>  3.6   |  24  |  0.35<L>    Ca    9.3      02 Aug 2020 22:50  Phos  3.4     08-02  Mg     2.4     08-02        COVID-19 PCR: NotDetec (30 Jul 2020 20:06)      Culture - Blood (07.28.20 @ 15:07)    Specimen Source: .Blood Blood-Peripheral    Culture Results:   No growth to date.    Culture - Blood (07.28.20 @ 15:07)    Specimen Source: .Blood Blood-Peripheral    Culture Results:   No growth to date.    Culture - Bronchial (07.28.20 @ 15:05)    Gram Stain:   No polymorphonuclear cells seen per low power field  No squamous epithelial cells per low power field  No organisms seen per oil power field    Specimen Source: Bronch Wash Combicath    Culture Results:   No growth    CAPILLARY BLOOD GLUCOSE    POCT Blood Glucose.: 153 mg/dL (02 Aug 2020 05:54)  POCT Blood Glucose.: 114 mg/dL (01 Aug 2020 23:47)  POCT Blood Glucose.: 174 mg/dL (01 Aug 2020 18:34)  POCT Blood Glucose.: 125 mg/dL (01 Aug 2020 11:53)        I&O:   I&O's Detail    01 Aug 2020 07:01  -  02 Aug 2020 07:00  --------------------------------------------------------  IN:    Enteral Tube Flush: 400 mL    Glucerna: 440 mL    IV PiggyBack: 250 mL    sodium chloride 0.9% with potassium chloride 20 mEq/L: 1650 mL  Total IN: 2740 mL    OUT:    Drain: 7 mL    Drain: 18 mL    Intermittent Catheterization - Urethral: 2850 mL  Total OUT: 2875 mL    Total NET: -135 mL      DRAINS: HMV (18), ALMA (7)    MEDICATIONS  (STANDING):  amantadine 100 milliGRAM(s) Oral every 12 hours  amLODIPine   Tablet 5 milliGRAM(s) Oral daily  artificial tears (preservative free) Ophthalmic Solution 1 Drop(s) Both EYES every 4 hours  chlorhexidine 4% Liquid 1 Application(s) Topical <User Schedule>  doxazosin 1 milliGRAM(s) Oral at bedtime  insulin lispro (HumaLOG) corrective regimen sliding scale   SubCutaneous every 6 hours  insulin NPH human recombinant 10 Unit(s) SubCutaneous every 6 hours  levETIRAcetam  Solution 750 milliGRAM(s) Oral two times a day  polyethylene glycol 3350 17 Gram(s) Oral two times a day  senna 2 Tablet(s) Oral at bedtime    MEDICATIONS  (PRN):  acetaminophen    Suspension .. 650 milliGRAM(s) Oral every 6 hours PRN Temp greater or equal to 38C (100.4F), Mild Pain (1 - 3)  bisacodyl 5 milliGRAM(s) Oral every 12 hours PRN Constipation  labetalol Injectable 10 milliGRAM(s) IV Push every 6 hours PRN Systolic blood pressure > 160  oxyCODONE    IR 5 milliGRAM(s) Oral every 4 hours PRN Moderate Pain (4 - 6)    DIET: [] Regular [] CCD [] Renal [] Puree [] Dysphagia [X] Tube Feeds: Glucerna    IMAGING:     CT Head No Cont (08.01.20 @ 09:17)   Interval placement of left-sided subdural drain.  Decreased rightward midline shift, currently measuring 6 mm, previously measuring 9 mm.  No effacement of the basal cisterns. No hydrocephalus.      CT Head No Cont (07.31.20 @ 04:48)   Slight decrease mass effect on the left lateral ventricle with fluid hemorrhage and air appreciated status post left craniotomy        CT Head No Cont (07.30.20 @ 09:09)   Slightly increased size of the left frontal parietal subdural collection compared with 7/29/2020 with no change in mass effect and midline shift to the right.        CT Head No Cont (07.29.20 @ 08:14)   Increased mixed density extra-axial fluid and hemorrhage along the left convexity. Increased left-to-right midline shift now 1 cm. Small parafalcine and left tentorial subdural hemorrhage, unchanged.      CT Head No Cont (07.28.20 @ 08:48)   Decreased extra-axial air compared with the prior 7/26/2020. Status post left subdural evacuation with left-sided craniotomy. Postop changes with some residual air and hemorrhage remain. Stable midline shift roughly 7 mm to the right        CT Head No Cont (07.26.20 @ 01:25)   Status post evacuation of large left-sided holohemispheric subdural hematoma with expected postoperative changes.  Intervally decreased midline shift currently 0.7 cm previously 1.9 cm.  Additionally the previously visualized effacement of basal cisterns, and uncal herniation have improved.  Mildly increased interhemispheric subdural blood which may represent blood redistribution.      CT Head No Cont (07.25.20 @ 20:59)   Large left-sided acute holohemispheric subdural hematoma measuring up to 2.1 cm in greatest depth with rightwardsubfalcine and transtentorial herniation. 65 year old female with PMHx of DM, HTN, on  ASAS 81 brought in by family for concern of HA, dizziness, multiple episodes of emesis s/p mechanical fall (trip over baby carriage). Pt walked into triage, however pt became lethargic and unarousable, brought back and intubated for airway protection and level 1 trauma activated.   7/26 S/P Lt Crani for SDH Evacuation  7/31 RTOR for Evacuation of residual heme    OVERNIGHT EVENTS: Initially sleepy, but improved by the time     ICU Vital Signs Last 24 Hrs  T(C): 36.6 (03 Aug 2020 03:00), Max: 36.9 (02 Aug 2020 23:00)  T(F): 97.8 (03 Aug 2020 03:00), Max: 98.4 (02 Aug 2020 23:00)  HR: 81 (03 Aug 2020 06:00) (81 - 112)  BP: 87/50 (03 Aug 2020 06:00) (80/53 - 165/84)  BP(mean): 62 (03 Aug 2020 06:00) (62 - 107)  ABP: --  ABP(mean): --  RR: 11 (03 Aug 2020 06:00) (11 - 19)  SpO2: 100% (03 Aug 2020 06:00) (98% - 100%)      PHYSICAL EXAM:    General: Calm    Neurological: examined in Bengali at bedside, EO to voice, oriented x3, LUE 5/5, RUE 4/5, LLE 5/5, RLE 4+/5    Pulmonary: Clear to Auscultation, No Rales, No Rhonchi, No Wheezes     Cardiovascular: S1, S2, Regular Rate and Rhythm     Gastrointestinal: Soft, Nontender, Nondistended     Incision: dressing in place, clean and dry; +drains                          9.7    11.18 )-----------( 342      ( 02 Aug 2020 22:50 )             30.5   08-02    140  |  101  |  13  ----------------------------<  107<H>  3.6   |  24  |  0.35<L>    Ca    9.3      02 Aug 2020 22:50  Phos  3.4     08-02  Mg     2.4     08-02        COVID-19 PCR: NotDetec (30 Jul 2020 20:06)      Culture - Blood (07.28.20 @ 15:07)    Specimen Source: .Blood Blood-Peripheral    Culture Results:   No growth to date.    Culture - Blood (07.28.20 @ 15:07)    Specimen Source: .Blood Blood-Peripheral    Culture Results:   No growth to date.    Culture - Bronchial (07.28.20 @ 15:05)    Gram Stain:   No polymorphonuclear cells seen per low power field  No squamous epithelial cells per low power field  No organisms seen per oil power field    Specimen Source: Bronch Wash Combicath    Culture Results:   No growth    CAPILLARY BLOOD GLUCOSE    POCT Blood Glucose.: 153 mg/dL (02 Aug 2020 05:54)  POCT Blood Glucose.: 114 mg/dL (01 Aug 2020 23:47)  POCT Blood Glucose.: 174 mg/dL (01 Aug 2020 18:34)  POCT Blood Glucose.: 125 mg/dL (01 Aug 2020 11:53)        I&O:   I&O's Detail    01 Aug 2020 07:01  -  02 Aug 2020 07:00  --------------------------------------------------------  IN:    Enteral Tube Flush: 400 mL    Glucerna: 440 mL    IV PiggyBack: 250 mL    sodium chloride 0.9% with potassium chloride 20 mEq/L: 1650 mL  Total IN: 2740 mL    OUT:    Drain: 7 mL    Drain: 18 mL    Intermittent Catheterization - Urethral: 2850 mL  Total OUT: 2875 mL    Total NET: -135 mL      DRAINS: HMV (18), ALMA (7)    MEDICATIONS  (STANDING):  amantadine 100 milliGRAM(s) Oral every 12 hours  amLODIPine   Tablet 5 milliGRAM(s) Oral daily  artificial tears (preservative free) Ophthalmic Solution 1 Drop(s) Both EYES every 4 hours  chlorhexidine 4% Liquid 1 Application(s) Topical <User Schedule>  doxazosin 1 milliGRAM(s) Oral at bedtime  insulin lispro (HumaLOG) corrective regimen sliding scale   SubCutaneous every 6 hours  insulin NPH human recombinant 10 Unit(s) SubCutaneous every 6 hours  levETIRAcetam  Solution 750 milliGRAM(s) Oral two times a day  polyethylene glycol 3350 17 Gram(s) Oral two times a day  senna 2 Tablet(s) Oral at bedtime    MEDICATIONS  (PRN):  acetaminophen    Suspension .. 650 milliGRAM(s) Oral every 6 hours PRN Temp greater or equal to 38C (100.4F), Mild Pain (1 - 3)  bisacodyl 5 milliGRAM(s) Oral every 12 hours PRN Constipation  labetalol Injectable 10 milliGRAM(s) IV Push every 6 hours PRN Systolic blood pressure > 160  oxyCODONE    IR 5 milliGRAM(s) Oral every 4 hours PRN Moderate Pain (4 - 6)    DIET: [] Regular [] CCD [] Renal [] Puree [] Dysphagia [X] Tube Feeds: Glucerna    IMAGING:     CT Head No Cont (08.01.20 @ 09:17)   Interval placement of left-sided subdural drain.  Decreased rightward midline shift, currently measuring 6 mm, previously measuring 9 mm.  No effacement of the basal cisterns. No hydrocephalus.      CT Head No Cont (07.31.20 @ 04:48)   Slight decrease mass effect on the left lateral ventricle with fluid hemorrhage and air appreciated status post left craniotomy        CT Head No Cont (07.30.20 @ 09:09)   Slightly increased size of the left frontal parietal subdural collection compared with 7/29/2020 with no change in mass effect and midline shift to the right.        CT Head No Cont (07.29.20 @ 08:14)   Increased mixed density extra-axial fluid and hemorrhage along the left convexity. Increased left-to-right midline shift now 1 cm. Small parafalcine and left tentorial subdural hemorrhage, unchanged.      CT Head No Cont (07.28.20 @ 08:48)   Decreased extra-axial air compared with the prior 7/26/2020. Status post left subdural evacuation with left-sided craniotomy. Postop changes with some residual air and hemorrhage remain. Stable midline shift roughly 7 mm to the right        CT Head No Cont (07.26.20 @ 01:25)   Status post evacuation of large left-sided holohemispheric subdural hematoma with expected postoperative changes.  Intervally decreased midline shift currently 0.7 cm previously 1.9 cm.  Additionally the previously visualized effacement of basal cisterns, and uncal herniation have improved.  Mildly increased interhemispheric subdural blood which may represent blood redistribution.      CT Head No Cont (07.25.20 @ 20:59)   Large left-sided acute holohemispheric subdural hematoma measuring up to 2.1 cm in greatest depth with rightwardsubfalcine and transtentorial herniation.

## 2020-08-03 NOTE — PROGRESS NOTE ADULT - SUBJECTIVE AND OBJECTIVE BOX
Pt seen earlier in evening with resident.  Pt was awake, responsive to questions, JENSEN well to command.  Wound intact.  Drains removed.  CT this morning shows persistent left epidural collection with approx 8 mm midline shift related to mass effect and brain compression. This is essentially similar over last 2-3 days.  Pt did not pass speech/swallow this time.  Continue close monitoring. New CT in AM to follow up post pull.

## 2020-08-03 NOTE — CONSULT NOTE ADULT - SUBJECTIVE AND OBJECTIVE BOX
HPI: Maria Esther Cline 64 yo female with pmh DM, HTN, on asa81 brought in by family fro concern of HA, dizziness, multiple episodes of emesis s/p mechanical fall 2 hours PTA (trip over baby carriage). Pt walked into triage, however pt became lethargic and unarousable, brought to CC room and intubated for airway protection and level 1 trauma activated.  No AC.    Patient was admitted on 7/25, CTH with large SDH, brought to OR emergently by neurosurgery, had evacuation of supratentorial SDH on 7/26, post op CT with decreased air, improved mass effect/midline shift, following commands, and mild right drift, right UE weakness on 7/30, then less responsive, moving only to noxious stimuli, returned to OR 7/31 for re exploration of craniotomy/evacuation of collection, extubated 7/31. Patient seen today, lethargic, hypotensive, right side mild weakness as per chart.     REVIEW OF SYSTEMS  unable to obtain     VITALS  T(C): 36.9 (08-03-20 @ 11:00), Max: 36.9 (08-02-20 @ 23:00)  HR: 101 (08-03-20 @ 12:00) (73 - 112)  BP: 125/62 (08-03-20 @ 12:00) (80/53 - 165/84)  RR: 15 (08-03-20 @ 12:00) (8 - 17)  SpO2: 99% (08-03-20 @ 12:00) (98% - 100%)  Wt(kg): --    PAST MEDICAL & SURGICAL HISTORY  Diabetes  Cholelithiasis  Hypertension  History of varicose veins of lower extremity  H/O shoulder surgery      SOCIAL HISTORY  Smoking - Denied  EtOH - Denied   Drugs - Denied    FUNCTIONAL HISTORY  Lives with  in private home, 4 RAC[address], one flight to bedroom   Independent AMB and ADLs PTA    CURRENT FUNCTIONAL STATUS  8/1 SLP  Pt is hypophonic suspected related to current weak and deconditioned state. Pt with intermittent attempts to follow commands and waxing/waning level of alertness. Given current lethargic state, PO trials contraindicated at this time.    8/2 PT    Bed Mobility: Rolling/Turning:     · Level of [place]	moderate assist (50% patients effort)  · Physical Assist/Nonphysical Assist	1 person assist; nonverbal cues (demo/gestures); verbal cues  · Assistive Device	bed rails    Bed Mobility: Scooting/Bridging:     · Level of [place]	moderate assist (50% patients effort)  · Physical Assist/Nonphysical Assist	verbal cues; nonverbal cues (demo/gestures); 1 person assist    Bed Mobility: Sit to Supine:     · Level of [place]	moderate assist (50% patients effort)  · Physical Assist/Nonphysical Assist	1 person assist; nonverbal cues (demo/gestures); verbal cues  · Assistive Device	bed rails    Bed Mobility: Supine to Sit:     · Level of [place]	maximum assist (25% patients effort)  · Physical Assist/Nonphysical Assist	1 person assist; nonverbal cues (demo/gestures); verbal cues  · Assistive Device	bed rails    Bed Mobility Analysis:     · Impairments Contributing to Impaired Bed Mobility	impaired balance; impaired coordination; impaired motor control; decreased strength    Transfer: Sit to Stand:     · Level of [place]	moderate assist (50% patients effort)  · Physical Assist/Nonphysical Assist	nonverbal cues (demo/gestures); verbal cues; 2 person assist  · Assistive Device	no AD    Transfer: Stand to Sit:     · Level of [place]	moderate assist (50% patients effort)  · Physical Assist/Nonphysical Assist	nonverbal cues (demo/gestures); verbal cues; 2 person assist  · Assistive Device	no AD    Sit/Stand Transfer Safety Analysis:     · Transfer Safety Concerns Noted	decreased weight-shifting ability; decreased sequencing ability  · Impairments Contributing to Impaired Transfers	impaired balance; impaired coordination; impaired motor control; decreased strength    Gait Skills:     · Level of [place]	moderate assist (50% patients effort)  · Physical Assist/Nonphysical Assist	verbal cues; nonverbal cues (demo/gestures); 2 person assist  · Weight-Bearing Restrictions	full weight-bearing  · Assistive Device	therapists on either side  · Gait Distance	5 feet; along bedside, x2 trials    Gait Analysis:     · Gait Pattern Used	side step  · Gait Deviations Noted	decreased kya; decreased step length; decreased stride length; decreased weight-shifting ability  · Impairments Contributing to Gait Deviations	impaired balance; impaired coordination; impaired motor control; decreased strength    8/2 OT    Bed Mobility: Rolling/Turning:     · Level of [place]	moderate assist (50% patients effort)  · Physical Assist/Nonphysical Assist	1 person assist; verbal cues    Bed Mobility: Scooting/Bridging:     · Level of [place]	moderate assist (50% patients effort)  · Physical Assist/Nonphysical Assist	1 person assist; verbal cues; nonverbal cues (demo/gestures)    Bed Mobility: Sit to Supine:     · Level of [place]	moderate assist (50% patients effort)  · Physical Assist/Nonphysical Assist	1 person assist; nonverbal cues (demo/gestures); verbal cues    Bed Mobility: Supine to Sit:     · Level of [place]	maximum assist (25% patients effort)  · Physical Assist/Nonphysical Assist	1 person assist; nonverbal cues (demo/gestures); verbal cues    Bed Mobility Analysis:     · Impairments Contributing to Impaired Bed Mobility	impaired balance; decreased strength    Transfer: Sit to Stand:     · Level of [place]	moderate assist (50% patients effort)  · Physical Assist/Nonphysical Assist	2 person assist; nonverbal cues (demo/gestures); verbal cues    Transfer: Stand to Sit:     · Level of [place]	moderate assist (50% patients effort)  · Physical Assist/Nonphysical Assist	2 person assist; nonverbal cues (demo/gestures); verbal cues    Sit/Stand Transfer Safety Analysis:     · Impairments Contributing to Impaired Transfers	impaired balance; decreased strength; impaired postural control      FAMILY HISTORY   no pertinent history in primary relatives     RECENT LABS/IMAGING  CBC Full  -  ( 02 Aug 2020 22:50 )  WBC Count : 11.18 K/uL  RBC Count : 3.63 M/uL  Hemoglobin : 9.7 g/dL  Hematocrit : 30.5 %  Platelet Count - Automated : 342 K/uL  Mean Cell Volume : 84.0 fl  Mean Cell Hemoglobin : 26.7 pg  Mean Cell Hemoglobin Concentration : 31.8 gm/dL  Auto Neutrophil # : x  Auto Lymphocyte # : x  Auto Monocyte # : x  Auto Eosinophil # : x  Auto Basophil # : x  Auto Neutrophil % : x  Auto Lymphocyte % : x  Auto Monocyte % : x  Auto Eosinophil % : x  Auto Basophil % : x    08-02    140  |  101  |  13  ----------------------------<  107<H>  3.6   |  24  |  0.35<L>    Ca    9.3      02 Aug 2020 22:50  Phos  3.4     08-02  Mg     2.4     08-02    < from: CT Head No Cont (08.03.20 @ 08:59) >    IMPRESSION:    Similar-appearing predominantly low density left holohemispheric subdural collection measuring 10 mm in greatest depth.    Similar thin acute subdural hemorrhage along the posterior interhemispheric fissure and left tentorial leaf.    Similar rightward midline shift of 8 mm. Persistent partial effacement of the left perimesencephalic and quadrigeminal plate cisterns. No hydrocephalus.        < end of copied text >        ALLERGIES  No Known Allergies      MEDICATIONS   acetaminophen    Suspension .. 650 milliGRAM(s) Oral every 6 hours PRN  amantadine 100 milliGRAM(s) Oral every 12 hours  amLODIPine   Tablet 5 milliGRAM(s) Oral daily  artificial tears (preservative free) Ophthalmic Solution 1 Drop(s) Both EYES every 4 hours  bisacodyl 5 milliGRAM(s) Oral every 12 hours PRN  chlorhexidine 4% Liquid 1 Application(s) Topical <User Schedule>  insulin lispro (HumaLOG) corrective regimen sliding scale   SubCutaneous every 6 hours  labetalol Injectable 10 milliGRAM(s) IV Push every 6 hours PRN  levETIRAcetam  Solution 750 milliGRAM(s) Oral two times a day  oxyCODONE    IR 5 milliGRAM(s) Oral every 4 hours PRN  polyethylene glycol 3350 17 Gram(s) Oral two times a day  senna 2 Tablet(s) Oral at bedtime      ----------------------------------------------------------------------------------------  PHYSICAL EXAM  Constitutional - NAD, Comfortable, in bed   HEENT - +NGT, scalp incision with staples  Neck - Supple, No limited ROM  Chest - Breathing comfortably  Cardiovascular - S1S2   Abdomen - Soft   Extremities - b/l UE restraints, no edema    Neurologic Exam -  patient lethargic, difficult to arouse              Psychiatric - Mood stable, Affect WNL  ----------------------------------------------------------------------------------------  ASSESSMENT/PLAN  65 year old woman with functional deficits after SDH, craniotomy 7/26, 7/31   CT 8/3 stable, similar midline shift   on keppra, EEG no seizures seen  amantadine 100 bid   Pain - Tylenol, oxycodone  DVT PPX - SCDs  Rehab - Will continue to follow for ongoing rehab needs and recommendations.   continue bedside PT/OT  patient was at mod assist with transfers, ambulation on 8/2   Recommend ACUTE inpatient rehabilitation for the functional deficits consisting of 3 hours of therapy/day & 24 hour RN/daily PMR physician for comorbid medical management. Patient will be able to tolerate 3 hours a day.

## 2020-08-03 NOTE — PROGRESS NOTE ADULT - ASSESSMENT
65 year old female with PMHx of DM, HTN, on  ASA presented with SDH requiring reevacuation    Q2hr neuro checks, protected sleep time   Keppra for seizure ppx increased 7/21 for possible seizures   Amantadine 100 mg po bid  EEG - no seizures seen  Oxys and Tylenol prn pain  TF via NGT  speech and swallow - gailed 65 year old female with PMHx of DM, HTN, on  ASA presented with SDH requiring reevacuation    Q2hr neuro checks, protected sleep time   Keppra for seizure ppx increased 7/21 for possible seizures   Amantadine 100 mg po bid  EEG - no seizures seen  Oxys and Tylenol prn pain  TF via NGT  speech and swallow - failed 65 year old female with PMHx of DM, HTN, on  ASA presented with SDH requiring reevacuation    Q2hr neuro checks, protected sleep time   Keppra for seizure ppx increased 7/21 for possible seizures   Amantadine 100 mg po bid  EEG - no seizures seen  Oxys and Tylenol prn pain  TF via NGT  speech and swallow - failed  CTH in AM 65 year old female with PMHx of DM, HTN, on  ASA presented with SDH requiring reevacuation    Q2hr neuro checks, protected sleep time   Keppra for seizure ppx increased 7/21 for possible seizures   Amantadine 100 mg po bid  EEG - no seizures seen  Oxys and Tylenol prn pain  TF via NGT  speech and swallow - failed  CTH in AM  restart NPH if BG>120

## 2020-08-03 NOTE — PROGRESS NOTE ADULT - ATTENDING COMMENTS
On initial AM eval, patient sleepy. However, she improved later in the morning and was following commands and moving well.     Per Dr. Harris, d/c surgical drain.

## 2020-08-03 NOTE — PROGRESS NOTE ADULT - ASSESSMENT
Left subdural hematoma status post evacuation and RTOR  - Monitor drain output  - Glycemic control with NPH  - -160mmHg on amlodipine and enalapril

## 2020-08-03 NOTE — PROVIDER CONTACT NOTE (CHANGE IN STATUS NOTIFICATION) - ASSESSMENT
Pt requiring repeated stimulation to open eyes, previously opened eyes to voice, following commands on all extremities, unable to lift arms and legs off bed, afebrile, hypotensive, MAP <60, finger stick 117

## 2020-08-04 LAB
GLUCOSE BLDC GLUCOMTR-MCNC: 179 MG/DL — HIGH (ref 70–99)
GLUCOSE BLDC GLUCOMTR-MCNC: 205 MG/DL — HIGH (ref 70–99)
GLUCOSE BLDC GLUCOMTR-MCNC: 222 MG/DL — HIGH (ref 70–99)
GLUCOSE BLDC GLUCOMTR-MCNC: 247 MG/DL — HIGH (ref 70–99)

## 2020-08-04 PROCEDURE — 99233 SBSQ HOSP IP/OBS HIGH 50: CPT

## 2020-08-04 PROCEDURE — 70450 CT HEAD/BRAIN W/O DYE: CPT | Mod: 26

## 2020-08-04 RX ORDER — AMANTADINE HCL 100 MG
200 CAPSULE ORAL
Refills: 0 | Status: DISCONTINUED | OUTPATIENT
Start: 2020-08-04 | End: 2020-08-08

## 2020-08-04 RX ORDER — HUMAN INSULIN 100 [IU]/ML
8 INJECTION, SUSPENSION SUBCUTANEOUS EVERY 6 HOURS
Refills: 0 | Status: DISCONTINUED | OUTPATIENT
Start: 2020-08-04 | End: 2020-08-04

## 2020-08-04 RX ORDER — AMANTADINE HCL 100 MG
100 CAPSULE ORAL ONCE
Refills: 0 | Status: COMPLETED | OUTPATIENT
Start: 2020-08-04 | End: 2020-08-04

## 2020-08-04 RX ORDER — HUMAN INSULIN 100 [IU]/ML
6 INJECTION, SUSPENSION SUBCUTANEOUS EVERY 6 HOURS
Refills: 0 | Status: DISCONTINUED | OUTPATIENT
Start: 2020-08-04 | End: 2020-08-04

## 2020-08-04 RX ORDER — HUMAN INSULIN 100 [IU]/ML
7 INJECTION, SUSPENSION SUBCUTANEOUS ONCE
Refills: 0 | Status: COMPLETED | OUTPATIENT
Start: 2020-08-04 | End: 2020-08-04

## 2020-08-04 RX ORDER — HUMAN INSULIN 100 [IU]/ML
15 INJECTION, SUSPENSION SUBCUTANEOUS EVERY 6 HOURS
Refills: 0 | Status: DISCONTINUED | OUTPATIENT
Start: 2020-08-04 | End: 2020-08-05

## 2020-08-04 RX ORDER — HUMAN INSULIN 100 [IU]/ML
10 INJECTION, SUSPENSION SUBCUTANEOUS EVERY 6 HOURS
Refills: 0 | Status: DISCONTINUED | OUTPATIENT
Start: 2020-08-04 | End: 2020-08-04

## 2020-08-04 RX ORDER — AMANTADINE HCL 100 MG
100 CAPSULE ORAL
Refills: 0 | Status: DISCONTINUED | OUTPATIENT
Start: 2020-08-04 | End: 2020-08-08

## 2020-08-04 RX ORDER — HEPARIN SODIUM 5000 [USP'U]/ML
5000 INJECTION INTRAVENOUS; SUBCUTANEOUS EVERY 12 HOURS
Refills: 0 | Status: DISCONTINUED | OUTPATIENT
Start: 2020-08-04 | End: 2020-08-04

## 2020-08-04 RX ADMIN — POLYETHYLENE GLYCOL 3350 17 GRAM(S): 17 POWDER, FOR SOLUTION ORAL at 17:08

## 2020-08-04 RX ADMIN — HEPARIN SODIUM 5000 UNIT(S): 5000 INJECTION INTRAVENOUS; SUBCUTANEOUS at 17:07

## 2020-08-04 RX ADMIN — OXYCODONE HYDROCHLORIDE 5 MILLIGRAM(S): 5 TABLET ORAL at 01:40

## 2020-08-04 RX ADMIN — LEVETIRACETAM 750 MILLIGRAM(S): 250 TABLET, FILM COATED ORAL at 05:15

## 2020-08-04 RX ADMIN — Medication 4: at 17:07

## 2020-08-04 RX ADMIN — HUMAN INSULIN 8 UNIT(S): 100 INJECTION, SUSPENSION SUBCUTANEOUS at 11:17

## 2020-08-04 RX ADMIN — Medication 1 DROP(S): at 09:27

## 2020-08-04 RX ADMIN — Medication 1 DROP(S): at 05:15

## 2020-08-04 RX ADMIN — Medication 1 DROP(S): at 17:08

## 2020-08-04 RX ADMIN — Medication 4: at 11:17

## 2020-08-04 RX ADMIN — Medication 650 MILLIGRAM(S): at 12:00

## 2020-08-04 RX ADMIN — Medication 1 DROP(S): at 23:11

## 2020-08-04 RX ADMIN — Medication 100 MILLIGRAM(S): at 05:15

## 2020-08-04 RX ADMIN — HUMAN INSULIN 15 UNIT(S): 100 INJECTION, SUSPENSION SUBCUTANEOUS at 23:11

## 2020-08-04 RX ADMIN — Medication 1 DROP(S): at 13:22

## 2020-08-04 RX ADMIN — CHLORHEXIDINE GLUCONATE 1 APPLICATION(S): 213 SOLUTION TOPICAL at 21:08

## 2020-08-04 RX ADMIN — AMLODIPINE BESYLATE 5 MILLIGRAM(S): 2.5 TABLET ORAL at 05:15

## 2020-08-04 RX ADMIN — LEVETIRACETAM 750 MILLIGRAM(S): 250 TABLET, FILM COATED ORAL at 17:06

## 2020-08-04 RX ADMIN — Medication 4: at 05:21

## 2020-08-04 RX ADMIN — Medication 650 MILLIGRAM(S): at 11:15

## 2020-08-04 RX ADMIN — Medication 100 MILLIGRAM(S): at 17:07

## 2020-08-04 RX ADMIN — Medication 4: at 00:20

## 2020-08-04 RX ADMIN — SENNA PLUS 2 TABLET(S): 8.6 TABLET ORAL at 21:09

## 2020-08-04 RX ADMIN — HUMAN INSULIN 7 UNIT(S): 100 INJECTION, SUSPENSION SUBCUTANEOUS at 00:55

## 2020-08-04 RX ADMIN — Medication 100 MILLIGRAM(S): at 13:22

## 2020-08-04 RX ADMIN — HUMAN INSULIN 8 UNIT(S): 100 INJECTION, SUSPENSION SUBCUTANEOUS at 06:00

## 2020-08-04 RX ADMIN — OXYCODONE HYDROCHLORIDE 5 MILLIGRAM(S): 5 TABLET ORAL at 01:25

## 2020-08-04 RX ADMIN — Medication 2: at 23:11

## 2020-08-04 RX ADMIN — HUMAN INSULIN 10 UNIT(S): 100 INJECTION, SUSPENSION SUBCUTANEOUS at 17:07

## 2020-08-04 RX ADMIN — Medication 1 DROP(S): at 01:08

## 2020-08-04 NOTE — PROGRESS NOTE ADULT - ASSESSMENT
65 year old female with PMHx of DM, HTN, on  ASA presented with SDH requiring reevacuation    Q2hr neuro checks, protected sleep time   Keppra for seizure ppx increased 7/21 for possible seizures   Amantadine 100 mg po bid  EEG - no seizures seen  Oxys and Tylenol prn pain  TF via NGT  speech and swallow - failed  cont NPH and ISS 65 year old female with PMHx of DM, HTN, on  ASA presented with SDH requiring reevacuation    Q2hr neuro checks, protected sleep time   Keppra for seizure ppx 750mg bid  Amantadine 100 mg po bid  EEG - no seizures seen  Oxys and Tylenol prn pain  TF via NGT; consider PEG  speech and swallow - failed  cont NPH and ISS  CTH in AM   lower ext dopplers 65 year old female with PMHx of DM, HTN, on  ASA presented with SDH requiring reevacuation    Q2hr neuro checks, protected sleep time   Keppra for seizure ppx 750mg bid  Amantadine 100 mg po bid  EEG - no seizures seen  Oxys and Tylenol prn pain  TF via NGT; consider PEG  speech and swallow - failed  inc NPH to 15 units and ISS  CTH in AM   lower ext dopplers

## 2020-08-04 NOTE — PROGRESS NOTE ADULT - ASSESSMENT
ASSESSMENT:  65 year old female with PMHx of DM, HTN, on  ASAS 81 brought in by family for concern of HA, dizziness, multiple episodes of emesis s/p mechanical fall (trip over baby carriage). Pt walked into triage, however pt became lethargic and unarousable, brought back and intubated for airway protection and level 1 trauma activated.   7/26 S/P Lt Crani for SDH Evacuation  7/30 Extubated   7/31 RTOR for Evacuation of residual heme    PROCEDURE: 7/26 S/P Lt Crani for SDH Evacuation                       7/31 S/P Re-explor Crani for Evacuation of resiudal SDH    POD# 7/2    PLAN:  NEURO:  Q1 neuro checks   Keppra for seizure ppx increased 7/21 for possible seizures   Amantadine 100 mg po bid  EEG - no seizures seen  CT brain yesterday was stable  Oxys and Tylenol prn pain  Monitor drain outputs; will f/up with NSx about ?d/c drains  Activity: [x] OOB as tolerated [] Bedrest [x] PT [x] OT [] PMNR    PULM:  Extubated 7/30   Reextubated in OR 7/31    CV:  -160mmHg  HTN: Norvasc, enalapril    RENAL:  IVF: IVL  Hypokalemia, got Kphos     GI:  Diet: On glucerna TF  Failed S&S eval yesterday- will re-eval on Monday  GI prophylaxis [X] not indicated [] PPI [] other:  Bowel regimen [] colace [x] senna [x] other: miralax    ENDO:   Goal euglycemia (-180)  HBA1c 7.2   NPH 15Q6, HISS, monitor FS      HEME/ONC:  Hold VTE ppx as per NS team  VTE prophylaxis: [x] SCDs [] chemoprophylaxis [x] hold chemoprophylaxis due to: fresh post op [] high risk of DVT/PE on admission due to:    ID:  Afebrile    SOCIAL/FAMILY:  [x] awaiting [] updated at bedside [] family meeting    CODE STATUS:  [x] Full Code [] DNR [] DNI [] Palliative/Comfort Care    DISPOSITION:  [x] ICU [] Stroke Unit [] Floor [] EMU [] RCU [] PCU    [x] Patient is at high risk of neurologic deterioration/death due to: post op hemorrhage, brain compression ASSESSMENT:  65 year old female with PMHx of DM, HTN, on  ASAS 81 brought in by family for concern of HA, dizziness, multiple episodes of emesis s/p mechanical fall (trip over baby carriage). Pt walked into triage, however pt became lethargic and unarousable, brought back and intubated for airway protection and level 1 trauma activated.   7/26 S/P Lt Crani for SDH Evacuation  7/30 Extubated   7/31 RTOR for Evacuation of residual heme    PROCEDURE: 7/26 S/P Lt Crani for SDH Evacuation                       7/31 S/P Re-explor Crani for Evacuation of resiudal SDH    POD# 7/2    PLAN:  NEURO:  Q4 neuro checks   Keppra for seizure ppx increased 7/21 for possible seizures   Amantadine  EEG - no seizures seen  Oxys and Tylenol prn pain  Activity: [x] OOB as tolerated [] Bedrest [x] PT [x] OT [] PMNR    PULM:  Extubated 7/30   Reextubated in OR 7/31  Tolerating RA    CV:  -160mmHg  HTN: Norvasc, enalapril    RENAL:  IVF: IVL  Hypokalemia, got Kphos     GI:  Diet: On glucerna TF  Failed S&S eval yesterday- will re-eval on Monday  GI prophylaxis [X] not indicated [] PPI [] other:  Bowel regimen [] colace [x] senna [x] other: miralax    ENDO:   Goal euglycemia (-180)  HBA1c 7.2   NPH for glycemic control    HEME/ONC:  Hold VTE ppx as per NS team  VTE prophylaxis: [x] SCDs [] chemoprophylaxis per Dr. Harris    ID:  Monitor for fever    SOCIAL/FAMILY:  [x] awaiting [] updated at bedside [] family meeting    CODE STATUS:  [x] Full Code [] DNR [] DNI [] Palliative/Comfort Care

## 2020-08-04 NOTE — PROGRESS NOTE ADULT - SUBJECTIVE AND OBJECTIVE BOX
65 year old female with PMHx of DM, HTN, on  ASAS 81 brought in by family for concern of HA, dizziness, multiple episodes of emesis s/p mechanical fall (trip over baby carriage). Pt walked into triage, however pt became lethargic and unarousable, brought back and intubated for airway protection and level 1 trauma activated.   7/26 S/P Lt Crani for SDH Evacuation  7/31 RTOR for Evacuation of residual heme    OVERNIGHT EVENTS: Initially sleepy, but improved by the time     ICU Vital Signs Last 24 Hrs  T(C): 37 (04 Aug 2020 03:00), Max: 37.3 (03 Aug 2020 19:00)  T(F): 98.6 (04 Aug 2020 03:00), Max: 99.2 (03 Aug 2020 19:00)  HR: 92 (04 Aug 2020 06:00) (76 - 109)  BP: 120/71 (04 Aug 2020 06:00) (98/52 - 150/81)  BP(mean): 86 (04 Aug 2020 06:00) (66 - 102)  ABP: --  ABP(mean): --  RR: 12 (04 Aug 2020 06:00) (8 - 21)  SpO2: 97% (04 Aug 2020 06:00) (97% - 100%)      PHYSICAL EXAM:    General: Calm    Neurological: examined in Ukrainian at bedside, EO to voice, oriented x3, LUE 5/5, RUE 4/5, LLE 5/5, RLE 4+/5    Pulmonary: Clear to Auscultation, No Rales, No Rhonchi, No Wheezes     Cardiovascular: S1, S2, Regular Rate and Rhythm     Gastrointestinal: Soft, Nontender, Nondistended     Incision: dressing in place, clean and dry; +drains                                            8.1    8.18  )-----------( 261      ( 03 Aug 2020 23:14 )             25.1     08-03    137  |  101  |  15  ----------------------------<  231<H>  4.0   |  25  |  0.37<L>    Ca    8.6      03 Aug 2020 23:14  Phos  3.4     08-03  Mg     2.2     08-03        COVID-19 PCR: NotDetec (30 Jul 2020 20:06)      Culture - Blood (07.28.20 @ 15:07)    Specimen Source: .Blood Blood-Peripheral    Culture Results:   No growth to date.    Culture - Blood (07.28.20 @ 15:07)    Specimen Source: .Blood Blood-Peripheral    Culture Results:   No growth to date.    Culture - Bronchial (07.28.20 @ 15:05)    Gram Stain:   No polymorphonuclear cells seen per low power field  No squamous epithelial cells per low power field  No organisms seen per oil power field    Specimen Source: Bronch Wash Combicath    Culture Results:   No growth    CAPILLARY BLOOD GLUCOSE    POCT Blood Glucose.: 153 mg/dL (02 Aug 2020 05:54)  POCT Blood Glucose.: 114 mg/dL (01 Aug 2020 23:47)  POCT Blood Glucose.: 174 mg/dL (01 Aug 2020 18:34)  POCT Blood Glucose.: 125 mg/dL (01 Aug 2020 11:53)        I&O:   I&O's Detail    01 Aug 2020 07:01  -  02 Aug 2020 07:00  --------------------------------------------------------  IN:    Enteral Tube Flush: 400 mL    Glucerna: 440 mL    IV PiggyBack: 250 mL    sodium chloride 0.9% with potassium chloride 20 mEq/L: 1650 mL  Total IN: 2740 mL    OUT:    Drain: 7 mL    Drain: 18 mL    Intermittent Catheterization - Urethral: 2850 mL  Total OUT: 2875 mL    Total NET: -135 mL      DRAINS: HMV (18), ALMA (7)    MEDICATIONS  (STANDING):  amantadine 100 milliGRAM(s) Oral every 12 hours  amLODIPine   Tablet 5 milliGRAM(s) Oral daily  artificial tears (preservative free) Ophthalmic Solution 1 Drop(s) Both EYES every 4 hours  chlorhexidine 4% Liquid 1 Application(s) Topical <User Schedule>  insulin lispro (HumaLOG) corrective regimen sliding scale   SubCutaneous every 6 hours  insulin NPH human recombinant 8 Unit(s) SubCutaneous every 6 hours  levETIRAcetam  Solution 750 milliGRAM(s) Oral two times a day  polyethylene glycol 3350 17 Gram(s) Oral two times a day  senna 2 Tablet(s) Oral at bedtime    MEDICATIONS  (PRN):  acetaminophen    Suspension .. 650 milliGRAM(s) Oral every 6 hours PRN Temp greater or equal to 38C (100.4F), Mild Pain (1 - 3)  bisacodyl 5 milliGRAM(s) Oral every 12 hours PRN Constipation  labetalol Injectable 10 milliGRAM(s) IV Push every 6 hours PRN Systolic blood pressure > 160  oxyCODONE    IR 5 milliGRAM(s) Oral every 4 hours PRN Moderate Pain (4 - 6)    DIET: [] Regular [] CCD [] Renal [] Puree [] Dysphagia [X] Tube Feeds: Glucerna    IMAGING:     CT Head No Cont (08.01.20 @ 09:17)   Interval placement of left-sided subdural drain.  Decreased rightward midline shift, currently measuring 6 mm, previously measuring 9 mm.  No effacement of the basal cisterns. No hydrocephalus.      CT Head No Cont (07.31.20 @ 04:48)   Slight decrease mass effect on the left lateral ventricle with fluid hemorrhage and air appreciated status post left craniotomy        CT Head No Cont (07.30.20 @ 09:09)   Slightly increased size of the left frontal parietal subdural collection compared with 7/29/2020 with no change in mass effect and midline shift to the right.        CT Head No Cont (07.29.20 @ 08:14)   Increased mixed density extra-axial fluid and hemorrhage along the left convexity. Increased left-to-right midline shift now 1 cm. Small parafalcine and left tentorial subdural hemorrhage, unchanged.      CT Head No Cont (07.28.20 @ 08:48)   Decreased extra-axial air compared with the prior 7/26/2020. Status post left subdural evacuation with left-sided craniotomy. Postop changes with some residual air and hemorrhage remain. Stable midline shift roughly 7 mm to the right        CT Head No Cont (07.26.20 @ 01:25)   Status post evacuation of large left-sided holohemispheric subdural hematoma with expected postoperative changes.  Intervally decreased midline shift currently 0.7 cm previously 1.9 cm.  Additionally the previously visualized effacement of basal cisterns, and uncal herniation have improved.  Mildly increased interhemispheric subdural blood which may represent blood redistribution.      CT Head No Cont (07.25.20 @ 20:59)   Large left-sided acute holohemispheric subdural hematoma measuring up to 2.1 cm in greatest depth with rightwardsubfalcine and transtentorial herniation. 65 year old female with PMHx of DM, HTN, on  ASAS 81 brought in by family for concern of HA, dizziness, multiple episodes of emesis s/p mechanical fall (trip over baby carriage). Pt walked into triage, however pt became lethargic and unarousable, brought back and intubated for airway protection and level 1 trauma activated.   7/26 S/P Lt Crani for SDH Evacuation  7/31 RTOR for Evacuation of residual heme    OVERNIGHT EVENTS: Surgical drain d/c    ICU Vital Signs Last 24 Hrs  T(C): 37 (04 Aug 2020 03:00), Max: 37.3 (03 Aug 2020 19:00)  T(F): 98.6 (04 Aug 2020 03:00), Max: 99.2 (03 Aug 2020 19:00)  HR: 92 (04 Aug 2020 06:00) (76 - 109)  BP: 120/71 (04 Aug 2020 06:00) (98/52 - 150/81)  BP(mean): 86 (04 Aug 2020 06:00) (66 - 102)  ABP: --  ABP(mean): --  RR: 12 (04 Aug 2020 06:00) (8 - 21)  SpO2: 97% (04 Aug 2020 06:00) (97% - 100%)      PHYSICAL EXAM:    General: Calm  Neurological: examined in Mongolian at bedside, EO to voice, oriented x3, LUE 5/5, RUE 4/5, LLE 5/5, RLE 4+/5  Pulmonary: Clear to Auscultation, No Rales, No Rhonchi, No Wheezes   Cardiovascular: S1, S2, Regular Rate and Rhythm   Gastrointestinal: Soft, Nontender, Nondistended   Incision: dressing in place, clean and dry; +drains                                            8.1    8.18  )-----------( 261      ( 03 Aug 2020 23:14 )             25.1     08-03    137  |  101  |  15  ----------------------------<  231<H>  4.0   |  25  |  0.37<L>    Ca    8.6      03 Aug 2020 23:14  Phos  3.4     08-03  Mg     2.2     08-03        COVID-19 PCR: NotDetec (30 Jul 2020 20:06)      Culture - Blood (07.28.20 @ 15:07)    Specimen Source: .Blood Blood-Peripheral    Culture Results:   No growth to date.    Culture - Blood (07.28.20 @ 15:07)    Specimen Source: .Blood Blood-Peripheral    Culture Results:   No growth to date.    Culture - Bronchial (07.28.20 @ 15:05)    Gram Stain:   No polymorphonuclear cells seen per low power field  No squamous epithelial cells per low power field  No organisms seen per oil power field    Specimen Source: Bronch Wash Combicath    Culture Results:   No growth    CAPILLARY BLOOD GLUCOSE    POCT Blood Glucose.: 153 mg/dL (02 Aug 2020 05:54)  POCT Blood Glucose.: 114 mg/dL (01 Aug 2020 23:47)  POCT Blood Glucose.: 174 mg/dL (01 Aug 2020 18:34)  POCT Blood Glucose.: 125 mg/dL (01 Aug 2020 11:53)        I&O:   I&O's Detail    01 Aug 2020 07:01  -  02 Aug 2020 07:00  --------------------------------------------------------  IN:    Enteral Tube Flush: 400 mL    Glucerna: 440 mL    IV PiggyBack: 250 mL    sodium chloride 0.9% with potassium chloride 20 mEq/L: 1650 mL  Total IN: 2740 mL    OUT:    Drain: 7 mL    Drain: 18 mL    Intermittent Catheterization - Urethral: 2850 mL  Total OUT: 2875 mL    Total NET: -135 mL      DRAINS: HMV (18), ALMA (7)    MEDICATIONS  (STANDING):  amantadine 100 milliGRAM(s) Oral every 12 hours  amLODIPine   Tablet 5 milliGRAM(s) Oral daily  artificial tears (preservative free) Ophthalmic Solution 1 Drop(s) Both EYES every 4 hours  chlorhexidine 4% Liquid 1 Application(s) Topical <User Schedule>  insulin lispro (HumaLOG) corrective regimen sliding scale   SubCutaneous every 6 hours  insulin NPH human recombinant 8 Unit(s) SubCutaneous every 6 hours  levETIRAcetam  Solution 750 milliGRAM(s) Oral two times a day  polyethylene glycol 3350 17 Gram(s) Oral two times a day  senna 2 Tablet(s) Oral at bedtime    MEDICATIONS  (PRN):  acetaminophen    Suspension .. 650 milliGRAM(s) Oral every 6 hours PRN Temp greater or equal to 38C (100.4F), Mild Pain (1 - 3)  bisacodyl 5 milliGRAM(s) Oral every 12 hours PRN Constipation  labetalol Injectable 10 milliGRAM(s) IV Push every 6 hours PRN Systolic blood pressure > 160  oxyCODONE    IR 5 milliGRAM(s) Oral every 4 hours PRN Moderate Pain (4 - 6)    DIET: [] Regular [] CCD [] Renal [] Puree [] Dysphagia [X] Tube Feeds: Glucerna    IMAGING:     CT Head No Cont (08.01.20 @ 09:17)   Interval placement of left-sided subdural drain.  Decreased rightward midline shift, currently measuring 6 mm, previously measuring 9 mm.  No effacement of the basal cisterns. No hydrocephalus.      CT Head No Cont (07.31.20 @ 04:48)   Slight decrease mass effect on the left lateral ventricle with fluid hemorrhage and air appreciated status post left craniotomy        CT Head No Cont (07.30.20 @ 09:09)   Slightly increased size of the left frontal parietal subdural collection compared with 7/29/2020 with no change in mass effect and midline shift to the right.        CT Head No Cont (07.29.20 @ 08:14)   Increased mixed density extra-axial fluid and hemorrhage along the left convexity. Increased left-to-right midline shift now 1 cm. Small parafalcine and left tentorial subdural hemorrhage, unchanged.      CT Head No Cont (07.28.20 @ 08:48)   Decreased extra-axial air compared with the prior 7/26/2020. Status post left subdural evacuation with left-sided craniotomy. Postop changes with some residual air and hemorrhage remain. Stable midline shift roughly 7 mm to the right        CT Head No Cont (07.26.20 @ 01:25)   Status post evacuation of large left-sided holohemispheric subdural hematoma with expected postoperative changes.  Intervally decreased midline shift currently 0.7 cm previously 1.9 cm.  Additionally the previously visualized effacement of basal cisterns, and uncal herniation have improved.  Mildly increased interhemispheric subdural blood which may represent blood redistribution.      CT Head No Cont (07.25.20 @ 20:59)   Large left-sided acute holohemispheric subdural hematoma measuring up to 2.1 cm in greatest depth with rightwardsubfalcine and transtentorial herniation.

## 2020-08-04 NOTE — PROGRESS NOTE ADULT - SUBJECTIVE AND OBJECTIVE BOX
65 year old female with PMHx of DM, HTN, on  ASAS 81 brought in by family for concern of HA, dizziness, multiple episodes of emesis s/p mechanical fall (trip over baby carriage). Pt walked into triage, however pt became lethargic and unarousable, brought back and intubated for airway protection and level 1 trauma activated.   7/26 S/P Lt Crani for SDH Evacuation  7/31 RTOR for Evacuation of residual heme    Events:  waxing and waning exam  CTH with residual SDH    PHYSICAL EXAM: *Setswana speaking  General: Calm, laying in bed  HEENT: MMM  Neuro:  -Mental status- No acute distress, EO to voice  -CN- PERRL 3mm, EOMI, tongue midline, face symmetric  -Motor-  RUE 4/5  RLE 4/5  LUE and LLE 5/5  -Sensation- intact to LT   -Coordination- no dysmetria noted

## 2020-08-04 NOTE — PROGRESS NOTE ADULT - SUBJECTIVE AND OBJECTIVE BOX
PT is arousable to awake, follows commands and speaks in very soft tones.  JENSEN well.  No drift.  subdural ALMA removed and post pull this AM showed no change in subdural compartment, but there is mild expansion of the epidural fluid collection and slight increase in midline shift to 8 mm.  No uncal herniation, cisterns open.  Will obtain new scan.  Encourage OOB, Duplex LE.  Have stopped chemoppx as a result of scan results.

## 2020-08-05 LAB
GLUCOSE BLDC GLUCOMTR-MCNC: 154 MG/DL — HIGH (ref 70–99)
GLUCOSE BLDC GLUCOMTR-MCNC: 167 MG/DL — HIGH (ref 70–99)
GLUCOSE BLDC GLUCOMTR-MCNC: 226 MG/DL — HIGH (ref 70–99)
GLUCOSE BLDC GLUCOMTR-MCNC: 242 MG/DL — HIGH (ref 70–99)

## 2020-08-05 PROCEDURE — 99232 SBSQ HOSP IP/OBS MODERATE 35: CPT

## 2020-08-05 PROCEDURE — 70450 CT HEAD/BRAIN W/O DYE: CPT | Mod: 26

## 2020-08-05 PROCEDURE — 93970 EXTREMITY STUDY: CPT | Mod: 26

## 2020-08-05 RX ORDER — HUMAN INSULIN 100 [IU]/ML
18 INJECTION, SUSPENSION SUBCUTANEOUS EVERY 6 HOURS
Refills: 0 | Status: DISCONTINUED | OUTPATIENT
Start: 2020-08-05 | End: 2020-08-10

## 2020-08-05 RX ORDER — HEPARIN SODIUM 5000 [USP'U]/ML
5000 INJECTION INTRAVENOUS; SUBCUTANEOUS EVERY 12 HOURS
Refills: 0 | Status: DISCONTINUED | OUTPATIENT
Start: 2020-08-05 | End: 2020-08-09

## 2020-08-05 RX ADMIN — HEPARIN SODIUM 5000 UNIT(S): 5000 INJECTION INTRAVENOUS; SUBCUTANEOUS at 17:09

## 2020-08-05 RX ADMIN — Medication 200 MILLIGRAM(S): at 06:01

## 2020-08-05 RX ADMIN — Medication 4: at 05:23

## 2020-08-05 RX ADMIN — Medication 1 DROP(S): at 05:03

## 2020-08-05 RX ADMIN — Medication 1 DROP(S): at 13:24

## 2020-08-05 RX ADMIN — HUMAN INSULIN 18 UNIT(S): 100 INJECTION, SUSPENSION SUBCUTANEOUS at 17:10

## 2020-08-05 RX ADMIN — Medication 650 MILLIGRAM(S): at 20:45

## 2020-08-05 RX ADMIN — HUMAN INSULIN 15 UNIT(S): 100 INJECTION, SUSPENSION SUBCUTANEOUS at 05:22

## 2020-08-05 RX ADMIN — Medication 650 MILLIGRAM(S): at 20:15

## 2020-08-05 RX ADMIN — LEVETIRACETAM 750 MILLIGRAM(S): 250 TABLET, FILM COATED ORAL at 05:04

## 2020-08-05 RX ADMIN — OXYCODONE HYDROCHLORIDE 5 MILLIGRAM(S): 5 TABLET ORAL at 21:00

## 2020-08-05 RX ADMIN — OXYCODONE HYDROCHLORIDE 5 MILLIGRAM(S): 5 TABLET ORAL at 21:15

## 2020-08-05 RX ADMIN — Medication 650 MILLIGRAM(S): at 14:15

## 2020-08-05 RX ADMIN — Medication 4: at 12:06

## 2020-08-05 RX ADMIN — Medication 650 MILLIGRAM(S): at 15:00

## 2020-08-05 RX ADMIN — HUMAN INSULIN 18 UNIT(S): 100 INJECTION, SUSPENSION SUBCUTANEOUS at 12:06

## 2020-08-05 RX ADMIN — Medication 1 DROP(S): at 17:09

## 2020-08-05 RX ADMIN — POLYETHYLENE GLYCOL 3350 17 GRAM(S): 17 POWDER, FOR SOLUTION ORAL at 05:03

## 2020-08-05 RX ADMIN — Medication 100 MILLIGRAM(S): at 17:09

## 2020-08-05 RX ADMIN — HUMAN INSULIN 18 UNIT(S): 100 INJECTION, SUSPENSION SUBCUTANEOUS at 23:11

## 2020-08-05 RX ADMIN — Medication 650 MILLIGRAM(S): at 08:15

## 2020-08-05 RX ADMIN — Medication 1 DROP(S): at 09:40

## 2020-08-05 RX ADMIN — Medication 1 DROP(S): at 02:20

## 2020-08-05 RX ADMIN — Medication 650 MILLIGRAM(S): at 09:00

## 2020-08-05 RX ADMIN — Medication 1 DROP(S): at 21:07

## 2020-08-05 RX ADMIN — LEVETIRACETAM 750 MILLIGRAM(S): 250 TABLET, FILM COATED ORAL at 17:09

## 2020-08-05 RX ADMIN — CHLORHEXIDINE GLUCONATE 1 APPLICATION(S): 213 SOLUTION TOPICAL at 21:08

## 2020-08-05 RX ADMIN — Medication 2: at 17:10

## 2020-08-05 RX ADMIN — AMLODIPINE BESYLATE 5 MILLIGRAM(S): 2.5 TABLET ORAL at 05:04

## 2020-08-05 RX ADMIN — Medication 2: at 23:11

## 2020-08-05 NOTE — PROGRESS NOTE ADULT - ASSESSMENT
ASSESSMENT:  65 year old female with PMHx of DM, HTN, on  ASAS 81 brought in by family for concern of HA, dizziness, multiple episodes of emesis s/p mechanical fall (trip over baby carriage). Pt walked into triage, however pt became lethargic and unarousable, brought back and intubated for airway protection and level 1 trauma activated.   7/26 S/P Lt Crani for SDH Evacuation  7/30 Extubated   7/31 RTOR for Evacuation of residual heme    PROCEDURE: 7/26 S/P Lt Crani for SDH Evacuation                       7/31 S/P Re-explor Crani for Evacuation of resiudal SDH    POD# 7/2    PLAN:  NEURO:  head ct this morning:   Q4 neuro checks   Keppra for seizure ppx increased 7/21 for possible seizures   Amantadine  EEG - no seizures seen  Oxys and Tylenol prn pain  Activity: [x] OOB as tolerated [] Bedrest [x] PT [x] OT [] PMNR    PULM:  Extubated 7/30   Reextubated in OR 7/31  Tolerating RA    CV:  -160mmHg  HTN: Norvasc, enalapril    RENAL:  IVF: IVL  Hypokalemia, got Kphos     GI:  Diet: On glucerna TF  Failed S&S eval yesterday- will re-eval on Monday  GI prophylaxis [X] not indicated [] PPI [] other:  Bowel regimen [] colace [x] senna [x] other: miralax    ENDO:   Goal euglycemia (-180)  HBA1c 7.2   NPH for glycemic control    HEME/ONC:  Hold VTE ppx as per NS team  VTE prophylaxis: [x] SCDs [] chemoprophylaxis per Dr. Harris    ID:  Monitor for fever    SOCIAL/FAMILY:  [x] awaiting [] updated at bedside [] family meeting    CODE STATUS:  [x] Full Code [] DNR [] DNI [] Palliative/Comfort Care ASSESSMENT:  65 year old female with PMHx of DM, HTN, on  ASAS 81 brought in by family for concern of HA, dizziness, multiple episodes of emesis s/p mechanical fall (trip over baby carriage). Pt walked into triage, however pt became lethargic and unarousable, brought back and intubated for airway protection and level 1 trauma activated.   7/26 S/P Lt Crani for SDH Evacuation  7/30 Extubated   7/31 RTOR for Evacuation of residual heme    PROCEDURE: 7/26 S/P Lt Crani for SDH Evacuation                       7/31 S/P Re-explor Crani for Evacuation of resiudal SDH    POD# 7/2    PLAN:  NEURO:  head ct this morning for stability  Q4 neuro checks   Keppra for seizure ppx increased 7/21 for possible seizures   Amantadine  EEG - no seizures seen  Oxys and Tylenol prn pain  Activity: [x] OOB as tolerated [] Bedrest [x] PT [x] OT [] PMNR    PULM:  Extubated 7/30   Reextubated in OR 7/31  Tolerating RA    CV:  -160mmHg  HTN: Norvasc, enalapril    RENAL:  IVF: IVL  Hypokalemia, got Kphos     GI:  Diet: On glucerna TF  Failed S&S eval yesterday- will re-eval on Monday  GI prophylaxis [X] not indicated [] PPI [] other:  Bowel regimen [] colace [x] senna [x] other: miralax    ENDO:   Goal euglycemia (-180)  HBA1c 7.2   NPH for glycemic control    HEME/ONC:  Hold VTE ppx as per NS team  VTE prophylaxis: [x] SCDs [] chemoprophylaxis per Dr. Harris    ID:  Monitor for fever    SOCIAL/FAMILY:  [x] awaiting [] updated at bedside [] family meeting    CODE STATUS:  [x] Full Code [] DNR [] DNI [] Palliative/Comfort Care

## 2020-08-05 NOTE — CHART NOTE - NSCHARTNOTEFT_GEN_A_CORE
Nutrition Follow Up Note     Patient seen for: nutrition follow up on ICU    Source: patient, medical record, communication with team.     Chart reviewed, events noted. Pt is a 64 yo F with PMH: DM, HTN; brought in by family for concern of HA, dizziness, multiple episodes of emesis s/p mechanical fall. Became lethargic in ED; intubated for airway protection. Noted with a traumatic acute SDH, midline shift, brain compression s/p craniotomy and evacuation. Pt extubated (7/30). SG and ALMA drained removed (7/30).     Interim events:  (7/31): Pt taken back to the OR for left craniotomy for reexploration of subdural hematoma.   (8/3): Pt seen for by SLP for swallow evaluation. Per note, recommendations for pt to continue NPO status with non-oral means of nutrition/hydration/medications. Plan for re-evaluation on Monday 8/10.  (8/4): NPH increased to 15u + Humulin to promote optimal glycemic control.    Diet Order: Diet, NPO:   Tube Feeding Modality: Nasogastric  Glucerna 1.2 Regino (GLUCERNARTH)  Total Volume for 24 Hours (mL): 1200  Continuous  Starting Tube Feed Rate {mL per Hour}: 20  Increase Tube Feed Rate by (mL): 10     Every 4 hours  Until Goal Tube Feed Rate (mL per Hour): 50  Tube Feed Duration (in Hours): 24  Tube Feed Start Time: 11:30 (08-01-20 @ 09:34)    CURRENT EN ORDER PROVIDES: 1200ml, 1440kcal and 72g protein.     EN provision: (per nursing flow sheets):   (8/5): 450ml (thus far)  (8/4): 1200ml (100% of goal)  (8/3): 900ml (75% of goal; EN held for pulled NGT)  (8/2): 1070ml (89% of goal; EN titrating up towards goal rate)  (8/1): 120ml (10% of goal; EN resumed; titrating up towards goal rate)    Last BM (8/4): x 1; (8/3): x 4; (8/2): x 1. On bowel regimen as ordered (senna, Miralax).     Anthropometric Measurements:   Height (cm): 158 (07-31-20 @ 07:33)  Weight (kg): 50.8 (07-31-20 @ 07:33)  BMI (kg/m2): 20.3 (07-31-20 @ 07:33)    Medications: MEDICATIONS  (STANDING):  amantadine 200 milliGRAM(s) Oral <User Schedule>  amantadine 100 milliGRAM(s) Oral <User Schedule>  amLODIPine   Tablet 5 milliGRAM(s) Oral daily  artificial tears (preservative free) Ophthalmic Solution 1 Drop(s) Both EYES every 4 hours  chlorhexidine 4% Liquid 1 Application(s) Topical <User Schedule>  insulin lispro (HumaLOG) corrective regimen sliding scale   SubCutaneous every 6 hours  insulin NPH human recombinant 15 Unit(s) SubCutaneous every 6 hours  levETIRAcetam  Solution 750 milliGRAM(s) Oral two times a day  polyethylene glycol 3350 17 Gram(s) Oral two times a day  senna 2 Tablet(s) Oral at bedtime    MEDICATIONS  (PRN):  acetaminophen    Suspension .. 650 milliGRAM(s) Oral every 6 hours PRN Temp greater or equal to 38C (100.4F), Mild Pain (1 - 3)  bisacodyl 5 milliGRAM(s) Oral every 12 hours PRN Constipation  labetalol Injectable 10 milliGRAM(s) IV Push every 6 hours PRN Systolic blood pressure > 160  oxyCODONE    IR 5 milliGRAM(s) Oral every 4 hours PRN Moderate Pain (4 - 6)    Labs:     POCT Blood Glucose.: 226 mg/dL (08-05-20 @ 05:17)  POCT Blood Glucose.: 179 mg/dL (08-04-20 @ 23:06)  POCT Blood Glucose.: 222 mg/dL (08-04-20 @ 17:02)  POCT Blood Glucose.: 247 mg/dL (08-04-20 @ 11:08)    Skin per nursing documentation: no pressure injuries documented; surgical incision left crani 7/26/20;   Edema: none noted    Estimated Needs: (dosing wt 112 lbs/50.8kg)  Energy: (25-30kcal/kg): 1270-1524kcal  Protein: (1.4-1.6g protein/kg): 71-81g protein    Previous Nutrition Diagnosis: Increased nutrient needs  Nutrition Diagnosis is: Remains appropriate, ongoing with provision of EN (however held at this time s/p extubation).   New Nutrition Diagnosis:      Recommended Interventions:   1. Recommend increase EN feeds to Glucerna 1.2 at 55ml/hr x 24 hrs. To provide: 1320ml, 1584kcal and 79g protein. Based on dosing wt 50.8kg, provides: 31kcal/kg and 1.56g protein/kg.   2. Diet advancement as per discretion of medical team, SLP. If PO diet initiated, consider consistent carbohydrate diet.   3. Monitor GI tolerance. RD to remain available to adjust EN formulary, volume/rate PRN.   4.       Monitoring and Evaluation:   Continue to monitor nutrition provision and tolerance, weights, labs, skin integrity.   RD remains available upon request and will follow up per protocol.    Alison Kleiner RD, [facility] ([phone]) Nutrition Follow Up Note     Patient seen for: nutrition follow up on ICU    Source: patient, medical record, communication with team.     Chart reviewed, events noted. Pt is a 66 yo F with PMH: DM, HTN; brought in by family for concern of HA, dizziness, multiple episodes of emesis s/p mechanical fall. Became lethargic in ED; intubated for airway protection. Noted with a traumatic acute SDH, midline shift, brain compression s/p craniotomy and evacuation. Pt extubated (7/30). SG and ALMA drained removed (7/30).     Interim events:  (7/31): Pt taken back to the OR for left craniotomy for reexploration of subdural hematoma.   (8/3): Pt seen for by SLP for swallow evaluation. Per note, recommendations for pt to continue NPO status with non-oral means of nutrition/hydration/medications. Plan for re-evaluation on Monday 8/10.  (8/4): NPH increased to 15u + Humulin to promote optimal glycemic control.    Diet Order: Diet, NPO:   Tube Feeding Modality: Nasogastric  Glucerna 1.2 Regino (GLUCERNARTH)  Total Volume for 24 Hours (mL): 1200  Continuous  Starting Tube Feed Rate {mL per Hour}: 20  Increase Tube Feed Rate by (mL): 10     Every 4 hours  Until Goal Tube Feed Rate (mL per Hour): 50  Tube Feed Duration (in Hours): 24  Tube Feed Start Time: 11:30 (08-01-20 @ 09:34)    CURRENT EN ORDER PROVIDES: 1200ml, 1440kcal and 72g protein.     EN provision: (per nursing flow sheets):   (8/5): 450ml (thus far)  (8/4): 1200ml (100% of goal)  (8/3): 900ml (75% of goal; EN held for pulled NGT)  (8/2): 1070ml (89% of goal; EN titrating up towards goal rate)  (8/1): 120ml (10% of goal; EN resumed; titrating up towards goal rate)    Last BM (8/4): x 1; (8/3): x 4; (8/2): x 1. On bowel regimen as ordered (senna, Miralax).     Anthropometric Measurements:   Height (cm): 158 (07-31-20 @ 07:33)  Weight (kg): 50.8 (07-31-20 @ 07:33)  BMI (kg/m2): 20.3 (07-31-20 @ 07:33)    Medications: MEDICATIONS  (STANDING):  amantadine 200 milliGRAM(s) Oral <User Schedule>  amantadine 100 milliGRAM(s) Oral <User Schedule>  amLODIPine   Tablet 5 milliGRAM(s) Oral daily  artificial tears (preservative free) Ophthalmic Solution 1 Drop(s) Both EYES every 4 hours  chlorhexidine 4% Liquid 1 Application(s) Topical <User Schedule>  insulin lispro (HumaLOG) corrective regimen sliding scale   SubCutaneous every 6 hours  insulin NPH human recombinant 15 Unit(s) SubCutaneous every 6 hours  levETIRAcetam  Solution 750 milliGRAM(s) Oral two times a day  polyethylene glycol 3350 17 Gram(s) Oral two times a day  senna 2 Tablet(s) Oral at bedtime    MEDICATIONS  (PRN):  acetaminophen    Suspension .. 650 milliGRAM(s) Oral every 6 hours PRN Temp greater or equal to 38C (100.4F), Mild Pain (1 - 3)  bisacodyl 5 milliGRAM(s) Oral every 12 hours PRN Constipation  labetalol Injectable 10 milliGRAM(s) IV Push every 6 hours PRN Systolic blood pressure > 160  oxyCODONE    IR 5 milliGRAM(s) Oral every 4 hours PRN Moderate Pain (4 - 6)    Labs:     POCT Blood Glucose.: 226 mg/dL (08-05-20 @ 05:17)  POCT Blood Glucose.: 179 mg/dL (08-04-20 @ 23:06)  POCT Blood Glucose.: 222 mg/dL (08-04-20 @ 17:02)  POCT Blood Glucose.: 247 mg/dL (08-04-20 @ 11:08)    Skin per nursing documentation: no pressure injuries documented; surgical incision left crani 7/26/20;   Edema: none noted    Estimated Needs: (dosing wt 112 lbs/50.8kg)  Energy: (25-30kcal/kg): 1270-1524kcal  Protein: (1.4-1.6g protein/kg): 71-81g protein    Previous Nutrition Diagnosis: Increased nutrient needs  Nutrition Diagnosis is: Remains appropriate, ongoing with provision of EN (however held at this time s/p extubation).   New Nutrition Diagnosis:      Recommended Interventions:   1. Recommend increase EN feeds to Glucerna 1.2 at 55ml/hr x 24 hrs. To provide: 1320ml, 1584kcal and 79g protein. Based on dosing wt 50.8kg, provides: 31kcal/kg and 1.56g protein/kg.   2. Diet advancement as per discretion of medical team, SLP. If PO diet initiated, consider consistent carbohydrate diet.   3. Monitor GI tolerance. RD to remain available to adjust EN formulary, volume/rate PRN.   4. Monitor wt trends, nutrition related labs, skin integrity, hydration status and bowel regularity.      Monitoring and Evaluation:   Continue to monitor nutrition provision and tolerance, weights, labs, skin integrity.   RD remains available upon request and will follow up per protocol.    Alison Kleiner RD, [facility] ([phone])

## 2020-08-05 NOTE — CHART NOTE - NSCHARTNOTEFT_GEN_A_CORE
64 yo female with PMHx DM, HTN, on asa81 brought in by family fro concern of HA, dizziness, multiple episodes of emesis s/p mechanical fall 2 hours PTA (trip over baby carriage). Pt walked into triage, however pt became lethargic and unarousable, brought to CC room and intubated for airway protection and level 1 trauma activated. 7/25: CTH: Large left-sided acute holohemispheric subdural hematoma measuring up to 2.1 cm in greatest depth with rightward subfalcine and transtentorial herniation. 7/26: s/p craniotomy and evacuation. 7/30: CT today shows increased size of subdural collection. s/p extubation. 7/31: Overnight stopped following commands, ct head done which showed stable left subdural collection with mid line shift and brain compression, taken back to the OR for left craniotomy for reexploration of SDH.    This service is following up to re-assess for readiness to introduce PO intake. Pt A&Ox3, spO2%  on room air, able to follow simple commands and verbally responsive to simple questions. Pt is primarily Divehi speaking and RN Abel was present bedside to serve as . Pt vocal quality hypophonic with reduced loudness. No oral structure abnormalities were seen and facial symmetry WNL. Lingual strength mildly reduced for protrusion tasks. Pt continues to present with white coating on lingual surface. She was brought to an upright position for safe administration of PO trials. Pt with eye closing throughout assessment and required max verbal prompts to maintain eye opening for brief periods of time. Despite eye closing, pt responsive to tasks. Pt with improved orientation to feeding task and acceptance of PO from previous assessment.     Trials administered included 1/4-1/2 tsp amounts of crushed ice chips and 1/2 to full tsp amount of honey thick liquid. Pt fed by clinician and continues to present with an oropharyngeal dysphagia marked by delayed oral transit time and suspected posterior loss prior to initiation of swallow trigger resulting in overt s/s penetration/aspiration for honey thick liquid via teaspoon (immediate and delayed throat clearing). Pt educated regarding role of slp, bedside swallow evaluation findings and recommendations. Discussed with TANNER Hernandez who was in agreement with proposed plan.    1. Continue NPO, with non-oral nutrition/hydration/medications.   2. Strict reflux/aspiration precautions, and for enteral feeds.  3. This service will follow up while patient is in house to assess for improved swallow functions pending improved mental status.     Ruth Fernández MA, CCC-SLP  Speech-Language Pathologist  Contact Information: [phone]. 64 yo female with PMHx DM, HTN, on asa81 brought in by family fro concern of HA, dizziness, multiple episodes of emesis s/p mechanical fall 2 hours PTA (trip over baby carriage). Pt walked into triage, however pt became lethargic and unarousable, brought to CC room and intubated for airway protection and level 1 trauma activated. 7/25: CTH: Large left-sided acute holohemispheric subdural hematoma measuring up to 2.1 cm in greatest depth with rightward subfalcine and transtentorial herniation. 7/26: s/p craniotomy and evacuation. 7/30: CT today shows increased size of subdural collection. s/p extubation. 7/31: Overnight stopped following commands, ct head done which showed stable left subdural collection with mid line shift and brain compression, taken back to the OR for left craniotomy for reexploration of SDH.    This service is following up to re-assess for readiness to introduce PO intake. Pt A&Ox3, spO2%  on room air, able to follow simple commands and verbally responsive to simple questions. Pt is primarily Frisian speaking and RN Abel was present bedside to serve as . Pt vocal quality hypophonic with reduced loudness. No oral structure abnormalities were seen and facial symmetry WNL. Lingual strength mildly reduced for protrusion tasks. Pt continues to present with white coating on lingual surface. She was brought to an upright position for safe administration of PO trials. Pt with eye closing throughout assessment and required max verbal prompts to maintain eye opening for brief periods of time. Despite eye closing, pt responsive to tasks. Pt with improved orientation to feeding task and acceptance of PO from previous assessment.     Trials administered included 1/4-1/2 tsp amounts of crushed ice chips and 1/2 to full tsp amount of honey thick liquid. Pt fed by clinician and continues to present with an oropharyngeal dysphagia marked by delayed oral transit time and suspected posterior loss prior to initiation of swallow trigger resulting in overt s/s penetration/aspiration for honey thick liquid via teaspoon (immediate and delayed throat clearing). Pt educated regarding role of slp, bedside swallow evaluation findings and recommendations. Discussed with MD Lema who was in agreement with proposed plan.    1. Continue NPO, with non-oral nutrition/hydration/medications.   2. Strict reflux/aspiration precautions, and for enteral feeds.  3. This service will follow up while patient is in house to assess for improved swallow functions pending improved mental status.     Ruth Fernández MA, CCC-SLP  Speech-Language Pathologist  Contact Information: [phone].

## 2020-08-05 NOTE — PHARMACOTHERAPY INTERVENTION NOTE - COMMENTS
Patient's NPH was increased to 15 units every 6 hour yesterday . Patient received 2 doses of Humalog coverage at 2-4 units. Provider approved to increase Insulin NPH to 18 units every 6 hour and titrate as needed

## 2020-08-05 NOTE — PROGRESS NOTE ADULT - SUBJECTIVE AND OBJECTIVE BOX
65 year old female with PMHx of DM, HTN, on  ASAS 81 brought in by family for concern of HA, dizziness, multiple episodes of emesis s/p mechanical fall (trip over baby carriage). Pt walked into triage, however pt became lethargic and unarousable, brought back and intubated for airway protection and level 1 trauma activated.   7/26 S/P Lt Crani for SDH Evacuation  7/31 RTOR for Evacuation of residual heme    OVERNIGHT EVENTS:     ICU Vital Signs Last 24 Hrs  T(C): 37.1 (05 Aug 2020 05:00), Max: 37.1 (04 Aug 2020 11:00)  T(F): 98.8 (05 Aug 2020 05:00), Max: 98.8 (04 Aug 2020 11:00)  HR: 80 (05 Aug 2020 03:00) (80 - 110)  BP: 106/63 (05 Aug 2020 03:00) (106/63 - 148/75)  BP(mean): 77 (05 Aug 2020 03:00) (76 - 96)  ABP: --  ABP(mean): --  RR: 12 (05 Aug 2020 03:00) (12 - 19)  SpO2: 99% (05 Aug 2020 03:00) (97% - 100%)      PHYSICAL EXAM:    General: Calm  Neurological: examined in Spanish at bedside, EO to voice, oriented x3, LUE 5/5, RUE 4/5, LLE 5/5, RLE 4+/5  Pulmonary: Clear to Auscultation, No Rales, No Rhonchi, No Wheezes   Cardiovascular: S1, S2, Regular Rate and Rhythm   Gastrointestinal: Soft, Nontender, Nondistended   Incision: dressing in place, clean and dry; +drains                                                         8.1    8.18  )-----------( 261      ( 03 Aug 2020 23:14 )             25.1   08-03    137  |  101  |  15  ----------------------------<  231<H>  4.0   |  25  |  0.37<L>    Ca    8.6      03 Aug 2020 23:14  Phos  3.4     08-03  Mg     2.2     08-03          COVID-19 PCR: NotDetec (30 Jul 2020 20:06)      Culture - Blood (07.28.20 @ 15:07)    Specimen Source: .Blood Blood-Peripheral    Culture Results:   No growth to date.    Culture - Blood (07.28.20 @ 15:07)    Specimen Source: .Blood Blood-Peripheral    Culture Results:   No growth to date.    Culture - Bronchial (07.28.20 @ 15:05)    Gram Stain:   No polymorphonuclear cells seen per low power field  No squamous epithelial cells per low power field  No organisms seen per oil power field    Specimen Source: Bronch Wash Combicath    Culture Results:   No growth    CAPILLARY BLOOD GLUCOSE    POCT Blood Glucose.: 153 mg/dL (02 Aug 2020 05:54)  POCT Blood Glucose.: 114 mg/dL (01 Aug 2020 23:47)  POCT Blood Glucose.: 174 mg/dL (01 Aug 2020 18:34)  POCT Blood Glucose.: 125 mg/dL (01 Aug 2020 11:53)        I&O:   I&O's Detail    01 Aug 2020 07:01  -  02 Aug 2020 07:00  --------------------------------------------------------  IN:    Enteral Tube Flush: 400 mL    Glucerna: 440 mL    IV PiggyBack: 250 mL    sodium chloride 0.9% with potassium chloride 20 mEq/L: 1650 mL  Total IN: 2740 mL    OUT:    Drain: 7 mL    Drain: 18 mL    Intermittent Catheterization - Urethral: 2850 mL  Total OUT: 2875 mL    Total NET: -135 mL      DRAINS: HMV (18), ALMA (7)  MEDICATIONS  (STANDING):  amantadine 200 milliGRAM(s) Oral <User Schedule>  amantadine 100 milliGRAM(s) Oral <User Schedule>  amLODIPine   Tablet 5 milliGRAM(s) Oral daily  artificial tears (preservative free) Ophthalmic Solution 1 Drop(s) Both EYES every 4 hours  chlorhexidine 4% Liquid 1 Application(s) Topical <User Schedule>  insulin lispro (HumaLOG) corrective regimen sliding scale   SubCutaneous every 6 hours  insulin NPH human recombinant 15 Unit(s) SubCutaneous every 6 hours  levETIRAcetam  Solution 750 milliGRAM(s) Oral two times a day  polyethylene glycol 3350 17 Gram(s) Oral two times a day  senna 2 Tablet(s) Oral at bedtime    MEDICATIONS  (PRN):  acetaminophen    Suspension .. 650 milliGRAM(s) Oral every 6 hours PRN Temp greater or equal to 38C (100.4F), Mild Pain (1 - 3)  bisacodyl 5 milliGRAM(s) Oral every 12 hours PRN Constipation  labetalol Injectable 10 milliGRAM(s) IV Push every 6 hours PRN Systolic blood pressure > 160  oxyCODONE    IR 5 milliGRAM(s) Oral every 4 hours PRN Moderate Pain (4 - 6)    DIET: [] Regular [] CCD [] Renal [] Puree [] Dysphagia [X] Tube Feeds: Glucerna    IMAGING:     CT Head No Cont (08.01.20 @ 09:17)   Interval placement of left-sided subdural drain.  Decreased rightward midline shift, currently measuring 6 mm, previously measuring 9 mm.  No effacement of the basal cisterns. No hydrocephalus.      CT Head No Cont (07.31.20 @ 04:48)   Slight decrease mass effect on the left lateral ventricle with fluid hemorrhage and air appreciated status post left craniotomy        CT Head No Cont (07.30.20 @ 09:09)   Slightly increased size of the left frontal parietal subdural collection compared with 7/29/2020 with no change in mass effect and midline shift to the right.        CT Head No Cont (07.29.20 @ 08:14)   Increased mixed density extra-axial fluid and hemorrhage along the left convexity. Increased left-to-right midline shift now 1 cm. Small parafalcine and left tentorial subdural hemorrhage, unchanged.      CT Head No Cont (07.28.20 @ 08:48)   Decreased extra-axial air compared with the prior 7/26/2020. Status post left subdural evacuation with left-sided craniotomy. Postop changes with some residual air and hemorrhage remain. Stable midline shift roughly 7 mm to the right        CT Head No Cont (07.26.20 @ 01:25)   Status post evacuation of large left-sided holohemispheric subdural hematoma with expected postoperative changes.  Intervally decreased midline shift currently 0.7 cm previously 1.9 cm.  Additionally the previously visualized effacement of basal cisterns, and uncal herniation have improved.  Mildly increased interhemispheric subdural blood which may represent blood redistribution.      CT Head No Cont (07.25.20 @ 20:59)   Large left-sided acute holohemispheric subdural hematoma measuring up to 2.1 cm in greatest depth with rightwardsubfalcine and transtentorial herniation. 65 year old female with PMHx of DM, HTN, on  ASAS 81 brought in by family for concern of HA, dizziness, multiple episodes of emesis s/p mechanical fall (trip over baby carriage). Pt walked into triage, however pt became lethargic and unarousable, brought back and intubated for airway protection and level 1 trauma activated.   7/26 S/P Lt Crani for SDH Evacuation  7/31 RTOR for Evacuation of residual heme    OVERNIGHT EVENTS:   Amantadine increased.     ICU Vital Signs Last 24 Hrs  T(C): 37.1 (05 Aug 2020 05:00), Max: 37.1 (04 Aug 2020 11:00)  T(F): 98.8 (05 Aug 2020 05:00), Max: 98.8 (04 Aug 2020 11:00)  HR: 80 (05 Aug 2020 03:00) (80 - 110)  BP: 106/63 (05 Aug 2020 03:00) (106/63 - 148/75)  BP(mean): 77 (05 Aug 2020 03:00) (76 - 96)  ABP: --  ABP(mean): --  RR: 12 (05 Aug 2020 03:00) (12 - 19)  SpO2: 99% (05 Aug 2020 03:00) (97% - 100%)      PHYSICAL EXAM:    General: Calm  Neurological: examined in French at bedside, EO to voice, oriented x3, hypophonic, dysarthric, LUE 5/5, RUE 4/5, LLE 5/5, RLE 4+/5  Pulmonary: Clear to Auscultation, No Rales, No Rhonchi, No Wheezes   Cardiovascular: S1, S2, Regular Rate and Rhythm   Gastrointestinal: Soft, Nontender, Nondistended   Incision: dressing in place, clean and dry; +drains                                                         8.1    8.18  )-----------( 261      ( 03 Aug 2020 23:14 )             25.1   08-03    137  |  101  |  15  ----------------------------<  231<H>  4.0   |  25  |  0.37<L>    Ca    8.6      03 Aug 2020 23:14  Phos  3.4     08-03  Mg     2.2     08-03          COVID-19 PCR: NotDetec (30 Jul 2020 20:06)      Culture - Blood (07.28.20 @ 15:07)    Specimen Source: .Blood Blood-Peripheral    Culture Results:   No growth to date.    Culture - Blood (07.28.20 @ 15:07)    Specimen Source: .Blood Blood-Peripheral    Culture Results:   No growth to date.    Culture - Bronchial (07.28.20 @ 15:05)    Gram Stain:   No polymorphonuclear cells seen per low power field  No squamous epithelial cells per low power field  No organisms seen per oil power field    Specimen Source: Bronch Wash Combicath    Culture Results:   No growth    CAPILLARY BLOOD GLUCOSE    POCT Blood Glucose.: 153 mg/dL (02 Aug 2020 05:54)  POCT Blood Glucose.: 114 mg/dL (01 Aug 2020 23:47)  POCT Blood Glucose.: 174 mg/dL (01 Aug 2020 18:34)  POCT Blood Glucose.: 125 mg/dL (01 Aug 2020 11:53)        I&O:   I&O's Detail    01 Aug 2020 07:01  -  02 Aug 2020 07:00  --------------------------------------------------------  IN:    Enteral Tube Flush: 400 mL    Glucerna: 440 mL    IV PiggyBack: 250 mL    sodium chloride 0.9% with potassium chloride 20 mEq/L: 1650 mL  Total IN: 2740 mL    OUT:    Drain: 7 mL    Drain: 18 mL    Intermittent Catheterization - Urethral: 2850 mL  Total OUT: 2875 mL    Total NET: -135 mL      DRAINS: HMV (18), ALMA (7)  MEDICATIONS  (STANDING):  amantadine 200 milliGRAM(s) Oral <User Schedule>  amantadine 100 milliGRAM(s) Oral <User Schedule>  amLODIPine   Tablet 5 milliGRAM(s) Oral daily  artificial tears (preservative free) Ophthalmic Solution 1 Drop(s) Both EYES every 4 hours  chlorhexidine 4% Liquid 1 Application(s) Topical <User Schedule>  insulin lispro (HumaLOG) corrective regimen sliding scale   SubCutaneous every 6 hours  insulin NPH human recombinant 15 Unit(s) SubCutaneous every 6 hours  levETIRAcetam  Solution 750 milliGRAM(s) Oral two times a day  polyethylene glycol 3350 17 Gram(s) Oral two times a day  senna 2 Tablet(s) Oral at bedtime    MEDICATIONS  (PRN):  acetaminophen    Suspension .. 650 milliGRAM(s) Oral every 6 hours PRN Temp greater or equal to 38C (100.4F), Mild Pain (1 - 3)  bisacodyl 5 milliGRAM(s) Oral every 12 hours PRN Constipation  labetalol Injectable 10 milliGRAM(s) IV Push every 6 hours PRN Systolic blood pressure > 160  oxyCODONE    IR 5 milliGRAM(s) Oral every 4 hours PRN Moderate Pain (4 - 6)    DIET: [] Regular [] CCD [] Renal [] Puree [] Dysphagia [X] Tube Feeds: Glucerna    IMAGING:     CT Head No Cont (08.01.20 @ 09:17)   Interval placement of left-sided subdural drain.  Decreased rightward midline shift, currently measuring 6 mm, previously measuring 9 mm.  No effacement of the basal cisterns. No hydrocephalus.      CT Head No Cont (07.31.20 @ 04:48)   Slight decrease mass effect on the left lateral ventricle with fluid hemorrhage and air appreciated status post left craniotomy        CT Head No Cont (07.30.20 @ 09:09)   Slightly increased size of the left frontal parietal subdural collection compared with 7/29/2020 with no change in mass effect and midline shift to the right.        CT Head No Cont (07.29.20 @ 08:14)   Increased mixed density extra-axial fluid and hemorrhage along the left convexity. Increased left-to-right midline shift now 1 cm. Small parafalcine and left tentorial subdural hemorrhage, unchanged.      CT Head No Cont (07.28.20 @ 08:48)   Decreased extra-axial air compared with the prior 7/26/2020. Status post left subdural evacuation with left-sided craniotomy. Postop changes with some residual air and hemorrhage remain. Stable midline shift roughly 7 mm to the right        CT Head No Cont (07.26.20 @ 01:25)   Status post evacuation of large left-sided holohemispheric subdural hematoma with expected postoperative changes.  Intervally decreased midline shift currently 0.7 cm previously 1.9 cm.  Additionally the previously visualized effacement of basal cisterns, and uncal herniation have improved.  Mildly increased interhemispheric subdural blood which may represent blood redistribution.      CT Head No Cont (07.25.20 @ 20:59)   Large left-sided acute holohemispheric subdural hematoma measuring up to 2.1 cm in greatest depth with rightwardsubfalcine and transtentorial herniation.

## 2020-08-05 NOTE — PROGRESS NOTE ADULT - SUBJECTIVE AND OBJECTIVE BOX
Pt seen earlier today.  She was arousable, following commands and nodding to question.  JENSEN.  CT today was stable.  Can start sq hep.  Amantadine had been decreased.  Can start 5 mg Ritalin in AM if HR <100.  MRI in 2 days.

## 2020-08-06 DIAGNOSIS — R13.10 DYSPHAGIA, UNSPECIFIED: ICD-10-CM

## 2020-08-06 LAB
ANION GAP SERPL CALC-SCNC: 14 MMOL/L — SIGNIFICANT CHANGE UP (ref 5–17)
BUN SERPL-MCNC: 20 MG/DL — SIGNIFICANT CHANGE UP (ref 7–23)
CALCIUM SERPL-MCNC: 9.2 MG/DL — SIGNIFICANT CHANGE UP (ref 8.4–10.5)
CHLORIDE SERPL-SCNC: 101 MMOL/L — SIGNIFICANT CHANGE UP (ref 96–108)
CO2 SERPL-SCNC: 27 MMOL/L — SIGNIFICANT CHANGE UP (ref 22–31)
CREAT SERPL-MCNC: 0.41 MG/DL — LOW (ref 0.5–1.3)
GLUCOSE BLDC GLUCOMTR-MCNC: 100 MG/DL — HIGH (ref 70–99)
GLUCOSE BLDC GLUCOMTR-MCNC: 108 MG/DL — HIGH (ref 70–99)
GLUCOSE BLDC GLUCOMTR-MCNC: 94 MG/DL — SIGNIFICANT CHANGE UP (ref 70–99)
GLUCOSE SERPL-MCNC: 106 MG/DL — HIGH (ref 70–99)
HCT VFR BLD CALC: 33 % — LOW (ref 34.5–45)
HGB BLD-MCNC: 10.1 G/DL — LOW (ref 11.5–15.5)
MAGNESIUM SERPL-MCNC: 2.5 MG/DL — SIGNIFICANT CHANGE UP (ref 1.6–2.6)
MCHC RBC-ENTMCNC: 26.3 PG — LOW (ref 27–34)
MCHC RBC-ENTMCNC: 30.6 GM/DL — LOW (ref 32–36)
MCV RBC AUTO: 85.9 FL — SIGNIFICANT CHANGE UP (ref 80–100)
NRBC # BLD: 0 /100 WBCS — SIGNIFICANT CHANGE UP (ref 0–0)
PHOSPHATE SERPL-MCNC: 4.1 MG/DL — SIGNIFICANT CHANGE UP (ref 2.5–4.5)
PLATELET # BLD AUTO: 348 K/UL — SIGNIFICANT CHANGE UP (ref 150–400)
POTASSIUM SERPL-MCNC: 3.7 MMOL/L — SIGNIFICANT CHANGE UP (ref 3.5–5.3)
POTASSIUM SERPL-SCNC: 3.7 MMOL/L — SIGNIFICANT CHANGE UP (ref 3.5–5.3)
RBC # BLD: 3.84 M/UL — SIGNIFICANT CHANGE UP (ref 3.8–5.2)
RBC # FLD: 16.9 % — HIGH (ref 10.3–14.5)
SODIUM SERPL-SCNC: 142 MMOL/L — SIGNIFICANT CHANGE UP (ref 135–145)
WBC # BLD: 12.48 K/UL — HIGH (ref 3.8–10.5)
WBC # FLD AUTO: 12.48 K/UL — HIGH (ref 3.8–10.5)

## 2020-08-06 PROCEDURE — 71045 X-RAY EXAM CHEST 1 VIEW: CPT | Mod: 26,76

## 2020-08-06 PROCEDURE — 43752 NASAL/OROGASTRIC W/TUBE PLMT: CPT

## 2020-08-06 PROCEDURE — 71045 X-RAY EXAM CHEST 1 VIEW: CPT | Mod: 26,77,76

## 2020-08-06 PROCEDURE — 99233 SBSQ HOSP IP/OBS HIGH 50: CPT

## 2020-08-06 PROCEDURE — 99223 1ST HOSP IP/OBS HIGH 75: CPT | Mod: 25

## 2020-08-06 PROCEDURE — ZZZZZ: CPT

## 2020-08-06 RX ORDER — ACETAMINOPHEN 500 MG
1000 TABLET ORAL ONCE
Refills: 0 | Status: COMPLETED | OUTPATIENT
Start: 2020-08-06 | End: 2020-08-06

## 2020-08-06 RX ORDER — LEVETIRACETAM 250 MG/1
750 TABLET, FILM COATED ORAL EVERY 12 HOURS
Refills: 0 | Status: DISCONTINUED | OUTPATIENT
Start: 2020-08-06 | End: 2020-08-09

## 2020-08-06 RX ADMIN — Medication 400 MILLIGRAM(S): at 22:36

## 2020-08-06 RX ADMIN — HEPARIN SODIUM 5000 UNIT(S): 5000 INJECTION INTRAVENOUS; SUBCUTANEOUS at 19:01

## 2020-08-06 RX ADMIN — Medication 400 MILLIGRAM(S): at 15:00

## 2020-08-06 RX ADMIN — LEVETIRACETAM 400 MILLIGRAM(S): 250 TABLET, FILM COATED ORAL at 22:06

## 2020-08-06 RX ADMIN — Medication 1 DROP(S): at 03:16

## 2020-08-06 RX ADMIN — Medication 1 DROP(S): at 10:56

## 2020-08-06 RX ADMIN — Medication 1 DROP(S): at 22:06

## 2020-08-06 RX ADMIN — Medication 1000 MILLIGRAM(S): at 23:22

## 2020-08-06 RX ADMIN — Medication 1000 MILLIGRAM(S): at 16:00

## 2020-08-06 RX ADMIN — Medication 1 DROP(S): at 18:23

## 2020-08-06 NOTE — PROGRESS NOTE ADULT - SUBJECTIVE AND OBJECTIVE BOX
ENT ISSUE/POD: Dysphagia/NGT placement.     HPI: 66 YO F with PMH of  DM, HTN, on  ASAS 81 brought in by family for concern of HA, dizziness, multiple episodes of emesis s/p mechanical fall (trip over baby carriage). Pt walked into triage, however pt became lethargic and unarousable, brought back and intubated for airway protection and level 1 trauma activated. 7/26 S/P Lt Crani for SDH Evacuation. 7/31 RTOR for Evacuation of residual heme. ENT consulted for NGT placement for artificial nutrition.   8/6:  ENT was reconsulted since NGT was coiled in Upper Esophagus on CXR.     PAST MEDICAL & SURGICAL HISTORY:  Diabetes  Cholelithiasis  Hypertension  History of varicose veins of lower extremity: s/p surgery in July 2019  H/O shoulder surgery    Allergies.  No Known Allergies    Intolerances.  MEDICATIONS  (STANDING):  acetaminophen  IVPB .. 1000 milliGRAM(s) IV Intermittent once  amantadine 200 milliGRAM(s) Oral <User Schedule>  amantadine 100 milliGRAM(s) Oral <User Schedule>  amLODIPine   Tablet 5 milliGRAM(s) Oral daily  artificial tears (preservative free) Ophthalmic Solution 1 Drop(s) Both EYES every 4 hours  heparin   Injectable 5000 Unit(s) SubCutaneous every 12 hours  insulin lispro (HumaLOG) corrective regimen sliding scale   SubCutaneous every 6 hours  insulin NPH human recombinant 18 Unit(s) SubCutaneous every 6 hours  levETIRAcetam  IVPB 750 milliGRAM(s) IV Intermittent every 12 hours  polyethylene glycol 3350 17 Gram(s) Oral two times a day  senna 2 Tablet(s) Oral at bedtime    MEDICATIONS  (PRN):  acetaminophen    Suspension .. 650 milliGRAM(s) Oral every 6 hours PRN Temp greater or equal to 38C (100.4F), Mild Pain (1 - 3)  bisacodyl 5 milliGRAM(s) Oral every 12 hours PRN Constipation  labetalol Injectable 10 milliGRAM(s) IV Push every 6 hours PRN Systolic blood pressure > 160  oxyCODONE    IR 5 milliGRAM(s) Oral every 4 hours PRN Moderate Pain (4 - 6)    Social History: Unable to obtain.  Family history: Unable to obtain.   ROS:   ENT: all negative except as noted in HPI   Pulm: denies SOB, cough, hemoptysis  Neuro: denies numbness/tingling, loss of sensation  Endo: denies heat/cold intolerance, excessive sweating    Vital Signs Last 24 Hrs  T(C): 36.8 (06 Aug 2020 19:49), Max: 36.8 (06 Aug 2020 00:35)  T(F): 98.2 (06 Aug 2020 19:49), Max: 98.2 (06 Aug 2020 00:35)  HR: 92 (06 Aug 2020 19:49) (73 - 100)  BP: 136/79 (06 Aug 2020 19:49) (107/54 - 136/80)  BP(mean): 71 (05 Aug 2020 23:00) (71 - 71)  RR: 18 (06 Aug 2020 19:49) (11 - 18)  SpO2: 99% (06 Aug 2020 19:49) (96% - 99%).                        10.1   12.48 )-----------( 348      ( 06 Aug 2020 12:37 )             33.0    08-06  142  |  101  |  20  ----------------------------<  106<H>  3.7   |  27  |  0.41<L>    Ca    9.2      06 Aug 2020 12:37  Phos  4.1     08-06  Mg     2.5     08-06    PHYSICAL EXAM:  Gen: NAD.   Skin: No rashes, bruises, or lesions.  Head: surgical scar.  Face: no edema, erythema, or fluctuance. Parotid glands soft without mass.  Eyes: no scleral injection.  Nose: Nares bilaterally patent, no discharge.  Mouth: No Stridor / Drooling / Trismus.  Mucosa moist, tongue/uvula midline, oropharynx clear.  Neck: Flat, supple, no lymphadenopathy, trachea midline, no masses.  Lymphatic: No lymphadenopathy.  Resp: breathing easily, no stridor.  CV: no peripheral edema/cyanosis.  GI: nondistended.  Peripheral vascular: no JVD or edema.  Neuro: facial nerve intact, no facial droop.    Reason for Laryngoscopy: Dysphagia/NGT placement.   Patient was unable to cooperate with mirror.  Nasopharynx, oropharynx, and hypopharynx clear, no bleeding. Tongue base, posterior pharyngeal wall, vallecula, epiglottis, and subglottis appear normal. No erythema, edema, pooling of secretions, masses or lesions. Airway patent, no foreign body visualized. No glottic/supraglottic edema. True vocal cords, arytenoids, vestibular folds, ventricles, pyriform sinuses, and aryepiglottic folds appear normal bilaterally. Vocal cords mobile with good contact b/l. ENT ISSUE/POD: Dysphagia/NGT placement.     HPI: 64 YO F with PMH of  DM, HTN, on  ASAS 81 brought in by family for concern of HA, dizziness, multiple episodes of emesis s/p mechanical fall (trip over baby carriage). Pt walked into triage, however pt became lethargic and unarousable, brought back and intubated for airway protection and level 1 trauma activated. 7/26 S/P Lt Crani for SDH Evacuation. 7/31 RTOR for Evacuation of residual heme. ENT consulted for NGT placement for artificial nutrition.   8/6:  ENT was reconsulted since NGT was coiled in Upper Esophagus on CXR.     PAST MEDICAL & SURGICAL HISTORY:  Diabetes  Cholelithiasis  Hypertension  History of varicose veins of lower extremity: s/p surgery in July 2019  H/O shoulder surgery    Allergies.  No Known Allergies    Intolerances.  MEDICATIONS  (STANDING):  acetaminophen  IVPB .. 1000 milliGRAM(s) IV Intermittent once  amantadine 200 milliGRAM(s) Oral <User Schedule>  amantadine 100 milliGRAM(s) Oral <User Schedule>  amLODIPine   Tablet 5 milliGRAM(s) Oral daily  artificial tears (preservative free) Ophthalmic Solution 1 Drop(s) Both EYES every 4 hours  heparin   Injectable 5000 Unit(s) SubCutaneous every 12 hours  insulin lispro (HumaLOG) corrective regimen sliding scale   SubCutaneous every 6 hours  insulin NPH human recombinant 18 Unit(s) SubCutaneous every 6 hours  levETIRAcetam  IVPB 750 milliGRAM(s) IV Intermittent every 12 hours  polyethylene glycol 3350 17 Gram(s) Oral two times a day  senna 2 Tablet(s) Oral at bedtime    MEDICATIONS  (PRN):  acetaminophen    Suspension .. 650 milliGRAM(s) Oral every 6 hours PRN Temp greater or equal to 38C (100.4F), Mild Pain (1 - 3)  bisacodyl 5 milliGRAM(s) Oral every 12 hours PRN Constipation  labetalol Injectable 10 milliGRAM(s) IV Push every 6 hours PRN Systolic blood pressure > 160  oxyCODONE    IR 5 milliGRAM(s) Oral every 4 hours PRN Moderate Pain (4 - 6)    Social History: Unable to obtain.  Family history: Unable to obtain.   ROS:   ENT: all negative except as noted in HPI   Pulm: denies SOB, cough, hemoptysis  Neuro: denies numbness/tingling, loss of sensation  Endo: denies heat/cold intolerance, excessive sweating    Vital Signs Last 24 Hrs  T(C): 36.8 (06 Aug 2020 19:49), Max: 36.8 (06 Aug 2020 00:35)  T(F): 98.2 (06 Aug 2020 19:49), Max: 98.2 (06 Aug 2020 00:35)  HR: 92 (06 Aug 2020 19:49) (73 - 100)  BP: 136/79 (06 Aug 2020 19:49) (107/54 - 136/80)  BP(mean): 71 (05 Aug 2020 23:00) (71 - 71)  RR: 18 (06 Aug 2020 19:49) (11 - 18)  SpO2: 99% (06 Aug 2020 19:49) (96% - 99%).                        10.1   12.48 )-----------( 348      ( 06 Aug 2020 12:37 )             33.0    08-06  142  |  101  |  20  ----------------------------<  106<H>  3.7   |  27  |  0.41<L>    Ca    9.2      06 Aug 2020 12:37  Phos  4.1     08-06  Mg     2.5     08-06    PHYSICAL EXAM:  Gen: NAD.   Skin: No rashes, bruises, or lesions.  Head: Left Crani.   Face: no edema, erythema, or fluctuance. Parotid glands soft without mass.  Eyes: no scleral injection.  Nose: Nares bilaterally patent, no discharge.  Mouth: No Stridor / Drooling / Trismus.  Mucosa moist, tongue/uvula midline, oropharynx clear.  Neck: Flat, supple, no lymphadenopathy, trachea midline, no masses.  Lymphatic: No lymphadenopathy.  Resp: breathing easily, no stridor.  CV: no peripheral edema/cyanosis.  GI: nondistended.  Peripheral vascular: no JVD or edema.  Neuro: facial nerve intact, no facial droop.    Reason for Laryngoscopy: Dysphagia/NGT placement.   Patient was unable to cooperate with mirror.  Nasopharynx, oropharynx, and hypopharynx clear, no bleeding. Tongue base, posterior pharyngeal wall, vallecula, epiglottis, and subglottis appear normal. No erythema, edema, pooling of secretions, masses or lesions. Airway patent, no foreign body visualized. No glottic/supraglottic edema. True vocal cords, arytenoids, vestibular folds, ventricles, pyriform sinuses, and aryepiglottic folds appear normal bilaterally. Vocal cords mobile with good contact b/l. ENT ISSUE/POD: Dysphagia/NGT placement.     HPI: 66 YO F with PMH of  DM, HTN, on  ASAS 81 brought in by family for concern of HA, dizziness, multiple episodes of emesis s/p mechanical fall (trip over baby carriage). Pt walked into triage, however pt became lethargic and unarousable, brought back and intubated for airway protection and level 1 trauma activated. 7/26 S/P Lt Crani for SDH Evacuation. 7/31 RTOR for Evacuation of residual heme. ENT consulted for NGT placement for artificial nutrition.   8/6:  ENT was reconsulted since NGT was coiled in Upper Esophagus on CXR.     PAST MEDICAL & SURGICAL HISTORY:  Diabetes  Cholelithiasis  Hypertension  History of varicose veins of lower extremity: s/p surgery in July 2019  H/O shoulder surgery    Allergies.  No Known Allergies    Intolerances.  MEDICATIONS  (STANDING):  acetaminophen  IVPB .. 1000 milliGRAM(s) IV Intermittent once  amantadine 200 milliGRAM(s) Oral <User Schedule>  amantadine 100 milliGRAM(s) Oral <User Schedule>  amLODIPine   Tablet 5 milliGRAM(s) Oral daily  artificial tears (preservative free) Ophthalmic Solution 1 Drop(s) Both EYES every 4 hours  heparin   Injectable 5000 Unit(s) SubCutaneous every 12 hours  insulin lispro (HumaLOG) corrective regimen sliding scale   SubCutaneous every 6 hours  insulin NPH human recombinant 18 Unit(s) SubCutaneous every 6 hours  levETIRAcetam  IVPB 750 milliGRAM(s) IV Intermittent every 12 hours  polyethylene glycol 3350 17 Gram(s) Oral two times a day  senna 2 Tablet(s) Oral at bedtime    MEDICATIONS  (PRN):  acetaminophen    Suspension .. 650 milliGRAM(s) Oral every 6 hours PRN Temp greater or equal to 38C (100.4F), Mild Pain (1 - 3)  bisacodyl 5 milliGRAM(s) Oral every 12 hours PRN Constipation  labetalol Injectable 10 milliGRAM(s) IV Push every 6 hours PRN Systolic blood pressure > 160  oxyCODONE    IR 5 milliGRAM(s) Oral every 4 hours PRN Moderate Pain (4 - 6)    Social History: Unable to obtain.  Family history: Unable to obtain.   ROS:   ENT: all negative except as noted in HPI   Pulm: denies SOB, cough, hemoptysis  Neuro: denies numbness/tingling, loss of sensation  Endo: denies heat/cold intolerance, excessive sweating    Vital Signs Last 24 Hrs  T(C): 36.8 (06 Aug 2020 19:49), Max: 36.8 (06 Aug 2020 00:35)  T(F): 98.2 (06 Aug 2020 19:49), Max: 98.2 (06 Aug 2020 00:35)  HR: 92 (06 Aug 2020 19:49) (73 - 100)  BP: 136/79 (06 Aug 2020 19:49) (107/54 - 136/80)  BP(mean): 71 (05 Aug 2020 23:00) (71 - 71)  RR: 18 (06 Aug 2020 19:49) (11 - 18)  SpO2: 99% (06 Aug 2020 19:49) (96% - 99%).                        10.1   12.48 )-----------( 348      ( 06 Aug 2020 12:37 )             33.0    08-06  142  |  101  |  20  ----------------------------<  106<H>  3.7   |  27  |  0.41<L>    Ca    9.2      06 Aug 2020 12:37  Phos  4.1     08-06  Mg     2.5     08-06    PHYSICAL EXAM:  Gen: NAD.   Skin: No rashes, bruises, or lesions.  Head: Left Crani.   Face: no edema, erythema, or fluctuance. Parotid glands soft without mass.  Eyes: no scleral injection.  Nose: Nares bilaterally patent, no discharge.  Mouth: No Stridor / Drooling / Trismus.  Mucosa moist, tongue/uvula midline, oropharynx clear.  Neck: Flat, supple, no lymphadenopathy, trachea midline, no masses.  Lymphatic: No lymphadenopathy.  Resp: breathing easily, no stridor.  CV: no peripheral edema/cyanosis.  GI: nondistended.  Peripheral vascular: no JVD or edema.  Neuro: facial nerve intact, no facial droop.    Reason for Laryngoscopy: Dysphagia/NGT placement..  Patient was unable to cooperate with mirror.  Nasopharynx, oropharynx, and hypopharynx clear, no bleeding. Tongue base, posterior pharyngeal wall, vallecula, epiglottis, and subglottis appear normal. No erythema, edema, pooling of secretions, masses or lesions. Airway patent, no foreign body visualized. No glottic/supraglottic edema. True vocal cords, arytenoids, vestibular folds, ventricles, pyriform sinuses, and aryepiglottic folds appear normal bilaterally. Vocal cords mobile with good contact b/l.

## 2020-08-06 NOTE — PROVIDER CONTACT NOTE (OTHER) - SITUATION
Pt NGT not working due to being coiled in the esophagus. Requesting IV keppra in order to keep patient from missing doses.

## 2020-08-06 NOTE — PROGRESS NOTE ADULT - SUBJECTIVE AND OBJECTIVE BOX
SUBJECTIVE:   No a/c distress. daughter in law at bedside   OVERNIGHT EVENTS: none    Vital Signs Last 24 Hrs  T(C): 36.5 (06 Aug 2020 11:00), Max: 36.9 (05 Aug 2020 19:00)  T(F): 97.7 (06 Aug 2020 11:00), Max: 98.5 (05 Aug 2020 19:00)  HR: 84 (06 Aug 2020 11:00) (73 - 95)  BP: 124/80 (06 Aug 2020 11:00) (94/56 - 136/80)  BP(mean): 71 (05 Aug 2020 23:00) (68 - 79)  RR: 18 (06 Aug 2020 11:00) (11 - 18)  SpO2: 97% (06 Aug 2020 11:00) (83% - 100%)    PHYSICAL EXAM:    Constitutional: No Acute Distress     Neurological: Awake alert Ox3, Speech hypophonic Following Commands, Moving all Extremities RUE 4/5 RLE 4+/5 LUE/LLE 5/5      Pulmonary: Clear to Auscultation,    Cardiovascular: S1, S2, Regular rate and rhythm     Gastrointestinal: Soft, Non-tender, Non-distended     Extremities: No calf tenderness    LABS:                        10.1   12.48 )-----------( 348      ( 06 Aug 2020 12:37 )             33.0    08-06    142  |  101  |  20  ----------------------------<  106<H>  3.7   |  27  |  0.41<L>    Ca    9.2      06 Aug 2020 12:37  Phos  4.1     08-06  Mg     2.5     08-06              IMAGIN/5 CT head- Similar-appearing predominantly low density left lateral convexity extra-axial collection measuring 1.6 cm in greatest depth.    Rightward midline shift of 7 mm is similar.    MEDICATIONS:    acetaminophen    Suspension .. 650 milliGRAM(s) Oral every 6 hours PRN Temp greater or equal to 38C (100.4F), Mild Pain (1 - 3)  amantadine 200 milliGRAM(s) Oral <User Schedule>  amantadine 100 milliGRAM(s) Oral <User Schedule>  levETIRAcetam  Solution 750 milliGRAM(s) Oral two times a day  oxyCODONE    IR 5 milliGRAM(s) Oral every 4 hours PRN Moderate Pain (4 - 6)  amLODIPine   Tablet 5 milliGRAM(s) Oral daily  labetalol Injectable 10 milliGRAM(s) IV Push every 6 hours PRN Systolic blood pressure > 160  bisacodyl 5 milliGRAM(s) Oral every 12 hours PRN Constipation  polyethylene glycol 3350 17 Gram(s) Oral two times a day  senna 2 Tablet(s) Oral at bedtime  artificial tears (preservative free) Ophthalmic Solution 1 Drop(s) Both EYES every 4 hours  heparin   Injectable 5000 Unit(s) SubCutaneous every 12 hours  insulin lispro (HumaLOG) corrective regimen sliding scale   SubCutaneous every 6 hours  insulin NPH human recombinant 18 Unit(s) SubCutaneous every 6 hours      DIET:

## 2020-08-06 NOTE — CONSULT NOTE ADULT - ASSESSMENT
65 year old female with multiple medical problems S/P Lt Crani for SDH Evacuation. 7/31 RTOR for Evacuation of residual heme ENT consulted for NGT placement. 65 year old female with multiple medical problems S/P Lt Crani for SDH Evacuation. 7/31 RTOR for Evacuation of residual heme. ENT consulted for NGT placement. FOE tube placed in the right nare without resistance. Placement confirmed with auscultation of air in the stomach. Pending CXR

## 2020-08-06 NOTE — PROGRESS NOTE ADULT - ASSESSMENT
64 YO F with PMH of  DM, HTN, on  ASAS 81 brought in by family for concern of HA, dizziness, multiple episodes of emesis s/p mechanical fall (trip over baby carriage). Pt walked into triage, however pt became lethargic and unarousable, brought back and intubated for airway protection and level 1 trauma activated. 7/26 S/P Lt Crani for SDH Evacuation. 7/31 RTOR for Evacuation of residual heme. ENT consulted for NGT placement under the guidance of Fiberoptic Indirect Laryngoscopy.

## 2020-08-06 NOTE — CHART NOTE - NSCHARTNOTEFT_GEN_A_CORE
66 yo female with PMHx DM, HTN, on asa81 brought in by family fro concern of HA, dizziness, multiple episodes of emesis s/p mechanical fall 2 hours PTA (trip over baby carriage). Pt walked into triage, however pt became lethargic and unarousable, brought to CC room and intubated for airway protection and level 1 trauma activated. 7/25: CTH: Large left-sided acute holohemispheric subdural hematoma measuring up to 2.1 cm in greatest depth with rightward subfalcine and transtentorial herniation. 7/26: s/p craniotomy and evacuation. 7/30: CT today shows increased size of subdural collection. s/p extubation. 7/31: Overnight stopped following commands, ct head done which showed stable left subdural collection with mid line shift and brain compression, taken back to the OR for left craniotomy for reexploration of SDH.    This service is following up to review swallow exercises and to re-assess for readiness to introduce PO intake. Pt A&Ox3, on room air, able to follow simple commands and verbally responsive to simple questions. Pt is primarily Japanese speaking and benefited from [employer]  [employer] (ID#[id number]). Pt vocal quality hypophonic with reduced loudness. No oral structure abnormalities were seen and facial symmetry WNL. Lingual strength mildly improved for lateralization/protrusion tasks. Maximum phonation time improved to 8 sec and noted with improved clarity. She benefited from verbal/visual feedback. Oral care administered following re-visualization of white coating on lingual surface. She was brought to an upright position for safe administration of PO trials. Pt with eye closing throughout assessment and required max verbal prompts to maintain eye opening for brief periods of time. Despite eye closing, pt responsive to tasks. Pt with improved orientation to feeding task and acceptance of PO from previous assessment.     Trials administered included full tsp amounts of crushed ice chips and cup sips honey thick liquid. Pt fed by clinician and continues to present with an oropharyngeal dysphagia marked by suspected posterior loss prior to initiation of swallow trigger resulting in overt s/s penetration/aspiration for crushed ice chips (delayed throat clearing).     1. Continue NPO, with non-oral nutrition/hydration/medications.   2. Strict reflux/aspiration precautions, and for enteral feeds.  3. This service will follow up while patient is in house in order to determine readiness for objective test.     Ben Conti MA, CCC-SLP  Speech-Language Pathologist  Pager #[phone]

## 2020-08-06 NOTE — PROGRESS NOTE ADULT - ASSESSMENT
64 yo female with pmh DM, HTN, on asa81 brought in by family fro concern of HA, dizziness, multiple episodes of emesis s/p mechanical fall 2 hours PTA (trip over baby carriage). Pt walked into triage, however pt became lethargic and unarousable, brought to CC room and intubated for airway protection and level 1 trauma activated.  No AC.  PT deteriorated to GCS 5.  CT revealed large left acute SDH with midline shift.  No apparent underlying brain injury noted.  Pt taken to OR for emergent evacuation of left SDH. 7/26. Pt was noted to be poorly responsive tomorrow, moving only to noxious and increase in left pupillary size, though reactive.  CT scan was unchanged.  Given potential for seizure, persistent mass effect, taken back to OR s/p Left crani/ reexploration mild subdural collection/clot evacuated, no evidence of membrane formation, dura primarily closed, water-tight closure, bone flap replaced.7/31/20     Plan    Neuro stable . On Keppra 750 BID. Amantidine for alertness  vitals stable. On Norvasc  Dysphagia- S&S reeval - Improvement noted. MBS possibly monday . Tube feeds   DVT ppx   Type 2 DM- ON NPH q6 and sliding scale.

## 2020-08-06 NOTE — CONSULT NOTE ADULT - SUBJECTIVE AND OBJECTIVE BOX
CC: Dysphagia     HPI: 65 year old female with PMHx of DM, HTN, on  ASAS 81 brought in by family for concern of HA, dizziness, multiple episodes of emesis s/p mechanical fall (trip over baby carriage). Pt walked into triage, however pt became lethargic and unarousable, brought back and intubated for airway protection and level 1 trauma activated. 7/26 S/P Lt Crani for SDH Evacuation. 7/31 RTOR for Evacuation of residual heme ENT consulted for NGT placement.       PAST MEDICAL & SURGICAL HISTORY:  Diabetes  Cholelithiasis  Hypertension  History of varicose veins of lower extremity: s/p surgery in July 2019  H/O shoulder surgery    Allergies    No Known Allergies    Intolerances      MEDICATIONS  (STANDING):  amantadine 200 milliGRAM(s) Oral <User Schedule>  amantadine 100 milliGRAM(s) Oral <User Schedule>  amLODIPine   Tablet 5 milliGRAM(s) Oral daily  artificial tears (preservative free) Ophthalmic Solution 1 Drop(s) Both EYES every 4 hours  heparin   Injectable 5000 Unit(s) SubCutaneous every 12 hours  insulin lispro (HumaLOG) corrective regimen sliding scale   SubCutaneous every 6 hours  insulin NPH human recombinant 18 Unit(s) SubCutaneous every 6 hours  levETIRAcetam  Solution 750 milliGRAM(s) Oral two times a day  polyethylene glycol 3350 17 Gram(s) Oral two times a day  senna 2 Tablet(s) Oral at bedtime    MEDICATIONS  (PRN):  acetaminophen    Suspension .. 650 milliGRAM(s) Oral every 6 hours PRN Temp greater or equal to 38C (100.4F), Mild Pain (1 - 3)  bisacodyl 5 milliGRAM(s) Oral every 12 hours PRN Constipation  labetalol Injectable 10 milliGRAM(s) IV Push every 6 hours PRN Systolic blood pressure > 160  oxyCODONE    IR 5 milliGRAM(s) Oral every 4 hours PRN Moderate Pain (4 - 6)      Social History: unable to obtain     Family history: unable to obtain     ROS:   ENT: all negative except as noted in HPI   CV: denies palpitations  Pulm: denies SOB, cough, hemoptysis  GI: denies change in apetite, indigestion, n/v  : denies pertinent urinary symptoms, urgency  Neuro: denies numbness/tingling, loss of sensation  Psych: denies anxiety  MS: denies muscle weakness, instability  Heme: denies easy bruising or bleeding  Endo: denies heat/cold intolerance, excessive sweating  Vascular: denies LE edema    Vital Signs Last 24 Hrs  T(C): 36.6 (06 Aug 2020 07:39), Max: 36.9 (05 Aug 2020 19:00)  T(F): 97.9 (06 Aug 2020 07:39), Max: 98.5 (05 Aug 2020 19:00)  HR: 88 (06 Aug 2020 07:39) (73 - 95)  BP: 133/74 (06 Aug 2020 07:39) (94/56 - 136/80)  BP(mean): 71 (05 Aug 2020 23:00) (68 - 79)  RR: 18 (06 Aug 2020 07:39) (11 - 18)  SpO2: 98% (06 Aug 2020 07:39) (83% - 100%)      PHYSICAL EXAM:  Gen: NAD  Skin: No rashes, bruises, or lesions  Head: Normocephalic, Atraumatic  Face: no edema, erythema, or fluctuance. Parotid glands soft without mass  Eyes: no scleral injection  Nose: Nares bilaterally patent, no discharge  Mouth: No Stridor / Drooling / Trismus.  Mucosa moist, tongue/uvula midline, oropharynx clear  Neck: Flat, supple, no lymphadenopathy, trachea midline, no masses  Lymphatic: No lymphadenopathy  Resp: breathing easily, no stridor  CV: no peripheral edema/cyanosis  GI: nondistended   Peripheral vascular: no JVD or edema  Neuro: facial nerve intact, no facial droop        Diagnostic Nasal Endoscopy: (Scope #2 used)    Fiberoptic Indirect laryngoscopy:  (Scope #2 used) CC: Dysphagia     HPI: 65 year old female with PMHx of DM, HTN, on  ASAS 81 brought in by family for concern of HA, dizziness, multiple episodes of emesis s/p mechanical fall (trip over baby carriage). Pt walked into triage, however pt became lethargic and unarousable, brought back and intubated for airway protection and level 1 trauma activated. 7/26 S/P Lt Crani for SDH Evacuation. 7/31 RTOR for Evacuation of residual heme. ENT consulted for NGT placement for artificial nutrition. Daughter at bedside       PAST MEDICAL & SURGICAL HISTORY:  Diabetes  Cholelithiasis  Hypertension  History of varicose veins of lower extremity: s/p surgery in July 2019  H/O shoulder surgery    Allergies    No Known Allergies    Intolerances      MEDICATIONS  (STANDING):  amantadine 200 milliGRAM(s) Oral <User Schedule>  amantadine 100 milliGRAM(s) Oral <User Schedule>  amLODIPine   Tablet 5 milliGRAM(s) Oral daily  artificial tears (preservative free) Ophthalmic Solution 1 Drop(s) Both EYES every 4 hours  heparin   Injectable 5000 Unit(s) SubCutaneous every 12 hours  insulin lispro (HumaLOG) corrective regimen sliding scale   SubCutaneous every 6 hours  insulin NPH human recombinant 18 Unit(s) SubCutaneous every 6 hours  levETIRAcetam  Solution 750 milliGRAM(s) Oral two times a day  polyethylene glycol 3350 17 Gram(s) Oral two times a day  senna 2 Tablet(s) Oral at bedtime    MEDICATIONS  (PRN):  acetaminophen    Suspension .. 650 milliGRAM(s) Oral every 6 hours PRN Temp greater or equal to 38C (100.4F), Mild Pain (1 - 3)  bisacodyl 5 milliGRAM(s) Oral every 12 hours PRN Constipation  labetalol Injectable 10 milliGRAM(s) IV Push every 6 hours PRN Systolic blood pressure > 160  oxyCODONE    IR 5 milliGRAM(s) Oral every 4 hours PRN Moderate Pain (4 - 6)      Social History: unable to obtain     Family history: unable to obtain     ROS:   ENT: all negative except as noted in HPI   CV: denies palpitations  Pulm: denies SOB, cough, hemoptysis  GI: denies change in apetite, indigestion, n/v  : denies pertinent urinary symptoms, urgency  Neuro: denies numbness/tingling, loss of sensation  Psych: denies anxiety  MS: denies muscle weakness, instability  Heme: denies easy bruising or bleeding  Endo: denies heat/cold intolerance, excessive sweating  Vascular: denies LE edema    Vital Signs Last 24 Hrs  T(C): 36.6 (06 Aug 2020 07:39), Max: 36.9 (05 Aug 2020 19:00)  T(F): 97.9 (06 Aug 2020 07:39), Max: 98.5 (05 Aug 2020 19:00)  HR: 88 (06 Aug 2020 07:39) (73 - 95)  BP: 133/74 (06 Aug 2020 07:39) (94/56 - 136/80)  BP(mean): 71 (05 Aug 2020 23:00) (68 - 79)  RR: 18 (06 Aug 2020 07:39) (11 - 18)  SpO2: 98% (06 Aug 2020 07:39) (83% - 100%)      PHYSICAL EXAM:  Gen: NAD  Skin: No rashes, bruises, or lesions  Head: surgical scar   Face: no edema, erythema, or fluctuance. Parotid glands soft without mass  Eyes: no scleral injection  Nose: Nares bilaterally patent, no discharge  Mouth: No Stridor / Drooling / Trismus.  Mucosa moist, tongue/uvula midline, oropharynx clear  Neck: Flat, supple, no lymphadenopathy, trachea midline, no masses  Lymphatic: No lymphadenopathy  Resp: breathing easily, no stridor  CV: no peripheral edema/cyanosis  GI: nondistended   Peripheral vascular: no JVD or edema  Neuro: facial nerve intact, no facial droop      Procedure Note:  Procedure: NGT placement  Diagnosis: dysphagia  Verbal consent obtained from patient. Lube applied to small keofeed tube. Keofeed advanced through right nare without resistance. Pt asked to swallow. Tube advanced. Placement confirmed with auscultation of air in stomach. Pending CXR confirmation.

## 2020-08-06 NOTE — PROGRESS NOTE ADULT - SUBJECTIVE AND OBJECTIVE BOX
patient seen during speech/swallow therapy     REVIEW OF SYSTEMS  Constitutional - No fever,  + fatigue  HEENT - No vertigo, No neck pain  Neurological - No headaches, No memory loss, + loss of strength, No numbness, No tremors  Skin - No rashes, No lesions   Musculoskeletal - No joint pain, No joint swelling, No muscle pain  Psychiatric - No depression, No anxiety    FUNCTIONAL PROGRESS  8/5 SLP  Pt vocal quality hypophonic with reduced loudness. No oral structure abnormalities were seen and facial symmetry WNL. Lingual strength mildly reduced for protrusion tasks. Pt continues to present with white coating on lingual surface. She was brought to an upright position for safe administration of PO trials. Pt with eye closing throughout assessment and required max verbal prompts to maintain eye opening for brief periods of time. Despite eye closing, pt responsive to tasks. Pt with improved orientation to feeding task and acceptance of PO from previous assessment.     Trials administered included 1/4-1/2 tsp amounts of crushed ice chips and 1/2 to full tsp amount of honey thick liquid. Pt fed by clinician and continues to present with an oropharyngeal dysphagia marked by delayed oral transit time and suspected posterior loss prior to initiation of swallow trigger resulting in overt s/s penetration/aspiration for honey thick liquid via teaspoon (immediate and delayed throat clearing). Pt educated regarding role of slp, bedside swallow evaluation findings and recommendations. Discussed with MD Lema who was in agreement with proposed plan.    8/5 PT      Bed Mobility  Bed Mobility Training Sit-to-Supine: moderate assist (50% patient effort);  1 person assist;  nonverbal cues (demo/gestures);  verbal cues;  bed rails  Bed Mobility Training Supine-to-Sit: moderate assist (50% patient effort);  1 person assist;  nonverbal cues (demo/gestures);  verbal cues;  bed rails  Bed Mobility Training Limitations: impaired balance;  decreased strength;  impaired coordination;  impaired motor control    Sit-Stand Transfer Training  Transfer Training Sit-to-Stand Transfer: minimum assist (75% patient effort);  2 person assist;  nonverbal cues (demo/gestures);  verbal cues;  full weight-bearing  Transfer Training Stand-to-Sit Transfer: minimum assist (75% patient effort);  2 person assist;  nonverbal cues (demo/gestures);  verbal cues;  full weight-bearing  Sit-to-Stand Transfer Training Transfer Safety Analysis: decreased balance;  decreased sequencing ability;  decreased step length;  decreased strength;  impaired balance;  impaired coordination    Gait Training  Gait Training: minimum assist (75% patient effort);  2 person assist;  verbal cues;  nonverbal cues (demo/gestures);  full weight-bearing   5 feet;  x3  Gait Analysis: 2-point gait   decreased kya;  decreased step length;  decreased stride length;  decreased strength;  impaired balance;  impaired coordination;  impaired motor control;  5 feet;  x3    Therapeutic Exercise  Therapeutic Exercise Detail: Pt delma seated balance ~8 min with poor trunk control progressing to fair- at times. Pt with R lateral lean, VC & TC's to maintain midline. Pt delma dynamic reaching out of KAILYN with b/l UE's to targets. Pt with weakness noted RUE. Pt delma b/l KE/KF.     8/5 OT    Bed Mobility  Bed Mobility Training Sit-to-Supine: moderate assist (50% patient effort);  1 person assist  Bed Mobility Training Supine-to-Sit: moderate assist (50% patient effort);  1 person assist    Sit-Stand Transfer Training  Transfer Training Sit-to-Stand Transfer: minimum assist (75% patient effort);  2 person assist  Transfer Training Stand-to-Sit Transfer: minimum assist (75% patient effort);  2 person assist  Sit-to-Stand Transfer Training Transfer Safety Analysis: decreased strength;  impaired balance;  impaired coordination    Lower Body Dressing Training  Lower Body Dressing Training Assistance: maximum assist (25% patient effort)          VITALS  T(C): 36.6 (08-06-20 @ 07:39), Max: 36.9 (08-05-20 @ 19:00)  HR: 88 (08-06-20 @ 07:39) (73 - 95)  BP: 133/74 (08-06-20 @ 07:39) (94/56 - 136/80)  RR: 18 (08-06-20 @ 07:39) (11 - 18)  SpO2: 98% (08-06-20 @ 07:39) (83% - 100%)  Wt(kg): --    MEDICATIONS   acetaminophen    Suspension .. 650 milliGRAM(s) every 6 hours PRN  amantadine 200 milliGRAM(s) <User Schedule>  amantadine 100 milliGRAM(s) <User Schedule>  amLODIPine   Tablet 5 milliGRAM(s) daily  artificial tears (preservative free) Ophthalmic Solution 1 Drop(s) every 4 hours  bisacodyl 5 milliGRAM(s) every 12 hours PRN  heparin   Injectable 5000 Unit(s) every 12 hours  insulin lispro (HumaLOG) corrective regimen sliding scale   every 6 hours  insulin NPH human recombinant 18 Unit(s) every 6 hours  labetalol Injectable 10 milliGRAM(s) every 6 hours PRN  levETIRAcetam  Solution 750 milliGRAM(s) two times a day  oxyCODONE    IR 5 milliGRAM(s) every 4 hours PRN  polyethylene glycol 3350 17 Gram(s) two times a day  senna 2 Tablet(s) at bedtime      RECENT LABS - Reviewed    < from: CT Head No Cont (08.05.20 @ 08:02) >    FINDINGS:    Similar-appearing predominantly low density left lateral convexity extra-axial collection measuring 1.6 cm in greatest depth.    Rightward midline shift of 7 mm is similar. No effacement of basal cisterns. No hydrocephalus.    No parenchymal hemorrhage or brain edema.    IMPRESSION:    No significant interval change from 8/4/2020        < end of copied text >        --------------------------------------------------------------------------------------  PHYSICAL EXAM  Constitutional - NAD, Comfortable, in bed  HEENT - scalp incision with staples  Neck - Supple, No limited ROM  Chest - Breathing comfortably  Cardiovascular - S1S2   Abdomen - Soft   Extremities - b/l UE restraints, no edema    Neurologic Exam -  patient awake, eyes partially closed, responds to questions, follows commands, voice hypophonic, sometimes, uses hands to answer, shows eight fingers for August, oriented to person, place, time, moves all extremities          Psychiatric - Mood stable, Affect WNL  ----------------------------------------------------------------------------------------  ASSESSMENT/PLAN  65 year old woman with functional deficits after SDH, craniotomy 7/26, 7/31   CT 8/5 stable, MRI pending  on keppra, EEG no seizures seen  amantadine, dose decreased, continues to have decreased attention   Pain - Tylenol, oxycodone  DVT PPX - SCDs  Rehab - Will continue to follow for ongoing rehab needs and recommendations.   continue bedside PT/OT/SLP  patient was at min assist with transfers, ambulation   Recommend ACUTE inpatient rehabilitation for the functional deficits consisting of 3 hours of therapy/day & 24 hour RN/daily PMR physician for comorbid medical management. Patient will be able to tolerate 3 hours a day.

## 2020-08-07 LAB
GLUCOSE BLDC GLUCOMTR-MCNC: 105 MG/DL — HIGH (ref 70–99)
GLUCOSE BLDC GLUCOMTR-MCNC: 107 MG/DL — HIGH (ref 70–99)
GLUCOSE BLDC GLUCOMTR-MCNC: 139 MG/DL — HIGH (ref 70–99)
GLUCOSE BLDC GLUCOMTR-MCNC: 191 MG/DL — HIGH (ref 70–99)
GLUCOSE BLDC GLUCOMTR-MCNC: 193 MG/DL — HIGH (ref 70–99)

## 2020-08-07 PROCEDURE — 99232 SBSQ HOSP IP/OBS MODERATE 35: CPT

## 2020-08-07 RX ORDER — SODIUM CHLORIDE 9 MG/ML
1000 INJECTION INTRAMUSCULAR; INTRAVENOUS; SUBCUTANEOUS
Refills: 0 | Status: DISCONTINUED | OUTPATIENT
Start: 2020-08-07 | End: 2020-08-09

## 2020-08-07 RX ORDER — LANOLIN ALCOHOL/MO/W.PET/CERES
3 CREAM (GRAM) TOPICAL ONCE
Refills: 0 | Status: COMPLETED | OUTPATIENT
Start: 2020-08-07 | End: 2020-08-07

## 2020-08-07 RX ORDER — LANOLIN ALCOHOL/MO/W.PET/CERES
3 CREAM (GRAM) TOPICAL AT BEDTIME
Refills: 0 | Status: DISCONTINUED | OUTPATIENT
Start: 2020-08-07 | End: 2020-08-14

## 2020-08-07 RX ADMIN — HEPARIN SODIUM 5000 UNIT(S): 5000 INJECTION INTRAVENOUS; SUBCUTANEOUS at 16:54

## 2020-08-07 RX ADMIN — Medication 2: at 17:50

## 2020-08-07 RX ADMIN — HUMAN INSULIN 18 UNIT(S): 100 INJECTION, SUSPENSION SUBCUTANEOUS at 17:50

## 2020-08-07 RX ADMIN — LEVETIRACETAM 400 MILLIGRAM(S): 250 TABLET, FILM COATED ORAL at 19:19

## 2020-08-07 RX ADMIN — Medication 1 DROP(S): at 06:15

## 2020-08-07 RX ADMIN — POLYETHYLENE GLYCOL 3350 17 GRAM(S): 17 POWDER, FOR SOLUTION ORAL at 06:13

## 2020-08-07 RX ADMIN — SENNA PLUS 2 TABLET(S): 8.6 TABLET ORAL at 21:32

## 2020-08-07 RX ADMIN — HEPARIN SODIUM 5000 UNIT(S): 5000 INJECTION INTRAVENOUS; SUBCUTANEOUS at 06:13

## 2020-08-07 RX ADMIN — Medication 1 DROP(S): at 21:31

## 2020-08-07 RX ADMIN — Medication 100 MILLIGRAM(S): at 16:53

## 2020-08-07 RX ADMIN — SODIUM CHLORIDE 75 MILLILITER(S): 9 INJECTION INTRAMUSCULAR; INTRAVENOUS; SUBCUTANEOUS at 21:31

## 2020-08-07 RX ADMIN — Medication 650 MILLIGRAM(S): at 16:52

## 2020-08-07 RX ADMIN — Medication 200 MILLIGRAM(S): at 06:22

## 2020-08-07 RX ADMIN — Medication 2: at 13:04

## 2020-08-07 RX ADMIN — Medication 1 DROP(S): at 16:53

## 2020-08-07 RX ADMIN — Medication 1 DROP(S): at 02:37

## 2020-08-07 RX ADMIN — LEVETIRACETAM 400 MILLIGRAM(S): 250 TABLET, FILM COATED ORAL at 06:14

## 2020-08-07 RX ADMIN — AMLODIPINE BESYLATE 5 MILLIGRAM(S): 2.5 TABLET ORAL at 06:13

## 2020-08-07 RX ADMIN — SODIUM CHLORIDE 75 MILLILITER(S): 9 INJECTION INTRAMUSCULAR; INTRAVENOUS; SUBCUTANEOUS at 06:13

## 2020-08-07 RX ADMIN — Medication 1 DROP(S): at 10:35

## 2020-08-07 RX ADMIN — POLYETHYLENE GLYCOL 3350 17 GRAM(S): 17 POWDER, FOR SOLUTION ORAL at 17:50

## 2020-08-07 RX ADMIN — HUMAN INSULIN 18 UNIT(S): 100 INJECTION, SUSPENSION SUBCUTANEOUS at 13:04

## 2020-08-07 NOTE — PROGRESS NOTE ADULT - SUBJECTIVE AND OBJECTIVE BOX
SUBJECTIVE:   No acute distress.   OVERNIGHT EVENTS: none    Vital Signs Last 24 Hrs  T(C): 36.4 (07 Aug 2020 08:18), Max: 36.8 (06 Aug 2020 19:49)  T(F): 97.6 (07 Aug 2020 08:18), Max: 98.2 (06 Aug 2020 19:49)  HR: 87 (07 Aug 2020 08:18) (71 - 100)  BP: 127/78 (07 Aug 2020 08:18) (105/67 - 136/79)  BP(mean): --  RR: 18 (07 Aug 2020 08:18) (18 - 18)  SpO2: 99% (07 Aug 2020 08:18) (96% - 99%)    PHYSICAL EXAM:    Constitutional: No Acute Distress     Neurological: Awake alert Ox3, Speech hypophonic Following Commands, Moving all Extremities RUE 4/5 RLE 4+/5 LUE/LLE 5/5  . Left cranial staples c/D/I     Pulmonary: Clear to Auscultation,    Cardiovascular: S1, S2, Regular rate and rhythm     Gastrointestinal: Soft, Non-tender, Non-distended     Extremities: No calf tenderness    LABS:                        10.1   12.48 )-----------( 348      ( 06 Aug 2020 12:37 )             33.0    08-06    142  |  101  |  20  ----------------------------<  106<H>  3.7   |  27  |  0.41<L>    Ca    9.2      06 Aug 2020 12:37  Phos  4.1     08-06  Mg     2.5     08-06        IMAGING:         MEDICATIONS:    acetaminophen    Suspension .. 650 milliGRAM(s) Oral every 6 hours PRN Temp greater or equal to 38C (100.4F), Mild Pain (1 - 3)  amantadine 200 milliGRAM(s) Oral <User Schedule>  amantadine 100 milliGRAM(s) Oral <User Schedule>  levETIRAcetam  IVPB 750 milliGRAM(s) IV Intermittent every 12 hours  oxyCODONE    IR 5 milliGRAM(s) Oral every 4 hours PRN Moderate Pain (4 - 6)  amLODIPine   Tablet 5 milliGRAM(s) Oral daily  labetalol Injectable 10 milliGRAM(s) IV Push every 6 hours PRN Systolic blood pressure > 160  bisacodyl 5 milliGRAM(s) Oral every 12 hours PRN Constipation  polyethylene glycol 3350 17 Gram(s) Oral two times a day  senna 2 Tablet(s) Oral at bedtime  artificial tears (preservative free) Ophthalmic Solution 1 Drop(s) Both EYES every 4 hours  heparin   Injectable 5000 Unit(s) SubCutaneous every 12 hours  insulin lispro (HumaLOG) corrective regimen sliding scale   SubCutaneous every 6 hours  insulin NPH human recombinant 18 Unit(s) SubCutaneous every 6 hours  sodium chloride 0.9%. 1000 milliLiter(s) IV Continuous <Continuous>      DIET:

## 2020-08-07 NOTE — CHART NOTE - NSCHARTNOTEFT_GEN_A_CORE
66 yo female with PMHx DM, HTN, on asa81 brought in by family fro concern of HA, dizziness, multiple episodes of emesis s/p mechanical fall 2 hours PTA (trip over baby carriage). Pt walked into triage, however pt became lethargic and unarousable, brought to CC room and intubated for airway protection and level 1 trauma activated. 7/25: CTH: Large left-sided acute holohemispheric subdural hematoma measuring up to 2.1 cm in greatest depth with rightward subfalcine and transtentorial herniation. 7/26: s/p craniotomy and evacuation. 7/30: CT today shows increased size of subdural collection. s/p extubation. 7/31: Overnight stopped following commands, ct head done which showed stable left subdural collection with mid line shift and brain compression, taken back to the OR for left craniotomy for reexploration of SDH.    This service is following up to review swallow exercises and to re-assess for readiness to introduce PO intake. Pt A&Ox3, on room air, able to follow simple commands and verbally responsive to simple questions. Pt is primarily Hungarian speaking and benefited from [employer]  Service (Sharif, ID#[id number]). Pt vocal quality improved, still hypophonic with reduced loudness. Labial/lingual strength and ROM mildly improved for retraction/lateralization/protrusion tasks. She benefited from verbal/visual feedback. She was brought to an upright position for safe administration of PO trials. Trials administered included full tsp amounts of crushed ice chips, honey thick liquid, and pudding. Pt fed by clinician and continues to present with an oropharyngeal dysphagia marked by decreased sustained level of alertness throughout feeding. She benefited from repeated verbal cues to keep eyes open, with improved amount of time to sustain. Acceptance, transfer and clearance was adequate. Initiation was timely and with no overt s/s penetration/aspiration. An objective test is warranted to rule out dysphagia.    1. Continue NPO, with non-oral nutrition/hydration/medications. Judicious trials of ice chips (one at a time) may be administered in conjunction with oral care and close RN supervision.  2. Consider modified barium swallow study to objectively assess swallow funciton. MD, PLEASE ENTER ORDER FOR CINESOPHAGRAM (ENTER UNDER RADIOLOGY EXAMS THE FOLLOWING WAY: GO TO THE BROWSER AND TYPE IN XRAY, THEN HIT THE SPACEBAR, AND TYPE IN THE LETTER C.)  WILL SCHEDULE FOR MONDAY 8/10 UPON RECEIPT IN RADIOLOGY.   3. Strict reflux/aspiration precautions, and for enteral feeds.    Ben Conti MA, CCC-SLP  Speech-Language Pathologist  Pager #[phone] 64 yo female with PMHx DM, HTN, on asa81 brought in by family fro concern of HA, dizziness, multiple episodes of emesis s/p mechanical fall 2 hours PTA (trip over baby carriage). Pt walked into triage, however pt became lethargic and unarousable, brought to CC room and intubated for airway protection and level 1 trauma activated. 7/25: CTH: Large left-sided acute holohemispheric subdural hematoma measuring up to 2.1 cm in greatest depth with rightward subfalcine and transtentorial herniation. 7/26: s/p craniotomy and evacuation. 7/30: CT today shows increased size of subdural collection. s/p extubation. 7/31: Overnight stopped following commands, ct head done which showed stable left subdural collection with mid line shift and brain compression, taken back to the OR for left craniotomy for reexploration of SDH.    This service is following up to review swallow exercises and to re-assess for readiness to introduce PO intake. Pt A&Ox3, on room air, able to follow simple commands and verbally responsive to simple questions. Pt is primarily Mohawk speaking and benefited from [employer]  Service (Sharif, ID#[id number]). Pt vocal quality improved, still hypophonic with reduced loudness. Labial/lingual strength and ROM mildly improved for retraction/lateralization/protrusion tasks. She benefited from verbal/visual feedback. She was brought to an upright position for safe administration of PO trials. Trials administered included full tsp amounts of crushed ice chips, honey thick liquid, and pudding. Pt presents with an oropharyngeal dysphagia marked by reduced orientation to feeding task. She benefited from repeated verbal cues to keep eyes open, with improved amount of time to sustain. Acceptance, transfer and oral clearance was adequate. Initiation was timely and with no overt s/s penetration/aspiration. An objective test is warranted to rule out silent aspiration. Pt consistently responds appropriately to clinician cues and level of participation deemed to be adequate.    1. Continue NPO, with non-oral nutrition/hydration/medications. Judicious trials of ice chips (one at a time) may be administered in conjunction with oral care and close RN supervision.  2. Consider modified barium swallow study to objectively assess swallow function. MD, PLEASE ENTER ORDER FOR CINESOPHAGRAM (ENTER UNDER RADIOLOGY EXAMS THE FOLLOWING WAY: GO TO THE BROWSER AND TYPE IN XRAY, THEN HIT THE SPACEBAR, AND TYPE IN THE LETTER C.)  WILL SCHEDULE FOR MONDAY 8/10 UPON RECEIPT IN RADIOLOGY.   3. Strict reflux/aspiration precautions, and for enteral feeds.    Ben Conti MA, CCC-SLP  Speech-Language Pathologist  Pager #[phone]

## 2020-08-07 NOTE — PROGRESS NOTE ADULT - ATTENDING COMMENTS
Pt seen and examined with PA.  Pt awake, in NAD.  She is following commands.  Trace right pronator drift.  Wound intact. Pt is doing better.  Pending s/s Monday.  F/u MRI pending.  Son at bedside. Pt seen and examined with NP.  As per above NP note.  Pt awake, in NAD.  She is following commands.  Trace right pronator drift.  Wound intact. Pt is doing better.  Pending s/s Monday.  F/u MRI pending.  Son at bedside.

## 2020-08-07 NOTE — PROGRESS NOTE ADULT - SUBJECTIVE AND OBJECTIVE BOX
participating with therapy, more awake today     REVIEW OF SYSTEMS  Constitutional - No fever, +fatigue  HEENT - No vertigo, No neck pain  Neurological - No headaches, No memory loss, + loss of strength, No numbness, No tremors  Skin - No rashes, No lesions   Musculoskeletal - No joint pain, No joint swelling, No muscle pain  Psychiatric - No depression, No anxiety    FUNCTIONAL PROGRESS  8/6 SLP    Trials administered included full tsp amounts of crushed ice chips and cup sips honey thick liquid. Pt fed by clinician and continues to present with an oropharyngeal dysphagia marked by suspected posterior loss prior to initiation of swallow trigger resulting in overt s/s penetration/aspiration for crushed ice chips (delayed throat clearing).     8/7 PT    Bed Mobility  Bed Mobility Training Scooting: moderate assist (50% patient effort);  1 person assist;  nonverbal cues (demo/gestures);  verbal cues  Bed Mobility Training Supine-to-Sit: moderate assist (50% patient effort);  1 person assist;  nonverbal cues (demo/gestures);  verbal cues  Bed Mobility Training Limitations: decreased strength;  impaired coordination;  impaired balance;  impaired motor control    Sit-Stand Transfer Training  Transfer Training Sit-to-Stand Transfer: moderate assist (50% patient effort);  1 person assist;  nonverbal cues (demo/gestures);  verbal cues;  full weight-bearing   rolling walker  Transfer Training Stand-to-Sit Transfer: moderate assist (50% patient effort);  1 person assist;  nonverbal cues (demo/gestures);  verbal cues;  full weight-bearing   rolling walker  Sit-to-Stand Transfer Training Transfer Safety Analysis: decreased balance;  decreased sequencing ability;  decreased step length;  decreased weight-shifting ability;  decreased strength;  impaired balance;  impaired coordination;  impaired motor control;  cognitive, decreased safety awareness;  rolling walker    Gait Training  Gait Training: moderate assist (50% patient effort);  minimum assist (75% patient effort);  nonverbal cues (demo/gestures);  1 person assist;  verbal cues;  full weight-bearing   rolling walker;  25 feet;  x2  Gait Analysis: 3-point gait   decreased kya;  decreased step length;  decreased stride length;  decreased toe clearance;  decreased strength;  impaired balance;  impaired coordination;  impaired motor control;  cognitive, decreased safety awareness;  25 feet;  x2;  rolling walker    Therapeutic Exercise  Therapeutic Exercise Detail: Pt performed seated therex and seated balance with fair trunk control.     8/6 OT    Bed Mobility  Bed Mobility Training Supine-to-Sit: moderate assist (50% patient effort);  1 person assist;  verbal cues  Bed Mobility Training Limitations: impaired balance;  decreased strength;  cognitive, decreased safety awareness    Bed-Chair Transfer Training  Transfer Training Bed-to-Chair Transfer: nonmech lift device    Sit-Stand Transfer Training  Transfer Training Sit-to-Stand Transfer: minimum assist (75% patient effort);  1 person assist;  verbal cues;  weight-bearing as tolerated   nonmech lift device  Transfer Training Stand-to-Sit Transfer: nonmech lift device;  minimum assist (75% patient effort)  Sit-to-Stand Transfer Training Transfer Safety Analysis: decreased strength;  impaired balance;  cognitive, decreased safety awareness    Therapeutic Exercise  Therapeutic Exercise Detail: Static upright standing with BUE support in nonmech lift device with CGA, lateral weight shifts and cervical AROM with BUE support in nonmech lift device with CGA.     Lower Body Dressing Training  Lower Body Dressing Training Assistance: maximum assist (25% patient effort)        VITALS  T(C): 36.4 (08-07-20 @ 08:18), Max: 36.8 (08-06-20 @ 19:49)  HR: 87 (08-07-20 @ 08:18) (71 - 100)  BP: 127/78 (08-07-20 @ 08:18) (105/67 - 136/79)  RR: 18 (08-07-20 @ 08:18) (18 - 18)  SpO2: 99% (08-07-20 @ 08:18) (96% - 99%)  Wt(kg): --    MEDICATIONS   acetaminophen    Suspension .. 650 milliGRAM(s) every 6 hours PRN  amantadine 200 milliGRAM(s) <User Schedule>  amantadine 100 milliGRAM(s) <User Schedule>  amLODIPine   Tablet 5 milliGRAM(s) daily  artificial tears (preservative free) Ophthalmic Solution 1 Drop(s) every 4 hours  bisacodyl 5 milliGRAM(s) every 12 hours PRN  heparin   Injectable 5000 Unit(s) every 12 hours  insulin lispro (HumaLOG) corrective regimen sliding scale   every 6 hours  insulin NPH human recombinant 18 Unit(s) every 6 hours  labetalol Injectable 10 milliGRAM(s) every 6 hours PRN  levETIRAcetam  IVPB 750 milliGRAM(s) every 12 hours  oxyCODONE    IR 5 milliGRAM(s) every 4 hours PRN  polyethylene glycol 3350 17 Gram(s) two times a day  senna 2 Tablet(s) at bedtime  sodium chloride 0.9%. 1000 milliLiter(s) <Continuous>      RECENT LABS - Reviewed                        10.1   12.48 )-----------( 348      ( 06 Aug 2020 12:37 )             33.0     08-06    142  |  101  |  20  ----------------------------<  106<H>  3.7   |  27  |  0.41<L>    Ca    9.2      06 Aug 2020 12:37  Phos  4.1     08-06  Mg     2.5     08-06      -----------------------------------------------------------------------------------  PHYSICAL EXAM  Constitutional - NAD, Comfortable, in chair   HEENT - scalp incision with staples, +NGT  Neck - Supple, No limited ROM  Chest - Breathing comfortably  Cardiovascular - S1S2   Abdomen - Soft   Extremities - b/l UE restraints, no edema    Neurologic Exam -  patient awake, eyes partially closed, responds to questions, follows commands, voice hypophonic, makes needs known, sometimes, uses hands to answer, shows eight fingers for August, oriented to person, place, time, moves all extremities          Psychiatric - Mood stable, Affect WNL  ----------------------------------------------------------------------------------------  ASSESSMENT/PLAN  65 year old woman with functional deficits after SDH, craniotomy 7/26, 7/31   CT 8/5 stable, MRI pending  on kera, EEG no seizures seen  amantadine, dose decreased, continues to have decreased attention   Pain - Tylenol, oxycodone  DVT PPX - SCDs  Rehab - Will continue to follow for ongoing rehab needs and recommendations.   continue bedside PT/OT/SLP  patient is at min-mod assist with transfers, ambulation x 25 feet  dysphagia, MBS possible Monday   Recommend ACUTE inpatient rehabilitation for the functional deficits consisting of 3 hours of therapy/day & 24 hour RN/daily PMR physician for comorbid medical management. Patient will be able to tolerate 3 hours a day.

## 2020-08-07 NOTE — PROGRESS NOTE ADULT - ASSESSMENT
64 yo female with pmh DM, HTN, on asa81 brought in by family fro concern of HA, dizziness, multiple episodes of emesis s/p mechanical fall 2 hours PTA (trip over baby carriage). Pt walked into triage, however pt became lethargic and unarousable, brought to CC room and intubated for airway protection and level 1 trauma activated.  No AC.  PT deteriorated to GCS 5.  CT revealed large left acute SDH with midline shift.  No apparent underlying brain injury noted.  Pt taken to OR for emergent evacuation of left SDH. 7/26. Pt was noted to be poorly responsive tomorrow, moving only to noxious and increase in left pupillary size, though reactive.  CT scan was unchanged.  Given potential for seizure, persistent mass effect, taken back to OR s/p Left crani/ reexploration mild subdural collection/clot evacuated, no evidence of membrane formation, dura primarily closed, water-tight closure, bone flap replaced.7/31/20     Plan    Neuro stable . On Keppra 750 BID. Amantidine for alertness  vitals stable. On Norvasc  Dysphagia- Improvement noted. MBS  monday . Tube feeds   DVT ppx   Type 2 DM- ON NPH 18 q6 and sliding scale.   PT/OT acute rehab . Possible d/c monday if able to tolerate diet

## 2020-08-08 LAB
GLUCOSE BLDC GLUCOMTR-MCNC: 115 MG/DL — HIGH (ref 70–99)
GLUCOSE BLDC GLUCOMTR-MCNC: 131 MG/DL — HIGH (ref 70–99)
GLUCOSE BLDC GLUCOMTR-MCNC: 194 MG/DL — HIGH (ref 70–99)
GLUCOSE BLDC GLUCOMTR-MCNC: 85 MG/DL — SIGNIFICANT CHANGE UP (ref 70–99)

## 2020-08-08 PROCEDURE — 70551 MRI BRAIN STEM W/O DYE: CPT | Mod: 26

## 2020-08-08 RX ORDER — AMANTADINE HCL 100 MG
100 CAPSULE ORAL
Refills: 0 | Status: DISCONTINUED | OUTPATIENT
Start: 2020-08-08 | End: 2020-08-13

## 2020-08-08 RX ORDER — AMANTADINE HCL 100 MG
200 CAPSULE ORAL
Refills: 0 | Status: DISCONTINUED | OUTPATIENT
Start: 2020-08-08 | End: 2020-08-13

## 2020-08-08 RX ORDER — OXYCODONE HYDROCHLORIDE 5 MG/1
5 TABLET ORAL EVERY 4 HOURS
Refills: 0 | Status: DISCONTINUED | OUTPATIENT
Start: 2020-08-08 | End: 2020-08-14

## 2020-08-08 RX ADMIN — Medication 100 MILLIGRAM(S): at 23:56

## 2020-08-08 RX ADMIN — Medication 1 DROP(S): at 17:40

## 2020-08-08 RX ADMIN — Medication 650 MILLIGRAM(S): at 15:56

## 2020-08-08 RX ADMIN — Medication 200 MILLIGRAM(S): at 05:29

## 2020-08-08 RX ADMIN — HUMAN INSULIN 18 UNIT(S): 100 INJECTION, SUSPENSION SUBCUTANEOUS at 12:24

## 2020-08-08 RX ADMIN — POLYETHYLENE GLYCOL 3350 17 GRAM(S): 17 POWDER, FOR SOLUTION ORAL at 05:27

## 2020-08-08 RX ADMIN — Medication 1 DROP(S): at 02:30

## 2020-08-08 RX ADMIN — HEPARIN SODIUM 5000 UNIT(S): 5000 INJECTION INTRAVENOUS; SUBCUTANEOUS at 05:27

## 2020-08-08 RX ADMIN — Medication 2: at 12:24

## 2020-08-08 RX ADMIN — OXYCODONE HYDROCHLORIDE 5 MILLIGRAM(S): 5 TABLET ORAL at 01:24

## 2020-08-08 RX ADMIN — LEVETIRACETAM 400 MILLIGRAM(S): 250 TABLET, FILM COATED ORAL at 17:48

## 2020-08-08 RX ADMIN — OXYCODONE HYDROCHLORIDE 5 MILLIGRAM(S): 5 TABLET ORAL at 00:54

## 2020-08-08 RX ADMIN — Medication 3 MILLIGRAM(S): at 19:50

## 2020-08-08 RX ADMIN — Medication 1 DROP(S): at 12:30

## 2020-08-08 RX ADMIN — HUMAN INSULIN 18 UNIT(S): 100 INJECTION, SUSPENSION SUBCUTANEOUS at 00:01

## 2020-08-08 RX ADMIN — Medication 650 MILLIGRAM(S): at 15:28

## 2020-08-08 RX ADMIN — LEVETIRACETAM 400 MILLIGRAM(S): 250 TABLET, FILM COATED ORAL at 05:28

## 2020-08-08 RX ADMIN — Medication 3 MILLIGRAM(S): at 00:01

## 2020-08-08 RX ADMIN — HUMAN INSULIN 18 UNIT(S): 100 INJECTION, SUSPENSION SUBCUTANEOUS at 17:47

## 2020-08-08 RX ADMIN — Medication 1 DROP(S): at 05:28

## 2020-08-08 RX ADMIN — SODIUM CHLORIDE 75 MILLILITER(S): 9 INJECTION INTRAMUSCULAR; INTRAVENOUS; SUBCUTANEOUS at 05:29

## 2020-08-08 RX ADMIN — Medication 1 DROP(S): at 22:37

## 2020-08-08 RX ADMIN — HEPARIN SODIUM 5000 UNIT(S): 5000 INJECTION INTRAVENOUS; SUBCUTANEOUS at 17:40

## 2020-08-08 RX ADMIN — AMLODIPINE BESYLATE 5 MILLIGRAM(S): 2.5 TABLET ORAL at 05:27

## 2020-08-08 RX ADMIN — HUMAN INSULIN 18 UNIT(S): 100 INJECTION, SUSPENSION SUBCUTANEOUS at 05:57

## 2020-08-09 LAB
GLUCOSE BLDC GLUCOMTR-MCNC: 142 MG/DL — HIGH (ref 70–99)
GLUCOSE BLDC GLUCOMTR-MCNC: 168 MG/DL — HIGH (ref 70–99)
GLUCOSE BLDC GLUCOMTR-MCNC: 179 MG/DL — HIGH (ref 70–99)
GLUCOSE BLDC GLUCOMTR-MCNC: 186 MG/DL — HIGH (ref 70–99)
LEVETIRACETAM SERPL-MCNC: 15.6 MCG/ML — SIGNIFICANT CHANGE UP (ref 12–46)
SARS-COV-2 RNA SPEC QL NAA+PROBE: SIGNIFICANT CHANGE UP

## 2020-08-09 RX ORDER — LEVETIRACETAM 250 MG/1
750 TABLET, FILM COATED ORAL
Refills: 0 | Status: DISCONTINUED | OUTPATIENT
Start: 2020-08-09 | End: 2020-08-13

## 2020-08-09 RX ORDER — SODIUM CHLORIDE 9 MG/ML
1000 INJECTION INTRAMUSCULAR; INTRAVENOUS; SUBCUTANEOUS
Refills: 0 | Status: DISCONTINUED | OUTPATIENT
Start: 2020-08-10 | End: 2020-08-10

## 2020-08-09 RX ORDER — HEPARIN SODIUM 5000 [USP'U]/ML
5000 INJECTION INTRAVENOUS; SUBCUTANEOUS EVERY 12 HOURS
Refills: 0 | Status: COMPLETED | OUTPATIENT
Start: 2020-08-09 | End: 2020-08-09

## 2020-08-09 RX ORDER — DOXAZOSIN MESYLATE 4 MG
2 TABLET ORAL AT BEDTIME
Refills: 0 | Status: DISCONTINUED | OUTPATIENT
Start: 2020-08-09 | End: 2020-08-11

## 2020-08-09 RX ADMIN — Medication 1 DROP(S): at 06:25

## 2020-08-09 RX ADMIN — Medication 100 MILLIGRAM(S): at 17:07

## 2020-08-09 RX ADMIN — HEPARIN SODIUM 5000 UNIT(S): 5000 INJECTION INTRAVENOUS; SUBCUTANEOUS at 17:07

## 2020-08-09 RX ADMIN — Medication 1 DROP(S): at 02:24

## 2020-08-09 RX ADMIN — Medication 3 MILLIGRAM(S): at 20:58

## 2020-08-09 RX ADMIN — Medication 200 MILLIGRAM(S): at 06:23

## 2020-08-09 RX ADMIN — Medication 2: at 06:49

## 2020-08-09 RX ADMIN — HUMAN INSULIN 18 UNIT(S): 100 INJECTION, SUSPENSION SUBCUTANEOUS at 13:15

## 2020-08-09 RX ADMIN — Medication 1 DROP(S): at 20:59

## 2020-08-09 RX ADMIN — HUMAN INSULIN 18 UNIT(S): 100 INJECTION, SUSPENSION SUBCUTANEOUS at 06:49

## 2020-08-09 RX ADMIN — Medication 650 MILLIGRAM(S): at 15:23

## 2020-08-09 RX ADMIN — Medication 1 DROP(S): at 13:14

## 2020-08-09 RX ADMIN — SODIUM CHLORIDE 75 MILLILITER(S): 9 INJECTION INTRAMUSCULAR; INTRAVENOUS; SUBCUTANEOUS at 06:25

## 2020-08-09 RX ADMIN — LEVETIRACETAM 400 MILLIGRAM(S): 250 TABLET, FILM COATED ORAL at 06:22

## 2020-08-09 RX ADMIN — Medication 650 MILLIGRAM(S): at 13:14

## 2020-08-09 RX ADMIN — Medication 2: at 17:18

## 2020-08-09 RX ADMIN — LEVETIRACETAM 750 MILLIGRAM(S): 250 TABLET, FILM COATED ORAL at 17:07

## 2020-08-09 RX ADMIN — Medication 2: at 13:14

## 2020-08-09 RX ADMIN — Medication 2 MILLIGRAM(S): at 20:58

## 2020-08-09 RX ADMIN — Medication 1 DROP(S): at 17:07

## 2020-08-09 RX ADMIN — Medication 1 DROP(S): at 09:55

## 2020-08-09 RX ADMIN — HEPARIN SODIUM 5000 UNIT(S): 5000 INJECTION INTRAVENOUS; SUBCUTANEOUS at 06:22

## 2020-08-09 RX ADMIN — HUMAN INSULIN 18 UNIT(S): 100 INJECTION, SUSPENSION SUBCUTANEOUS at 17:17

## 2020-08-09 RX ADMIN — AMLODIPINE BESYLATE 5 MILLIGRAM(S): 2.5 TABLET ORAL at 06:22

## 2020-08-09 NOTE — PROGRESS NOTE ADULT - SUBJECTIVE AND OBJECTIVE BOX
# [id number]    Patient was seen at bedside this am. Patient was having headaches and asking for pain medications. Otherwise, denied any cp, sob, n/v, or abd pain.    OVERNIGHT EVENTS: NO acute event overnight    Vital Signs Last 24 Hrs  T(C): 36.6 (09 Aug 2020 12:36), Max: 37.1 (08 Aug 2020 19:00)  T(F): 97.9 (09 Aug 2020 12:36), Max: 98.8 (08 Aug 2020 19:00)  HR: 100 (09 Aug 2020 12:36) (67 - 106)  BP: 136/64 (09 Aug 2020 12:36) (120/73 - 148/74)  BP(mean): --  RR: 18 (09 Aug 2020 12:36) (18 - 19)  SpO2: 99% (09 Aug 2020 12:36) (97% - 100%)    I&O's Detail    08 Aug 2020 07:01  -  09 Aug 2020 07:00  --------------------------------------------------------  IN:    Enteral Tube Flush: 100 mL    Glucerna: 750 mL    sodium chloride 0.9%: 1200 mL  Total IN: 2050 mL    OUT:    Intermittent Catheterization - Urethral: 1500 mL  Total OUT: 1500 mL    Total NET: 550 mL      09 Aug 2020 07:01  -  09 Aug 2020 15:46  --------------------------------------------------------  IN:    Enteral Tube Flush: 480 mL    sodium chloride 0.9%: 375 mL  Total IN: 855 mL    OUT:    Intermittent Catheterization - Urethral: 1000 mL  Total OUT: 1000 mL    Total NET: -145 mL        I&O's Summary    08 Aug 2020 07:01  -  09 Aug 2020 07:00  --------------------------------------------------------  IN: 2050 mL / OUT: 1500 mL / NET: 550 mL    09 Aug 2020 07:01  -  09 Aug 2020 15:46  --------------------------------------------------------  IN: 855 mL / OUT: 1000 mL / NET: -145 mL        PHYSICAL EXAM:  Neurological:  awake, alert, oriented to person, place, and date, PERRL, hypophonic, mild right facial, follow commands, right side 4+/5, left side 5/5, sensation intact to light touch  Cardiovascular: +s1, s2  Respiratory: clear to auscultation b/l  Gastrointestinal: soft, non-distended, non-tender  Extremities: warm, dry  Incision/Wound: incision C/D/I    LABS:                  CAPILLARY BLOOD GLUCOSE      POCT Blood Glucose.: 179 mg/dL (09 Aug 2020 13:03)  POCT Blood Glucose.: 168 mg/dL (09 Aug 2020 06:29)  POCT Blood Glucose.: 85 mg/dL (08 Aug 2020 23:48)  POCT Blood Glucose.: 115 mg/dL (08 Aug 2020 17:30)      Drug Levels: [] N/A    CSF Analysis: [] N/A      Allergies    No Known Allergies    Intolerances      MEDICATIONS:  Antibiotics:    Neuro:  acetaminophen    Suspension .. 650 milliGRAM(s) Oral every 6 hours PRN  amantadine Syrup 100 milliGRAM(s) Oral <User Schedule>  amantadine Syrup 200 milliGRAM(s) Oral <User Schedule>  levETIRAcetam  IVPB 750 milliGRAM(s) IV Intermittent every 12 hours  melatonin 3 milliGRAM(s) Oral at bedtime  oxyCODONE    IR 5 milliGRAM(s) Oral every 4 hours PRN    Anticoagulation:  heparin   Injectable 5000 Unit(s) SubCutaneous every 12 hours    OTHER:  amLODIPine   Tablet 5 milliGRAM(s) Oral daily  artificial tears (preservative free) Ophthalmic Solution 1 Drop(s) Both EYES every 4 hours  bisacodyl 5 milliGRAM(s) Oral every 12 hours PRN  doxazosin 2 milliGRAM(s) Oral at bedtime  insulin lispro (HumaLOG) corrective regimen sliding scale   SubCutaneous every 6 hours  insulin NPH human recombinant 18 Unit(s) SubCutaneous every 6 hours  labetalol Injectable 10 milliGRAM(s) IV Push every 6 hours PRN  polyethylene glycol 3350 17 Gram(s) Oral two times a day  senna 2 Tablet(s) Oral at bedtime    IVF:    CULTURES:  Culture Results:   No Growth Final (07-28 @ 15:07)  Culture Results:   No Growth Final (07-28 @ 15:07)    RADIOLOGY & ADDITIONAL TESTS:

## 2020-08-09 NOTE — PROGRESS NOTE ADULT - ASSESSMENT
64 yo female with pmh DM, HTN, on asa81 brought in by family fro concern of HA, dizziness, multiple episodes of emesis s/p mechanical fall 2 hours PTA (trip over baby carriage). Pt walked into triage, however pt became lethargic and unarousable, brought to CC room and intubated for airway protection and level 1 trauma activated.  No AC.  PT deteriorated to GCS 5.  CT revealed large left acute SDH with midline shift.  No apparent underlying brain injury noted.  Pt taken to OR for emergent evacuation of left SDH. 7/26. Pt was noted to be poorly responsive tomorrow, moving only to noxious and increase in left pupillary size, though reactive.  CT scan was unchanged.  Given potential for seizure, persistent mass effect, taken back to OR s/p Left crani/ reexploration mild subdural collection/clot evacuated, no evidence of membrane formation, dura primarily closed, water-tight closure, bone flap replaced.7/31/20     PLAN:  - neuro check Q4hrs  - MRI on 8/8 revealed left frontoparietal craniotomy with subjacent fluid collection and rightward midline shift, similar in appearance to previous head CT dated 8/5/2020. No acute infarct or parenchymal hemorrhage.  - amantadine BID added for neurostimulation  - continue keppra for seizure  - pain control with prn tylenol and oxycodone  - continue Norvasc for HTN  - urinary retention, requiring straight cath, cardura added today  - dysphagia, MBS tomorrow, ?addon PEG tomorrow, will be NPO after MN, NS@75ml/hr when NPO  - last BM 8/8, continue current bowel regiments  - DVT ppx: b// SCDs, hold SQH tomorrow for possible PEG  - activity ambulating as tolerated  - incentive spirometer  Discharge planning: PT/OT/PMR- acute rehab    will discuss plan with Dr. Kimberly mohan [phone]

## 2020-08-10 LAB
ANION GAP SERPL CALC-SCNC: 12 MMOL/L — SIGNIFICANT CHANGE UP (ref 5–17)
APTT BLD: 27 SEC — LOW (ref 27.5–35.5)
BLD GP AB SCN SERPL QL: NEGATIVE — SIGNIFICANT CHANGE UP
BUN SERPL-MCNC: 14 MG/DL — SIGNIFICANT CHANGE UP (ref 7–23)
CALCIUM SERPL-MCNC: 9.3 MG/DL — SIGNIFICANT CHANGE UP (ref 8.4–10.5)
CHLORIDE SERPL-SCNC: 102 MMOL/L — SIGNIFICANT CHANGE UP (ref 96–108)
CO2 SERPL-SCNC: 27 MMOL/L — SIGNIFICANT CHANGE UP (ref 22–31)
CREAT SERPL-MCNC: 0.41 MG/DL — LOW (ref 0.5–1.3)
GLUCOSE BLDC GLUCOMTR-MCNC: 114 MG/DL — HIGH (ref 70–99)
GLUCOSE BLDC GLUCOMTR-MCNC: 127 MG/DL — HIGH (ref 70–99)
GLUCOSE BLDC GLUCOMTR-MCNC: 144 MG/DL — HIGH (ref 70–99)
GLUCOSE BLDC GLUCOMTR-MCNC: 213 MG/DL — HIGH (ref 70–99)
GLUCOSE SERPL-MCNC: 134 MG/DL — HIGH (ref 70–99)
HCT VFR BLD CALC: 29.4 % — LOW (ref 34.5–45)
HGB BLD-MCNC: 8.9 G/DL — LOW (ref 11.5–15.5)
INR BLD: 1.03 RATIO — SIGNIFICANT CHANGE UP (ref 0.88–1.16)
MCHC RBC-ENTMCNC: 26.3 PG — LOW (ref 27–34)
MCHC RBC-ENTMCNC: 30.3 GM/DL — LOW (ref 32–36)
MCV RBC AUTO: 87 FL — SIGNIFICANT CHANGE UP (ref 80–100)
NRBC # BLD: 0 /100 WBCS — SIGNIFICANT CHANGE UP (ref 0–0)
PLATELET # BLD AUTO: 210 K/UL — SIGNIFICANT CHANGE UP (ref 150–400)
POTASSIUM SERPL-MCNC: 3.1 MMOL/L — LOW (ref 3.5–5.3)
POTASSIUM SERPL-SCNC: 3.1 MMOL/L — LOW (ref 3.5–5.3)
PROTHROM AB SERPL-ACNC: 12.1 SEC — SIGNIFICANT CHANGE UP (ref 10.6–13.6)
RBC # BLD: 3.38 M/UL — LOW (ref 3.8–5.2)
RBC # FLD: 16.2 % — HIGH (ref 10.3–14.5)
RH IG SCN BLD-IMP: POSITIVE — SIGNIFICANT CHANGE UP
SODIUM SERPL-SCNC: 141 MMOL/L — SIGNIFICANT CHANGE UP (ref 135–145)
WBC # BLD: 5.15 K/UL — SIGNIFICANT CHANGE UP (ref 3.8–10.5)
WBC # FLD AUTO: 5.15 K/UL — SIGNIFICANT CHANGE UP (ref 3.8–10.5)

## 2020-08-10 PROCEDURE — 99232 SBSQ HOSP IP/OBS MODERATE 35: CPT

## 2020-08-10 PROCEDURE — 74230 X-RAY XM SWLNG FUNCJ C+: CPT | Mod: 26

## 2020-08-10 RX ORDER — DEXTROSE 50 % IN WATER 50 %
12.5 SYRINGE (ML) INTRAVENOUS ONCE
Refills: 0 | Status: DISCONTINUED | OUTPATIENT
Start: 2020-08-10 | End: 2020-08-14

## 2020-08-10 RX ORDER — GLUCAGON INJECTION, SOLUTION 0.5 MG/.1ML
1 INJECTION, SOLUTION SUBCUTANEOUS ONCE
Refills: 0 | Status: DISCONTINUED | OUTPATIENT
Start: 2020-08-10 | End: 2020-08-14

## 2020-08-10 RX ORDER — INSULIN GLARGINE 100 [IU]/ML
30 INJECTION, SOLUTION SUBCUTANEOUS AT BEDTIME
Refills: 0 | Status: DISCONTINUED | OUTPATIENT
Start: 2020-08-10 | End: 2020-08-13

## 2020-08-10 RX ORDER — INSULIN LISPRO 100/ML
3 VIAL (ML) SUBCUTANEOUS
Refills: 0 | Status: DISCONTINUED | OUTPATIENT
Start: 2020-08-10 | End: 2020-08-14

## 2020-08-10 RX ORDER — INSULIN LISPRO 100/ML
VIAL (ML) SUBCUTANEOUS
Refills: 0 | Status: DISCONTINUED | OUTPATIENT
Start: 2020-08-10 | End: 2020-08-14

## 2020-08-10 RX ORDER — POTASSIUM CHLORIDE 20 MEQ
40 PACKET (EA) ORAL EVERY 4 HOURS
Refills: 0 | Status: COMPLETED | OUTPATIENT
Start: 2020-08-10 | End: 2020-08-10

## 2020-08-10 RX ORDER — SODIUM CHLORIDE 9 MG/ML
1000 INJECTION, SOLUTION INTRAVENOUS
Refills: 0 | Status: DISCONTINUED | OUTPATIENT
Start: 2020-08-10 | End: 2020-08-10

## 2020-08-10 RX ORDER — DEXTROSE 50 % IN WATER 50 %
15 SYRINGE (ML) INTRAVENOUS ONCE
Refills: 0 | Status: DISCONTINUED | OUTPATIENT
Start: 2020-08-10 | End: 2020-08-14

## 2020-08-10 RX ORDER — DEXTROSE 50 % IN WATER 50 %
25 SYRINGE (ML) INTRAVENOUS ONCE
Refills: 0 | Status: DISCONTINUED | OUTPATIENT
Start: 2020-08-10 | End: 2020-08-14

## 2020-08-10 RX ORDER — INSULIN LISPRO 100/ML
VIAL (ML) SUBCUTANEOUS AT BEDTIME
Refills: 0 | Status: DISCONTINUED | OUTPATIENT
Start: 2020-08-10 | End: 2020-08-14

## 2020-08-10 RX ORDER — ENOXAPARIN SODIUM 100 MG/ML
40 INJECTION SUBCUTANEOUS AT BEDTIME
Refills: 0 | Status: DISCONTINUED | OUTPATIENT
Start: 2020-08-10 | End: 2020-08-14

## 2020-08-10 RX ADMIN — Medication 3 UNIT(S): at 12:01

## 2020-08-10 RX ADMIN — Medication 650 MILLIGRAM(S): at 16:40

## 2020-08-10 RX ADMIN — Medication 1 DROP(S): at 16:38

## 2020-08-10 RX ADMIN — Medication 200 MILLIGRAM(S): at 06:18

## 2020-08-10 RX ADMIN — Medication 1 DROP(S): at 11:39

## 2020-08-10 RX ADMIN — Medication 1 DROP(S): at 21:21

## 2020-08-10 RX ADMIN — Medication 40 MILLIEQUIVALENT(S): at 12:01

## 2020-08-10 RX ADMIN — Medication 2 MILLIGRAM(S): at 21:22

## 2020-08-10 RX ADMIN — LEVETIRACETAM 750 MILLIGRAM(S): 250 TABLET, FILM COATED ORAL at 06:19

## 2020-08-10 RX ADMIN — INSULIN GLARGINE 30 UNIT(S): 100 INJECTION, SOLUTION SUBCUTANEOUS at 21:22

## 2020-08-10 RX ADMIN — Medication 3 UNIT(S): at 18:14

## 2020-08-10 RX ADMIN — ENOXAPARIN SODIUM 40 MILLIGRAM(S): 100 INJECTION SUBCUTANEOUS at 21:22

## 2020-08-10 RX ADMIN — Medication 3 MILLIGRAM(S): at 21:22

## 2020-08-10 RX ADMIN — Medication 1 DROP(S): at 06:19

## 2020-08-10 RX ADMIN — SODIUM CHLORIDE 75 MILLILITER(S): 9 INJECTION INTRAMUSCULAR; INTRAVENOUS; SUBCUTANEOUS at 00:10

## 2020-08-10 RX ADMIN — AMLODIPINE BESYLATE 5 MILLIGRAM(S): 2.5 TABLET ORAL at 06:18

## 2020-08-10 RX ADMIN — Medication 1 DROP(S): at 01:53

## 2020-08-10 RX ADMIN — LEVETIRACETAM 750 MILLIGRAM(S): 250 TABLET, FILM COATED ORAL at 18:14

## 2020-08-10 RX ADMIN — Medication 1 DROP(S): at 18:15

## 2020-08-10 RX ADMIN — Medication 40 MILLIEQUIVALENT(S): at 18:14

## 2020-08-10 NOTE — PROGRESS NOTE ADULT - SUBJECTIVE AND OBJECTIVE BOX
SUBJECTIVE:     OVERNIGHT EVENTS: none    Vital Signs Last 24 Hrs  T(C): 36.9 (10 Aug 2020 12:28), Max: 37.1 (09 Aug 2020 16:11)  T(F): 98.4 (10 Aug 2020 12:28), Max: 98.7 (09 Aug 2020 16:11)  HR: 101 (10 Aug 2020 12:28) (78 - 101)  BP: 136/73 (10 Aug 2020 12:28) (115/70 - 145/73)  BP(mean): --  RR: 18 (10 Aug 2020 12:28) (16 - 18)  SpO2: 98% (10 Aug 2020 12:28) (97% - 99%)    PHYSICAL EXAM:      Neurological: Awake alert Ox3, Speech hypophonic Following Commands, Moving all Extremities RUE 4/5 RLE 4+/5 LUE/LLE 5/5  . Left cranial staples c/D/I     Pulmonary: Clear to Auscultation,    Cardiovascular: S1, S2, Regular rate and rhythm     Gastrointestinal: Soft, Non-tender, Non-distended     Extremities: No calf tenderness      LABS:                        8.9    5.15  )-----------( 210      ( 10 Aug 2020 06:46 )             29.4    08-10    141  |  102  |  14  ----------------------------<  134<H>  3.1<L>   |  27  |  0.41<L>    Ca    9.3      10 Aug 2020 06:46    PT/INR - ( 10 Aug 2020 08:29 )   PT: 12.1 sec;   INR: 1.03 ratio         PTT - ( 10 Aug 2020 08:29 )  PTT:27.0 sec          IMAGIN/8 MRI brain -Left frontoparietal craniotomy with subjacent fluid collection and 7.3mm rightward midline shift, similar in appearance to previous head CT dated 2020. No acute infarct or parenchymal hemorrhage    MEDICATIONS:    acetaminophen    Suspension .. 650 milliGRAM(s) Oral every 6 hours PRN Temp greater or equal to 38C (100.4F), Mild Pain (1 - 3)  amantadine Syrup 100 milliGRAM(s) Oral <User Schedule>  amantadine Syrup 200 milliGRAM(s) Oral <User Schedule>  levETIRAcetam 750 milliGRAM(s) Oral two times a day  melatonin 3 milliGRAM(s) Oral at bedtime  oxyCODONE    IR 5 milliGRAM(s) Oral every 4 hours PRN Moderate Pain (4 - 6)  amLODIPine   Tablet 5 milliGRAM(s) Oral daily  doxazosin 2 milliGRAM(s) Oral at bedtime  labetalol Injectable 10 milliGRAM(s) IV Push every 6 hours PRN Systolic blood pressure > 160  bisacodyl 5 milliGRAM(s) Oral every 12 hours PRN Constipation  polyethylene glycol 3350 17 Gram(s) Oral two times a day  senna 2 Tablet(s) Oral at bedtime  artificial tears (preservative free) Ophthalmic Solution 1 Drop(s) Both EYES every 4 hours  insulin glargine Injectable (LANTUS) 30 Unit(s) SubCutaneous at bedtime  insulin lispro (HumaLOG) corrective regimen sliding scale   SubCutaneous three times a day before meals  insulin lispro (HumaLOG) corrective regimen sliding scale   SubCutaneous at bedtime  insulin lispro Injectable (HumaLOG) 3 Unit(s) SubCutaneous three times a day before meals  potassium chloride   Solution 40 milliEquivalent(s) Oral every 4 hours  sodium chloride 0.9%. 1000 milliLiter(s) IV Continuous <Continuous>      DIET:

## 2020-08-10 NOTE — SWALLOW VFSS/MBS ASSESSMENT ADULT - SLP GENERAL OBSERVATIONS
Pt encountered asleep, sitting upright in SHERWIN chair. Setswana speaking, Video pacific  utilized throughout the exam (Moises Dobbs ID #[id number]). Pt alerted to . A&Ox3 despite non-sustained eye opening. Pt able to follow commands. Benefits from increased processing time and verbal cues.

## 2020-08-10 NOTE — SWALLOW VFSS/MBS ASSESSMENT ADULT - THE ABOVE FINDINGS WERE DISCUSSED WITH
Patient/NP Janae CHACHA Cardoso, Discussed with CHEKO Vides. SLP attempted to call Pt daughter x2 with no answer./Patient

## 2020-08-10 NOTE — PROGRESS NOTE ADULT - ATTENDING COMMENTS
As per above note.  Pt seen and examined 8/10 with resident.  Pt is awake, JENSEN to command, soft spoken.  Wound intact. Had worked with PT with walker.  Discussed with daughter-in-law that we strongly recommend acute rehab to improve pt with intensive therapy.  Pt wants to go home and family wants to take her home.  We feel that she is not entirely safe for discharge to home.  Benefits of rehab vs home PT were discussed and family will discuss.

## 2020-08-10 NOTE — PROGRESS NOTE ADULT - ASSESSMENT
66 yo female with pmh DM, HTN, on asa81 brought in by family fro concern of HA, dizziness, multiple episodes of emesis s/p mechanical fall 2 hours PTA (trip over baby carriage). Pt walked into triage, however pt became lethargic and unarousable, brought to CC room and intubated for airway protection and level 1 trauma activated.  No AC.  PT deteriorated to GCS 5.  CT revealed large left acute SDH with midline shift.  No apparent underlying brain injury noted.  Pt taken to OR for emergent evacuation of left SDH. 7/26. Pt was noted to be poorly responsive tomorrow, moving only to noxious and increase in left pupillary size, though reactive.  CT scan was unchanged.  Given potential for seizure, persistent mass effect, taken back to OR s/p Left crani/ reexploration mild subdural collection/clot evacuated, no evidence of membrane formation, dura primarily closed, water-tight closure, bone flap replaced.7/31/20 pod #10     Plan    Neuro stable . On Keppra 750 BID. Amantidine for alertness  vitals stable. On Norvasc  Dysphagia- post MBS. Started on puree diet/ thin liquids   DVT ppx   Type 2 DM- Change to Lantus and premeal humalog & sliding scale   PT/OT acute rehab . Awaiting placement

## 2020-08-10 NOTE — SWALLOW VFSS/MBS ASSESSMENT ADULT - SLP PERTINENT HISTORY OF CURRENT PROBLEM
64 yo female with PMHx DM, HTN, on asa81 brought in by family fro concern of HA, dizziness, multiple episodes of emesis s/p mechanical fall 2 hours PTA (trip over baby carriage). Pt walked into triage, however pt became lethargic and unarousable, brought to CC room and intubated for airway protection and level 1 trauma activated. 7/25: CTH: Large left-sided acute holohemispheric subdural hematoma measuring up to 2.1 cm in greatest depth with rightward subfalcine and transtentorial herniation. 7/26: s/p craniotomy and evacuation. 7/30: CT today shows increased size of subdural collection. s/p extubation. 7/31: Overnight stopped following commands, ct head done which showed stable left subdural collection with mid line shift and brain compression, taken back to the OR for left craniotomy for reexploration of SDH.

## 2020-08-10 NOTE — PROGRESS NOTE ADULT - SUBJECTIVE AND OBJECTIVE BOX
Tolerating therapies.  Mod A transfers and gait.  Pain controlled.  No CP, no SOB, no N/V; other ROS negative.    MEDICATIONS  (STANDING):  amantadine Syrup 100 milliGRAM(s) Oral <User Schedule>  amantadine Syrup 200 milliGRAM(s) Oral <User Schedule>  amLODIPine   Tablet 5 milliGRAM(s) Oral daily  artificial tears (preservative free) Ophthalmic Solution 1 Drop(s) Both EYES every 4 hours  dextrose 50% Injectable 12.5 Gram(s) IV Push once  dextrose 50% Injectable 25 Gram(s) IV Push once  dextrose 50% Injectable 25 Gram(s) IV Push once  doxazosin 2 milliGRAM(s) Oral at bedtime  enoxaparin Injectable 40 milliGRAM(s) SubCutaneous at bedtime  insulin glargine Injectable (LANTUS) 30 Unit(s) SubCutaneous at bedtime  insulin lispro (HumaLOG) corrective regimen sliding scale   SubCutaneous three times a day before meals  insulin lispro (HumaLOG) corrective regimen sliding scale   SubCutaneous at bedtime  insulin lispro Injectable (HumaLOG) 3 Unit(s) SubCutaneous three times a day before meals  levETIRAcetam 750 milliGRAM(s) Oral two times a day  melatonin 3 milliGRAM(s) Oral at bedtime  polyethylene glycol 3350 17 Gram(s) Oral two times a day  potassium chloride   Solution 40 milliEquivalent(s) Oral every 4 hours  senna 2 Tablet(s) Oral at bedtime    MEDICATIONS  (PRN):  acetaminophen    Suspension .. 650 milliGRAM(s) Oral every 6 hours PRN Temp greater or equal to 38C (100.4F), Mild Pain (1 - 3)  bisacodyl 5 milliGRAM(s) Oral every 12 hours PRN Constipation  dextrose 40% Gel 15 Gram(s) Oral once PRN Blood Glucose LESS THAN 70 milliGRAM(s)/deciliter  glucagon  Injectable 1 milliGRAM(s) IntraMuscular once PRN Glucose LESS THAN 70 milligrams/deciliter  labetalol Injectable 10 milliGRAM(s) IV Push every 6 hours PRN Systolic blood pressure > 160  oxyCODONE    IR 5 milliGRAM(s) Oral every 4 hours PRN Moderate Pain (4 - 6)    Vital Signs Last 24 Hrs  T(C): 36.9 (10 Aug 2020 12:28), Max: 37.1 (09 Aug 2020 16:11)  T(F): 98.4 (10 Aug 2020 12:28), Max: 98.7 (09 Aug 2020 16:11)  HR: 101 (10 Aug 2020 12:28) (78 - 101)  BP: 136/73 (10 Aug 2020 12:28) (115/70 - 145/73)  BP(mean): --  RR: 18 (10 Aug 2020 12:28) (16 - 18)  SpO2: 98% (10 Aug 2020 12:28) (97% - 99%)      PHYSICAL EXAM  Constitutional - NAD, Comfortable, in chair   HEENT - scalp incision with staples  Neck - Supple, No limited ROM  Chest - Breathing comfortably  Cardiovascular - S1S2   Abdomen - Soft   Extremities - b/l UE restraints, no edema    Neurologic Exam -    Speech hypophonic  Follows instructions  Alert  RUE/RLE 4+/5; LUE/LLE 5/5                        8.9    5.15  )-----------( 210      ( 10 Aug 2020 06:46 )             29.4       08-10    141  |  102  |  14  ----------------------------<  134<H>  3.1<L>   |  27  |  0.41<L>    Ca    9.3      10 Aug 2020 06:46      ASSESSMENT/PLAN  65 year old woman with functional deficits after SDH, craniotomy 7/26, 7/31   - Continue PT/OT/SLP  - Prec- Falls, Cardiac, Sz  - Cog- Amantadine  - DVT px- Lovenox  - Monitor K+  - Acute Rehab- can tolerate 3h/d PT/OT/SLP and requires daily physician visits

## 2020-08-10 NOTE — SWALLOW VFSS/MBS ASSESSMENT ADULT - ORAL PHASE
Residue in oral cavity/Incomplete tongue to palate contact/Uncontrolled bolus / spillover in rae-pharynx/Delayed oral transit time/Reduced anterior - posterior transport Delayed oral transit time/Reduced anterior - posterior transport/Incomplete tongue to palate contact/Uncontrolled bolus / spillover in rae-pharynx/Residue in oral cavity Uncontrolled bolus / spillover in hypopharynx/Delayed oral transit time/Reduced anterior - posterior transport/Residue in oral cavity/Uncontrolled bolus / spillover in rae-pharynx/Laryngeal penetration before swallow - silent/Incomplete tongue to palate contact Uncontrolled bolus / spillover in rae-pharynx/Delayed oral transit time/Residue in oral cavity/Incomplete tongue to palate contact/Reduced anterior - posterior transport Residue in oral cavity/Delayed oral transit time

## 2020-08-10 NOTE — SWALLOW VFSS/MBS ASSESSMENT ADULT - RECOMMENDED CONSISTENCY
Puree texture diet. MD/team Please enter the following as provider to RN orders : 1) Full assist with meals with 100% supervision 2) Crush meds or provide via alternate source, 3)  ALL liquids via SMALL SINGLE CUP SIPS. NO STRAW.  4) Provide small teaspoon amounts of puree and sips of thin liquids at slow rate 5) Encourage successive swallows for oral and pharyngeal clearance. Pt requires cues verbal cues to swallow. 6) Alternate food and liquids to aid in oral and pharyngeal clearance 7) Aspiration precautions. Monitor for s/s aspiration/laryngeal penetration. If noted:  D/C p.o. intake, provide non-oral nutrition/hydration/meds

## 2020-08-10 NOTE — SWALLOW VFSS/MBS ASSESSMENT ADULT - ORAL PHASE COMMENTS
Mod premature spillage to valleculae. Mod lingual-palatal residue noted post swallow. Mod lingual-palatal residue post swallow. Mod premature spillage to pyriform sinuses. Mild silent transient penetration before the swallow over the laryngeal surface of the epiglottis with complete retrieval. Mild lingual-palatal residue post swallow. Max premature spillage to valleculae. Mod lingual-palatal residue post swallow. Mod lingual-palatal residue.

## 2020-08-10 NOTE — SWALLOW VFSS/MBS ASSESSMENT ADULT - ADDITIONAL RECOMMENDATIONS
Maintain adequate oral hygiene.     Follow up with [facility] at next level of care. Maintain adequate oral hygiene.     Follow up with [facility] at next level of care. SLP may provide trials of mechanical soft during swallow tx and advance diet as appropriate. Maintain adequate oral hygiene.     Follow up with [facility] at next level of care. SLP may provide trials of mechanical soft during swallow tx and advance diet as appropriate.    Patient has been educated regarding recommended diet. Safe feeding guidelines and diet recommendations reviewed and left at bedside.

## 2020-08-10 NOTE — SWALLOW VFSS/MBS ASSESSMENT ADULT - ADDITIONAL INFORMATION
+ Laryngeal calcifications + Laryngeal calcifications  + Cervical Osteophyte at C6, which does not impact bolus flow. + Laryngeal calcifications  + Cervical Osteophyte at C6, which does not impact bolus flow.    Disorders: reduced lingual strength/ROM/Rate of motion, reduced AP transport, reduced BOT to posterior pharyngeal wall contact, delay in trigger of the swallow reflex, reduced hyo-laryngeal excursion, reduced laryngeal closure, and reduced pharyngeal contractility.

## 2020-08-10 NOTE — SWALLOW VFSS/MBS ASSESSMENT ADULT - PHARYNGEAL PHASE COMMENTS
+ Independent subsequent swallow. She required verbal cues to swallow third and fourth time, which occurred off fluoro, to clear oral and pharyngeal residue. + Delayed independent subsequent swallow. She required verbal cues to swallow third and fourth time, which occurred off fluoro, to clear oral and pharyngeal residue.

## 2020-08-10 NOTE — SWALLOW VFSS/MBS ASSESSMENT ADULT - NS SWALLOW VFSS REC ASPIR MON
If noted:  D/C p.o. intake, provide non-oral nutrition/hydration/meds and consult this service should change in status occur./change of breathing pattern/gurgly voice/upper respiratory infection/pneumonia/throat clearing/cough/fever

## 2020-08-10 NOTE — SWALLOW VFSS/MBS ASSESSMENT ADULT - DIAGNOSTIC IMPRESSIONS
Pt seen for modified barium swallow study s/p large left side acute holohemispheric subdural hematoma and L crani. Pt presents with an oral and pharyngeal dysphagia. Oral stage primarily characterized by lingual pumping and increased oral transit time. Pharyngeal stage characterized by reduced pharyngeal propulsion, reduced hyolaryngeal excursion, and delayed pharyngeal swallow. This results in one episode of min transient penetration with complete retrieval of crushed ice chips. No additional episodes of penetration noted on exam. No aspiration observed. Pt with oral and pharyngeal residue post swallow. A independent secondary swallow is not effective in clearing all residue and Pt benefits from verbal cues to swallow a third-fourth time. This is effective in reducing amount of oral and pharyngeal residue.

## 2020-08-10 NOTE — SWALLOW VFSS/MBS ASSESSMENT ADULT - COMMENTS
Disorders: reduced lingual strength/ROM/Rate of motion, reduced AP transport, reduced BOT to posterior pharyngeal wall contact, delay in trigger of the swallow reflex, reduced hyo-laryngeal excursion, reduced laryngeal closure, and reduced pharyngeal contractility. ENT exam 8/6: Nasopharynx, oropharynx, and hypopharynx clear, no bleeding. Tongue base, posterior pharyngeal wall, vallecula, epiglottis, and subglottis appear normal. No erythema, edema, pooling of secretions, masses or lesions. Airway patent, no foreign body visualized. No glottic/supraglottic edema. True vocal cords, arytenoids, vestibular folds, ventricles, pyriform sinuses, and aryepiglottic folds appear normal bilaterally. Vocal cords mobile with good contact b/l.

## 2020-08-11 DIAGNOSIS — K59.09 OTHER CONSTIPATION: ICD-10-CM

## 2020-08-11 DIAGNOSIS — S06.5X9A TRAUMATIC SUBDURAL HEMORRHAGE WITH LOSS OF CONSCIOUSNESS OF UNSPECIFIED DURATION, INITIAL ENCOUNTER: ICD-10-CM

## 2020-08-11 DIAGNOSIS — R33.9 RETENTION OF URINE, UNSPECIFIED: ICD-10-CM

## 2020-08-11 DIAGNOSIS — E11.9 TYPE 2 DIABETES MELLITUS WITHOUT COMPLICATIONS: ICD-10-CM

## 2020-08-11 DIAGNOSIS — I10 ESSENTIAL (PRIMARY) HYPERTENSION: ICD-10-CM

## 2020-08-11 DIAGNOSIS — Z79.899 OTHER LONG TERM (CURRENT) DRUG THERAPY: ICD-10-CM

## 2020-08-11 DIAGNOSIS — Z71.89 OTHER SPECIFIED COUNSELING: ICD-10-CM

## 2020-08-11 LAB
ANION GAP SERPL CALC-SCNC: 14 MMOL/L — SIGNIFICANT CHANGE UP (ref 5–17)
BUN SERPL-MCNC: 13 MG/DL — SIGNIFICANT CHANGE UP (ref 7–23)
CALCIUM SERPL-MCNC: 9.5 MG/DL — SIGNIFICANT CHANGE UP (ref 8.4–10.5)
CHLORIDE SERPL-SCNC: 101 MMOL/L — SIGNIFICANT CHANGE UP (ref 96–108)
CO2 SERPL-SCNC: 26 MMOL/L — SIGNIFICANT CHANGE UP (ref 22–31)
CREAT SERPL-MCNC: 0.39 MG/DL — LOW (ref 0.5–1.3)
GLUCOSE BLDC GLUCOMTR-MCNC: 114 MG/DL — HIGH (ref 70–99)
GLUCOSE BLDC GLUCOMTR-MCNC: 124 MG/DL — HIGH (ref 70–99)
GLUCOSE BLDC GLUCOMTR-MCNC: 150 MG/DL — HIGH (ref 70–99)
GLUCOSE BLDC GLUCOMTR-MCNC: 96 MG/DL — SIGNIFICANT CHANGE UP (ref 70–99)
GLUCOSE SERPL-MCNC: 121 MG/DL — HIGH (ref 70–99)
POTASSIUM SERPL-MCNC: 3.4 MMOL/L — LOW (ref 3.5–5.3)
POTASSIUM SERPL-SCNC: 3.4 MMOL/L — LOW (ref 3.5–5.3)
SODIUM SERPL-SCNC: 141 MMOL/L — SIGNIFICANT CHANGE UP (ref 135–145)

## 2020-08-11 PROCEDURE — 99232 SBSQ HOSP IP/OBS MODERATE 35: CPT

## 2020-08-11 PROCEDURE — 99497 ADVNCD CARE PLAN 30 MIN: CPT | Mod: 25

## 2020-08-11 PROCEDURE — 99223 1ST HOSP IP/OBS HIGH 75: CPT

## 2020-08-11 RX ORDER — POTASSIUM CHLORIDE 20 MEQ
40 PACKET (EA) ORAL ONCE
Refills: 0 | Status: COMPLETED | OUTPATIENT
Start: 2020-08-11 | End: 2020-08-11

## 2020-08-11 RX ORDER — BACITRACIN ZINC 500 UNIT/G
1 OINTMENT IN PACKET (EA) TOPICAL
Qty: 0 | Refills: 0 | DISCHARGE

## 2020-08-11 RX ORDER — LISINOPRIL 2.5 MG/1
5 TABLET ORAL DAILY
Refills: 0 | Status: DISCONTINUED | OUTPATIENT
Start: 2020-08-12 | End: 2020-08-14

## 2020-08-11 RX ORDER — AMLODIPINE BESYLATE 2.5 MG/1
1 TABLET ORAL
Qty: 0 | Refills: 0 | DISCHARGE

## 2020-08-11 RX ORDER — TAMSULOSIN HYDROCHLORIDE 0.4 MG/1
0.4 CAPSULE ORAL AT BEDTIME
Refills: 0 | Status: DISCONTINUED | OUTPATIENT
Start: 2020-08-11 | End: 2020-08-14

## 2020-08-11 RX ADMIN — POLYETHYLENE GLYCOL 3350 17 GRAM(S): 17 POWDER, FOR SOLUTION ORAL at 06:21

## 2020-08-11 RX ADMIN — Medication 3 MILLIGRAM(S): at 21:52

## 2020-08-11 RX ADMIN — Medication 3 UNIT(S): at 13:03

## 2020-08-11 RX ADMIN — Medication 40 MILLIEQUIVALENT(S): at 11:41

## 2020-08-11 RX ADMIN — Medication 200 MILLIGRAM(S): at 08:48

## 2020-08-11 RX ADMIN — Medication 3 UNIT(S): at 08:48

## 2020-08-11 RX ADMIN — TAMSULOSIN HYDROCHLORIDE 0.4 MILLIGRAM(S): 0.4 CAPSULE ORAL at 21:52

## 2020-08-11 RX ADMIN — Medication 1 DROP(S): at 02:04

## 2020-08-11 RX ADMIN — Medication 650 MILLIGRAM(S): at 15:06

## 2020-08-11 RX ADMIN — Medication 1 DROP(S): at 11:56

## 2020-08-11 RX ADMIN — AMLODIPINE BESYLATE 5 MILLIGRAM(S): 2.5 TABLET ORAL at 06:21

## 2020-08-11 RX ADMIN — Medication 650 MILLIGRAM(S): at 14:36

## 2020-08-11 RX ADMIN — LEVETIRACETAM 750 MILLIGRAM(S): 250 TABLET, FILM COATED ORAL at 17:21

## 2020-08-11 RX ADMIN — Medication 1 DROP(S): at 21:52

## 2020-08-11 RX ADMIN — Medication 1 DROP(S): at 17:21

## 2020-08-11 RX ADMIN — Medication 1 DROP(S): at 06:22

## 2020-08-11 RX ADMIN — Medication 1 DROP(S): at 13:07

## 2020-08-11 RX ADMIN — SENNA PLUS 2 TABLET(S): 8.6 TABLET ORAL at 21:52

## 2020-08-11 RX ADMIN — INSULIN GLARGINE 30 UNIT(S): 100 INJECTION, SOLUTION SUBCUTANEOUS at 21:52

## 2020-08-11 RX ADMIN — Medication 100 MILLIGRAM(S): at 17:20

## 2020-08-11 RX ADMIN — ENOXAPARIN SODIUM 40 MILLIGRAM(S): 100 INJECTION SUBCUTANEOUS at 21:52

## 2020-08-11 RX ADMIN — LEVETIRACETAM 750 MILLIGRAM(S): 250 TABLET, FILM COATED ORAL at 06:21

## 2020-08-11 NOTE — CONSULT NOTE ADULT - PROBLEM SELECTOR RECOMMENDATION 9
s/p emergent evacuation of left SDH on 7/26 taken back to OR on 7/31 for L crani/re-exploration with mild subdural collection/clot evacuated. Bone flap replaced on 7/31.  - keppra for seizure ppx  - management as per primary team
FOE tube placed in the right nare, please confirm placement with CXR  Reconsult ENT PRN

## 2020-08-11 NOTE — CONSULT NOTE ADULT - ATTENDING COMMENTS
Pt initially seen and evaluated as Level 1 trauma activation. Agree with A/P. Large L SDH with midline shift s/p fall, intubated, taken to OR emergently by neurosurgery.
Pt seen and examined. Agree with note as written above    - Plan as stated above  - Followup as outpt for trial of void
Dr. Lilian Cline  Division of Hospital Medicine  [facility]   Spectra: [phone]

## 2020-08-11 NOTE — CONSULT NOTE ADULT - PROBLEM SELECTOR RECOMMENDATION 7
called pharmacy and confirmed with daughter bedside. home documentation med rec updated: patient on ASA 81mg daily, amitiza 24mcg BID, glimepiride 2mg daily, metformin 500mg BID, gabapentin 300mg qHS, lisinopril 5mg daily, senna 8.6 BID, calcium 500+vit D BID at home.

## 2020-08-11 NOTE — CHART NOTE - NSCHARTNOTEFT_GEN_A_CORE
speech/swallow called to provide further education to family with dysphagia diet, family expressed concerns of poor intake- calorie count x 48hrs started with glucerna shakes added.   spoke at length with daughter in law regarding rehab vs home d/c  awaiting urology recommendations- will change to flomax QHS from [facility] and monitor  home meds restarted, hospitalist medicine asked to see in consult

## 2020-08-11 NOTE — PROGRESS NOTE ADULT - ATTENDING COMMENTS
as per above PA note.  PT seen and examined with resident.  PT was in chair, responsive, JENSEN.  Spoke again with daughter in law about rehab.  Family wants to take pt home which is her wishes.  Discussed that this may be an AMA discharge if she is not safe.  Precautions given regarding fall risks, potential new bleeds, follow up.   Continue monitoring in hospital.

## 2020-08-11 NOTE — PROGRESS NOTE ADULT - SUBJECTIVE AND OBJECTIVE BOX
CHIEF COMPLAINT: patient in NAD, withour complaints except thirsty and wanting water  Italian  Gabriele used # [id number]    OVERNIGHT EVENTS: intermittent straight cath (bladder scan 450cc,  amt > 700cc on cath per staff)    Vital Signs Last 24 Hrs  T(C): 36.7 (11 Aug 2020 08:00), Max: 36.9 (10 Aug 2020 12:28)  T(F): 98 (11 Aug 2020 08:00), Max: 98.4 (10 Aug 2020 12:28)  HR: 81 (11 Aug 2020 08:00) (76 - 101)  BP: 109/73 (11 Aug 2020 08:00) (103/57 - 136/73)  BP(mean): --  RR: 18 (11 Aug 2020 08:00) (16 - 18)  SpO2: 98% (11 Aug 2020 08:00) (96% - 100%)    PHYSICAL EXAM:    General: No Acute Distress     Neurological: Awake alert Ox3, Speech hypophonic,  Following Commands, Moving all Extremities RUE 4/5 RLE 4+/5 LUE/LLE 5/5     Pulmonary: Clear to Auscultation, No Rales, No Rhonchi, No Wheezes     Cardiovascular: S1, S2, Regular Rate and Rhythm     Gastrointestinal: Soft, Nontender, Nondistended     Incision: staples c/d/i    LABS:                        8.9    5.15  )-----------( 210      ( 10 Aug 2020 06:46 )             29.4    08-11    141  |  101  |  13  ----------------------------<  121<H>  3.4<L>   |  26  |  0.39<L>    Ca    9.5      11 Aug 2020 09:11    PT/INR - ( 10 Aug 2020 08:29 )   PT: 12.1 sec;   INR: 1.03 ratio      PTT - ( 10 Aug 2020 08:29 )  PTT:27.0 sec    08-10 @ 07:01  -  08-11 @ 07:00  --------------------------------------------------------  IN: 0 mL / OUT: 200 mL / NET: -200 mL    08-11 @ 07:01 - 08-11 @ 10:43  --------------------------------------------------------  IN: 0 mL / OUT: 750 mL / NET: -750 mL    MEDICATIONS:  Anticoagulation:  enoxaparin Injectable 40 milliGRAM(s) SubCutaneous at bedtime    Endo:  dextrose 40% Gel 15 Gram(s) Oral once PRN  dextrose 50% Injectable 12.5 Gram(s) IV Push once  dextrose 50% Injectable 25 Gram(s) IV Push once  dextrose 50% Injectable 25 Gram(s) IV Push once  glucagon  Injectable 1 milliGRAM(s) IntraMuscular once PRN  insulin glargine Injectable (LANTUS) 30 Unit(s) SubCutaneous at bedtime  insulin lispro (HumaLOG) corrective regimen sliding scale   SubCutaneous three times a day before meals  insulin lispro (HumaLOG) corrective regimen sliding scale   SubCutaneous at bedtime  insulin lispro Injectable (HumaLOG) 3 Unit(s) SubCutaneous three times a day before meals    Neuro:  acetaminophen    Suspension .. 650 milliGRAM(s) Oral every 6 hours PRN Temp greater or equal to 38C (100.4F), Mild Pain (1 - 3)  amantadine Syrup 100 milliGRAM(s) Oral <User Schedule>  amantadine Syrup 200 milliGRAM(s) Oral <User Schedule>  levETIRAcetam 750 milliGRAM(s) Oral two times a day  melatonin 3 milliGRAM(s) Oral at bedtime  oxyCODONE IR 5 milliGRAM(s) Oral every 4 hours PRN Moderate Pain (4 - 6)    Cardiac:  amLODIPine   Tablet 5 milliGRAM(s) Oral daily  doxazosin 2 milliGRAM(s) Oral at bedtime  labetalol Injectable 10 milliGRAM(s) IV Push every 6 hours PRN Systolic blood pressure > 160    GI/:  bisacodyl 5 milliGRAM(s) Oral every 12 hours PRN Constipation  polyethylene glycol 3350 17 Gram(s) Oral two times a day  senna 2 Tablet(s) Oral at bedtime    Other:   artificial tears (preservative free) Ophthalmic Solution 1 Drop(s) Both EYES every 4 hours    DIET: [] Regular [x] CCD [] Renal [] Puree [x] Dysphagia [] Tube Feeds:   1 puree with thin liquids,     IMAGING:   < from: MR Head No Cont (08.08.20 @ 21:36) >  INTERPRETATION:  Non-contrast MRI of the brain.    CLINICAL INDICATION: Status post evacuation of subdural hematoma    TECHNIQUE:  Multiplanar, multisequence MR images of the brain were obtained without the administration of intravenous contrast.    COMPARISON: Head CT dated 8/5/2020    FINDINGS:No acute hemorrhage or infarct is identified.    Redemonstrated is a left frontoparietal craniotomy with a subjacent fluid collection measuring 2 cm in greatest thickness and not significantly changed in appearance compared with the previous head CT. There is continued rightward shift of 7.3 mm. and mass effect upon the left lateral ventricle. There is trace postprocedural pneumocephaly.    No acute infarct is present. Age-appropriate involutional changes and mild microvascular ischemic changes are present. A trace amount of subdural hemorrhage is noted within the left occipital region.    Major flow-voids at the skull base are within normal limits.    The orbits are not remarkable in appearance.    There is right ethmoid and right sphenoid sinus mucosal thickening.    The tympanomastoid cavities are free of acute disease.    Impression:    Left frontoparietal craniotomy with subjacent fluid collection and rightward midline shift, similar in appearance to previous head CT dated 8/5/2020. No acute infarct or parenchymal hemorrhage.            < end of copied text >

## 2020-08-11 NOTE — CONSULT NOTE ADULT - ASSESSMENT
65 Hungarian-speaking female PMH of HTN on ASA and T2DM, brought in with HA, dizziness and multiple episodes of emesis s/p mechanical fall found to have large LEFT acute SDH with midline shift taken to OR for emergent evacuation of left SDH on 7/26 taken back to OR on 7/31 for L crani/re-exploration with mild subdural collection/clot evacuated. Bone flap replaced on 7/31.

## 2020-08-11 NOTE — CONSULT NOTE ADULT - PROBLEM SELECTOR RECOMMENDATION 3
per daughter in law bedside and patient. she has had difficulty with initiating urinary stream even prior to hospitalization. was seeing a urologist outpatient who decided not to start her on any medications (sounds like she had urodynamic testing).  - Urology consulted, will await their eval and recs  - would start on flomax 0.4mg qHS  - will either need frequent straight cathing at home vs. charles on discharge with outpatient follow-up with Urology

## 2020-08-11 NOTE — CONSULT NOTE ADULT - SUBJECTIVE AND OBJECTIVE BOX
UROLOGY CONSULT NOTE    HPI:  This is a 65y old Spanish-speaking Female with PMHx of DM, HTN, who was admitted for a subdural hematoma after a fall, s/p craniectomy, and was found to have urinary retention.  Since her procedure, the patient has voided small amounts and required intermittent catheterizations for volumes ranging from 300cc-1500cc.  She has a history of recurrent UTIs and developed post operative urinary retention after a cholecystectomy 12/2019.  For this admission, the patient developed a SDH with shift after a trip and fall and underwent craniectomy emergently on 7/26.  She is currently without acute complaints at this time.      PAST MEDICAL HISTORY    Diabetes  Cholelithiasis  Hypertension      PAST SURGICAL HISTORY    History of varicose veins of lower extremity  H/O shoulder surgery  s/p craniectomy     FAMILY HISTORY    Noncontributory    SOCIAL HISTORY    No smoking history    HOME MEDICATIONS    Amitiza 24 mcg oral capsule: 1 cap(s) orally 2 times a day (11 Aug 2020 11:16)  aspirin 81 mg oral tablet: 1 tab(s) orally once a day (11 Aug 2020 11:16)  Calcium 500+D oral tablet, chewable: 1 tab(s) orally 2 times a day (11 Aug 2020 11:16)  gabapentin 300 mg oral capsule: 1 tab(s) orally once a day (at bedtime) (11 Aug 2020 11:16)  glimepiride 2 mg oral tablet: 1 tab(s) orally once a day (11 Aug 2020 11:16)  lisinopril 5 mg oral tablet: 1 tab(s) orally once a day (11 Aug 2020 11:16)  metFORMIN 500 mg oral tablet: 1 tab(s) orally 2 times a day (11 Aug 2020 11:16)  Senna 8.6 mg oral tablet: 1 tab(s) orally 2 times a day (11 Aug 2020 11:16)      DRUG ALLERGIES    NKDA    REVIEW OF SYSTEMS: Pertinent positives and negatives as stated in HPI, otherwise negative      VITAL SIGNS    T(F): 98, Max: 98 (08-11-20 @ 08:00)  HR: 81  BP: 109/73  RR: 18  SpO2: 98%    I's & O's      10 Aug 2020 07:01  -  11 Aug 2020 07:00  --------------------------------------------------------  IN: 0 mL / OUT: 200 mL / NET: -200 mL    11 Aug 2020 07:01  -  11 Aug 2020 13:30  --------------------------------------------------------  IN: 0 mL / OUT: 750 mL / NET: -750 mL        PHYSICAL EXAM    Gen: appears lethargic, weak  HEENT: +midline cranial incision with staples, no erythema or skin changes  Abd: Soft, NT/ND  : No uterine prolapse    LABS:                        8.9    5.15  )-----------( 210               29.4     141  |  101  |  13  ----------------------------<  121  3.4   |  26  |  0.39    Ca    9.5      PT: 12.1 sec;   INR: 1.03 ratio  PTT:27.0 sec        Blood culture: negative (7/28 and 7/27)        CT A/P (7/25):   KIDNEYS/URETERS: The kidneys enhance symmetrically. No hydronephrosis. Left upper pole renal cyst. Additional subcentimeter left renal hypodensities, too small to characterize.     BLADDER: Distended bladder.

## 2020-08-11 NOTE — CONSULT NOTE ADULT - PROBLEM SELECTOR RECOMMENDATION 2
continue with dysphagia diet as recommended by speech and swallow.  - family will need education regarding food and liquid consistencies they can continue at home

## 2020-08-11 NOTE — CONSULT NOTE ADULT - SUBJECTIVE AND OBJECTIVE BOX
[facility] Division of Hospital Medicine  Lilian Cline MD  Pager (LIZETTE-SHIVAM, 5H-8A): [phone]  Other Times:  [phone]    PMD: Dr. Carlitos Strange [phone]    Patient is a 65y old  Female who presents with a chief complaint of OR for emergent evacuation of left SDH. 7/26.   OR s/p Left crani/ reexploration mild subdural collection/clot evacuated, no evidence of membrane formation, dura primarily closed, water-tight closure, bone flap replaced 7/31/20 (11 Aug 2020 10:40)      HPI: 64 yo female with PMH of HTN on ASA 81mg daily, T2DM brought in by family fro concern of HA, dizziness, multiple episodes of emesis s/p mechanical fall 2 hours PTA (trip over baby carriage). Pt walked into triage, however pt became lethargic and unarousable, brought to CC room and intubated for airway protection and level 1 trauma activated.  Pt was noted to be poorly responsive tomorrow, moving only to noxious and increase in left pupillary size, though reactive. CT scan was unchanged. Given potential for seizure, persistent mass effect, taken back to OR s/p Left crani/reexploration mild subdural collection/clot evacuated, no evidence of membrane formation, dura primarily closed, water-tight closure, bone flap replaced on 7/31/20.    Interval History: seen and examined bedside this AM with daughter in law bedside. no acute events overnight. patient states feels light twinges of pain on L side of her head, but otherwise without complaints.     PHYSICAL EXAMINATION:   T(C): 36.6 (07-25-20 @ 20:41), Max: 36.6 (07-25-20 @ 19:27)  HR: 88 (07-25-20 @ 20:41) (87 - 88)  BP: 177/77 (07-25-20 @ 20:41) (177/77 - 210/81)  RR: 18 (07-25-20 @ 20:41) (16 - 19)  SpO2: 98% (07-25-20 @ 20:41) (98% - 100%)  Wt(kg): --Height (cm): 158 (07-25 @ 20:41)  Weight (kg): 50.8 (07-25 @ 20:41) (26 Jul 2020 00:00)        Allergies  No Known Allergies      HOME MEDICATIONS: [x] Reviewed   Home Medications:  Amitiza 24 mcg oral capsule: 1 cap(s) orally 2 times a day (11 Aug 2020 11:16)  aspirin 81 mg oral tablet: 1 tab(s) orally once a day (11 Aug 2020 11:16)  Calcium 500+D oral tablet, chewable: 1 tab(s) orally 2 times a day (11 Aug 2020 11:16)  gabapentin 300 mg oral capsule: 1 tab(s) orally once a day (at bedtime) (11 Aug 2020 11:16)  glimepiride 2 mg oral tablet: 1 tab(s) orally once a day (11 Aug 2020 11:16)  lisinopril 5 mg oral tablet: 1 tab(s) orally once a day (11 Aug 2020 11:16)  metFORMIN 500 mg oral tablet: 1 tab(s) orally 2 times a day (11 Aug 2020 11:16)  Senna 8.6 mg oral tablet: 1 tab(s) orally 2 times a day (11 Aug 2020 11:16)      MEDICATIONS  (STANDING):  amantadine Syrup 100 milliGRAM(s) Oral <User Schedule>  amantadine Syrup 200 milliGRAM(s) Oral <User Schedule>  artificial tears (preservative free) Ophthalmic Solution 1 Drop(s) Both EYES every 4 hours  dextrose 50% Injectable 12.5 Gram(s) IV Push once  dextrose 50% Injectable 25 Gram(s) IV Push once  dextrose 50% Injectable 25 Gram(s) IV Push once  doxazosin 2 milliGRAM(s) Oral at bedtime  enoxaparin Injectable 40 milliGRAM(s) SubCutaneous at bedtime  insulin glargine Injectable (LANTUS) 30 Unit(s) SubCutaneous at bedtime  insulin lispro (HumaLOG) corrective regimen sliding scale   SubCutaneous three times a day before meals  insulin lispro (HumaLOG) corrective regimen sliding scale   SubCutaneous at bedtime  insulin lispro Injectable (HumaLOG) 3 Unit(s) SubCutaneous three times a day before meals  levETIRAcetam 750 milliGRAM(s) Oral two times a day  melatonin 3 milliGRAM(s) Oral at bedtime  polyethylene glycol 3350 17 Gram(s) Oral two times a day  senna 2 Tablet(s) Oral at bedtime    MEDICATIONS  (PRN):  acetaminophen    Suspension .. 650 milliGRAM(s) Oral every 6 hours PRN Temp greater or equal to 38C (100.4F), Mild Pain (1 - 3)  bisacodyl 5 milliGRAM(s) Oral every 12 hours PRN Constipation  dextrose 40% Gel 15 Gram(s) Oral once PRN Blood Glucose LESS THAN 70 milliGRAM(s)/deciliter  glucagon  Injectable 1 milliGRAM(s) IntraMuscular once PRN Glucose LESS THAN 70 milligrams/deciliter  labetalol Injectable 10 milliGRAM(s) IV Push every 6 hours PRN Systolic blood pressure > 160  oxyCODONE    IR 5 milliGRAM(s) Oral every 4 hours PRN Moderate Pain (4 - 6)      PAST MEDICAL & SURGICAL HISTORY:  Diabetes  Cholelithiasis  Hypertension  History of varicose veins of lower extremity: s/p surgery in July 2019  H/O shoulder surgery  [x] Reviewed     SOCIAL HISTORY:  never smoker  denies EtOH use  lives at home with  and 2 adult children    FAMILY HISTORY:  [x] No pertinent family history in first degree relatives     REVIEW OF SYSTEMS:    CONSTITUTIONAL: No fever, weight loss, or fatigue  EYES: No eye pain, visual disturbances, or discharge  ENMT:  No difficulty hearing, tinnitus, vertigo; No sinus or throat pain  NECK: No pain or stiffness  RESPIRATORY: No cough, wheezing, chills or hemoptysis; No shortness of breath  CARDIOVASCULAR: No chest pain, palpitations, dizziness, or leg swelling  GASTROINTESTINAL: No abdominal or epigastric pain. No nausea, vomiting, or hematemesis; No diarrhea or constipation. No melena or hematochezia.  GENITOURINARY: No dysuria, frequency, hematuria, or incontinence  NEUROLOGICAL: No memory loss, loss of strength, numbness, or tremors +left sided headache  SKIN: No itching, burning, rashes, or lesions   LYMPH NODES: No enlarged glands  ENDOCRINE: No heat or cold intolerance; No hair loss  MUSCULOSKELETAL: No muscle or back pain  PSYCHIATRIC: No depression, anxiety, mood swings, or difficulty sleeping  HEME/LYMPH: No easy bruising, or bleeding gums  ALLERGY AND IMMUNOLOGIC: No hives or eczema      PHYSICAL EXAM:  Vital Signs Last 24 Hrs  T(C): 36.7 (11 Aug 2020 08:00), Max: 36.9 (10 Aug 2020 16:08)  T(F): 98 (11 Aug 2020 08:00), Max: 98.4 (10 Aug 2020 16:08)  HR: 81 (11 Aug 2020 08:00) (76 - 92)  BP: 109/73 (11 Aug 2020 08:00) (103/57 - 133/77)  BP(mean): --  RR: 18 (11 Aug 2020 08:00) (16 - 18)  SpO2: 98% (11 Aug 2020 08:00) (96% - 100%)    CONSTITUTIONAL: NAD, appears older than stated age  HEAD: +crani incision site c/d/i  EYES: PERRLA; conjunctiva and sclera clear  ENMT: Moist oral mucosa, no pharyngeal injection or exudates; normal dentition  NECK: Supple, no palpable masses; no thyromegaly  RESPIRATORY: Normal respiratory effort; lungs are clear to auscultation bilaterally  CARDIOVASCULAR: Regular rate and rhythm, normal S1 and S2, no murmur/rub/gallop; No lower extremity edema; Peripheral pulses are 2+ bilaterally  ABDOMEN: Soft, Nondistended,  Nontender to palpation, normoactive bowel sounds  MUSCULOSKELETAL:  No cyanosis of digits; no joint swelling or tenderness to palpation +clubbing of digits (chronic)  PSYCH: A+O to person, place, and time; affect appropriate  NEUROLOGY: CN 2-12 are intact and symmetric; no gross sensory deficits, moving all extremities  SKIN: +L crani incision site with staples c/d/i    LABS:                        8.9    5.15  )-----------( 210      ( 10 Aug 2020 06:46 )             29.4     08-11    141  |  101  |  13  ----------------------------<  121<H>  3.4<L>   |  26  |  0.39<L>    Ca    9.5      11 Aug 2020 09:11      PT/INR - ( 10 Aug 2020 08:29 )   PT: 12.1 sec;   INR: 1.03 ratio         PTT - ( 10 Aug 2020 08:29 )  PTT:27.0 sec      RADIOLOGY & ADDITIONAL TESTS:  Results Reviewed: no leukocytosis, H/H stable, mild hypokalemia to 3.4, Cr stable  Imaging Personally Reviewed:  8/5/20 CT Head:  FINDINGS:    Similar-appearing predominantly low density left lateral convexity extra-axial collection measuring 1.6 cm in greatest depth.    Rightward midline shift of 7 mm is similar. No effacement of basal cisterns. No hydrocephalus.    No parenchymal hemorrhage or brain edema.    IMPRESSION:    No significant interval change from 8/4/2020 8/8/20 MRI Brain:  FINDINGS:No acute hemorrhage or infarct is identified.    Redemonstrated is a left frontoparietal craniotomy with a subjacent fluid collection measuring 2 cm in greatest thickness and not significantly changed in appearance compared with the previous head CT. There is continued rightward shift of 7.3 mm. and mass effect upon the left lateral ventricle. There is trace postprocedural pneumocephaly.    No acute infarct is present. Age-appropriate involutional changes and mild microvascular ischemic changes are present. A trace amount of subdural hemorrhage is noted within the left occipital region.    Major flow-voids at the skull base are within normal limits.    The orbits are not remarkable in appearance.    There is right ethmoid and right sphenoid sinus mucosal thickening.    The tympanomastoid cavities are free of acute disease.    Impression:    Left frontoparietal craniotomy with subjacent fluid collection and rightward midline shift, similar in appearance to previous head CT dated 8/5/2020. No acute infarct or parenchymal hemorrhage.  8/10/20 MBS:  IMPRESSION:      Penetration without aspiration.    For further information and recommendations, please refer to the speech pathologist final report which is available for review in the electronic medical record.  Electrocardiogram Personally Reviewed:    COORDINATION OF CARE:  Care Discussed with Consultants/Other Providers [Y]: Neurosurgery TANNER Montero  Prior or Outpatient Records Reviewed [Y/N]:

## 2020-08-11 NOTE — CONSULT NOTE ADULT - PROBLEM SELECTOR RECOMMENDATION 5
HbA1c 7.2%.   - takes metformin 500mg BID, glimepiride 2mg daily at home  - checks her FS qAM with glucometer and maintains log for her PCP  - continue with current basal/bolus regimen now while inpatient  - would resume her home oral medication regimen upon discharge

## 2020-08-11 NOTE — CONSULT NOTE ADULT - PROBLEM SELECTOR RECOMMENDATION 8
Discussed at length with both patient and daughter in law bedside in both English and Chinese. Explained patient is high risk for falls and would benefit from rehab to help with her strength, functional status, speech and swallowing. Reiterated her hospital course and her 2 operations, which they both acknowledged understanding. Per daughter in law, their family members had a family meeting and decided they do no want the patient to go to rehab, predominantly because the patient herself does not want to go and would like to be home with family. Patient's  and 2 children live in the same home and the 2 adult children would be with her 24/7 alternating shifts to be there to assist her at all times. reiterated multiple times patient would benefit from rehab at this time, but both patient and family also reiterate they will refuse rehab and would like to take her home, willing to care for patient with instructions. time spent with family 32 minutes.

## 2020-08-11 NOTE — CONSULT NOTE ADULT - ASSESSMENT
65 year old female with urinary retention, s/p craniectomy for SDH 7/26    - 65 year old female with urinary retention, s/p craniectomy for SDH 7/26.  Retention likely multifactorial in the setting of recent surgery, hospitalization, and decreased mobility.  Prior hx of post-surgical retention which resolved with catheterization and outpatient trial of void.    - Repeat UA/urine cx  - Avoid opioid pain control and anti-cholinergic medications  -Bowel regimen, senna, colace, miralax  -Hydration  - Please place charles catheter, can be discharged with it  -Keep Charles until follow-up with Dr. Driscoll as outpatient in 1-2 weeks  -Follow up with Dr. Driscoll  in clinic [phone]

## 2020-08-11 NOTE — CONSULT NOTE ADULT - PROBLEM SELECTOR RECOMMENDATION 6
on senna and amitiza BID at home for chronic constipation  - would send home on senna BID and miralax daily

## 2020-08-12 ENCOUNTER — TRANSCRIPTION ENCOUNTER (OUTPATIENT)
Age: 65
End: 2020-08-12

## 2020-08-12 DIAGNOSIS — Z02.9 ENCOUNTER FOR ADMINISTRATIVE EXAMINATIONS, UNSPECIFIED: ICD-10-CM

## 2020-08-12 LAB
ANION GAP SERPL CALC-SCNC: 17 MMOL/L — SIGNIFICANT CHANGE UP (ref 5–17)
APPEARANCE UR: ABNORMAL
BACTERIA # UR AUTO: ABNORMAL
BASOPHILS # BLD AUTO: 0.02 K/UL — SIGNIFICANT CHANGE UP (ref 0–0.2)
BASOPHILS NFR BLD AUTO: 0.4 % — SIGNIFICANT CHANGE UP (ref 0–2)
BILIRUB UR-MCNC: NEGATIVE — SIGNIFICANT CHANGE UP
BUN SERPL-MCNC: 8 MG/DL — SIGNIFICANT CHANGE UP (ref 7–23)
CALCIUM SERPL-MCNC: 9.7 MG/DL — SIGNIFICANT CHANGE UP (ref 8.4–10.5)
CHLORIDE SERPL-SCNC: 97 MMOL/L — SIGNIFICANT CHANGE UP (ref 96–108)
CO2 SERPL-SCNC: 25 MMOL/L — SIGNIFICANT CHANGE UP (ref 22–31)
COLOR SPEC: YELLOW — SIGNIFICANT CHANGE UP
CREAT SERPL-MCNC: 0.38 MG/DL — LOW (ref 0.5–1.3)
DIFF PNL FLD: NEGATIVE — SIGNIFICANT CHANGE UP
EOSINOPHIL # BLD AUTO: 0.04 K/UL — SIGNIFICANT CHANGE UP (ref 0–0.5)
EOSINOPHIL NFR BLD AUTO: 0.8 % — SIGNIFICANT CHANGE UP (ref 0–6)
EPI CELLS # UR: 0 /HPF — SIGNIFICANT CHANGE UP (ref 0–5)
GLUCOSE BLDC GLUCOMTR-MCNC: 122 MG/DL — HIGH (ref 70–99)
GLUCOSE BLDC GLUCOMTR-MCNC: 124 MG/DL — HIGH (ref 70–99)
GLUCOSE BLDC GLUCOMTR-MCNC: 150 MG/DL — HIGH (ref 70–99)
GLUCOSE BLDC GLUCOMTR-MCNC: 235 MG/DL — HIGH (ref 70–99)
GLUCOSE SERPL-MCNC: 146 MG/DL — HIGH (ref 70–99)
GLUCOSE UR QL: SIGNIFICANT CHANGE UP
HCT VFR BLD CALC: 31.1 % — LOW (ref 34.5–45)
HGB BLD-MCNC: 9.6 G/DL — LOW (ref 11.5–15.5)
HYALINE CASTS # UR AUTO: 0 /LPF — SIGNIFICANT CHANGE UP (ref 0–7)
IMM GRANULOCYTES NFR BLD AUTO: 0.6 % — SIGNIFICANT CHANGE UP (ref 0–1.5)
KETONES UR-MCNC: SIGNIFICANT CHANGE UP
LEUKOCYTE ESTERASE UR-ACNC: ABNORMAL
LYMPHOCYTES # BLD AUTO: 1.29 K/UL — SIGNIFICANT CHANGE UP (ref 1–3.3)
LYMPHOCYTES # BLD AUTO: 24.9 % — SIGNIFICANT CHANGE UP (ref 13–44)
MCHC RBC-ENTMCNC: 26.7 PG — LOW (ref 27–34)
MCHC RBC-ENTMCNC: 30.9 GM/DL — LOW (ref 32–36)
MCV RBC AUTO: 86.4 FL — SIGNIFICANT CHANGE UP (ref 80–100)
MONOCYTES # BLD AUTO: 0.4 K/UL — SIGNIFICANT CHANGE UP (ref 0–0.9)
MONOCYTES NFR BLD AUTO: 7.7 % — SIGNIFICANT CHANGE UP (ref 2–14)
NEUTROPHILS # BLD AUTO: 3.41 K/UL — SIGNIFICANT CHANGE UP (ref 1.8–7.4)
NEUTROPHILS NFR BLD AUTO: 65.6 % — SIGNIFICANT CHANGE UP (ref 43–77)
NITRITE UR-MCNC: NEGATIVE — SIGNIFICANT CHANGE UP
NRBC # BLD: 0 /100 WBCS — SIGNIFICANT CHANGE UP (ref 0–0)
PH UR: 6.5 — SIGNIFICANT CHANGE UP (ref 5–8)
PLATELET # BLD AUTO: 222 K/UL — SIGNIFICANT CHANGE UP (ref 150–400)
POTASSIUM SERPL-MCNC: 3.5 MMOL/L — SIGNIFICANT CHANGE UP (ref 3.5–5.3)
POTASSIUM SERPL-SCNC: 3.5 MMOL/L — SIGNIFICANT CHANGE UP (ref 3.5–5.3)
PROT UR-MCNC: NEGATIVE — SIGNIFICANT CHANGE UP
RBC # BLD: 3.6 M/UL — LOW (ref 3.8–5.2)
RBC # FLD: 15.9 % — HIGH (ref 10.3–14.5)
RBC CASTS # UR COMP ASSIST: 2 /HPF — SIGNIFICANT CHANGE UP (ref 0–4)
SODIUM SERPL-SCNC: 139 MMOL/L — SIGNIFICANT CHANGE UP (ref 135–145)
SP GR SPEC: 1.01 — LOW (ref 1.01–1.02)
UROBILINOGEN FLD QL: SIGNIFICANT CHANGE UP
WBC # BLD: 5.19 K/UL — SIGNIFICANT CHANGE UP (ref 3.8–10.5)
WBC # FLD AUTO: 5.19 K/UL — SIGNIFICANT CHANGE UP (ref 3.8–10.5)
WBC UR QL: 136 /HPF — HIGH (ref 0–5)

## 2020-08-12 PROCEDURE — 99233 SBSQ HOSP IP/OBS HIGH 50: CPT

## 2020-08-12 PROCEDURE — 99232 SBSQ HOSP IP/OBS MODERATE 35: CPT

## 2020-08-12 RX ORDER — ONDANSETRON 8 MG/1
4 TABLET, FILM COATED ORAL ONCE
Refills: 0 | Status: DISCONTINUED | OUTPATIENT
Start: 2020-08-12 | End: 2020-08-14

## 2020-08-12 RX ADMIN — Medication 650 MILLIGRAM(S): at 09:53

## 2020-08-12 RX ADMIN — SENNA PLUS 2 TABLET(S): 8.6 TABLET ORAL at 22:42

## 2020-08-12 RX ADMIN — Medication 1 DROP(S): at 17:18

## 2020-08-12 RX ADMIN — Medication 3 UNIT(S): at 12:51

## 2020-08-12 RX ADMIN — Medication 2: at 12:51

## 2020-08-12 RX ADMIN — Medication 200 MILLIGRAM(S): at 06:44

## 2020-08-12 RX ADMIN — OXYCODONE HYDROCHLORIDE 5 MILLIGRAM(S): 5 TABLET ORAL at 12:51

## 2020-08-12 RX ADMIN — Medication 650 MILLIGRAM(S): at 17:23

## 2020-08-12 RX ADMIN — LEVETIRACETAM 750 MILLIGRAM(S): 250 TABLET, FILM COATED ORAL at 05:26

## 2020-08-12 RX ADMIN — Medication 1 DROP(S): at 12:58

## 2020-08-12 RX ADMIN — LEVETIRACETAM 750 MILLIGRAM(S): 250 TABLET, FILM COATED ORAL at 17:18

## 2020-08-12 RX ADMIN — Medication 3 UNIT(S): at 17:18

## 2020-08-12 RX ADMIN — Medication 1 DROP(S): at 05:27

## 2020-08-12 RX ADMIN — POLYETHYLENE GLYCOL 3350 17 GRAM(S): 17 POWDER, FOR SOLUTION ORAL at 05:27

## 2020-08-12 RX ADMIN — LISINOPRIL 5 MILLIGRAM(S): 2.5 TABLET ORAL at 05:26

## 2020-08-12 RX ADMIN — Medication 1 DROP(S): at 02:02

## 2020-08-12 RX ADMIN — ENOXAPARIN SODIUM 40 MILLIGRAM(S): 100 INJECTION SUBCUTANEOUS at 22:41

## 2020-08-12 RX ADMIN — Medication 3 UNIT(S): at 09:03

## 2020-08-12 RX ADMIN — POLYETHYLENE GLYCOL 3350 17 GRAM(S): 17 POWDER, FOR SOLUTION ORAL at 17:18

## 2020-08-12 RX ADMIN — OXYCODONE HYDROCHLORIDE 5 MILLIGRAM(S): 5 TABLET ORAL at 14:26

## 2020-08-12 RX ADMIN — Medication 1 DROP(S): at 22:41

## 2020-08-12 RX ADMIN — Medication 100 MILLIGRAM(S): at 17:18

## 2020-08-12 RX ADMIN — OXYCODONE HYDROCHLORIDE 5 MILLIGRAM(S): 5 TABLET ORAL at 06:31

## 2020-08-12 RX ADMIN — TAMSULOSIN HYDROCHLORIDE 0.4 MILLIGRAM(S): 0.4 CAPSULE ORAL at 22:42

## 2020-08-12 RX ADMIN — Medication 650 MILLIGRAM(S): at 18:58

## 2020-08-12 RX ADMIN — Medication 1 DROP(S): at 09:04

## 2020-08-12 RX ADMIN — Medication 3 MILLIGRAM(S): at 22:42

## 2020-08-12 RX ADMIN — OXYCODONE HYDROCHLORIDE 5 MILLIGRAM(S): 5 TABLET ORAL at 05:32

## 2020-08-12 RX ADMIN — Medication 650 MILLIGRAM(S): at 10:15

## 2020-08-12 NOTE — CHART NOTE - NSCHARTNOTEFT_GEN_A_CORE
Nutrition Follow Up Note    Patient seen for calorie count initiation/follow up    Source: pt's daughter-in-law, EMR    Chart reviewed, events noted. Pt previously receiving enteral feeds via NGT. Pt s/p S&S evaluation 8/10 and cleared for PO diet.     Diet : Diet, Dysphagia 1 Pureed-Thin Liquids:   Consistent Carbohydrate {No Snacks} (CSTCHO)  Supplement Feeding Modality:  Oral  Glucerna Shake Cans or Servings Per Day:  2       Frequency:  Daily (08-11-20 @ 16:26)    Patient resting at time of visit, information obtained from pt's daughter in law Tatum at bedside. Per chart no noted N+V, last BM 8/10.     PO intake: per daughter-in-law family with concerns regarding pt's PO intake, states pt was only taking 5-6 spoonfuls of meal trays. Glucerna Shakes added yesterday afternoon, daughter-in-law states she encouraged intake of supplement today.    Source for PO intake: EMR    Enteral/Parenteral Nutrition: n/a    No updated weights available to assess - will continue to monitor    Pertinent Medications: MEDICATIONS  (STANDING):  amantadine Syrup 100 milliGRAM(s) Oral <User Schedule>  amantadine Syrup 200 milliGRAM(s) Oral <User Schedule>  artificial tears (preservative free) Ophthalmic Solution 1 Drop(s) Both EYES every 4 hours  dextrose 50% Injectable 12.5 Gram(s) IV Push once  dextrose 50% Injectable 25 Gram(s) IV Push once  dextrose 50% Injectable 25 Gram(s) IV Push once  enoxaparin Injectable 40 milliGRAM(s) SubCutaneous at bedtime  insulin glargine Injectable (LANTUS) 30 Unit(s) SubCutaneous at bedtime  insulin lispro (HumaLOG) corrective regimen sliding scale   SubCutaneous three times a day before meals  insulin lispro (HumaLOG) corrective regimen sliding scale   SubCutaneous at bedtime  insulin lispro Injectable (HumaLOG) 3 Unit(s) SubCutaneous three times a day before meals  levETIRAcetam 750 milliGRAM(s) Oral two times a day  lisinopril 5 milliGRAM(s) Oral daily  melatonin 3 milliGRAM(s) Oral at bedtime  ondansetron Injectable 4 milliGRAM(s) IV Push once  polyethylene glycol 3350 17 Gram(s) Oral two times a day  senna 2 Tablet(s) Oral at bedtime  tamsulosin 0.4 milliGRAM(s) Oral at bedtime    MEDICATIONS  (PRN):  acetaminophen    Suspension .. 650 milliGRAM(s) Oral every 6 hours PRN Temp greater or equal to 38C (100.4F), Mild Pain (1 - 3)  bisacodyl 5 milliGRAM(s) Oral every 12 hours PRN Constipation  dextrose 40% Gel 15 Gram(s) Oral once PRN Blood Glucose LESS THAN 70 milliGRAM(s)/deciliter  glucagon  Injectable 1 milliGRAM(s) IntraMuscular once PRN Glucose LESS THAN 70 milligrams/deciliter  labetalol Injectable 10 milliGRAM(s) IV Push every 6 hours PRN Systolic blood pressure > 160  oxyCODONE    IR 5 milliGRAM(s) Oral every 4 hours PRN Moderate Pain (4 - 6)    Pertinent Labs: 08-12 @ 06:57: Na 139, BUN 8, Cr 0.38<L>, <H>, K+ 3.5, Phos --, Mg --, Alk Phos --, ALT/SGPT --, AST/SGOT --, HbA1c --    Finger Sticks:  POCT Blood Glucose.: 150 mg/dL (08-12 @ 08:52)  POCT Blood Glucose.: 150 mg/dL (08-11 @ 20:59)  POCT Blood Glucose.: 96 mg/dL (08-11 @ 17:15)  POCT Blood Glucose.: 124 mg/dL (08-11 @ 13:00)    Skin per nursing documentation: no pressure injuries  Edema per nursing documentation: none noted    Estimated Needs:   [x] no change since previous assessment  [ ] recalculated:     Previous Nutrition Diagnosis: Increased nutrient needs  Nutrition Diagnosis is: ongoing, being addressed with PO diet with oral nutrition supplements    New Nutrition Diagnosis: Inadequate oral intake  Related to: unclear etiology  As evidenced by: pt with reported poor PO intake of meals    Interventions: Calorie count, Oral nutrition supplements    Recommend  1. Continue consistent carbohydrate no snacks diet, defer consistencies to team/SLP.  2. Continue Glucerna Shake 240mls 2x daily (440kcals, 20g protein), discussed supplements with pt's daughter-in-law, encouraged use of supplement after discharge if feasible.  3. Calorie count initiated, RD to follow up with results tomorrow. No indications for plan for re-initiating enteral nutrition at this time, if pt unable to meet estimated nutrient needs through PO diet may consider supplemental EN if within GOC.    Monitoring and Evaluation:     Continue to monitor nutritional intake, tolerance to diet prescription, weights, labs, skin integrity    RD remains available upon request and will follow up per protocol    Ivana Mcintyre RD, Pager # [phone]

## 2020-08-12 NOTE — PROGRESS NOTE ADULT - SUBJECTIVE AND OBJECTIVE BOX
SUBJECTIVE: Pt seen and examined, resting comfortably in bed, c/o headache this morning    OVERNIGHT EVENTS: none    Vital Signs Last 24 Hrs  T(C): 36.3 (12 Aug 2020 07:39), Max: 36.7 (11 Aug 2020 13:30)  T(F): 97.3 (12 Aug 2020 07:39), Max: 98.1 (11 Aug 2020 13:30)  HR: 93 (12 Aug 2020 07:39) (93 - 102)  BP: 129/75 (12 Aug 2020 07:39) (119/76 - 143/70)  BP(mean): --  RR: 18 (12 Aug 2020 07:39) (18 - 18)  SpO2: 100% (12 Aug 2020 07:39) (99% - 100%)    PHYSICAL EXAM:    General: No Acute Distress     Neurological: Awake alert Ox3, Speech hypophonic, Following Commands, Moving all Extremities RUE 4/5 RLE 4+/5 LUE/LLE 5/5     Pulmonary: Clear to Auscultation, No Rales, No Rhonchi, No Wheezes     Cardiovascular: S1, S2, Regular Rate and Rhythm     Gastrointestinal: Soft, Nontender, Nondistended     Incision: crani staples c/d/i    LABS:                        9.6    5.19  )-----------( 222      ( 12 Aug 2020 06:57 )             31.1    08-12    139  |  97  |  8   ----------------------------<  146<H>  3.5   |  25  |  0.38<L>    Ca    9.7      12 Aug 2020 06:57          08-11 @ 07:01  -  08-12 @ 07:00  --------------------------------------------------------  IN: 0 mL / OUT: 1500 mL / NET: -1500 mL        MEDICATIONS:  Antibiotics:    Neuro:  acetaminophen    Suspension .. 650 milliGRAM(s) Oral every 6 hours PRN Temp greater or equal to 38C (100.4F), Mild Pain (1 - 3)  amantadine Syrup 100 milliGRAM(s) Oral <User Schedule>  amantadine Syrup 200 milliGRAM(s) Oral <User Schedule>  levETIRAcetam 750 milliGRAM(s) Oral two times a day  melatonin 3 milliGRAM(s) Oral at bedtime  ondansetron Injectable 4 milliGRAM(s) IV Push once  oxyCODONE    IR 5 milliGRAM(s) Oral every 4 hours PRN Moderate Pain (4 - 6)    Cardiac:  labetalol Injectable 10 milliGRAM(s) IV Push every 6 hours PRN Systolic blood pressure > 160  lisinopril 5 milliGRAM(s) Oral daily  tamsulosin 0.4 milliGRAM(s) Oral at bedtime    Pulm:    GI/:  bisacodyl 5 milliGRAM(s) Oral every 12 hours PRN Constipation  polyethylene glycol 3350 17 Gram(s) Oral two times a day  senna 2 Tablet(s) Oral at bedtime    Other:   artificial tears (preservative free) Ophthalmic Solution 1 Drop(s) Both EYES every 4 hours  dextrose 40% Gel 15 Gram(s) Oral once PRN Blood Glucose LESS THAN 70 milliGRAM(s)/deciliter  dextrose 50% Injectable 12.5 Gram(s) IV Push once  dextrose 50% Injectable 25 Gram(s) IV Push once  dextrose 50% Injectable 25 Gram(s) IV Push once  enoxaparin Injectable 40 milliGRAM(s) SubCutaneous at bedtime  glucagon  Injectable 1 milliGRAM(s) IntraMuscular once PRN Glucose LESS THAN 70 milligrams/deciliter  insulin glargine Injectable (LANTUS) 30 Unit(s) SubCutaneous at bedtime  insulin lispro (HumaLOG) corrective regimen sliding scale   SubCutaneous three times a day before meals  insulin lispro (HumaLOG) corrective regimen sliding scale   SubCutaneous at bedtime  insulin lispro Injectable (HumaLOG) 3 Unit(s) SubCutaneous three times a day before meals    DIET: [] Regular [] CCD [] Renal [] Puree [x] Dysphagia [] Tube Feeds:     IMAGING:   < from: CT Head No Cont (08.05.20 @ 08:02) >  FINDINGS:    Similar-appearing predominantly low density left lateral convexity extra-axial collection measuring 1.6 cm in greatest depth.    Rightward midline shift of 7 mm is similar. No effacement of basal cisterns. No hydrocephalus.    No parenchymal hemorrhage or brain edema.    IMPRESSION:    No significant interval change from 8/4/2020    < end of copied text >  < from: [facility] Duplex Lower Ext Vein Scan, Bilat (08.05.20 @ 11:21) >  IMPRESSION:  No evidence of deep venous thrombosis in either lower extremity.    < end of copied text >  EEG SUMMARY/CLASSIFICATION    Abnormal EEG in an altered / a sedated patient.  - Intermittent focal slowing noted over the left fronto central temporal area    _____________________________________________________________  EEG IMPRESSION/CLINICAL CORRELATE    Abnormal EEG study.  1. Left hemispheric cortical functional abnormality  2. No epileptiform pattern or seizure seen.    < from: MR Head No Cont (08.08.20 @ 21:36) >    Impression:    Left frontoparietal craniotomy with subjacent fluid collection and rightward midline shift, similar in appearance to previous head CT dated 8/5/2020. No acute infarct or parenchymal hemorrhage.    < end of copied text >

## 2020-08-12 NOTE — PROGRESS NOTE ADULT - SUBJECTIVE AND OBJECTIVE BOX
[facility] Division of Hospital Medicine  Lilian Cline MD  Pager (M-F, 1M-6G): [phone]  Other Times:  [phone]      Patient is a 65y old  Female who presents with a chief complaint of s/p fall resulting in SDH (12 Aug 2020 13:55)      SUBJECTIVE / OVERNIGHT EVENTS: no acute events overnight. complaining of some dizziness this AM. still with mild L sided headache. otherwise. +constipation.    ADDITIONAL REVIEW OF SYSTEMS:    MEDICATIONS  (STANDING):  amantadine Syrup 100 milliGRAM(s) Oral <User Schedule>  amantadine Syrup 200 milliGRAM(s) Oral <User Schedule>  artificial tears (preservative free) Ophthalmic Solution 1 Drop(s) Both EYES every 4 hours  dextrose 50% Injectable 12.5 Gram(s) IV Push once  dextrose 50% Injectable 25 Gram(s) IV Push once  dextrose 50% Injectable 25 Gram(s) IV Push once  enoxaparin Injectable 40 milliGRAM(s) SubCutaneous at bedtime  insulin glargine Injectable (LANTUS) 30 Unit(s) SubCutaneous at bedtime  insulin lispro (HumaLOG) corrective regimen sliding scale   SubCutaneous three times a day before meals  insulin lispro (HumaLOG) corrective regimen sliding scale   SubCutaneous at bedtime  insulin lispro Injectable (HumaLOG) 3 Unit(s) SubCutaneous three times a day before meals  levETIRAcetam 750 milliGRAM(s) Oral two times a day  lisinopril 5 milliGRAM(s) Oral daily  melatonin 3 milliGRAM(s) Oral at bedtime  ondansetron Injectable 4 milliGRAM(s) IV Push once  polyethylene glycol 3350 17 Gram(s) Oral two times a day  senna 2 Tablet(s) Oral at bedtime  tamsulosin 0.4 milliGRAM(s) Oral at bedtime    MEDICATIONS  (PRN):  acetaminophen    Suspension .. 650 milliGRAM(s) Oral every 6 hours PRN Temp greater or equal to 38C (100.4F), Mild Pain (1 - 3)  bisacodyl 5 milliGRAM(s) Oral every 12 hours PRN Constipation  dextrose 40% Gel 15 Gram(s) Oral once PRN Blood Glucose LESS THAN 70 milliGRAM(s)/deciliter  glucagon  Injectable 1 milliGRAM(s) IntraMuscular once PRN Glucose LESS THAN 70 milligrams/deciliter  labetalol Injectable 10 milliGRAM(s) IV Push every 6 hours PRN Systolic blood pressure > 160  oxyCODONE    IR 5 milliGRAM(s) Oral every 4 hours PRN Moderate Pain (4 - 6)      CAPILLARY BLOOD GLUCOSE      POCT Blood Glucose.: 235 mg/dL (12 Aug 2020 12:49)  POCT Blood Glucose.: 150 mg/dL (12 Aug 2020 08:52)  POCT Blood Glucose.: 150 mg/dL (11 Aug 2020 20:59)  POCT Blood Glucose.: 96 mg/dL (11 Aug 2020 17:15)    I&O's Summary    11 Aug 2020 07:01  -  12 Aug 2020 07:00  --------------------------------------------------------  IN: 0 mL / OUT: 1500 mL / NET: -1500 mL        PHYSICAL EXAM:  Vital Signs Last 24 Hrs  T(C): 36.3 (12 Aug 2020 13:00), Max: 36.7 (11 Aug 2020 23:53)  T(F): 97.4 (12 Aug 2020 13:00), Max: 98.1 (11 Aug 2020 23:53)  HR: 82 (12 Aug 2020 13:00) (82 - 100)  BP: 112/73 (12 Aug 2020 13:00) (112/73 - 143/70)  BP(mean): --  RR: 18 (12 Aug 2020 13:00) (18 - 18)  SpO2: 100% (12 Aug 2020 13:00) (99% - 100%)    CONSTITUTIONAL: NAD, appears older than stated age  HEAD: +crani incision site c/d/i  EYES: PERRLA; conjunctiva and sclera clear  ENMT: Moist oral mucosa, no pharyngeal injection or exudates; normal dentition  NECK: Supple, no palpable masses; no thyromegaly  RESPIRATORY: Normal respiratory effort; lungs are clear to auscultation bilaterally  CARDIOVASCULAR: Regular rate and rhythm, normal S1 and S2, no murmur/rub/gallop; No lower extremity edema; Peripheral pulses are 2+ bilaterally  ABDOMEN: Soft, Nondistended,  Nontender to palpation, normoactive bowel sounds  MUSCULOSKELETAL:  No cyanosis of digits; no joint swelling or tenderness to palpation +clubbing of digits (chronic)  PSYCH: A+O to person, place, and time; affect appropriate  NEUROLOGY: CN 2-12 are intact and symmetric; no gross sensory deficits, moving all extremities  SKIN: +L crani incision site with staples c/d/i    LABS:                        9.6    5.19  )-----------( 222      ( 12 Aug 2020 06:57 )             31.1     08-12    139  |  97  |  8   ----------------------------<  146<H>  3.5   |  25  |  0.38<L>    Ca    9.7      12 Aug 2020 06:57          RADIOLOGY & ADDITIONAL TESTS:  Results Reviewed: no leukocytosis, H/H stable, mild hypokalemia to 3.5, Cr stable  Imaging Personally Reviewed:  Electrocardiogram Personally Reviewed:    COORDINATION OF CARE:  Care Discussed with Consultants/Other Providers [Y]: Neurosurgery CHACHA Corrales  Prior or Outpatient Records Reviewed [Y]: Urology progress note

## 2020-08-12 NOTE — DISCHARGE NOTE PROVIDER - CARE PROVIDERS DIRECT ADDRESSES
[email or web address]

## 2020-08-12 NOTE — PROGRESS NOTE ADULT - ASSESSMENT
66 yo female with pmh DM, HTN, on asa 81 brought in by family for concern of HA, dizziness, multiple episodes of emesis s/p mechanical fall 2 hours PTA (trip over baby carriage). Pt walked into triage, however pt became lethargic and unarousable,  intubated for airway protection and level 1 trauma activated.  No AC.  PT deteriorated to GCS 5.  CT revealed large left acute SDH with midline shift.  No apparent underlying brain injury noted.  Pt taken to OR for emergent evacuation of left SDH on7/26. Pt was noted to be poorly responsive, moving only to noxious and increase in left pupillary size, though reactive.  CT scan was unchanged.  Given potential for seizure, persistent mass effect, taken back to OR s/p Left crani/ reexploration mild subdural collection/clot evacuated, no evidence of membrane formation, dura primarily closed, water-tight closure, bone flap replaced on 7/31/20       PLAN:  Neuro:   - continue keppra for seizure   - amantadine for neuro stimulation, melatonin for sleep aid/sleep cycle  - continue oxycodone prn pain  - urinary retention- Urology consult appreciated, plan to dc home with charles and f/u with Urology as outpatient.  Respiratory:   - on room air  CV:  - HTN- continue lisinopril  - urinary retention- continue flomax  Endocrine:   - DMT2- continue fingersticks, lantus 30u qhs, humalog 3u pre meal  DVT ppx:   - venodynes, sq lovenox  GI:    - continue calorie count for poor intake, continue glucerna shakes  - on dysphagia diet  PT/OT:   PT/PMR recommending Acute rehab but family wishes to take her home and will provide 24 hour supervision.   PT working with family to optimize patient. Awaiting PT clearance      [employer] # [id number] 64 yo female with pmh DM, HTN, on asa 81 brought in by family for concern of HA, dizziness, multiple episodes of emesis s/p mechanical fall 2 hours PTA (trip over baby carriage). Pt walked into triage, however pt became lethargic and unarousable,  intubated for airway protection and level 1 trauma activated.  No AC.  PT deteriorated to GCS 5.  CT revealed large left acute SDH with midline shift.  No apparent underlying brain injury noted.  Pt taken to OR for emergent evacuation of left SDH on7/26. Pt was noted to be poorly responsive, moving only to noxious and increase in left pupillary size, though reactive.  CT scan was unchanged.  Given potential for seizure, persistent mass effect, taken back to OR s/p Left crani/ reexploration mild subdural collection/clot evacuated, no evidence of membrane formation, dura primarily closed, water-tight closure, bone flap replaced on 7/31/20       PLAN:  Neuro:   - continue keppra for seizure   - amantadine for neuro stimulation, melatonin for sleep aid/sleep cycle  - continue oxycodone prn pain  - urinary retention- Urology consult appreciated, plan to dc home with charles and f/u with Urology as outpatient.  - f/u UA and culture  Respiratory:   - on room air  CV:  - HTN- continue lisinopril  - urinary retention- continue flomax  Endocrine:   - DMT2- continue fingersticks, lantus 30u qhs, humalog 3u pre meal  DVT ppx:   - venodynes, sq lovenox  GI:    - continue calorie count for poor intake, continue glucerna shakes  - on dysphagia diet  PT/OT:   PT/PMR recommending Acute rehab but family wishes to take her home and will provide 24 hour supervision.   PT working with family to optimize patient. Awaiting PT clearance      [employer] # [id number] 66 yo female with pmh DM, HTN, on asa 81 brought in by family for concern of HA, dizziness, multiple episodes of emesis s/p mechanical fall 2 hours PTA (trip over baby carriage). Pt walked into triage, however pt became lethargic and unarousable,  intubated for airway protection and level 1 trauma activated.  No AC.  PT deteriorated to GCS 5.  CT revealed large left acute SDH with midline shift.  No apparent underlying brain injury noted.  Pt taken to OR for emergent evacuation of left SDH on7/26. Pt was noted to be poorly responsive, moving only to noxious and increase in left pupillary size, though reactive.  CT scan was unchanged.  Given potential for seizure, persistent mass effect, taken back to OR s/p Left crani/ reexploration mild subdural collection/clot evacuated, no evidence of membrane formation, dura primarily closed, water-tight closure, bone flap replaced on 7/31/20       PLAN:  Neuro:   - continue keppra for seizure   - amantadine for neuro stimulation, melatonin for sleep aid/sleep cycle  - continue oxycodone prn pain  - urinary retention- Urology consult appreciated, plan to dc home with charles and f/u with Urology as outpatient.  - f/u UA and culture  Respiratory:   - on room air  CV:  - HTN- continue lisinopril  - urinary retention- continue flomax  Endocrine:   - DMT2- continue fingersticks, lantus 30u qhs, humalog 3u pre meal  DVT ppx:   - venodynes, sq lovenox  GI:    - continue calorie count for poor intake, continue glucerna shakes  - on dysphagia diet  PT/OT:   PT/PMR recommending Acute rehab but family wishes to take her home and will provide 24 hour supervision.   PT working with family to optimize patient. Awaiting PT clearance    Based on patients ongoing issues with deconditioning  and generalized weakness secondary to patients diagnosis of s/p craniotomy for SDH evacuation, patient will require a semi electric hospital bed. This is necessary to achieve positioning, elevation and head of bed needs to be elevated at least 30 degrees most of the time. Bed pillow and wedges have been tried and ruled out.     Due to patients deconditioning and generalized weakness, secondary to patients diagnosis of s/p craniotomy for SDH evacuation, patient will require a transport chair. Patient is unable to self propel in a standard wheelchair. This is necessary to achieve daily tasks and therapies which cannot be achieved with the use of a cane or rolling walker. Patient and family are in agreement with transport chair use at home and assistance will be provided if needed.      [employer] # [id number]

## 2020-08-12 NOTE — DISCHARGE NOTE PROVIDER - NSDCCPCAREPLAN_GEN_ALL_CORE_FT
PRINCIPAL DISCHARGE DIAGNOSIS  Diagnosis: SDH (subdural hematoma)  Assessment and Plan of Treatment: 7/25 s/p left craniotomy for SDH evacuation. 7/31 s/p re-exploration left craniotomy for small clot evacuation      SECONDARY DISCHARGE DIAGNOSES  Diagnosis: Diabetes  Assessment and Plan of Treatment: Continue to check your fingersticks and continue home medication.    Diagnosis: Dysphagia  Assessment and Plan of Treatment: Continue dysphagia diet    Diagnosis: Hypertension  Assessment and Plan of Treatment: Continue current medication. Do not restart aspirin until you follow up  with Dr Harris in the office.    Diagnosis: Urinary retention  Assessment and Plan of Treatment: Continue charles. Follow up with your Private Urologist or you can follow up with Urologist Dr Driscoll @ [phone]

## 2020-08-12 NOTE — PROGRESS NOTE ADULT - PROBLEM SELECTOR PLAN 3
per daughter in law bedside and patient. she has had difficulty with initiating urinary stream even prior to hospitalization. was seeing a urologist outpatient who decided not to start her on any medications (sounds like she had urodynamic testing).  - c/w flomax 0.4mg qHS  - Urology consult recs noted  - recommending patient discharged with charles catheter, TOV to be performed outpatient - patient will need to follow-up with Urology as outpatient per daughter in law bedside and patient. she has had difficulty with initiating urinary stream even prior to hospitalization. was seeing a urologist outpatient who decided not to start her on any medications (sounds like she had urodynamic testing).  - c/w flomax 0.4mg qHS  - Urology consult recs noted  - f/u UA/UCx to r/o infection (low suspicion) contributing to retention  - recommending patient discharged with charles catheter, TOV to be performed outpatient - patient will need to follow-up with Urology as outpatient

## 2020-08-12 NOTE — DISCHARGE NOTE PROVIDER - NSDCFUADDINST_GEN_ALL_CORE_FT
please do not engage in strenuous activity, heavy lifting, drive, or return to work or school until cleared by surgeon.  please keep incision clean and dry, do not submerge wound in water for prolonged periods of time, pat dry after showering, and do not use any creams or ointments to incision.

## 2020-08-12 NOTE — DISCHARGE NOTE PROVIDER - NSDCACTIVITY_GEN_ALL_CORE
Stairs allowed/Walking - Outdoors allowed/Do not drive or operate machinery/Showering allowed/Do not make important decisions/Walking - Indoors allowed/No heavy lifting/straining

## 2020-08-12 NOTE — DISCHARGE NOTE PROVIDER - CARE PROVIDER_API CALL
Donal Harris  NEUROSURGERY  [address] [place] [id number]  Phone: [phone]  Fax: [phone]  Follow Up Time:     Debra Driscoll  UROLOGY  [address] [place] [address]  Phone: [phone]  Fax: [phone]  Follow Up Time:

## 2020-08-12 NOTE — PROGRESS NOTE ADULT - PROBLEM SELECTOR PLAN 6
on senna and amitiza BID at home for chronic constipation  - would send home on senna BID and miralax daily on senna and amitiza BID at home for chronic constipation  - would send home on senna BID and miralax daily  - will give dulcolax suppository x1 now, last BM 4 days ago

## 2020-08-12 NOTE — DISCHARGE NOTE PROVIDER - HOSPITAL COURSE
64 yo female with pmh DM, HTN, on asa 81 brought in by family for concern of HA, dizziness, multiple episodes of emesis s/p mechanical fall 2 hours PTA (trip over baby carriage). Pt walked into triage, however pt became lethargic and unarousable,  intubated for airway protection and level 1 trauma activated.  No AC.  PT deteriorated to GCS 5.  CT revealed large left acute SDH with midline shift.  No apparent underlying brain injury noted.  Pt taken to OR for emergent evacuation of left SDH on 7/26. Pt was noted to be poorly responsive, moving only to noxious stimuli and increase in left pupillary size, though reactive.  CT scan was unchanged.  Given potential for seizure, persistent mass effect, patient was taken back to OR s/p Left crani/ reexploration mild subdural collection/clot evacuated, no evidence of membrane formation, dura primarily closed, water-tight closure, bone flap replaced on 7/31/20. Patient remained in the NeuroICU for close monitoring. She was transferred to the floor when stable. Started on amantadine for neuro stimulation.  Patient was evaluated by Urology for persistent urinary retention. Recommendation is to place charles prior to discharge and follow up with Urology as outpatient, started on flomax. Urinalysis revealed .................Pt currently on dysphagia diet.     PT/PMR recommending Acute rehab but family wishes to take her home and will provide 24 hour supervision. PT working with family to optimize patient. On day of discharge patient is medically and neurologically stable. 66 yo female with pmh DM, HTN, on asa 81 brought in by family for concern of HA, dizziness, multiple episodes of emesis s/p mechanical fall 2 hours PTA (trip over baby carriage). Pt walked into triage, however pt became lethargic and unarousable,  intubated for airway protection and level 1 trauma activated.  No AC.  PT deteriorated to GCS 5.  CT revealed large left acute SDH with midline shift.  No apparent underlying brain injury noted.  Pt taken to OR for emergent evacuation of left SDH on 7/26. Pt was noted to be poorly responsive, moving only to noxious stimuli and increase in left pupillary size, though reactive.  CT scan was unchanged.  Given potential for seizure, persistent mass effect, patient was taken back to OR s/p Left crani/ reexploration mild subdural collection/clot evacuated, no evidence of membrane formation, dura primarily closed, water-tight closure, bone flap replaced on 7/31/20. Patient remained in the NeuroICU for close monitoring. She was transferred to the floor when stable. Started on amantadine for neuro stimulation.  Patient was evaluated by Urology for persistent urinary retention. Recommendation is to place charles prior to discharge and follow up with Urology as outpatient, started on flomax. Urinalysis revealed >[id number] ecoli most likely colonized.  Pt currently on dysphagia diet.     PT/PMR recommending Acute rehab but family wishes to take her home and will provide 24 hour supervision. PT working with family to optimize patient. On day of discharge patient is medically and neurologically stable.

## 2020-08-12 NOTE — DISCHARGE NOTE PROVIDER - NSDCFUADDAPPT_GEN_ALL_CORE_FT
Please make appointments for follow up with 1) Dr Harris 2) Urologist 3)Your Primary Care Physician with 1 week from discharge from hospital.

## 2020-08-12 NOTE — PROGRESS NOTE ADULT - ASSESSMENT
65 Upper sorbian-speaking female PMH of HTN on ASA and T2DM, brought in with HA, dizziness and multiple episodes of emesis s/p mechanical fall found to have large LEFT acute SDH with midline shift taken to OR for emergent evacuation of left SDH on 7/26 taken back to OR on 7/31 for L crani/re-exploration with mild subdural collection/clot evacuated. Bone flap replaced on 7/31. same name as above

## 2020-08-12 NOTE — PROGRESS NOTE ADULT - SUBJECTIVE AND OBJECTIVE BOX
Tolerating therapies.  Min A transfers and gait.  Pain controlled.  Followed by urology for urinary retention.   No CP, no SOB, no N/V; other ROS negative.    MEDICATIONS  (STANDING):  amantadine Syrup 100 milliGRAM(s) Oral <User Schedule>  amantadine Syrup 200 milliGRAM(s) Oral <User Schedule>  artificial tears (preservative free) Ophthalmic Solution 1 Drop(s) Both EYES every 4 hours  dextrose 50% Injectable 12.5 Gram(s) IV Push once  dextrose 50% Injectable 25 Gram(s) IV Push once  dextrose 50% Injectable 25 Gram(s) IV Push once  enoxaparin Injectable 40 milliGRAM(s) SubCutaneous at bedtime  insulin glargine Injectable (LANTUS) 30 Unit(s) SubCutaneous at bedtime  insulin lispro (HumaLOG) corrective regimen sliding scale   SubCutaneous three times a day before meals  insulin lispro (HumaLOG) corrective regimen sliding scale   SubCutaneous at bedtime  insulin lispro Injectable (HumaLOG) 3 Unit(s) SubCutaneous three times a day before meals  levETIRAcetam 750 milliGRAM(s) Oral two times a day  lisinopril 5 milliGRAM(s) Oral daily  melatonin 3 milliGRAM(s) Oral at bedtime  ondansetron Injectable 4 milliGRAM(s) IV Push once  polyethylene glycol 3350 17 Gram(s) Oral two times a day  senna 2 Tablet(s) Oral at bedtime  tamsulosin 0.4 milliGRAM(s) Oral at bedtime    MEDICATIONS  (PRN):  acetaminophen    Suspension .. 650 milliGRAM(s) Oral every 6 hours PRN Temp greater or equal to 38C (100.4F), Mild Pain (1 - 3)  bisacodyl 5 milliGRAM(s) Oral every 12 hours PRN Constipation  dextrose 40% Gel 15 Gram(s) Oral once PRN Blood Glucose LESS THAN 70 milliGRAM(s)/deciliter  glucagon  Injectable 1 milliGRAM(s) IntraMuscular once PRN Glucose LESS THAN 70 milligrams/deciliter  labetalol Injectable 10 milliGRAM(s) IV Push every 6 hours PRN Systolic blood pressure > 160  oxyCODONE    IR 5 milliGRAM(s) Oral every 4 hours PRN Moderate Pain (4 - 6)    Vital Signs Last 24 Hrs  T(C): 36.3 (12 Aug 2020 07:39), Max: 36.7 (11 Aug 2020 13:30)  T(F): 97.3 (12 Aug 2020 07:39), Max: 98.1 (11 Aug 2020 13:30)  HR: 93 (12 Aug 2020 07:39) (93 - 102)  BP: 129/75 (12 Aug 2020 07:39) (119/76 - 143/70)  BP(mean): --  RR: 18 (12 Aug 2020 07:39) (18 - 18)  SpO2: 100% (12 Aug 2020 07:39) (99% - 100%)    PHYSICAL EXAM  Constitutional - NAD, Comfortable, in chair   HEENT - scalp incision with staples  Neck - Supple, No limited ROM  Chest - Breathing comfortably  Cardiovascular - S1S2   Abdomen - Soft   Extremities - b/l UE restraints, no edema    Neurologic Exam -    Speech hypophonic  Follows instructions  Alert  RUE/RLE 4+/5; LUE/LLE 5/5                          9.6    5.19  )-----------( 222      ( 12 Aug 2020 06:57 )             31.1     08-12    139  |  97  |  8   ----------------------------<  146<H>  3.5   |  25  |  0.38<L>    Ca    9.7      12 Aug 2020 06:57      ASSESSMENT/PLAN  65 year old woman with functional deficits after SDH, craniotomy 7/26, 7/31   - Continue PT/OT/SLP  - Prec- Falls, Cardiac, Sz  - Cog- Amantadine  - DVT px- Lovenox  - Acute Rehab- can tolerate 3h/d PT/OT/SLP and requires daily physician visits

## 2020-08-13 LAB
APPEARANCE UR: ABNORMAL
BACTERIA # UR AUTO: ABNORMAL
BILIRUB UR-MCNC: NEGATIVE — SIGNIFICANT CHANGE UP
COLOR SPEC: YELLOW — SIGNIFICANT CHANGE UP
DIFF PNL FLD: NEGATIVE — SIGNIFICANT CHANGE UP
EPI CELLS # UR: 1 /HPF — SIGNIFICANT CHANGE UP
GLUCOSE BLDC GLUCOMTR-MCNC: 100 MG/DL — HIGH (ref 70–99)
GLUCOSE BLDC GLUCOMTR-MCNC: 104 MG/DL — HIGH (ref 70–99)
GLUCOSE BLDC GLUCOMTR-MCNC: 111 MG/DL — HIGH (ref 70–99)
GLUCOSE BLDC GLUCOMTR-MCNC: 154 MG/DL — HIGH (ref 70–99)
GLUCOSE BLDC GLUCOMTR-MCNC: 164 MG/DL — HIGH (ref 70–99)
GLUCOSE BLDC GLUCOMTR-MCNC: 178 MG/DL — HIGH (ref 70–99)
GLUCOSE UR QL: ABNORMAL
GRAN CASTS # UR COMP ASSIST: 1 /LPF — SIGNIFICANT CHANGE UP
HYALINE CASTS # UR AUTO: 9 /LPF — HIGH (ref 0–2)
KETONES UR-MCNC: NEGATIVE — SIGNIFICANT CHANGE UP
LEUKOCYTE ESTERASE UR-ACNC: ABNORMAL
NITRITE UR-MCNC: NEGATIVE — SIGNIFICANT CHANGE UP
PH UR: 6 — SIGNIFICANT CHANGE UP (ref 5–8)
PROT UR-MCNC: NEGATIVE — SIGNIFICANT CHANGE UP
RBC CASTS # UR COMP ASSIST: 2 /HPF — SIGNIFICANT CHANGE UP (ref 0–4)
SP GR SPEC: 1.01 — LOW (ref 1.01–1.02)
UROBILINOGEN FLD QL: NEGATIVE — SIGNIFICANT CHANGE UP
WBC UR QL: 66 /HPF — HIGH (ref 0–5)

## 2020-08-13 PROCEDURE — 99233 SBSQ HOSP IP/OBS HIGH 50: CPT

## 2020-08-13 RX ORDER — INSULIN GLARGINE 100 [IU]/ML
15 INJECTION, SOLUTION SUBCUTANEOUS ONCE
Refills: 0 | Status: COMPLETED | OUTPATIENT
Start: 2020-08-13 | End: 2020-08-13

## 2020-08-13 RX ORDER — INSULIN GLARGINE 100 [IU]/ML
15 INJECTION, SOLUTION SUBCUTANEOUS AT BEDTIME
Refills: 0 | Status: DISCONTINUED | OUTPATIENT
Start: 2020-08-13 | End: 2020-08-13

## 2020-08-13 RX ORDER — LEVETIRACETAM 250 MG/1
500 TABLET, FILM COATED ORAL
Refills: 0 | Status: DISCONTINUED | OUTPATIENT
Start: 2020-08-13 | End: 2020-08-14

## 2020-08-13 RX ORDER — INSULIN GLARGINE 100 [IU]/ML
30 INJECTION, SOLUTION SUBCUTANEOUS AT BEDTIME
Refills: 0 | Status: DISCONTINUED | OUTPATIENT
Start: 2020-08-13 | End: 2020-08-14

## 2020-08-13 RX ADMIN — Medication 3 UNIT(S): at 08:48

## 2020-08-13 RX ADMIN — Medication 1 DROP(S): at 01:32

## 2020-08-13 RX ADMIN — OXYCODONE HYDROCHLORIDE 5 MILLIGRAM(S): 5 TABLET ORAL at 03:21

## 2020-08-13 RX ADMIN — OXYCODONE HYDROCHLORIDE 5 MILLIGRAM(S): 5 TABLET ORAL at 08:52

## 2020-08-13 RX ADMIN — Medication 1 DROP(S): at 21:30

## 2020-08-13 RX ADMIN — OXYCODONE HYDROCHLORIDE 5 MILLIGRAM(S): 5 TABLET ORAL at 09:15

## 2020-08-13 RX ADMIN — Medication 3 MILLIGRAM(S): at 21:31

## 2020-08-13 RX ADMIN — SENNA PLUS 2 TABLET(S): 8.6 TABLET ORAL at 21:31

## 2020-08-13 RX ADMIN — Medication 3 UNIT(S): at 13:10

## 2020-08-13 RX ADMIN — Medication 650 MILLIGRAM(S): at 01:32

## 2020-08-13 RX ADMIN — Medication 200 MILLIGRAM(S): at 06:21

## 2020-08-13 RX ADMIN — INSULIN GLARGINE 30 UNIT(S): 100 INJECTION, SOLUTION SUBCUTANEOUS at 22:34

## 2020-08-13 RX ADMIN — Medication 1 DROP(S): at 08:49

## 2020-08-13 RX ADMIN — LISINOPRIL 5 MILLIGRAM(S): 2.5 TABLET ORAL at 06:20

## 2020-08-13 RX ADMIN — Medication 650 MILLIGRAM(S): at 02:02

## 2020-08-13 RX ADMIN — TAMSULOSIN HYDROCHLORIDE 0.4 MILLIGRAM(S): 0.4 CAPSULE ORAL at 21:31

## 2020-08-13 RX ADMIN — Medication 1 DROP(S): at 13:10

## 2020-08-13 RX ADMIN — Medication 1: at 08:48

## 2020-08-13 RX ADMIN — LEVETIRACETAM 750 MILLIGRAM(S): 250 TABLET, FILM COATED ORAL at 06:20

## 2020-08-13 RX ADMIN — Medication 1 DROP(S): at 06:20

## 2020-08-13 RX ADMIN — POLYETHYLENE GLYCOL 3350 17 GRAM(S): 17 POWDER, FOR SOLUTION ORAL at 06:20

## 2020-08-13 RX ADMIN — INSULIN GLARGINE 15 UNIT(S): 100 INJECTION, SOLUTION SUBCUTANEOUS at 01:34

## 2020-08-13 RX ADMIN — ENOXAPARIN SODIUM 40 MILLIGRAM(S): 100 INJECTION SUBCUTANEOUS at 21:31

## 2020-08-13 RX ADMIN — Medication 1 DROP(S): at 17:19

## 2020-08-13 RX ADMIN — OXYCODONE HYDROCHLORIDE 5 MILLIGRAM(S): 5 TABLET ORAL at 15:47

## 2020-08-13 RX ADMIN — OXYCODONE HYDROCHLORIDE 5 MILLIGRAM(S): 5 TABLET ORAL at 17:18

## 2020-08-13 RX ADMIN — LEVETIRACETAM 750 MILLIGRAM(S): 250 TABLET, FILM COATED ORAL at 17:19

## 2020-08-13 RX ADMIN — OXYCODONE HYDROCHLORIDE 5 MILLIGRAM(S): 5 TABLET ORAL at 02:51

## 2020-08-13 RX ADMIN — Medication 3 UNIT(S): at 17:19

## 2020-08-13 NOTE — PROGRESS NOTE ADULT - SUBJECTIVE AND OBJECTIVE BOX
CHIEF COMPLAINT: patient in NAD, complains of headache and wanting water  Italian  used     PHYSICAL EXAM:    General: No Acute Distress     Neurological: Awake alert Ox3, Speech hypophonic,  Following Commands, Moving all Extremities RUE 4/5 RLE 4+/5 LUE/LLE 5/5     Pulmonary: Clear to Auscultation, No Rales, No Rhonchi, No Wheezes     Cardiovascular: S1, S2, Regular Rate and Rhythm     Gastrointestinal: Soft, Nontender, Nondistended     Incision: staples c/d/i    LABS:                        9.6    5.19  )-----------( 222      ( 12 Aug 2020 06:57 )             31.1   08-    139  |  97  |  8   ----------------------------<  146<H>  3.5   |  25  |  0.38<L>    Ca    9.7      12 Aug 2020 06:57    Urinalysis Basic - ( 13 Aug 2020 10:28 )    Color: Yellow / Appearance: Slightly Turbid / S.009 / pH: x  Gluc: x / Ketone: Negative  / Bili: Negative / Urobili: Negative   Blood: x / Protein: Negative / Nitrite: Negative   Leuk Esterase: Large / RBC: 2 /hpf / WBC 66 /HPF   Sq Epi: x / Non Sq Epi: 1 /hpf / Bacteria: Many    MEDICATIONS  (STANDING):  artificial tears (preservative free) Ophthalmic Solution 1 Drop(s) Both EYES every 4 hours  dextrose 50% Injectable 12.5 Gram(s) IV Push once  dextrose 50% Injectable 25 Gram(s) IV Push once  dextrose 50% Injectable 25 Gram(s) IV Push once  enoxaparin Injectable 40 milliGRAM(s) SubCutaneous at bedtime  insulin glargine Injectable (LANTUS) 30 Unit(s) SubCutaneous at bedtime  insulin lispro (HumaLOG) corrective regimen sliding scale   SubCutaneous three times a day before meals  insulin lispro (HumaLOG) corrective regimen sliding scale   SubCutaneous at bedtime  insulin lispro Injectable (HumaLOG) 3 Unit(s) SubCutaneous three times a day before meals  levETIRAcetam 750 milliGRAM(s) Oral two times a day  lisinopril 5 milliGRAM(s) Oral daily  melatonin 3 milliGRAM(s) Oral at bedtime  ondansetron Injectable 4 milliGRAM(s) IV Push once  polyethylene glycol 3350 17 Gram(s) Oral two times a day  senna 2 Tablet(s) Oral at bedtime  tamsulosin 0.4 milliGRAM(s) Oral at bedtime    MEDICATIONS  (PRN):  acetaminophen    Suspension .. 650 milliGRAM(s) Oral every 6 hours PRN Temp greater or equal to 38C (100.4F), Mild Pain (1 - 3)  bisacodyl 5 milliGRAM(s) Oral every 12 hours PRN Constipation  dextrose 40% Gel 15 Gram(s) Oral once PRN Blood Glucose LESS THAN 70 milliGRAM(s)/deciliter  glucagon  Injectable 1 milliGRAM(s) IntraMuscular once PRN Glucose LESS THAN 70 milligrams/deciliter  oxyCODONE    IR 5 milliGRAM(s) Oral every 4 hours PRN Moderate Pain (4 - 6)    DIET: [] Regular [x] CCD [] Renal [] Puree [x] Dysphagia [] Tube Feeds:   1 puree with thin liquids,     IMAGING:   < from: MR Head No Cont (20 @ 21:36) >  INTERPRETATION:  Non-contrast MRI of the brain.    CLINICAL INDICATION: Status post evacuation of subdural hematoma    TECHNIQUE:  Multiplanar, multisequence MR images of the brain were obtained without the administration of intravenous contrast.    COMPARISON: Head CT dated 2020    FINDINGS:No acute hemorrhage or infarct is identified.    Redemonstrated is a left frontoparietal craniotomy with a subjacent fluid collection measuring 2 cm in greatest thickness and not significantly changed in appearance compared with the previous head CT. There is continued rightward shift of 7.3 mm. and mass effect upon the left lateral ventricle. There is trace postprocedural pneumocephaly.    No acute infarct is present. Age-appropriate involutional changes and mild microvascular ischemic changes are present. A trace amount of subdural hemorrhage is noted within the left occipital region.    Major flow-voids at the skull base are within normal limits.    The orbits are not remarkable in appearance.    There is right ethmoid and right sphenoid sinus mucosal thickening.    The tympanomastoid cavities are free of acute disease.    Impression:    Left frontoparietal craniotomy with subjacent fluid collection and rightward midline shift, similar in appearance to previous head CT dated 2020. No acute infarct or parenchymal hemorrhage.    < end of copied text >

## 2020-08-13 NOTE — CONSULT NOTE ADULT - SUBJECTIVE AND OBJECTIVE BOX
HPI:   Patient is a 65y female who fell about 3 weeks ago, became obtunded upon arrival to the ER and found to have SDH. She underwent craniotomy and evacuation and then a week later had re exploration and some persistent blood evacuated and skull flap replaced. She was noted to have urinary retention over the past few days and yesterday a charles was placed rather than send the patient home on straight cath regimen. She has some pyuria raising concern for a uti but the patient has not had any fevers and has a normal wbc. She had urinary retention after gall bladder surgery in the past. The patient is not talking so I cannot get any information from her.     REVIEW OF SYSTEMS:  All other review of systems cannot get (Comprehensive ROS)    PAST MEDICAL & SURGICAL HISTORY:  Diabetes  Cholelithiasis  Hypertension  History of varicose veins of lower extremity: s/p surgery in 2019  H/O shoulder surgery      Allergies    No Known Allergies    Intolerances        Antimicrobials Day #  :    Other Medications:  acetaminophen    Suspension .. 650 milliGRAM(s) Oral every 6 hours PRN  amantadine Syrup 100 milliGRAM(s) Oral <User Schedule>  amantadine Syrup 200 milliGRAM(s) Oral <User Schedule>  artificial tears (preservative free) Ophthalmic Solution 1 Drop(s) Both EYES every 4 hours  bisacodyl 5 milliGRAM(s) Oral every 12 hours PRN  dextrose 40% Gel 15 Gram(s) Oral once PRN  dextrose 50% Injectable 12.5 Gram(s) IV Push once  dextrose 50% Injectable 25 Gram(s) IV Push once  dextrose 50% Injectable 25 Gram(s) IV Push once  enoxaparin Injectable 40 milliGRAM(s) SubCutaneous at bedtime  glucagon  Injectable 1 milliGRAM(s) IntraMuscular once PRN  insulin glargine Injectable (LANTUS) 30 Unit(s) SubCutaneous at bedtime  insulin lispro (HumaLOG) corrective regimen sliding scale   SubCutaneous three times a day before meals  insulin lispro (HumaLOG) corrective regimen sliding scale   SubCutaneous at bedtime  insulin lispro Injectable (HumaLOG) 3 Unit(s) SubCutaneous three times a day before meals  labetalol Injectable 10 milliGRAM(s) IV Push every 6 hours PRN  levETIRAcetam 750 milliGRAM(s) Oral two times a day  lisinopril 5 milliGRAM(s) Oral daily  melatonin 3 milliGRAM(s) Oral at bedtime  ondansetron Injectable 4 milliGRAM(s) IV Push once  oxyCODONE    IR 5 milliGRAM(s) Oral every 4 hours PRN  polyethylene glycol 3350 17 Gram(s) Oral two times a day  senna 2 Tablet(s) Oral at bedtime  tamsulosin 0.4 milliGRAM(s) Oral at bedtime      FAMILY HISTORY:      SOCIAL HISTORY:  Smoking: [ ]Yes [ x]No  ETOH: [ ]Yes [ x]No  Drug Use: [ ]Yes [ ]xNo   [x ] Single[ ]    T(F): 97.6 (20 @ 08:32), Max: 97.9 (20 @ 04:34)  HR: 102 (20 @ 08:32)  BP: 121/82 (20 @ 08:32)  RR: 18 (20 @ 08:32)  SpO2: 98% (20 @ 08:32)  Wt(kg): --    PHYSICAL EXAM:  General: alert, no acute distress  Eyes:  anicteric, no conjunctival injection, no discharge  Oropharynx: no lesions or injection 	  Neck: supple, without adenopathy  Lungs: clear to auscultation  Heart: regular rate and rhythm; no murmur, rubs or gallops  Abdomen: soft, nondistended, nontender, without mass or organomegaly  Skin: no lesions  Extremities: no clubbing, cyanosis, or edema  Neurologic: alert, not verbal, moves all extremities  Head wound is intact  LAB RESULTS:                        9.6    5.19  )-----------( 222      ( 12 Aug 2020 06:57 )             31.1     08-    139  |  97  |  8   ----------------------------<  146<H>  3.5   |  25  |  0.38<L>    Ca    9.7      12 Aug 2020 06:57        Urinalysis Basic - ( 13 Aug 2020 10:28 )    Color: Yellow / Appearance: Slightly Turbid / S.009 / pH: x  Gluc: x / Ketone: Negative  / Bili: Negative / Urobili: Negative   Blood: x / Protein: Negative / Nitrite: Negative   Leuk Esterase: Large / RBC: 2 /hpf / WBC 66 /HPF   Sq Epi: x / Non Sq Epi: 1 /hpf / Bacteria: Many        MICROBIOLOGY:  RECENT CULTURES:        RADIOLOGY REVIEWED:      < from: MR Head No Cont (20 @ 21:36) >  EXAM:  MR BRAIN                            PROCEDURE DATE:  2020            INTERPRETATION:  Non-contrast MRI of the brain.    CLINICAL INDICATION: Status post evacuation of subdural hematoma    TECHNIQUE:  Multiplanar, multisequence MR images of the brain were obtained without the administration of intravenous contrast.    COMPARISON: Head CT dated 2020    FINDINGS:No acute hemorrhage or infarct is identified.    Redemonstrated is a left frontoparietal craniotomy with a subjacent fluid collection measuring 2 cm in greatest thickness and not significantly changed in appearance compared with the previous head CT. There is continued rightward shift of 7.3 mm. and mass effect upon the left lateral ventricle. There is trace postprocedural pneumocephaly.    No acute infarct is present. Age-appropriate involutional changes and mild microvascular ischemic changes are present. A trace amount of subdural hemorrhage is noted within the left occipital region.    Major flow-voids at the skull base are within normal limits.    The orbits are not remarkable in appearance.    There is right ethmoid and right sphenoid sinus mucosal thickening.    The tympanomastoid cavities are free of acute disease.    Impression:    Left frontoparietal craniotomy with subjacent fluid collection and rightward midline shift, similar in appearance to previous head CT dated 2020. No acute infarct or parenchymal hemorrhage.          < end of copied text >      Impression: Patient s/p sdh from fall, craniotomy x 2 for evacuation, has urinary retention with plan to send home on a charles. She has no fever, an abnormal ua and even a positive urine culture is expected since she is retaining and getting straight cathed. She is not septic at all and would just consider this colonizing if she grows bacteria. I would not treat unless she is for a gu procedure or she is symptomatic    Recommendations:  no INdication for antibiotics at present

## 2020-08-13 NOTE — PROGRESS NOTE ADULT - PROBLEM SELECTOR PLAN 3
per daughter in law bedside and patient. she has had difficulty with initiating urinary stream even prior to hospitalization. was seeing a urologist outpatient who decided not to start her on any medications (sounds like she had urodynamic testing).  - c/w flomax 0.4mg qHS  - UA positive, but asymptomatic, afebrile with no leukocytosis  - ID consulted, believe likely colonized and state no role of abx even if urine culture grows bacteria  - Urology recommending patient discharged with charles catheter, TOV to be performed outpatient - patient will need to follow-up with Urology as outpatient

## 2020-08-13 NOTE — PROGRESS NOTE ADULT - PROBLEM SELECTOR PLAN 6
on senna and amitiza BID at home for chronic constipation. patient states no BM but per RN, had BM during her shift yesterday.  - would send home on senna BID and miralax daily

## 2020-08-13 NOTE — PROGRESS NOTE ADULT - ASSESSMENT
64 yo female with pmh DM, HTN, on asa81 brought in by family fro concern of HA, dizziness, multiple episodes of emesis s/p mechanical fall 2 hours PTA (trip over baby carriage). Pt walked into triage, however pt became lethargic and unarousable, brought to CC room and intubated for airway protection and level 1 trauma activated.  No AC.  PT deteriorated to GCS 5.  CT revealed large left acute SDH with midline shift.  No apparent underlying brain injury noted.  Pt taken to OR for emergent evacuation of left SDH. 7/26. Pt was noted to be poorly responsive tomorrow, moving only to noxious and increase in left pupillary size, though reactive.  CT scan was unchanged.  Given potential for seizure, persistent mass effect, taken back to OR s/p Left crani/ reexploration mild subdural collection/clot evacuated, no evidence of membrane formation, dura primarily closed, water-tight closure, bone flap replaced 7/31/20.      PAST MEDICAL & SURGICAL HISTORY:  Diabetes  Cholelithiasis  Hypertension  History of varicose veins of lower extremity: s/p surgery in July 2019  H/O shoulder surgery    PROCEDURE: OR for emergent evacuation of left SDH. 7/26.                       OR s/p Left crani/ reexploration mild subdural collection/clot evacuated, no evidence of membrane formation, dura primarily closed, water-tight closure, bone flap replaced 7/31/20    PLAN:  Neuro: cont keppra for seizure prophylaxis, pain meds PRN  Respiratory: patient instructed on incentive spirometer  CV: cont amlodipine for HTN  Endocrine: lantus and HISS for type 2 DM, (on PO meds at home, HGA1c 7.2)           DVT ppx: [] SQH [x] SQL and venodynes bilaterally  Renal: IVL  ID: afebrile   GI: on dysphagia diet   PT/OT/PMR- acute rehab, however family wishing to take her home. awating DME equipment auth/delivery  Urology consult noted, charles placed, UA+, Urine culture- sent.   ID called for + UA, will hold off on antibiotics given afebrile/asymptomatic.   Hospitalist medicine following    As discussed with Dr Kimberly Turpin # [id number]

## 2020-08-13 NOTE — CHART NOTE - NSCHARTNOTEFT_GEN_A_CORE
Nutrition Follow Up Note  Patient seen for: Calorie count follow up. Chart reviewed and events noted. Pt is a "65 Turkmen-speaking female PMH of HTN on ASA and T2DM, brought in with HA, dizziness and multiple episodes of emesis s/p mechanical fall found to have large LEFT acute SDH with midline shift taken to OR for emergent evacuation of left SDH on 7/26 taken back to OR on 7/31 for L crani/re-exploration with mild subdural collection/clot evacuated. Bone flap replaced on 7/31. Recommended for Rehab, but patient and family declining."     Source:   Medical record and RN    Diet :   Dysphagia 1 Pureed-Thin Liquids:   Consistent Carbohydrate {No Snacks}  Glucerna Shake Cans x 2  Daily     Nutrition Events:  Seen for MBS on 8/10 with recommendation for Puree texture diet and Full assist with meals with 100% supervision, per SLP.    Per RN, pt is still eating minimally. 1-day calorie count was initiated on 8/12, completed on 8/13. Findings below. No recent N/V, constipation or diarrhea. Last BM 8/10; on bowel regimen.     Calorie Count Findings:  8/12  Breakfast: 305kcals, 21 gm protein  Lunch: 230 kcals, 14 gm protein   Dinner: 95 kcals, 1 gm protein  Supplements: 285 kcals,14.2 gm protein   Total: 915kcals, 50.2 gm protein; Meeting 72% of kcals needs and 70.7 gm protein needs.     8/13  Breakfast: 210kcals, 13 gm protein  Supplements: 142 kcals and 7 gm protein   Total so far today: 352kcals, 20g,m protein     Source for PO intake:  Calorie count per RN    Daily   % Weight Change    Pertinent Medications: MEDICATIONS  (STANDING):  artificial tears (preservative free) Ophthalmic Solution 1 Drop(s) Both EYES every 4 hours  dextrose 50% Injectable 12.5 Gram(s) IV Push once  dextrose 50% Injectable 25 Gram(s) IV Push once  dextrose 50% Injectable 25 Gram(s) IV Push once  enoxaparin Injectable 40 milliGRAM(s) SubCutaneous at bedtime  insulin glargine Injectable (LANTUS) 30 Unit(s) SubCutaneous at bedtime  insulin lispro (HumaLOG) corrective regimen sliding scale   SubCutaneous three times a day before meals  insulin lispro (HumaLOG) corrective regimen sliding scale   SubCutaneous at bedtime  insulin lispro Injectable (HumaLOG) 3 Unit(s) SubCutaneous three times a day before meals  levETIRAcetam 750 milliGRAM(s) Oral two times a day  lisinopril 5 milliGRAM(s) Oral daily  melatonin 3 milliGRAM(s) Oral at bedtime  ondansetron Injectable 4 milliGRAM(s) IV Push once  polyethylene glycol 3350 17 Gram(s) Oral two times a day  senna 2 Tablet(s) Oral at bedtime  tamsulosin 0.4 milliGRAM(s) Oral at bedtime    MEDICATIONS  (PRN):  acetaminophen    Suspension .. 650 milliGRAM(s) Oral every 6 hours PRN Temp greater or equal to 38C (100.4F), Mild Pain (1 - 3)  bisacodyl 5 milliGRAM(s) Oral every 12 hours PRN Constipation  dextrose 40% Gel 15 Gram(s) Oral once PRN Blood Glucose LESS THAN 70 milliGRAM(s)/deciliter  glucagon  Injectable 1 milliGRAM(s) IntraMuscular once PRN Glucose LESS THAN 70 milligrams/deciliter  oxyCODONE    IR 5 milliGRAM(s) Oral every 4 hours PRN Moderate Pain (4 - 6)    Pertinent Labs: No recent labs to address.   Finger Sticks:  POCT Blood Glucose.: 100 mg/dL (08-13 @ 12:43)  POCT Blood Glucose.: 178 mg/dL (08-13 @ 08:39)  POCT Blood Glucose.: 154 mg/dL (08-13 @ 01:30)  POCT Blood Glucose.: 164 mg/dL (08-13 @ 00:21)  POCT Blood Glucose.: 124 mg/dL (08-12 @ 22:28)  POCT Blood Glucose.: 122 mg/dL (08-12 @ 17:14)    Skin per nursing documentation: no pressure injuries noted. Surgical incision left crani 7/26/20;   Edema per nursing documentation: none noted    Estimated Needs:   [x] no change since previous assessment  (dosing wt 112 lbs/50.8kg)  Energy: (25-30kcal/kg): 1270-1524kcal  Protein: (1.4-1.6g protein/kg): 71-81g protein    Previous Nutrition Diagnosis: Increased nutrient needs  Nutrition Diagnosis is: ongoing, being addressed with PO diet and supplements    Previous Nutrition Diagnosis: Inadequate oral intake  Nutrition Diagnosis is: ongoing, being addressed with PO diet and supplements.    Recommend  1)     Monitoring and Evaluation:   Continue to monitor Nutritional intake, Tolerance to diet prescription, weights, labs, skin integrity    RD remains available upon request and will follow up per protocol  Snehal Frazier MS, RD, Pager #[phone] Nutrition Follow Up Note  Patient seen for: Calorie count follow up. Chart reviewed and events noted. Pt is a "65 Frisian-speaking female PMH of HTN on ASA and T2DM, brought in with HA, dizziness and multiple episodes of emesis s/p mechanical fall found to have large LEFT acute SDH with midline shift taken to OR for emergent evacuation of left SDH on 7/26 taken back to OR on 7/31 for L crani/re-exploration with mild subdural collection/clot evacuated. Bone flap replaced on 7/31. Recommended for Rehab, but patient and family declining."     Source:   Medical record and RN; pt asleep at time of interview     Diet :   Dysphagia 1 Pureed-Thin Liquids:   Consistent Carbohydrate {No Snacks}  Glucerna Shake Cans x 2  Daily     Nutrition Events:  Seen for MBS on 8/10 with recommendation for Puree texture diet and Full assist with meals with 100% supervision, per SLP.    Per RN, pt is still eating minimally. 1-day calorie count was initiated on 8/12, completed on 8/13 per family request. Findings below. No recent N/V, constipation or diarrhea. Last BM 8/10; on bowel regimen.     Calorie Count Findings:  8/12  Breakfast: 305kcals, 21 gm protein  Lunch: 230 kcals, 14 gm protein   Dinner: 95 kcals, 1 gm protein  Supplements: 285 kcals,14.2 gm protein   Total: 915kcals, 50.2 gm protein; Meeting 72% of kcals needs and 71% protein needs.     8/13  Breakfast: 210kcals, 13 gm protein  Supplements: 142 kcals and 7 gm protein   Total so far today: 352kcals, 20g,m protein     Source for PO intake:  Calorie count per RN    Pt is currently meeting >70% of estimated nutrient needs. Attempted ot reach family regarding results of calorie count. Will follow up to obtain dietary and supplement preferences to optimize intake >75% of estimated nutrient needs.     Pertinent Medications: MEDICATIONS  (STANDING):  artificial tears (preservative free) Ophthalmic Solution 1 Drop(s) Both EYES every 4 hours  dextrose 50% Injectable 12.5 Gram(s) IV Push once  dextrose 50% Injectable 25 Gram(s) IV Push once  dextrose 50% Injectable 25 Gram(s) IV Push once  enoxaparin Injectable 40 milliGRAM(s) SubCutaneous at bedtime  insulin glargine Injectable (LANTUS) 30 Unit(s) SubCutaneous at bedtime  insulin lispro (HumaLOG) corrective regimen sliding scale   SubCutaneous three times a day before meals  insulin lispro (HumaLOG) corrective regimen sliding scale   SubCutaneous at bedtime  insulin lispro Injectable (HumaLOG) 3 Unit(s) SubCutaneous three times a day before meals  levETIRAcetam 750 milliGRAM(s) Oral two times a day  lisinopril 5 milliGRAM(s) Oral daily  melatonin 3 milliGRAM(s) Oral at bedtime  ondansetron Injectable 4 milliGRAM(s) IV Push once  polyethylene glycol 3350 17 Gram(s) Oral two times a day  senna 2 Tablet(s) Oral at bedtime  tamsulosin 0.4 milliGRAM(s) Oral at bedtime    MEDICATIONS  (PRN):  acetaminophen    Suspension .. 650 milliGRAM(s) Oral every 6 hours PRN Temp greater or equal to 38C (100.4F), Mild Pain (1 - 3)  bisacodyl 5 milliGRAM(s) Oral every 12 hours PRN Constipation  dextrose 40% Gel 15 Gram(s) Oral once PRN Blood Glucose LESS THAN 70 milliGRAM(s)/deciliter  glucagon  Injectable 1 milliGRAM(s) IntraMuscular once PRN Glucose LESS THAN 70 milligrams/deciliter  oxyCODONE    IR 5 milliGRAM(s) Oral every 4 hours PRN Moderate Pain (4 - 6)    Pertinent Labs: No recent labs to address.   Finger Sticks:  POCT Blood Glucose.: 100 mg/dL (08-13 @ 12:43)  POCT Blood Glucose.: 178 mg/dL (08-13 @ 08:39)  POCT Blood Glucose.: 154 mg/dL (08-13 @ 01:30)  POCT Blood Glucose.: 164 mg/dL (08-13 @ 00:21)  POCT Blood Glucose.: 124 mg/dL (08-12 @ 22:28)  POCT Blood Glucose.: 122 mg/dL (08-12 @ 17:14)    Skin per nursing documentation: no pressure injuries noted. Surgical incision left crani 7/26/20;   Edema per nursing documentation: none noted    Estimated Needs:   [x] no change since previous assessment  (dosing wt 112 lbs/50.8kg)  Energy: (25-30kcal/kg): 1270-1524kcal  Protein: (1.4-1.6g protein/kg): 71-81g protein    Previous Nutrition Diagnosis: Increased nutrient needs  Nutrition Diagnosis is: ongoing, being addressed with PO diet and supplements    Previous Nutrition Diagnosis: Inadequate oral intake  Nutrition Diagnosis is: ongoing, being addressed with PO diet and supplements.    Recommend  1) Continue current Diet, Dysphagia 1 Pureed-Thin Liquids, Consistent Carbohydrate {No Snacks} diet.   2) Continue to provide Glucerna x2 daily to optimize nutrient intake.   3) RD to add Chocolate Mighty Shake to increase supplement flavor options to optimize intake.   4) Calorie count complete RD to attempt to follow up with family as able. No indications for plan for re-initiating enteral nutrition at this time.    Monitoring and Evaluation:   Continue to monitor Nutritional intake, Tolerance to diet prescription, weights, labs, skin integrity    RD remains available upon request and will follow up per protocol  Snehal Frazier MS, RD, Pager #[phone] Nutrition Follow Up Note  Patient seen for: Calorie count follow up. Chart reviewed and events noted. Pt is a "65 Pashto-speaking female PMH of HTN on ASA and T2DM, brought in with HA, dizziness and multiple episodes of emesis s/p mechanical fall found to have large LEFT acute SDH with midline shift taken to OR for emergent evacuation of left SDH on 7/26 taken back to OR on 7/31 for L crani/re-exploration with mild subdural collection/clot evacuated. Bone flap replaced on 7/31. Recommended for Rehab, but patient and family declining."     Source:   Medical record, RN, and fmaily at bedside; pt asleep at time of interview     Diet :   Dysphagia 1 Pureed-Thin Liquids:   Consistent Carbohydrate {No Snacks}  Glucerna Shake Cans x 2  Daily     Nutrition Events:  Seen for MBS on 8/10 with recommendation for Puree texture diet and Full assist with meals with 100% supervision, per SLP.    Per RN, pt is still eating minimally. 1-day calorie count was initiated on 8/12, completed on 8/13 per family request. Findings below. No recent N/V, constipation or diarrhea. Last BM 8/10; on bowel regimen.     Calorie Count Findings:  8/12  Breakfast: 305kcals, 21 gm protein  Lunch: 230 kcals, 14 gm protein   Dinner: 95 kcals, 1 gm protein  Supplements: 285 kcals,14.2 gm protein   Total: 915kcals, 50.2 gm protein; Meeting 72% of kcals needs and 71% protein needs.     8/13  Breakfast: 210kcals, 13 gm protein  Supplements: 142 kcals and 7 gm protein   Total so far today: 352kcals, 20g,m protein     Source for PO intake:  Calorie count per RN    Pt is currently meeting >70% of estimated nutrient needs. Spoke with family regarding results of calorie count, family would like to try Glucerna strawberry x1 and Vanilla x1 daily to optimize intake. Will continue to follow up to obtain dietary and supplement preferences to optimize intake >75% of estimated nutrient needs.     Pertinent Medications: MEDICATIONS  (STANDING):  artificial tears (preservative free) Ophthalmic Solution 1 Drop(s) Both EYES every 4 hours  dextrose 50% Injectable 12.5 Gram(s) IV Push once  dextrose 50% Injectable 25 Gram(s) IV Push once  dextrose 50% Injectable 25 Gram(s) IV Push once  enoxaparin Injectable 40 milliGRAM(s) SubCutaneous at bedtime  insulin glargine Injectable (LANTUS) 30 Unit(s) SubCutaneous at bedtime  insulin lispro (HumaLOG) corrective regimen sliding scale   SubCutaneous three times a day before meals  insulin lispro (HumaLOG) corrective regimen sliding scale   SubCutaneous at bedtime  insulin lispro Injectable (HumaLOG) 3 Unit(s) SubCutaneous three times a day before meals  levETIRAcetam 750 milliGRAM(s) Oral two times a day  lisinopril 5 milliGRAM(s) Oral daily  melatonin 3 milliGRAM(s) Oral at bedtime  ondansetron Injectable 4 milliGRAM(s) IV Push once  polyethylene glycol 3350 17 Gram(s) Oral two times a day  senna 2 Tablet(s) Oral at bedtime  tamsulosin 0.4 milliGRAM(s) Oral at bedtime    MEDICATIONS  (PRN):  acetaminophen    Suspension .. 650 milliGRAM(s) Oral every 6 hours PRN Temp greater or equal to 38C (100.4F), Mild Pain (1 - 3)  bisacodyl 5 milliGRAM(s) Oral every 12 hours PRN Constipation  dextrose 40% Gel 15 Gram(s) Oral once PRN Blood Glucose LESS THAN 70 milliGRAM(s)/deciliter  glucagon  Injectable 1 milliGRAM(s) IntraMuscular once PRN Glucose LESS THAN 70 milligrams/deciliter  oxyCODONE    IR 5 milliGRAM(s) Oral every 4 hours PRN Moderate Pain (4 - 6)    Pertinent Labs: No recent labs to address.   Finger Sticks:  POCT Blood Glucose.: 100 mg/dL (08-13 @ 12:43)  POCT Blood Glucose.: 178 mg/dL (08-13 @ 08:39)  POCT Blood Glucose.: 154 mg/dL (08-13 @ 01:30)  POCT Blood Glucose.: 164 mg/dL (08-13 @ 00:21)  POCT Blood Glucose.: 124 mg/dL (08-12 @ 22:28)  POCT Blood Glucose.: 122 mg/dL (08-12 @ 17:14)    Skin per nursing documentation: no pressure injuries noted. Surgical incision left crani 7/26/20;   Edema per nursing documentation: none noted    Estimated Needs:   [x] no change since previous assessment  (dosing wt 112 lbs/50.8kg)  Energy: (25-30kcal/kg): 1270-1524kcal  Protein: (1.4-1.6g protein/kg): 71-81g protein    Previous Nutrition Diagnosis: Increased nutrient needs  Nutrition Diagnosis is: ongoing, being addressed with PO diet and supplements    Previous Nutrition Diagnosis: Inadequate oral intake  Nutrition Diagnosis is: ongoing, being addressed with PO diet and supplements.    Recommend  1) Continue current Diet, Dysphagia 1 Pureed-Thin Liquids, Consistent Carbohydrate {No Snacks} diet.   2) Continue to provide Glucerna x2 daily to optimize nutrient intake.   3) Calorie count complete RD to change flavor of Glucerna to optimize nutrient intake. No indications for plan for re-initiating enteral nutrition at this time.    Monitoring and Evaluation:   Continue to monitor Nutritional intake, Tolerance to diet prescription, weights, labs, skin integrity    RD remains available upon request and will follow up per protocol  Snehal Frazier MS, RD, Pager #[phone]

## 2020-08-13 NOTE — PROGRESS NOTE ADULT - SUBJECTIVE AND OBJECTIVE BOX
[facility]  Lilian Cline MD  Pager (M-F, 3Y-5W): [phone]  Other Times:  [phone]      Patient is a 65y old  Female who presents with a chief complaint of sdh (13 Aug 2020 11:49)      SUBJECTIVE / OVERNIGHT EVENTS: states headache improved. no dizziness today. per patient, no BM still but per RN patient had BM on her shift yesterday. charles catheter placed for d/c planning.  ADDITIONAL REVIEW OF SYSTEMS:    MEDICATIONS  (STANDING):  artificial tears (preservative free) Ophthalmic Solution 1 Drop(s) Both EYES every 4 hours  dextrose 50% Injectable 12.5 Gram(s) IV Push once  dextrose 50% Injectable 25 Gram(s) IV Push once  dextrose 50% Injectable 25 Gram(s) IV Push once  enoxaparin Injectable 40 milliGRAM(s) SubCutaneous at bedtime  insulin glargine Injectable (LANTUS) 30 Unit(s) SubCutaneous at bedtime  insulin lispro (HumaLOG) corrective regimen sliding scale   SubCutaneous three times a day before meals  insulin lispro (HumaLOG) corrective regimen sliding scale   SubCutaneous at bedtime  insulin lispro Injectable (HumaLOG) 3 Unit(s) SubCutaneous three times a day before meals  levETIRAcetam 750 milliGRAM(s) Oral two times a day  lisinopril 5 milliGRAM(s) Oral daily  melatonin 3 milliGRAM(s) Oral at bedtime  ondansetron Injectable 4 milliGRAM(s) IV Push once  polyethylene glycol 3350 17 Gram(s) Oral two times a day  senna 2 Tablet(s) Oral at bedtime  tamsulosin 0.4 milliGRAM(s) Oral at bedtime    MEDICATIONS  (PRN):  acetaminophen    Suspension .. 650 milliGRAM(s) Oral every 6 hours PRN Temp greater or equal to 38C (100.4F), Mild Pain (1 - 3)  bisacodyl 5 milliGRAM(s) Oral every 12 hours PRN Constipation  dextrose 40% Gel 15 Gram(s) Oral once PRN Blood Glucose LESS THAN 70 milliGRAM(s)/deciliter  glucagon  Injectable 1 milliGRAM(s) IntraMuscular once PRN Glucose LESS THAN 70 milligrams/deciliter  oxyCODONE    IR 5 milliGRAM(s) Oral every 4 hours PRN Moderate Pain (4 - 6)      CAPILLARY BLOOD GLUCOSE      POCT Blood Glucose.: 100 mg/dL (13 Aug 2020 12:43)  POCT Blood Glucose.: 178 mg/dL (13 Aug 2020 08:39)  POCT Blood Glucose.: 154 mg/dL (13 Aug 2020 01:30)  POCT Blood Glucose.: 164 mg/dL (13 Aug 2020 00:21)  POCT Blood Glucose.: 124 mg/dL (12 Aug 2020 22:28)  POCT Blood Glucose.: 122 mg/dL (12 Aug 2020 17:14)    I&O's Summary    12 Aug 2020 07:01  -  13 Aug 2020 07:00  --------------------------------------------------------  IN: 0 mL / OUT: 1200 mL / NET: -1200 mL    13 Aug 2020 07:01  -  13 Aug 2020 13:47  --------------------------------------------------------  IN: 0 mL / OUT: 300 mL / NET: -300 mL        PHYSICAL EXAM:  Vital Signs Last 24 Hrs  T(C): 36.4 (13 Aug 2020 08:32), Max: 36.6 (13 Aug 2020 04:34)  T(F): 97.6 (13 Aug 2020 08:32), Max: 97.9 (13 Aug 2020 04:34)  HR: 102 (13 Aug 2020 08:32) (92 - 108)  BP: 121/82 (13 Aug 2020 08:32) (116/73 - 133/84)  BP(mean): --  RR: 18 (13 Aug 2020 08:32) (18 - 18)  SpO2: 98% (13 Aug 2020 08:32) (98% - 100%)    CONSTITUTIONAL: NAD, well-developed, well-groomed  EYES: PERRLA; conjunctiva and sclera clear  ENMT: Moist oral mucosa, no pharyngeal injection or exudates; normal dentition  NECK: Supple, no palpable masses; no thyromegaly  RESPIRATORY: Normal respiratory effort; lungs are clear to auscultation bilaterally  CARDIOVASCULAR: Regular rate and rhythm, normal S1 and S2, no murmur/rub/gallop; No lower extremity edema; Peripheral pulses are 2+ bilaterally  ABDOMEN: Soft, Nondistended,  Nontender to palpation, normoactive bowel sounds  MUSCULOSKELETAL:  No clubbing or cyanosis of digits; no joint swelling or tenderness to palpation  PSYCH: A+O to person, place, and time; affect appropriate  NEUROLOGY: CN 2-12 are intact and symmetric; no gross sensory deficits   SKIN: No rashes; no palpable lesions    LABS:                        9.6    5.19  )-----------( 222      ( 12 Aug 2020 06:57 )             31.1     08-    139  |  97  |  8   ----------------------------<  146<H>  3.5   |  25  |  0.38<L>    Ca    9.7      12 Aug 2020 06:57        Urinalysis Basic - ( 13 Aug 2020 10:28 )    Color: Yellow / Appearance: Slightly Turbid / S.009 / pH: x  Gluc: x / Ketone: Negative  / Bili: Negative / Urobili: Negative   Blood: x / Protein: Negative / Nitrite: Negative   Leuk Esterase: Large / RBC: 2 /hpf / WBC 66 /HPF   Sq Epi: x / Non Sq Epi: 1 /hpf / Bacteria: Many          RADIOLOGY & ADDITIONAL TESTS:  Results Reviewed: UA with pyuria, bacteruria, large LE, neg nitrite  Imaging Personally Reviewed:  Electrocardiogram Personally Reviewed:    COORDINATION OF CARE:  Care Discussed with Consultants/Other Providers [Y]: Neurosurgery TANNER Montero  Prior or Outpatient Records Reviewed [Y]: Infectious Disease consult note [facility] Division of Hospital Medicine  Lilian Cline MD  Pager (M-F, 9U-3R): [phone]  Other Times:  [phone]      Patient is a 65y old  Female who presents with a chief complaint of sdh (13 Aug 2020 11:49)      SUBJECTIVE / OVERNIGHT EVENTS: states headache improved. no dizziness today. per patient, no BM still but per RN patient had BM on her shift yesterday. charles catheter placed for d/c planning.  ADDITIONAL REVIEW OF SYSTEMS:    MEDICATIONS  (STANDING):  artificial tears (preservative free) Ophthalmic Solution 1 Drop(s) Both EYES every 4 hours  dextrose 50% Injectable 12.5 Gram(s) IV Push once  dextrose 50% Injectable 25 Gram(s) IV Push once  dextrose 50% Injectable 25 Gram(s) IV Push once  enoxaparin Injectable 40 milliGRAM(s) SubCutaneous at bedtime  insulin glargine Injectable (LANTUS) 30 Unit(s) SubCutaneous at bedtime  insulin lispro (HumaLOG) corrective regimen sliding scale   SubCutaneous three times a day before meals  insulin lispro (HumaLOG) corrective regimen sliding scale   SubCutaneous at bedtime  insulin lispro Injectable (HumaLOG) 3 Unit(s) SubCutaneous three times a day before meals  levETIRAcetam 750 milliGRAM(s) Oral two times a day  lisinopril 5 milliGRAM(s) Oral daily  melatonin 3 milliGRAM(s) Oral at bedtime  ondansetron Injectable 4 milliGRAM(s) IV Push once  polyethylene glycol 3350 17 Gram(s) Oral two times a day  senna 2 Tablet(s) Oral at bedtime  tamsulosin 0.4 milliGRAM(s) Oral at bedtime    MEDICATIONS  (PRN):  acetaminophen    Suspension .. 650 milliGRAM(s) Oral every 6 hours PRN Temp greater or equal to 38C (100.4F), Mild Pain (1 - 3)  bisacodyl 5 milliGRAM(s) Oral every 12 hours PRN Constipation  dextrose 40% Gel 15 Gram(s) Oral once PRN Blood Glucose LESS THAN 70 milliGRAM(s)/deciliter  glucagon  Injectable 1 milliGRAM(s) IntraMuscular once PRN Glucose LESS THAN 70 milligrams/deciliter  oxyCODONE    IR 5 milliGRAM(s) Oral every 4 hours PRN Moderate Pain (4 - 6)      CAPILLARY BLOOD GLUCOSE      POCT Blood Glucose.: 100 mg/dL (13 Aug 2020 12:43)  POCT Blood Glucose.: 178 mg/dL (13 Aug 2020 08:39)  POCT Blood Glucose.: 154 mg/dL (13 Aug 2020 01:30)  POCT Blood Glucose.: 164 mg/dL (13 Aug 2020 00:21)  POCT Blood Glucose.: 124 mg/dL (12 Aug 2020 22:28)  POCT Blood Glucose.: 122 mg/dL (12 Aug 2020 17:14)    I&O's Summary    12 Aug 2020 07:01  -  13 Aug 2020 07:00  --------------------------------------------------------  IN: 0 mL / OUT: 1200 mL / NET: -1200 mL    13 Aug 2020 07:01  -  13 Aug 2020 13:47  --------------------------------------------------------  IN: 0 mL / OUT: 300 mL / NET: -300 mL        PHYSICAL EXAM:  Vital Signs Last 24 Hrs  T(C): 36.4 (13 Aug 2020 08:32), Max: 36.6 (13 Aug 2020 04:34)  T(F): 97.6 (13 Aug 2020 08:32), Max: 97.9 (13 Aug 2020 04:34)  HR: 102 (13 Aug 2020 08:32) (92 - 108)  BP: 121/82 (13 Aug 2020 08:32) (116/73 - 133/84)  BP(mean): --  RR: 18 (13 Aug 2020 08:32) (18 - 18)  SpO2: 98% (13 Aug 2020 08:32) (98% - 100%)    CONSTITUTIONAL: NAD, appears older than stated age  HEAD: +crani incision site c/d/i  EYES: PERRLA; conjunctiva and sclera clear  ENMT: Moist oral mucosa, no pharyngeal injection or exudates; normal dentition  NECK: Supple, no palpable masses; no thyromegaly  RESPIRATORY: Normal respiratory effort; lungs are clear to auscultation bilaterally  CARDIOVASCULAR: Regular rate and rhythm, normal S1 and S2, no murmur/rub/gallop; No lower extremity edema; Peripheral pulses are 2+ bilaterally  ABDOMEN: Soft, Nondistended,  Nontender to palpation, normoactive bowel sounds  GENITOURINARY: +charles draining clear yellow urine  MUSCULOSKELETAL:  No cyanosis of digits; no joint swelling or tenderness to palpation +clubbing of digits (chronic)  PSYCH: A+O to person, place, and time; affect appropriate  NEUROLOGY: CN 2-12 are intact and symmetric; no gross sensory deficits, moving all extremities  SKIN: +L crani incision site with staples c/d/i    LABS:                        9.6    5.19  )-----------( 222      ( 12 Aug 2020 06:57 )             31.1     -    139  |  97  |  8   ----------------------------<  146<H>  3.5   |  25  |  0.38<L>    Ca    9.7      12 Aug 2020 06:57        Urinalysis Basic - ( 13 Aug 2020 10:28 )    Color: Yellow / Appearance: Slightly Turbid / S.009 / pH: x  Gluc: x / Ketone: Negative  / Bili: Negative / Urobili: Negative   Blood: x / Protein: Negative / Nitrite: Negative   Leuk Esterase: Large / RBC: 2 /hpf / WBC 66 /HPF   Sq Epi: x / Non Sq Epi: 1 /hpf / Bacteria: Many          RADIOLOGY & ADDITIONAL TESTS:  Results Reviewed: UA with pyuria, bacteruria, large LE, neg nitrite  Imaging Personally Reviewed:  Electrocardiogram Personally Reviewed:    COORDINATION OF CARE:  Care Discussed with Consultants/Other Providers [Y]: Neurosurgery TANNER Montero  Prior or Outpatient Records Reviewed [Y]: Infectious Disease consult note

## 2020-08-14 VITALS
TEMPERATURE: 98 F | SYSTOLIC BLOOD PRESSURE: 124 MMHG | DIASTOLIC BLOOD PRESSURE: 81 MMHG | HEART RATE: 105 BPM | OXYGEN SATURATION: 100 % | RESPIRATION RATE: 18 BRPM

## 2020-08-14 LAB
-  AMIKACIN: SIGNIFICANT CHANGE UP
-  AMOXICILLIN/CLAVULANIC ACID: SIGNIFICANT CHANGE UP
-  AMPICILLIN/SULBACTAM: SIGNIFICANT CHANGE UP
-  AMPICILLIN: SIGNIFICANT CHANGE UP
-  AZTREONAM: SIGNIFICANT CHANGE UP
-  CEFAZOLIN: SIGNIFICANT CHANGE UP
-  CEFEPIME: SIGNIFICANT CHANGE UP
-  CEFOXITIN: SIGNIFICANT CHANGE UP
-  CEFTRIAXONE: SIGNIFICANT CHANGE UP
-  CIPROFLOXACIN: SIGNIFICANT CHANGE UP
-  ERTAPENEM: SIGNIFICANT CHANGE UP
-  GENTAMICIN: SIGNIFICANT CHANGE UP
-  IMIPENEM: SIGNIFICANT CHANGE UP
-  LEVOFLOXACIN: SIGNIFICANT CHANGE UP
-  MEROPENEM: SIGNIFICANT CHANGE UP
-  NITROFURANTOIN: SIGNIFICANT CHANGE UP
-  PIPERACILLIN/TAZOBACTAM: SIGNIFICANT CHANGE UP
-  TIGECYCLINE: SIGNIFICANT CHANGE UP
-  TOBRAMYCIN: SIGNIFICANT CHANGE UP
-  TRIMETHOPRIM/SULFAMETHOXAZOLE: SIGNIFICANT CHANGE UP
CULTURE RESULTS: SIGNIFICANT CHANGE UP
GLUCOSE BLDC GLUCOMTR-MCNC: 143 MG/DL — HIGH (ref 70–99)
GLUCOSE BLDC GLUCOMTR-MCNC: 99 MG/DL — SIGNIFICANT CHANGE UP (ref 70–99)
METHOD TYPE: SIGNIFICANT CHANGE UP
ORGANISM # SPEC MICROSCOPIC CNT: SIGNIFICANT CHANGE UP
ORGANISM # SPEC MICROSCOPIC CNT: SIGNIFICANT CHANGE UP
SPECIMEN SOURCE: SIGNIFICANT CHANGE UP

## 2020-08-14 PROCEDURE — 84132 ASSAY OF SERUM POTASSIUM: CPT

## 2020-08-14 PROCEDURE — 87186 SC STD MICRODIL/AGAR DIL: CPT

## 2020-08-14 PROCEDURE — 84100 ASSAY OF PHOSPHORUS: CPT

## 2020-08-14 PROCEDURE — 97530 THERAPEUTIC ACTIVITIES: CPT

## 2020-08-14 PROCEDURE — 97162 PT EVAL MOD COMPLEX 30 MIN: CPT

## 2020-08-14 PROCEDURE — 95714 VEEG EA 12-26 HR UNMNTR: CPT

## 2020-08-14 PROCEDURE — 85014 HEMATOCRIT: CPT

## 2020-08-14 PROCEDURE — 82962 GLUCOSE BLOOD TEST: CPT

## 2020-08-14 PROCEDURE — 82803 BLOOD GASES ANY COMBINATION: CPT

## 2020-08-14 PROCEDURE — 86901 BLOOD TYPING SEROLOGIC RH(D): CPT

## 2020-08-14 PROCEDURE — 83735 ASSAY OF MAGNESIUM: CPT

## 2020-08-14 PROCEDURE — 82565 ASSAY OF CREATININE: CPT

## 2020-08-14 PROCEDURE — 81001 URINALYSIS AUTO W/SCOPE: CPT

## 2020-08-14 PROCEDURE — P9037: CPT

## 2020-08-14 PROCEDURE — 92610 EVALUATE SWALLOWING FUNCTION: CPT

## 2020-08-14 PROCEDURE — 84295 ASSAY OF SERUM SODIUM: CPT

## 2020-08-14 PROCEDURE — C1889: CPT

## 2020-08-14 PROCEDURE — 86923 COMPATIBILITY TEST ELECTRIC: CPT

## 2020-08-14 PROCEDURE — 97116 GAIT TRAINING THERAPY: CPT

## 2020-08-14 PROCEDURE — 85576 BLOOD PLATELET AGGREGATION: CPT

## 2020-08-14 PROCEDURE — P9016: CPT

## 2020-08-14 PROCEDURE — 74177 CT ABD & PELVIS W/CONTRAST: CPT

## 2020-08-14 PROCEDURE — 88304 TISSUE EXAM BY PATHOLOGIST: CPT

## 2020-08-14 PROCEDURE — 93970 EXTREMITY STUDY: CPT

## 2020-08-14 PROCEDURE — 82947 ASSAY GLUCOSE BLOOD QUANT: CPT

## 2020-08-14 PROCEDURE — 94003 VENT MGMT INPAT SUBQ DAY: CPT

## 2020-08-14 PROCEDURE — 86900 BLOOD TYPING SEROLOGIC ABO: CPT

## 2020-08-14 PROCEDURE — 83036 HEMOGLOBIN GLYCOSYLATED A1C: CPT

## 2020-08-14 PROCEDURE — 92611 MOTION FLUOROSCOPY/SWALLOW: CPT

## 2020-08-14 PROCEDURE — 70450 CT HEAD/BRAIN W/O DYE: CPT

## 2020-08-14 PROCEDURE — 87040 BLOOD CULTURE FOR BACTERIA: CPT

## 2020-08-14 PROCEDURE — 99232 SBSQ HOSP IP/OBS MODERATE 35: CPT

## 2020-08-14 PROCEDURE — 97535 SELF CARE MNGMENT TRAINING: CPT

## 2020-08-14 PROCEDURE — 83605 ASSAY OF LACTIC ACID: CPT

## 2020-08-14 PROCEDURE — 74230 X-RAY XM SWLNG FUNCJ C+: CPT

## 2020-08-14 PROCEDURE — 97167 OT EVAL HIGH COMPLEX 60 MIN: CPT

## 2020-08-14 PROCEDURE — 87086 URINE CULTURE/COLONY COUNT: CPT

## 2020-08-14 PROCEDURE — 80177 DRUG SCRN QUAN LEVETIRACETAM: CPT

## 2020-08-14 PROCEDURE — 36430 TRANSFUSION BLD/BLD COMPNT: CPT

## 2020-08-14 PROCEDURE — U0003: CPT

## 2020-08-14 PROCEDURE — 71260 CT THORAX DX C+: CPT

## 2020-08-14 PROCEDURE — C1769: CPT

## 2020-08-14 PROCEDURE — 72125 CT NECK SPINE W/O DYE: CPT

## 2020-08-14 PROCEDURE — 82435 ASSAY OF BLOOD CHLORIDE: CPT

## 2020-08-14 PROCEDURE — 85730 THROMBOPLASTIN TIME PARTIAL: CPT

## 2020-08-14 PROCEDURE — 85027 COMPLETE CBC AUTOMATED: CPT

## 2020-08-14 PROCEDURE — 97110 THERAPEUTIC EXERCISES: CPT

## 2020-08-14 PROCEDURE — C1713: CPT

## 2020-08-14 PROCEDURE — 87070 CULTURE OTHR SPECIMN AEROBIC: CPT

## 2020-08-14 PROCEDURE — 94002 VENT MGMT INPAT INIT DAY: CPT

## 2020-08-14 PROCEDURE — 70551 MRI BRAIN STEM W/O DYE: CPT

## 2020-08-14 PROCEDURE — 99233 SBSQ HOSP IP/OBS HIGH 50: CPT

## 2020-08-14 PROCEDURE — 86850 RBC ANTIBODY SCREEN: CPT

## 2020-08-14 PROCEDURE — 86803 HEPATITIS C AB TEST: CPT

## 2020-08-14 PROCEDURE — 80053 COMPREHEN METABOLIC PANEL: CPT

## 2020-08-14 PROCEDURE — 85396 CLOTTING ASSAY WHOLE BLOOD: CPT

## 2020-08-14 PROCEDURE — 80048 BASIC METABOLIC PNL TOTAL CA: CPT

## 2020-08-14 PROCEDURE — 71045 X-RAY EXAM CHEST 1 VIEW: CPT

## 2020-08-14 PROCEDURE — 95700 EEG CONT REC W/VID EEG TECH: CPT

## 2020-08-14 PROCEDURE — 85610 PROTHROMBIN TIME: CPT

## 2020-08-14 PROCEDURE — 82330 ASSAY OF CALCIUM: CPT

## 2020-08-14 PROCEDURE — 92526 ORAL FUNCTION THERAPY: CPT

## 2020-08-14 PROCEDURE — 97164 PT RE-EVAL EST PLAN CARE: CPT

## 2020-08-14 RX ORDER — OXYCODONE HYDROCHLORIDE 5 MG/1
1 TABLET ORAL
Qty: 30 | Refills: 0
Start: 2020-08-14

## 2020-08-14 RX ORDER — TAMSULOSIN HYDROCHLORIDE 0.4 MG/1
1 CAPSULE ORAL
Qty: 30 | Refills: 0
Start: 2020-08-14

## 2020-08-14 RX ORDER — ASPIRIN/CALCIUM CARB/MAGNESIUM 324 MG
1 TABLET ORAL
Qty: 0 | Refills: 0 | DISCHARGE

## 2020-08-14 RX ORDER — ACETAMINOPHEN 500 MG
20.31 TABLET ORAL
Qty: 0 | Refills: 0 | DISCHARGE
Start: 2020-08-14

## 2020-08-14 RX ORDER — GABAPENTIN 400 MG/1
1 CAPSULE ORAL
Qty: 0 | Refills: 0 | DISCHARGE

## 2020-08-14 RX ORDER — LEVETIRACETAM 250 MG/1
1 TABLET, FILM COATED ORAL
Qty: 60 | Refills: 0
Start: 2020-08-14

## 2020-08-14 RX ORDER — LUBIPROSTONE 24 UG/1
1 CAPSULE, GELATIN COATED ORAL
Qty: 0 | Refills: 0 | DISCHARGE

## 2020-08-14 RX ADMIN — Medication 3 UNIT(S): at 12:56

## 2020-08-14 RX ADMIN — Medication 1 DROP(S): at 14:47

## 2020-08-14 RX ADMIN — OXYCODONE HYDROCHLORIDE 5 MILLIGRAM(S): 5 TABLET ORAL at 09:11

## 2020-08-14 RX ADMIN — Medication 1 DROP(S): at 06:03

## 2020-08-14 RX ADMIN — Medication 1 DROP(S): at 01:27

## 2020-08-14 RX ADMIN — Medication 3 UNIT(S): at 09:11

## 2020-08-14 RX ADMIN — Medication 1 DROP(S): at 09:12

## 2020-08-14 NOTE — PROGRESS NOTE ADULT - PROBLEM SELECTOR PLAN 2
continue with dysphagia diet as recommended by speech and swallow.  - family educated by nutrition regarding food and liquid consistencies they can continue at home
continue with dysphagia diet as recommended by speech and swallow.  - family will need education regarding food and liquid consistencies they can continue at home
continue with dysphagia diet as recommended by speech and swallow.  - family educated by nutrition regarding food and liquid consistencies they can continue at home

## 2020-08-14 NOTE — PROGRESS NOTE ADULT - ASSESSMENT
64 yo female with pmh DM, HTN, on asa81 brought in by family fro concern of HA, dizziness, multiple episodes of emesis s/p mechanical fall 2 hours PTA (trip over baby carriage). Pt walked into triage, however pt became lethargic and unarousable, brought to CC room and intubated for airway protection and level 1 trauma activated.  No AC.  PT deteriorated to GCS 5.  CT revealed large left acute SDH with midline shift.  No apparent underlying brain injury noted.  Pt taken to OR for emergent evacuation of left SDH. 7/26. Pt was noted to be poorly responsive tomorrow, moving only to noxious and increase in left pupillary size, though reactive.  CT scan was unchanged.  Given potential for seizure, persistent mass effect, taken back to OR s/p Left crani/ reexploration mild subdural collection/clot evacuated, no evidence of membrane formation, dura primarily closed, water-tight closure, bone flap replaced 7/31/20.      PAST MEDICAL & SURGICAL HISTORY:  Diabetes  Cholelithiasis  Hypertension  History of varicose veins of lower extremity: s/p surgery in July 2019  H/O shoulder surgery    PROCEDURE: OR for emergent evacuation of left SDH. 7/26.                       OR s/p Left crani/ reexploration mild subdural collection/clot evacuated, no evidence of membrane formation, dura primarily closed, water-tight closure, bone flap replaced 7/31/20    PLAN:  Neuro: cont keppra for seizure prophylaxis, pain meds PRN  Respiratory: patient instructed on incentive spirometer  CV: cont amlodipine for HTN  Endocrine: lantus and HISS for type 2 DM, (on PO meds at home, HGA1c 7.2)           DVT ppx: [] SQH [x] SQL and venodynes bilaterally  Renal: IVL  ID: afebrile   GI: on dysphagia diet   PT/OT/PMR- acute rehab, however family wishing to take her home. awating DME equipment auth/delivery  Urology consult noted, charles placed, UA+, Urine culture- sent.   ID called for + UA, will hold off on antibiotics given afebrile/asymptomatic. most likely colonized   Hospitalist medicine following    As discussed with Dr Kimberly Turpin # [id number]

## 2020-08-14 NOTE — PROGRESS NOTE ADULT - SUBJECTIVE AND OBJECTIVE BOX
[facility] Division of Hospital Medicine  Lilian Cline MD  Pager (M-F, 6M-8K): [phone]  Other Times:  [phone]      Patient is a 65y old  Female who presents with a chief complaint of OR for emergent evacuation of left SDH. .   OR s/p Left crani/ reexploration mild subdural collection/clot evacuated, no evidence of membrane formation, dura primarily closed, water-tight closure, bone flap replaced 20 (13 Aug 2020 15:13)      SUBJECTIVE / OVERNIGHT EVENTS: no acute events overnight. feels well. asking when she can go home. still some mild left sided headache at times, but overall feels it has improved. no fevers, chills, chest pain, SOB, abd pain, n/v at this time.     ADDITIONAL REVIEW OF SYSTEMS:    MEDICATIONS  (STANDING):  artificial tears (preservative free) Ophthalmic Solution 1 Drop(s) Both EYES every 4 hours  dextrose 50% Injectable 12.5 Gram(s) IV Push once  dextrose 50% Injectable 25 Gram(s) IV Push once  dextrose 50% Injectable 25 Gram(s) IV Push once  enoxaparin Injectable 40 milliGRAM(s) SubCutaneous at bedtime  insulin glargine Injectable (LANTUS) 30 Unit(s) SubCutaneous at bedtime  insulin lispro (HumaLOG) corrective regimen sliding scale   SubCutaneous three times a day before meals  insulin lispro (HumaLOG) corrective regimen sliding scale   SubCutaneous at bedtime  insulin lispro Injectable (HumaLOG) 3 Unit(s) SubCutaneous three times a day before meals  levETIRAcetam 500 milliGRAM(s) Oral two times a day  lisinopril 5 milliGRAM(s) Oral daily  melatonin 3 milliGRAM(s) Oral at bedtime  ondansetron Injectable 4 milliGRAM(s) IV Push once  polyethylene glycol 3350 17 Gram(s) Oral two times a day  senna 2 Tablet(s) Oral at bedtime  tamsulosin 0.4 milliGRAM(s) Oral at bedtime    MEDICATIONS  (PRN):  acetaminophen    Suspension .. 650 milliGRAM(s) Oral every 6 hours PRN Temp greater or equal to 38C (100.4F), Mild Pain (1 - 3)  bisacodyl 5 milliGRAM(s) Oral every 12 hours PRN Constipation  dextrose 40% Gel 15 Gram(s) Oral once PRN Blood Glucose LESS THAN 70 milliGRAM(s)/deciliter  glucagon  Injectable 1 milliGRAM(s) IntraMuscular once PRN Glucose LESS THAN 70 milligrams/deciliter  oxyCODONE    IR 5 milliGRAM(s) Oral every 4 hours PRN Moderate Pain (4 - 6)      CAPILLARY BLOOD GLUCOSE      POCT Blood Glucose.: 99 mg/dL (14 Aug 2020 08:45)  POCT Blood Glucose.: 104 mg/dL (13 Aug 2020 20:53)  POCT Blood Glucose.: 111 mg/dL (13 Aug 2020 17:16)  POCT Blood Glucose.: 100 mg/dL (13 Aug 2020 12:43)    I&O's Summary    13 Aug 2020 07:01  -  14 Aug 2020 07:00  --------------------------------------------------------  IN: 0 mL / OUT: 1700 mL / NET: -1700 mL    14 Aug 2020 07:01  -  14 Aug 2020 12:40  --------------------------------------------------------  IN: 0 mL / OUT: 650 mL / NET: -650 mL        PHYSICAL EXAM:  Vital Signs Last 24 Hrs  T(C): 36.4 (14 Aug 2020 08:47), Max: 36.8 (13 Aug 2020 13:46)  T(F): 97.6 (14 Aug 2020 08:47), Max: 98.2 (13 Aug 2020 13:46)  HR: 106 (14 Aug 2020 08:47) (76 - 106)  BP: 115/76 (14 Aug 2020 08:47) (103/70 - 137/79)  BP(mean): --  RR: 18 (14 Aug 2020 08:47) (18 - 18)  SpO2: 100% (14 Aug 2020 08:47) (99% - 100%)    CONSTITUTIONAL: NAD, appears older than stated age  HEAD: +crani incision site c/d/i  EYES: PERRLA; conjunctiva and sclera clear  ENMT: Moist oral mucosa, no pharyngeal injection or exudates; normal dentition  NECK: Supple, no palpable masses; no thyromegaly  RESPIRATORY: Normal respiratory effort; lungs are clear to auscultation bilaterally  CARDIOVASCULAR: Regular rate and rhythm, normal S1 and S2, no murmur/rub/gallop; No lower extremity edema; Peripheral pulses are 2+ bilaterally  ABDOMEN: Soft, Nondistended,  Nontender to palpation, normoactive bowel sounds  GENITOURINARY: +charles draining clear yellow urine  MUSCULOSKELETAL:  No cyanosis of digits; no joint swelling or tenderness to palpation +clubbing of digits (chronic)  PSYCH: A+O to person, place, and time; affect appropriate  NEUROLOGY: CN 2-12 are intact and symmetric; no gross sensory deficits, moving all extremities  SKIN: +L crani incision site with staples c/d/i    LABS:      Urinalysis Basic - ( 13 Aug 2020 10:28 )    Color: Yellow / Appearance: Slightly Turbid / S.009 / pH: x  Gluc: x / Ketone: Negative  / Bili: Negative / Urobili: Negative   Blood: x / Protein: Negative / Nitrite: Negative   Leuk Esterase: Large / RBC: 2 /hpf / WBC 66 /HPF   Sq Epi: x / Non Sq Epi: 1 /hpf / Bacteria: Many        Culture - Urine (collected 12 Aug 2020 16:32)  Source: .Urine Catheterized  Preliminary Report (13 Aug 2020 19:48):    >100,000 CFU/ml Escherichia coli        RADIOLOGY & ADDITIONAL TESTS:  Results Reviewed:   Imaging Personally Reviewed:  Electrocardiogram Personally Reviewed:    COORDINATION OF CARE:  Care Discussed with Consultants/Other Providers [Y/N]:  Prior or Outpatient Records Reviewed [Y/N]: [facility] Division of Hospital Medicine  Lilian Cline MD  Pager (M-F, 9A-5M): [phone]  Other Times:  [phone]      Patient is a 65y old  Female who presents with a chief complaint of OR for emergent evacuation of left SDH. .   OR s/p Left crani/ reexploration mild subdural collection/clot evacuated, no evidence of membrane formation, dura primarily closed, water-tight closure, bone flap replaced 20 (13 Aug 2020 15:13)      SUBJECTIVE / OVERNIGHT EVENTS: no acute events overnight. feels well. asking when she can go home. still some mild left sided headache at times, but overall feels it has improved. no fevers, chills, chest pain, SOB, abd pain, n/v at this time.     ADDITIONAL REVIEW OF SYSTEMS:    MEDICATIONS  (STANDING):  artificial tears (preservative free) Ophthalmic Solution 1 Drop(s) Both EYES every 4 hours  dextrose 50% Injectable 12.5 Gram(s) IV Push once  dextrose 50% Injectable 25 Gram(s) IV Push once  dextrose 50% Injectable 25 Gram(s) IV Push once  enoxaparin Injectable 40 milliGRAM(s) SubCutaneous at bedtime  insulin glargine Injectable (LANTUS) 30 Unit(s) SubCutaneous at bedtime  insulin lispro (HumaLOG) corrective regimen sliding scale   SubCutaneous three times a day before meals  insulin lispro (HumaLOG) corrective regimen sliding scale   SubCutaneous at bedtime  insulin lispro Injectable (HumaLOG) 3 Unit(s) SubCutaneous three times a day before meals  levETIRAcetam 500 milliGRAM(s) Oral two times a day  lisinopril 5 milliGRAM(s) Oral daily  melatonin 3 milliGRAM(s) Oral at bedtime  ondansetron Injectable 4 milliGRAM(s) IV Push once  polyethylene glycol 3350 17 Gram(s) Oral two times a day  senna 2 Tablet(s) Oral at bedtime  tamsulosin 0.4 milliGRAM(s) Oral at bedtime    MEDICATIONS  (PRN):  acetaminophen    Suspension .. 650 milliGRAM(s) Oral every 6 hours PRN Temp greater or equal to 38C (100.4F), Mild Pain (1 - 3)  bisacodyl 5 milliGRAM(s) Oral every 12 hours PRN Constipation  dextrose 40% Gel 15 Gram(s) Oral once PRN Blood Glucose LESS THAN 70 milliGRAM(s)/deciliter  glucagon  Injectable 1 milliGRAM(s) IntraMuscular once PRN Glucose LESS THAN 70 milligrams/deciliter  oxyCODONE    IR 5 milliGRAM(s) Oral every 4 hours PRN Moderate Pain (4 - 6)      CAPILLARY BLOOD GLUCOSE      POCT Blood Glucose.: 99 mg/dL (14 Aug 2020 08:45)  POCT Blood Glucose.: 104 mg/dL (13 Aug 2020 20:53)  POCT Blood Glucose.: 111 mg/dL (13 Aug 2020 17:16)  POCT Blood Glucose.: 100 mg/dL (13 Aug 2020 12:43)    I&O's Summary    13 Aug 2020 07:01  -  14 Aug 2020 07:00  --------------------------------------------------------  IN: 0 mL / OUT: 1700 mL / NET: -1700 mL    14 Aug 2020 07:01  -  14 Aug 2020 12:40  --------------------------------------------------------  IN: 0 mL / OUT: 650 mL / NET: -650 mL        PHYSICAL EXAM:  Vital Signs Last 24 Hrs  T(C): 36.4 (14 Aug 2020 08:47), Max: 36.8 (13 Aug 2020 13:46)  T(F): 97.6 (14 Aug 2020 08:47), Max: 98.2 (13 Aug 2020 13:46)  HR: 106 (14 Aug 2020 08:47) (76 - 106)  BP: 115/76 (14 Aug 2020 08:47) (103/70 - 137/79)  BP(mean): --  RR: 18 (14 Aug 2020 08:47) (18 - 18)  SpO2: 100% (14 Aug 2020 08:47) (99% - 100%)    CONSTITUTIONAL: NAD, appears older than stated age  HEAD: +crani incision site c/d/i  EYES: PERRLA; conjunctiva and sclera clear  ENMT: Moist oral mucosa, no pharyngeal injection or exudates; normal dentition  NECK: Supple, no palpable masses; no thyromegaly  RESPIRATORY: Normal respiratory effort; lungs are clear to auscultation bilaterally  CARDIOVASCULAR: Regular rate and rhythm, normal S1 and S2, no murmur/rub/gallop; No lower extremity edema; Peripheral pulses are 2+ bilaterally  ABDOMEN: Soft, Nondistended,  Nontender to palpation, normoactive bowel sounds  GENITOURINARY: +charles draining clear yellow urine  MUSCULOSKELETAL:  No cyanosis of digits; no joint swelling or tenderness to palpation +clubbing of digits (chronic)  PSYCH: A+O to person, place, and time; affect appropriate  NEUROLOGY: CN 2-12 are intact and symmetric; no gross sensory deficits, moving all extremities  SKIN: +L crani incision site with staples c/d/i    LABS:      Urinalysis Basic - ( 13 Aug 2020 10:28 )    Color: Yellow / Appearance: Slightly Turbid / S.009 / pH: x  Gluc: x / Ketone: Negative  / Bili: Negative / Urobili: Negative   Blood: x / Protein: Negative / Nitrite: Negative   Leuk Esterase: Large / RBC: 2 /hpf / WBC 66 /HPF   Sq Epi: x / Non Sq Epi: 1 /hpf / Bacteria: Many        Culture - Urine (collected 12 Aug 2020 16:32)  Source: .Urine Catheterized  Preliminary Report (13 Aug 2020 19:48):    >100,000 CFU/ml Escherichia coli        RADIOLOGY & ADDITIONAL TESTS:  Results Reviewed: Urine culture growing >100K E. coli  Imaging Personally Reviewed:  Electrocardiogram Personally Reviewed:    COORDINATION OF CARE:  Care Discussed with Consultants/Other Providers [Y]: Neurosurgery TANNER Champion  Prior or Outpatient Records Reviewed [Y]:

## 2020-08-14 NOTE — PROGRESS NOTE ADULT - PROBLEM SELECTOR PLAN 3
per daughter in law bedside and patient. she has had difficulty with initiating urinary stream even prior to hospitalization. was seeing a urologist outpatient who decided not to start her on any medications (sounds like she had urodynamic testing).  - c/w flomax 0.4mg qHS  - UA positive, but asymptomatic, afebrile with no leukocytosis  - ID consulted, believe likely colonized and state no role of abx even if urine culture grows bacteria (grew >100K E. coli) - reconfirmed with ID by primary team - no role for abx  - Urology recommending patient discharged with charles catheter, TOV to be performed outpatient - patient will need to follow-up with Urology as outpatient

## 2020-08-14 NOTE — PROGRESS NOTE ADULT - PROBLEM SELECTOR PLAN 4
BP well controlled. confirmed home meds with pharmacy and daughter  - d/c'ed amlodipine 5mg daily - was not taking at home  - c/w home lisinopril 5mg daily (first dose 8/12) which is her home med  - daughter educated not to resume home ASA 81mg daily until cleared from NSGY standpoint

## 2020-08-14 NOTE — PROGRESS NOTE ADULT - PROBLEM SELECTOR PLAN 8
Discussed at length with both patient and daughter in law bedside in both English and Kyrgyz. Explained patient is high risk for falls and would benefit from rehab to help with her strength, functional status, speech and swallowing. Reiterated her hospital course and her 2 operations, which they both acknowledged understanding. Per daughter in law, their family members had a family meeting and decided they do no want the patient to go to rehab, predominantly because the patient herself does not want to go and would like to be home with family. Patient's  and 2 children live in the same home and the 2 adult children would be with her 24/7 alternating shifts to be there to assist her at all times. reiterated multiple times patient would benefit from rehab at this time, but both patient and family also reiterate they will refuse rehab and would like to take her home, willing to care for patient with instructions. time spent with family 32 minutes 8/11/20.
Discussed at length with both patient and daughter in law bedside in both English and Setswana. Explained patient is high risk for falls and would benefit from rehab to help with her strength, functional status, speech and swallowing. Reiterated her hospital course and her 2 operations, which they both acknowledged understanding. Per daughter in law, their family members had a family meeting and decided they do no want the patient to go to rehab, predominantly because the patient herself does not want to go and would like to be home with family. Patient's  and 2 children live in the same home and the 2 adult children would be with her 24/7 alternating shifts to be there to assist her at all times. reiterated multiple times patient would benefit from rehab at this time, but both patient and family also reiterate they will refuse rehab and would like to take her home, willing to care for patient with instructions. time spent with family 32 minutes 8/11/20.
Discussed at length with both patient and daughter in law bedside in both English and Lao. Explained patient is high risk for falls and would benefit from rehab to help with her strength, functional status, speech and swallowing. Reiterated her hospital course and her 2 operations, which they both acknowledged understanding. Per daughter in law, their family members had a family meeting and decided they do no want the patient to go to rehab, predominantly because the patient herself does not want to go and would like to be home with family. Patient's  and 2 children live in the same home and the 2 adult children would be with her 24/7 alternating shifts to be there to assist her at all times. reiterated multiple times patient would benefit from rehab at this time, but both patient and family also reiterate they will refuse rehab and would like to take her home, willing to care for patient with instructions. time spent with family 32 minutes 8/11/20.

## 2020-08-14 NOTE — PROGRESS NOTE ADULT - NSHPATTENDINGPLANDISCUSS_GEN_ALL_CORE
patient in Armenian and neurosurgery PA Kylah
patient in Croatian and neurosurgery CHACHA Corrales
patient in Wolof and neurosurgery TANNER Champion

## 2020-08-14 NOTE — PROGRESS NOTE ADULT - SUBJECTIVE AND OBJECTIVE BOX
SUBJECTIVE: Patient seen and examined.  No acute distress.        Vital Signs Last 24 Hrs  T(C): 36.4 (14 Aug 2020 08:47), Max: 36.8 (13 Aug 2020 13:46)  T(F): 97.6 (14 Aug 2020 08:47), Max: 98.2 (13 Aug 2020 13:46)  HR: 106 (14 Aug 2020 08:47) (76 - 106)  BP: 115/76 (14 Aug 2020 08:47) (103/70 - 137/79)  BP(mean): --  RR: 18 (14 Aug 2020 08:47) (18 - 18)  SpO2: 100% (14 Aug 2020 08:47) (99% - 100%)    PHYSICAL EXAM:    Constitutional: No Acute Distress     Neurological: AOx3, Following Commands, Moving all Extremities hypophonic, left side 5/5 right 4+/5      Pulmonary: Clear to Auscultation, No rales, No rhonchi, No wheezes     Cardiovascular: S1, S2, Regular rate and rhythm     Gastrointestinal: Soft, Non-tender, Non-distended     Extremities: No calf tenderness     Incision: c/d/i + staples   LABS:           08-13 @ 07:01 - 08-14 @ 07:00  --------------------------------------------------------  IN: 0 mL / OUT: 1700 mL / NET: -1700 mL    08-14 @ 07:01 - 08-14 @ 10:48  --------------------------------------------------------  IN: 0 mL / OUT: 650 mL / NET: -650 mL      MEDICATIONS:  Anticoagulation:   enoxaparin Injectable 40 milliGRAM(s) SubCutaneous at bedtime    Antibiotics:    Endo:  dextrose 40% Gel 15 Gram(s) Oral once PRN  dextrose 50% Injectable 12.5 Gram(s) IV Push once  dextrose 50% Injectable 25 Gram(s) IV Push once  dextrose 50% Injectable 25 Gram(s) IV Push once  glucagon  Injectable 1 milliGRAM(s) IntraMuscular once PRN  insulin glargine Injectable (LANTUS) 30 Unit(s) SubCutaneous at bedtime  insulin lispro (HumaLOG) corrective regimen sliding scale   SubCutaneous three times a day before meals  insulin lispro (HumaLOG) corrective regimen sliding scale   SubCutaneous at bedtime  insulin lispro Injectable (HumaLOG) 3 Unit(s) SubCutaneous three times a day before meals    Neuro:  acetaminophen    Suspension .. 650 milliGRAM(s) Oral every 6 hours PRN Temp greater or equal to 38C (100.4F), Mild Pain (1 - 3)  levETIRAcetam 500 milliGRAM(s) Oral two times a day  melatonin 3 milliGRAM(s) Oral at bedtime  ondansetron Injectable 4 milliGRAM(s) IV Push once  oxyCODONE    IR 5 milliGRAM(s) Oral every 4 hours PRN Moderate Pain (4 - 6)    Cardiac:  lisinopril 5 milliGRAM(s) Oral daily  tamsulosin 0.4 milliGRAM(s) Oral at bedtime    Pulm:    GI/:  bisacodyl 5 milliGRAM(s) Oral every 12 hours PRN Constipation  polyethylene glycol 3350 17 Gram(s) Oral two times a day  senna 2 Tablet(s) Oral at bedtime    Other:   artificial tears (preservative free) Ophthalmic Solution 1 Drop(s) Both EYES every 4 hours    DIET: dysphagia 1     IMAGING:

## 2020-08-14 NOTE — PROGRESS NOTE ADULT - SUBJECTIVE AND OBJECTIVE BOX
Tolerating therapies.  CG transfers and gait.  No CP, no SOB, other ROS negative.    MEDICATIONS  (STANDING):  artificial tears (preservative free) Ophthalmic Solution 1 Drop(s) Both EYES every 4 hours  dextrose 50% Injectable 12.5 Gram(s) IV Push once  dextrose 50% Injectable 25 Gram(s) IV Push once  dextrose 50% Injectable 25 Gram(s) IV Push once  enoxaparin Injectable 40 milliGRAM(s) SubCutaneous at bedtime  insulin glargine Injectable (LANTUS) 30 Unit(s) SubCutaneous at bedtime  insulin lispro (HumaLOG) corrective regimen sliding scale   SubCutaneous three times a day before meals  insulin lispro (HumaLOG) corrective regimen sliding scale   SubCutaneous at bedtime  insulin lispro Injectable (HumaLOG) 3 Unit(s) SubCutaneous three times a day before meals  levETIRAcetam 500 milliGRAM(s) Oral two times a day  lisinopril 5 milliGRAM(s) Oral daily  melatonin 3 milliGRAM(s) Oral at bedtime  ondansetron Injectable 4 milliGRAM(s) IV Push once  polyethylene glycol 3350 17 Gram(s) Oral two times a day  senna 2 Tablet(s) Oral at bedtime  tamsulosin 0.4 milliGRAM(s) Oral at bedtime    MEDICATIONS  (PRN):  acetaminophen    Suspension .. 650 milliGRAM(s) Oral every 6 hours PRN Temp greater or equal to 38C (100.4F), Mild Pain (1 - 3)  bisacodyl 5 milliGRAM(s) Oral every 12 hours PRN Constipation  dextrose 40% Gel 15 Gram(s) Oral once PRN Blood Glucose LESS THAN 70 milliGRAM(s)/deciliter  glucagon  Injectable 1 milliGRAM(s) IntraMuscular once PRN Glucose LESS THAN 70 milligrams/deciliter  oxyCODONE    IR 5 milliGRAM(s) Oral every 4 hours PRN Moderate Pain (4 - 6)    Vital Signs Last 24 Hrs  T(C): 36.6 (14 Aug 2020 12:20), Max: 36.8 (13 Aug 2020 13:46)  T(F): 97.8 (14 Aug 2020 12:20), Max: 98.2 (13 Aug 2020 13:46)  HR: 105 (14 Aug 2020 12:20) (76 - 106)  BP: 124/81 (14 Aug 2020 12:20) (103/70 - 137/79)  BP(mean): --  RR: 18 (14 Aug 2020 12:20) (18 - 18)  SpO2: 100% (14 Aug 2020 12:20) (99% - 100%)    PHYSICAL EXAM  Constitutional - NAD, Comfortable, in chair   HEENT - scalp incision with staples  Neck - Supple, No limited ROM  Chest - Breathing comfortably  Cardiovascular - S1S2   Abdomen - Soft   Extremities - b/l UE restraints, no edema    Neurologic Exam -    Speech hypophonic  Follows instructions  Alert  RUE/RLE 4+/5; LUE/LLE 5/5         ASSESSMENT/PLAN  65 year old woman with functional deficits after SDH, craniotomy 7/26, 7/31   - Continue PT/OT/SLP  - Prec- Falls, Cardiac, Sz  - Cog- Amantadine  - DVT px- Lovenox  - Acute Rehab- can tolerate 3h/d PT/OT/SLP and requires daily physician visits Family prefers home- will need 24h supervision.

## 2020-08-14 NOTE — PROGRESS NOTE ADULT - ASSESSMENT
65 Romansh-speaking female PMH of HTN on ASA and T2DM, brought in with HA, dizziness and multiple episodes of emesis s/p mechanical fall found to have large LEFT acute SDH with midline shift taken to OR for emergent evacuation of left SDH on 7/26 taken back to OR on 7/31 for L crani/re-exploration with mild subdural collection/clot evacuated. Bone flap replaced on 7/31. Recommended for Rehab, but patient and family declining.

## 2020-08-14 NOTE — PROGRESS NOTE ADULT - PROBLEM SELECTOR PLAN 1
- NGT placed under FOE.   - f/u CXR for placement.   - Diet per S/S.   - Call ENT with questions.     COREY GUAMAN PA-C.   # [phone]  ENT PA.
s/p emergent evacuation of left SDH on 7/26 taken back to OR on 7/31 for L crani/re-exploration with mild subdural collection/clot evacuated. Bone flap replaced on 7/31.  - keppra for seizure ppx  - management as per primary team

## 2020-08-14 NOTE — PROGRESS NOTE ADULT - PROBLEM SELECTOR PLAN 7
called pharmacy and confirmed with daughter bedside. home documentation med rec updated: patient on ASA 81mg daily, amitiza 24mcg BID, glimepiride 2mg daily, metformin 500mg BID, gabapentin 300mg qHS, lisinopril 5mg daily, senna 8.6 BID, calcium 500+vit D BID at home

## 2020-08-14 NOTE — PROGRESS NOTE ADULT - PROVIDER SPECIALTY LIST ADULT
ENT
Hospitalist
Infectious Disease
NSICU
Neurosurgery
Rehab Medicine
Surgery
Neurosurgery
Rehab Medicine
NSICU

## 2020-08-14 NOTE — PROGRESS NOTE ADULT - SUBJECTIVE AND OBJECTIVE BOX
CC: f/u for  positive urine culture  Patient reports  she is thirsty and moved her bowels  REVIEW OF SYSTEMS:  All other review of systems negative (Comprehensive ROS)    Antimicrobials Day #  :    Other Medications Reviewed    T(F): 97.8 (20 @ 12:20), Max: 98.2 (20 @ 13:46)  HR: 105 (20 @ 12:20)  BP: 124/81 (20 @ 12:20)  RR: 18 (20 @ 12:20)  SpO2: 100% (20 @ 12:20)  Wt(kg): --    PHYSICAL EXAM:  , head wound intactGeneral: alert, no acute distress  Eyes:  anicteric, no conjunctival injection, no discharge  Oropharynx: no lesions or injection 	  Neck: supple, without adenopathy  Lungs: clear to auscultation  Heart: regular rate and rhythm; no murmur, rubs or gallops  Abdomen: soft, nondistended, nontender, without mass or organomegaly  Skin: no lesions  Extremities: no clubbing, cyanosis, or edema  Neurologic: alert, oriented, moves all extremities    LAB RESULTS:            Urinalysis Basic - ( 13 Aug 2020 10:28 )    Color: Yellow / Appearance: Slightly Turbid / S.009 / pH: x  Gluc: x / Ketone: Negative  / Bili: Negative / Urobili: Negative   Blood: x / Protein: Negative / Nitrite: Negative   Leuk Esterase: Large / RBC: 2 /hpf / WBC 66 /HPF   Sq Epi: x / Non Sq Epi: 1 /hpf / Bacteria: Many      MICROBIOLOGY:  RECENT CULTURES:   @ 16:32 .Urine Catheterized     >100,000 CFU/ml Escherichia coli          RADIOLOGY REVIEWED:    < from: Xray Cinesophagram Swallow Function w/ Contrast (08.10.20 @ 09:04) >    EXAM:  XR SWAL FUNC VIVIENNE VID CON STDY                            PROCEDURE DATE:  08/10/2020            INTERPRETATION:  CLINICAL INFORMATION: Dysphagia.    TECHNIQUE: A Modified Barium Swallow was performed. The patient was given oral contrast in varied consistencies to swallow and fluoroscopy was performed with a speech therapist from the Speech and Hearing Department.    FLUORO TIME: 249.4 seconds    IMPRESSION:      Penetration without aspiration.    For further information and recommendations, please refer to the speech pathologist final report which is available for review in the electronic medical record.    < end of copied text >            Assessment:  patient s/p fall , sdh, craniotomy x 2, urinary retention was having isc now has a charles for d/c. No gu symptoms otherwise so despite positive cultures and urine with wbc, no indication to treat the urine culture  Plan:  monitor off antibiotics

## 2020-08-14 NOTE — PROGRESS NOTE ADULT - PROBLEM SELECTOR PLAN 9
patient recommended for rehab, however family declining despite know the risk of recurrent falls and subsequent complications. will need to ensure patient has all necessary equipment prior to discharge. family to receive ongoing education and training as to how to care for mother.
patient recommended for rehab, however family declining despite know the risk of recurrent falls and subsequent complications. will need to ensure patient has all necessary equipment prior to discharge. family to receive ongoing education and training as to how to care for mother. as well as necessary equipment for the home (ie, hospital bed).
patient recommended for rehab, however family declining despite know the risk of recurrent falls and subsequent complications. will need to ensure patient has all necessary equipment prior to discharge. family to receive ongoing education and training as to how to care for mother. as well as necessary equipment for the home (ie, hospital bed).

## 2020-08-14 NOTE — PROGRESS NOTE ADULT - THIS PATIENT HAS THE FOLLOWING CONDITION(S)/DIAGNOSES ON THIS ADMISSION:
Brain Compression / Herniation/s/p SDH evacuation
Brain Compression / Herniation
midline shift due to brain compression by subdural hematoma
Brain Compression / Herniation
Brain Compression / Herniation/Acute Respiratory Failure
None
Brain Compression / Herniation
Brain Compression / Herniation/s/p SDH evacuation
Cerebral Edema
None
subdural hematoma
Brain Compression / Herniation
Cerebral Edema

## 2020-08-14 NOTE — PROGRESS NOTE ADULT - REASON FOR ADMISSION
sdh
OR for emergent evacuation of left SDH. 7/26.   OR s/p Left crani/ reexploration mild subdural collection/clot evacuated, no evidence of membrane formation, dura primarily closed, water-tight closure, bone flap replaced 7/31/20
SDH
OR for emergent evacuation of left SDH. 7/26.   OR s/p Left crani/ reexploration mild subdural collection/clot evacuated, no evidence of membrane formation, dura primarily closed, water-tight closure, bone flap replaced 7/31/20
SDH
SDH

## 2020-08-15 ENCOUNTER — EMERGENCY (EMERGENCY)
Facility: HOSPITAL | Age: 65
LOS: 1 days | Discharge: HOME | End: 2020-08-15
Attending: EMERGENCY MEDICINE
Payer: MEDICARE

## 2020-08-15 VITALS
RESPIRATION RATE: 17 BRPM | TEMPERATURE: 98 F | HEART RATE: 92 BPM | SYSTOLIC BLOOD PRESSURE: 132 MMHG | DIASTOLIC BLOOD PRESSURE: 80 MMHG | OXYGEN SATURATION: 100 %

## 2020-08-15 VITALS
HEART RATE: 109 BPM | DIASTOLIC BLOOD PRESSURE: 75 MMHG | RESPIRATION RATE: 20 BRPM | TEMPERATURE: 98 F | SYSTOLIC BLOOD PRESSURE: 120 MMHG | OXYGEN SATURATION: 100 % | WEIGHT: 125 LBS | HEIGHT: 62 IN

## 2020-08-15 DIAGNOSIS — Z86.79 PERSONAL HISTORY OF OTHER DISEASES OF THE CIRCULATORY SYSTEM: Chronic | ICD-10-CM

## 2020-08-15 DIAGNOSIS — Z98.890 OTHER SPECIFIED POSTPROCEDURAL STATES: Chronic | ICD-10-CM

## 2020-08-15 LAB
-  AMIKACIN: SIGNIFICANT CHANGE UP
-  AMOXICILLIN/CLAVULANIC ACID: SIGNIFICANT CHANGE UP
-  AMPICILLIN/SULBACTAM: SIGNIFICANT CHANGE UP
-  AMPICILLIN: SIGNIFICANT CHANGE UP
-  AZTREONAM: SIGNIFICANT CHANGE UP
-  CEFAZOLIN: SIGNIFICANT CHANGE UP
-  CEFEPIME: SIGNIFICANT CHANGE UP
-  CEFOXITIN: SIGNIFICANT CHANGE UP
-  CEFTRIAXONE: SIGNIFICANT CHANGE UP
-  CIPROFLOXACIN: SIGNIFICANT CHANGE UP
-  ERTAPENEM: SIGNIFICANT CHANGE UP
-  GENTAMICIN: SIGNIFICANT CHANGE UP
-  IMIPENEM: SIGNIFICANT CHANGE UP
-  LEVOFLOXACIN: SIGNIFICANT CHANGE UP
-  MEROPENEM: SIGNIFICANT CHANGE UP
-  NITROFURANTOIN: SIGNIFICANT CHANGE UP
-  PIPERACILLIN/TAZOBACTAM: SIGNIFICANT CHANGE UP
-  TIGECYCLINE: SIGNIFICANT CHANGE UP
-  TOBRAMYCIN: SIGNIFICANT CHANGE UP
-  TRIMETHOPRIM/SULFAMETHOXAZOLE: SIGNIFICANT CHANGE UP
ALBUMIN SERPL ELPH-MCNC: 4 G/DL — SIGNIFICANT CHANGE UP (ref 3.3–5)
ALP SERPL-CCNC: 94 U/L — SIGNIFICANT CHANGE UP (ref 40–120)
ALT FLD-CCNC: 35 U/L — SIGNIFICANT CHANGE UP (ref 10–45)
ANION GAP SERPL CALC-SCNC: 13 MMOL/L — SIGNIFICANT CHANGE UP (ref 5–17)
APPEARANCE UR: CLEAR — SIGNIFICANT CHANGE UP
APTT BLD: 25.9 SEC — LOW (ref 27.5–35.5)
AST SERPL-CCNC: 36 U/L — SIGNIFICANT CHANGE UP (ref 10–40)
BACTERIA # UR AUTO: ABNORMAL
BASE EXCESS BLDV CALC-SCNC: 2.6 MMOL/L — HIGH (ref -2–2)
BASE EXCESS BLDV CALC-SCNC: 4 MMOL/L — HIGH (ref -2–2)
BASOPHILS # BLD AUTO: 0.01 K/UL — SIGNIFICANT CHANGE UP (ref 0–0.2)
BASOPHILS NFR BLD AUTO: 0.1 % — SIGNIFICANT CHANGE UP (ref 0–2)
BILIRUB SERPL-MCNC: 0.9 MG/DL — SIGNIFICANT CHANGE UP (ref 0.2–1.2)
BILIRUB UR-MCNC: NEGATIVE — SIGNIFICANT CHANGE UP
BUN SERPL-MCNC: 13 MG/DL — SIGNIFICANT CHANGE UP (ref 7–23)
CA-I SERPL-SCNC: 1.17 MMOL/L — SIGNIFICANT CHANGE UP (ref 1.12–1.3)
CA-I SERPL-SCNC: 1.23 MMOL/L — SIGNIFICANT CHANGE UP (ref 1.12–1.3)
CALCIUM SERPL-MCNC: 9.7 MG/DL — SIGNIFICANT CHANGE UP (ref 8.4–10.5)
CHLORIDE BLDV-SCNC: 103 MMOL/L — SIGNIFICANT CHANGE UP (ref 96–108)
CHLORIDE BLDV-SCNC: 105 MMOL/L — SIGNIFICANT CHANGE UP (ref 96–108)
CHLORIDE SERPL-SCNC: 100 MMOL/L — SIGNIFICANT CHANGE UP (ref 96–108)
CO2 BLDV-SCNC: 29 MMOL/L — SIGNIFICANT CHANGE UP (ref 22–30)
CO2 BLDV-SCNC: 30 MMOL/L — SIGNIFICANT CHANGE UP (ref 22–30)
CO2 SERPL-SCNC: 25 MMOL/L — SIGNIFICANT CHANGE UP (ref 22–31)
COLOR SPEC: COLORLESS — SIGNIFICANT CHANGE UP
CREAT SERPL-MCNC: 0.5 MG/DL — SIGNIFICANT CHANGE UP (ref 0.5–1.3)
CULTURE RESULTS: SIGNIFICANT CHANGE UP
DIFF PNL FLD: NEGATIVE — SIGNIFICANT CHANGE UP
EOSINOPHIL # BLD AUTO: 0.13 K/UL — SIGNIFICANT CHANGE UP (ref 0–0.5)
EOSINOPHIL NFR BLD AUTO: 1.8 % — SIGNIFICANT CHANGE UP (ref 0–6)
EPI CELLS # UR: 0 /HPF — SIGNIFICANT CHANGE UP
GAS PNL BLDV: 139 MMOL/L — SIGNIFICANT CHANGE UP (ref 135–145)
GAS PNL BLDV: 141 MMOL/L — SIGNIFICANT CHANGE UP (ref 135–145)
GAS PNL BLDV: SIGNIFICANT CHANGE UP
GLUCOSE BLDV-MCNC: 120 MG/DL — HIGH (ref 70–99)
GLUCOSE BLDV-MCNC: 74 MG/DL — SIGNIFICANT CHANGE UP (ref 70–99)
GLUCOSE SERPL-MCNC: 122 MG/DL — HIGH (ref 70–99)
GLUCOSE UR QL: NEGATIVE — SIGNIFICANT CHANGE UP
HCO3 BLDV-SCNC: 28 MMOL/L — SIGNIFICANT CHANGE UP (ref 21–29)
HCO3 BLDV-SCNC: 29 MMOL/L — SIGNIFICANT CHANGE UP (ref 21–29)
HCT VFR BLD CALC: 32.8 % — LOW (ref 34.5–45)
HCT VFR BLDA CALC: 26 % — LOW (ref 39–50)
HCT VFR BLDA CALC: 32 % — LOW (ref 39–50)
HGB BLD CALC-MCNC: 10.5 G/DL — LOW (ref 11.5–15.5)
HGB BLD CALC-MCNC: 8.2 G/DL — LOW (ref 11.5–15.5)
HGB BLD-MCNC: 10 G/DL — LOW (ref 11.5–15.5)
HYALINE CASTS # UR AUTO: 1 /LPF — SIGNIFICANT CHANGE UP (ref 0–2)
IMM GRANULOCYTES NFR BLD AUTO: 0.6 % — SIGNIFICANT CHANGE UP (ref 0–1.5)
INR BLD: 0.97 RATIO — SIGNIFICANT CHANGE UP (ref 0.88–1.16)
KETONES UR-MCNC: NEGATIVE — SIGNIFICANT CHANGE UP
LACTATE BLDV-MCNC: 1.3 MMOL/L — SIGNIFICANT CHANGE UP (ref 0.7–2)
LACTATE BLDV-MCNC: 2.8 MMOL/L — HIGH (ref 0.7–2)
LEUKOCYTE ESTERASE UR-ACNC: NEGATIVE — SIGNIFICANT CHANGE UP
LYMPHOCYTES # BLD AUTO: 1.35 K/UL — SIGNIFICANT CHANGE UP (ref 1–3.3)
LYMPHOCYTES # BLD AUTO: 18.9 % — SIGNIFICANT CHANGE UP (ref 13–44)
MCHC RBC-ENTMCNC: 26.2 PG — LOW (ref 27–34)
MCHC RBC-ENTMCNC: 30.5 GM/DL — LOW (ref 32–36)
MCV RBC AUTO: 85.9 FL — SIGNIFICANT CHANGE UP (ref 80–100)
METHOD TYPE: SIGNIFICANT CHANGE UP
MONOCYTES # BLD AUTO: 0.67 K/UL — SIGNIFICANT CHANGE UP (ref 0–0.9)
MONOCYTES NFR BLD AUTO: 9.4 % — SIGNIFICANT CHANGE UP (ref 2–14)
NEUTROPHILS # BLD AUTO: 4.94 K/UL — SIGNIFICANT CHANGE UP (ref 1.8–7.4)
NEUTROPHILS NFR BLD AUTO: 69.2 % — SIGNIFICANT CHANGE UP (ref 43–77)
NITRITE UR-MCNC: NEGATIVE — SIGNIFICANT CHANGE UP
NRBC # BLD: 0 /100 WBCS — SIGNIFICANT CHANGE UP (ref 0–0)
ORGANISM # SPEC MICROSCOPIC CNT: SIGNIFICANT CHANGE UP
ORGANISM # SPEC MICROSCOPIC CNT: SIGNIFICANT CHANGE UP
OTHER CELLS CSF MANUAL: 5 ML/DL — LOW (ref 18–22)
OTHER CELLS CSF MANUAL: 5 ML/DL — LOW (ref 18–22)
PCO2 BLDV: 47 MMHG — SIGNIFICANT CHANGE UP (ref 35–50)
PCO2 BLDV: 47 MMHG — SIGNIFICANT CHANGE UP (ref 35–50)
PH BLDV: 7.39 — SIGNIFICANT CHANGE UP (ref 7.35–7.45)
PH BLDV: 7.4 — SIGNIFICANT CHANGE UP (ref 7.35–7.45)
PH UR: 6 — SIGNIFICANT CHANGE UP (ref 5–8)
PLATELET # BLD AUTO: 201 K/UL — SIGNIFICANT CHANGE UP (ref 150–400)
PO2 BLDV: 25 MMHG — SIGNIFICANT CHANGE UP (ref 25–45)
PO2 BLDV: 28 MMHG — SIGNIFICANT CHANGE UP (ref 25–45)
POTASSIUM BLDV-SCNC: 3.3 MMOL/L — LOW (ref 3.5–5.3)
POTASSIUM BLDV-SCNC: 3.7 MMOL/L — SIGNIFICANT CHANGE UP (ref 3.5–5.3)
POTASSIUM SERPL-MCNC: 3.7 MMOL/L — SIGNIFICANT CHANGE UP (ref 3.5–5.3)
POTASSIUM SERPL-SCNC: 3.7 MMOL/L — SIGNIFICANT CHANGE UP (ref 3.5–5.3)
PROT SERPL-MCNC: 6.9 G/DL — SIGNIFICANT CHANGE UP (ref 6–8.3)
PROT UR-MCNC: NEGATIVE — SIGNIFICANT CHANGE UP
PROTHROM AB SERPL-ACNC: 11.6 SEC — SIGNIFICANT CHANGE UP (ref 10.6–13.6)
RBC # BLD: 3.82 M/UL — SIGNIFICANT CHANGE UP (ref 3.8–5.2)
RBC # FLD: 15.4 % — HIGH (ref 10.3–14.5)
RBC CASTS # UR COMP ASSIST: 0 /HPF — SIGNIFICANT CHANGE UP (ref 0–4)
SAO2 % BLDV: 37 % — LOW (ref 67–88)
SAO2 % BLDV: 44 % — LOW (ref 67–88)
SODIUM SERPL-SCNC: 139 MMOL/L — SIGNIFICANT CHANGE UP (ref 135–145)
SP GR SPEC: 1 — LOW (ref 1.01–1.02)
SPECIMEN SOURCE: SIGNIFICANT CHANGE UP
TROPONIN T, HIGH SENSITIVITY RESULT: 12 NG/L — SIGNIFICANT CHANGE UP (ref 0–51)
TROPONIN T, HIGH SENSITIVITY RESULT: 14 NG/L — SIGNIFICANT CHANGE UP (ref 0–51)
UROBILINOGEN FLD QL: NEGATIVE — SIGNIFICANT CHANGE UP
WBC # BLD: 7.14 K/UL — SIGNIFICANT CHANGE UP (ref 3.8–10.5)
WBC # FLD AUTO: 7.14 K/UL — SIGNIFICANT CHANGE UP (ref 3.8–10.5)
WBC UR QL: 4 /HPF — SIGNIFICANT CHANGE UP (ref 0–5)

## 2020-08-15 PROCEDURE — 84295 ASSAY OF SERUM SODIUM: CPT

## 2020-08-15 PROCEDURE — 82435 ASSAY OF BLOOD CHLORIDE: CPT

## 2020-08-15 PROCEDURE — 71045 X-RAY EXAM CHEST 1 VIEW: CPT

## 2020-08-15 PROCEDURE — 71045 X-RAY EXAM CHEST 1 VIEW: CPT | Mod: 26

## 2020-08-15 PROCEDURE — 93308 TTE F-UP OR LMTD: CPT | Mod: 26

## 2020-08-15 PROCEDURE — 83690 ASSAY OF LIPASE: CPT

## 2020-08-15 PROCEDURE — 93005 ELECTROCARDIOGRAM TRACING: CPT

## 2020-08-15 PROCEDURE — 84132 ASSAY OF SERUM POTASSIUM: CPT

## 2020-08-15 PROCEDURE — 85610 PROTHROMBIN TIME: CPT

## 2020-08-15 PROCEDURE — 85014 HEMATOCRIT: CPT

## 2020-08-15 PROCEDURE — 99284 EMERGENCY DEPT VISIT MOD MDM: CPT | Mod: 25

## 2020-08-15 PROCEDURE — 80053 COMPREHEN METABOLIC PANEL: CPT

## 2020-08-15 PROCEDURE — 82962 GLUCOSE BLOOD TEST: CPT

## 2020-08-15 PROCEDURE — 84484 ASSAY OF TROPONIN QUANT: CPT

## 2020-08-15 PROCEDURE — 96374 THER/PROPH/DIAG INJ IV PUSH: CPT | Mod: XU

## 2020-08-15 PROCEDURE — 93010 ELECTROCARDIOGRAM REPORT: CPT

## 2020-08-15 PROCEDURE — 96375 TX/PRO/DX INJ NEW DRUG ADDON: CPT | Mod: XU

## 2020-08-15 PROCEDURE — 71275 CT ANGIOGRAPHY CHEST: CPT

## 2020-08-15 PROCEDURE — 85730 THROMBOPLASTIN TIME PARTIAL: CPT

## 2020-08-15 PROCEDURE — 82803 BLOOD GASES ANY COMBINATION: CPT

## 2020-08-15 PROCEDURE — 71275 CT ANGIOGRAPHY CHEST: CPT | Mod: 26

## 2020-08-15 PROCEDURE — 82947 ASSAY GLUCOSE BLOOD QUANT: CPT

## 2020-08-15 PROCEDURE — 85379 FIBRIN DEGRADATION QUANT: CPT

## 2020-08-15 PROCEDURE — 85027 COMPLETE CBC AUTOMATED: CPT

## 2020-08-15 PROCEDURE — 70450 CT HEAD/BRAIN W/O DYE: CPT

## 2020-08-15 PROCEDURE — 93308 TTE F-UP OR LMTD: CPT

## 2020-08-15 PROCEDURE — 81001 URINALYSIS AUTO W/SCOPE: CPT

## 2020-08-15 PROCEDURE — 82330 ASSAY OF CALCIUM: CPT

## 2020-08-15 PROCEDURE — 99285 EMERGENCY DEPT VISIT HI MDM: CPT

## 2020-08-15 PROCEDURE — 70450 CT HEAD/BRAIN W/O DYE: CPT | Mod: 26

## 2020-08-15 PROCEDURE — 83605 ASSAY OF LACTIC ACID: CPT

## 2020-08-15 PROCEDURE — 83880 ASSAY OF NATRIURETIC PEPTIDE: CPT

## 2020-08-15 RX ORDER — FAMOTIDINE 10 MG/ML
20 INJECTION INTRAVENOUS ONCE
Refills: 0 | Status: COMPLETED | OUTPATIENT
Start: 2020-08-15 | End: 2020-08-15

## 2020-08-15 RX ORDER — ACETAMINOPHEN 500 MG
1000 TABLET ORAL ONCE
Refills: 0 | Status: COMPLETED | OUTPATIENT
Start: 2020-08-15 | End: 2020-08-15

## 2020-08-15 RX ORDER — SODIUM CHLORIDE 9 MG/ML
1000 INJECTION INTRAMUSCULAR; INTRAVENOUS; SUBCUTANEOUS ONCE
Refills: 0 | Status: COMPLETED | OUTPATIENT
Start: 2020-08-15 | End: 2020-08-15

## 2020-08-15 RX ADMIN — FAMOTIDINE 20 MILLIGRAM(S): 10 INJECTION INTRAVENOUS at 17:17

## 2020-08-15 RX ADMIN — SODIUM CHLORIDE 1000 MILLILITER(S): 9 INJECTION INTRAMUSCULAR; INTRAVENOUS; SUBCUTANEOUS at 17:41

## 2020-08-15 RX ADMIN — Medication 400 MILLIGRAM(S): at 18:28

## 2020-08-15 RX ADMIN — Medication 30 MILLILITER(S): at 17:18

## 2020-08-15 NOTE — ED PROVIDER NOTE - CLINICAL SUMMARY MEDICAL DECISION MAKING FREE TEXT BOX
65yF accompanied by daughter in law at bedside h/o DM, HTN presents with constant non radiation left sided pleuritic chest pain ~3 hour duration. Patient is tachy but not hypoxic without any remarkable physical findings. Concern for ACS vs PE vs GERD. Wells score 3. Will get ekg, labs, coags, vbg, trop, CXR, CT head, CTA, GI cocktail and reassess 65yF accompanied by daughter in law at bedside h/o DM, HTN presents with constant non radiation left sided pleuritic chest pain ~3 hour duration. Patient is tachy but not hypoxic without any remarkable physical findings. Concern for ACS vs PE vs GERD. Wells score 3. Will get ekg, labs, coags, vbg, trop, CXR, CT head, CTA, GI cocktail and reassess  Attending Rochelle Dillon: 66 y/o female accompanied by daughter with h/o HTN, s/p recent intracranial bleed with neurosurgerical intervention presenting with chest pain and reports of increased fatigue. upon arrival pt hemodynamically stable and afebrile. pt complaining of chest discomfort. ekg performed showing no evidence of acute st elevation or ischemic changes. pt placed on monitor. pt states pain worse with inspiration. pocus performed showing no evidence of pericadial effusion and ekg without evidence of pericarditis. cta performed which was negative for PE. troponins negative making acute ischemia less likely. no rash to area to suggest shingles. d/w neurosurgery as pt recently d/c from hospital. pt given tylenol with improvement. will d/c with follow up with cardiology, consider possible gastritis as potential cause as well.

## 2020-08-15 NOTE — ED PROVIDER NOTE - NSFOLLOWUPCLINICS_GEN_ALL_ED_FT
[facility]enter[facility]  Gastroenterology  [address] [place] [id number]  Phone: [phone]  Fax:   Follow Up Time: 4-6 Days

## 2020-08-15 NOTE — ED PROVIDER NOTE - OBJECTIVE STATEMENT
65yF accompanied by daughter in law at bedside h/o DM, HTN presents with constant non radiation left sided pleuritic chest pain ~3 hour duration. Patient was recently admitted for subdural with midline shift (07/25-08/14/20). Per family: pain started after eating. Initially started as indigestion in which have become pleuritic. Endorse diffuse headache, with dizziness, vertigo but denies fever, abd, n/v, back pain.

## 2020-08-15 NOTE — ED PROVIDER NOTE - PATIENT PORTAL LINK FT
You can access the FollowMyHealth Patient Portal offered by [facility] by registering at the following website: [email or web address]. By joining [employer]’s FollowMyHealth portal, you will also be able to view your health information using other applications (apps) compatible with our system.

## 2020-08-15 NOTE — ED ADULT NURSE REASSESSMENT NOTE - NS ED NURSE REASSESS COMMENT FT1
received patient from RN Nasir, patient at baseline mental status, VSS, NAD, daughter at bedside, waiting on CT results, comfort and safety provided.

## 2020-08-15 NOTE — ED PROVIDER NOTE - PHYSICAL EXAMINATION
Gen: AAOx3, tired appearing  Head: NCAT  HEENT: EOMI, oral mucosa moist, normal conjunctiva  Lung: CTAB, no respiratory distress, speaking in full sentences  CV: RRR, no murmurs, rubs or gallops  Abd: soft, NTND, no guarding,  MSK: no visible deformities           Strength 5/5 UE/LE b/l  :  Das in place with mildly cloudy yellow urine  Neuro: No focal sensory or motor deficits  Skin: Warm, well perfused, no rash  Psych: normal affect.   ~Vasyl Gary M.D. Resident Gen: AAOx3, tired appearing  Head: NCAT  HEENT: EOMI, oral mucosa moist, normal conjunctiva  Lung: CTAB, no respiratory distress, speaking in full sentences  CV: RRR, no murmurs, rubs or gallops  Abd: soft, NTND, no guarding,  MSK: no visible deformities           Strength 5/5 UE/LE b/l  :  Dsa in place with mildly cloudy yellow urine  Neuro: No focal sensory or motor deficits  Skin: Warm, well perfused, no rash  Psych: normal affect.   ~Vasyl Gary M.D. Resident  Attending Rochelle Dillon: Gen: NAD, heent: atrauamtic, eomi, perrla, mmm, op pink, uvula midline, neck; nttp, no nuchal rigidity, chest: nttp,no rash, no erythema, cv: rrr, no murmurs, lungs: ctab, abd: soft, nontender, nondistended, no peritoneal signs, +BS, no guarding, ext: wwp, no calf swelling, skin: no rash,  incision to left scalp c/d/i. neuro: awake and alert, following commands, speech clear, sensation and strength intact, no focal deficits

## 2020-08-15 NOTE — ED PROVIDER NOTE - NS ED ROS FT
GENERAL: No fever or chills, EYES: no change in vision, HEENT: no trouble swallowing or speaking, CARDIAC: +chest pain, PULMONARY: no cough or SOB, GI: no abdominal pain, no nausea, no vomiting, no diarrhea or constipation, : No changes in urination, SKIN: no rashes, NEURO: +headache, dizzy  MSK: No joint pain ~Vasyl Gary M.D. Resident

## 2020-08-15 NOTE — ED PROVIDER NOTE - NSFOLLOWUPINSTRUCTIONS_ED_ALL_ED_FT
1) Please follow-up with your primary care doctor in the next 2-3 days.  Please call tomorrow for an appointment.  If you cannot follow-up with your primary care doctor please return to the ED for any urgent issues. Please also follow up with your Gastroenterologist in 2-5 days. If you don't have a gastroenterologist the number to [facility] cardiology is provided above  2) You were given a copy of the tests performed today.  Please bring the results with you and review them with your primary care doctor.  3) If you have any worsening of symptoms or any other concerns please return to the ED immediately. Such as but not limited to including chest pain, shortness or breath, or weakness, or rash  4) Please continue taking your home medications as directed.

## 2020-08-15 NOTE — ED PROVIDER NOTE - ATTENDING CONTRIBUTION TO CARE
Attending MD Rochelle Dillon:  I personally have seen and examined this patient.  Resident note reviewed and agree on plan of care and except where noted.  See HPI, PE, and MDM for details.

## 2020-08-18 ENCOUNTER — EMERGENCY (EMERGENCY)
Facility: HOSPITAL | Age: 65
LOS: 1 days | Discharge: HOME | End: 2020-08-18
Attending: STUDENT IN AN ORGANIZED HEALTH CARE EDUCATION/TRAINING PROGRAM
Payer: MEDICARE

## 2020-08-18 VITALS
DIASTOLIC BLOOD PRESSURE: 78 MMHG | RESPIRATION RATE: 16 BRPM | TEMPERATURE: 99 F | HEART RATE: 112 BPM | OXYGEN SATURATION: 100 % | SYSTOLIC BLOOD PRESSURE: 147 MMHG | HEIGHT: 62 IN | WEIGHT: 102.74 LBS

## 2020-08-18 VITALS
DIASTOLIC BLOOD PRESSURE: 74 MMHG | HEART RATE: 102 BPM | OXYGEN SATURATION: 100 % | RESPIRATION RATE: 18 BRPM | SYSTOLIC BLOOD PRESSURE: 166 MMHG | TEMPERATURE: 98 F

## 2020-08-18 DIAGNOSIS — Z86.79 PERSONAL HISTORY OF OTHER DISEASES OF THE CIRCULATORY SYSTEM: Chronic | ICD-10-CM

## 2020-08-18 DIAGNOSIS — Z98.890 OTHER SPECIFIED POSTPROCEDURAL STATES: Chronic | ICD-10-CM

## 2020-08-18 LAB
ALBUMIN SERPL ELPH-MCNC: 3.8 G/DL — SIGNIFICANT CHANGE UP (ref 3.3–5)
ALP SERPL-CCNC: 87 U/L — SIGNIFICANT CHANGE UP (ref 40–120)
ALT FLD-CCNC: 30 U/L — SIGNIFICANT CHANGE UP (ref 10–45)
ANION GAP SERPL CALC-SCNC: 14 MMOL/L — SIGNIFICANT CHANGE UP (ref 5–17)
AST SERPL-CCNC: 33 U/L — SIGNIFICANT CHANGE UP (ref 10–40)
BASOPHILS # BLD AUTO: 0.01 K/UL — SIGNIFICANT CHANGE UP (ref 0–0.2)
BASOPHILS NFR BLD AUTO: 0.2 % — SIGNIFICANT CHANGE UP (ref 0–2)
BILIRUB SERPL-MCNC: 0.9 MG/DL — SIGNIFICANT CHANGE UP (ref 0.2–1.2)
BUN SERPL-MCNC: 12 MG/DL — SIGNIFICANT CHANGE UP (ref 7–23)
CALCIUM SERPL-MCNC: 9.3 MG/DL — SIGNIFICANT CHANGE UP (ref 8.4–10.5)
CHLORIDE SERPL-SCNC: 103 MMOL/L — SIGNIFICANT CHANGE UP (ref 96–108)
CO2 SERPL-SCNC: 23 MMOL/L — SIGNIFICANT CHANGE UP (ref 22–31)
CREAT SERPL-MCNC: 0.4 MG/DL — LOW (ref 0.5–1.3)
EOSINOPHIL # BLD AUTO: 0.05 K/UL — SIGNIFICANT CHANGE UP (ref 0–0.5)
EOSINOPHIL NFR BLD AUTO: 1 % — SIGNIFICANT CHANGE UP (ref 0–6)
GLUCOSE SERPL-MCNC: 178 MG/DL — HIGH (ref 70–99)
HCT VFR BLD CALC: 29.3 % — LOW (ref 34.5–45)
HGB BLD-MCNC: 8.8 G/DL — LOW (ref 11.5–15.5)
IMM GRANULOCYTES NFR BLD AUTO: 0.2 % — SIGNIFICANT CHANGE UP (ref 0–1.5)
LYMPHOCYTES # BLD AUTO: 1.2 K/UL — SIGNIFICANT CHANGE UP (ref 1–3.3)
LYMPHOCYTES # BLD AUTO: 24.9 % — SIGNIFICANT CHANGE UP (ref 13–44)
MCHC RBC-ENTMCNC: 26 PG — LOW (ref 27–34)
MCHC RBC-ENTMCNC: 30 GM/DL — LOW (ref 32–36)
MCV RBC AUTO: 86.7 FL — SIGNIFICANT CHANGE UP (ref 80–100)
MONOCYTES # BLD AUTO: 0.49 K/UL — SIGNIFICANT CHANGE UP (ref 0–0.9)
MONOCYTES NFR BLD AUTO: 10.2 % — SIGNIFICANT CHANGE UP (ref 2–14)
NEUTROPHILS # BLD AUTO: 3.06 K/UL — SIGNIFICANT CHANGE UP (ref 1.8–7.4)
NEUTROPHILS NFR BLD AUTO: 63.5 % — SIGNIFICANT CHANGE UP (ref 43–77)
NRBC # BLD: 0 /100 WBCS — SIGNIFICANT CHANGE UP (ref 0–0)
PLATELET # BLD AUTO: 100 K/UL — LOW (ref 150–400)
POTASSIUM SERPL-MCNC: 3.8 MMOL/L — SIGNIFICANT CHANGE UP (ref 3.5–5.3)
POTASSIUM SERPL-SCNC: 3.8 MMOL/L — SIGNIFICANT CHANGE UP (ref 3.5–5.3)
PROT SERPL-MCNC: 6.5 G/DL — SIGNIFICANT CHANGE UP (ref 6–8.3)
RBC # BLD: 3.38 M/UL — LOW (ref 3.8–5.2)
RBC # FLD: 15.7 % — HIGH (ref 10.3–14.5)
SODIUM SERPL-SCNC: 140 MMOL/L — SIGNIFICANT CHANGE UP (ref 135–145)
TROPONIN T, HIGH SENSITIVITY RESULT: 12 NG/L — SIGNIFICANT CHANGE UP (ref 0–51)
WBC # BLD: 4.82 K/UL — SIGNIFICANT CHANGE UP (ref 3.8–10.5)
WBC # FLD AUTO: 4.82 K/UL — SIGNIFICANT CHANGE UP (ref 3.8–10.5)

## 2020-08-18 PROCEDURE — 71045 X-RAY EXAM CHEST 1 VIEW: CPT | Mod: 26

## 2020-08-18 PROCEDURE — 85027 COMPLETE CBC AUTOMATED: CPT

## 2020-08-18 PROCEDURE — 71045 X-RAY EXAM CHEST 1 VIEW: CPT

## 2020-08-18 PROCEDURE — 96374 THER/PROPH/DIAG INJ IV PUSH: CPT

## 2020-08-18 PROCEDURE — 84484 ASSAY OF TROPONIN QUANT: CPT

## 2020-08-18 PROCEDURE — 93010 ELECTROCARDIOGRAM REPORT: CPT

## 2020-08-18 PROCEDURE — 93005 ELECTROCARDIOGRAM TRACING: CPT

## 2020-08-18 PROCEDURE — 80053 COMPREHEN METABOLIC PANEL: CPT

## 2020-08-18 PROCEDURE — 70450 CT HEAD/BRAIN W/O DYE: CPT

## 2020-08-18 PROCEDURE — 99285 EMERGENCY DEPT VISIT HI MDM: CPT

## 2020-08-18 PROCEDURE — 99284 EMERGENCY DEPT VISIT MOD MDM: CPT | Mod: 25

## 2020-08-18 PROCEDURE — 70450 CT HEAD/BRAIN W/O DYE: CPT | Mod: 26

## 2020-08-18 RX ORDER — ACETAMINOPHEN 500 MG
975 TABLET ORAL ONCE
Refills: 0 | Status: COMPLETED | OUTPATIENT
Start: 2020-08-18 | End: 2020-08-18

## 2020-08-18 RX ORDER — SODIUM CHLORIDE 9 MG/ML
500 INJECTION INTRAMUSCULAR; INTRAVENOUS; SUBCUTANEOUS ONCE
Refills: 0 | Status: COMPLETED | OUTPATIENT
Start: 2020-08-18 | End: 2020-08-18

## 2020-08-18 RX ORDER — FAMOTIDINE 10 MG/ML
20 INJECTION INTRAVENOUS ONCE
Refills: 0 | Status: COMPLETED | OUTPATIENT
Start: 2020-08-18 | End: 2020-08-18

## 2020-08-18 RX ADMIN — FAMOTIDINE 20 MILLIGRAM(S): 10 INJECTION INTRAVENOUS at 02:44

## 2020-08-18 RX ADMIN — Medication 30 MILLILITER(S): at 02:44

## 2020-08-18 RX ADMIN — Medication 975 MILLIGRAM(S): at 05:16

## 2020-08-18 RX ADMIN — SODIUM CHLORIDE 500 MILLILITER(S): 9 INJECTION INTRAMUSCULAR; INTRAVENOUS; SUBCUTANEOUS at 03:35

## 2020-08-18 NOTE — ED PROVIDER NOTE - CLINICAL SUMMARY MEDICAL DECISION MAKING FREE TEXT BOX
Deepthi Lemos MD: 65 yoF PMH SDH s/p craniotomy 07/26 p/w high /80. will get CTH to eval for ICH. will get basics labs

## 2020-08-18 NOTE — ED PROVIDER NOTE - PHYSICAL EXAMINATION
Gen: AAOx3, non-toxic  Head: NCAT incision on scalp dry and clean  HEENT: EOMI, PERRLA, oral mucosa moist, normal conjunctiva  Lung: CTAB, no respiratory distress, no wheezes/rhonchi/rales B/L, speaking in full sentences  CV: RRR, no murmurs, rubs or gallops  Abd: soft, NTND, no guarding, no CVA tenderness, no rebound tenderness  : charles catheter in place  MSK: no visible deformities, full range of motion of all 4 exts  Neuro: No focal sensory or motor deficits  Skin: Warm, well perfused, no rash  Psych: normal affect.   ~Deepthi Lemos MD

## 2020-08-18 NOTE — ED ADULT NURSE NOTE - OBJECTIVE STATEMENT
65 year old female presenting to ED c/o hypertension. PMH includes diabetes, cholelithiasis, HTN, and received neuorsurgery for subdural hematoma x3 weeks ago at [facility]. Pt A+Ox3, primarily speaks Romanian  utilized, moves all four extremities. As per pt's family, pt was hypertensive to 180s at home and c/o diffuse headache. Pt recently in [facility] ED for chest pain, results unremarkable. 65 year old female presenting to ED c/o hypertension. PMH includes diabetes, cholelithiasis, HTN, and received neuorsurgery for subdural hematoma x3 weeks ago at [facility]. Pt A+Ox3, primarily speaks Polish  utilized, moves all four extremities. As per pt's family, pt was hypertensive to 180s at home and c/o diffuse headache beginning today. Pt in [facility] ED 8/15 for chest pain, however results unremarkable. Pt denies dizziness, chest pain, SOB, N/V, abdominal pain, urinary or bowel symptoms, fevers or chills. Pt presents to ED with charles catheter changed last Thursday as per family, draining clear felix urine. EKG completed, pt attached to cardiac monitor. 65 year old female presenting to ED c/o hypertension. PMH includes diabetes, cholelithiasis, HTN, and received neuorsurgery for subdural hematoma x3 weeks ago at [facility]. Pt A+Ox3, primarily speaks Tajik  utilized, moves all four extremities. As per pt's family, pt was hypertensive to 180s at home and c/o diffuse headache beginning today, with numbness and tingling in all four extremities. Pt in [facility] ED 8/15 for chest pain, however results unremarkable. Pt denies dizziness, chest pain, SOB, N/V, abdominal pain, urinary or bowel symptoms, fevers or chills. Pt presents to ED with charles catheter changed last Thursday as per family, draining clear felix urine. EKG completed, pt attached to cardiac monitor.

## 2020-08-18 NOTE — ED PROVIDER NOTE - ATTENDING CONTRIBUTION TO CARE
65 F w/ hx of HTN, recent SDH s/p evacuation on 7/26 p/w multiple complaints, elevated /80 checked at home by daughter in law, headache chest pain since SX on 7/26. No numbness no new weakness in arms/legs, no nausea no vomiting. Pt chest pain is intermittent w/ intermittent L sideed headache. Pt was seen by PCP today and medications for pain were changed. on exam, pt is awake and alert, in no distress, I have reviewed the triage vital signs. tachycardic, htnsive  Const: Well-nourished, Well-developed, appearing stated age  Head:surgical scar on L side of scalp well appearing  Eyes: no conjunctival injection and no scleral icterus  ENMT: Atraumatic external nose and ears, Moist mucus membranes  Neck: Symmetric, trachea midline,  CVS: +S1/S2, tachycardic dorsalis pedis/radial pulse 2+ bilaterally  RESP: Unlabored respiratory effort, Clear to auscultation bilaterally  GI: Nontender/Nondistended, soft abdomen  Neuro: GCS=15, CNs II-XII grossly intact, motor in all 4 extremities equal, Sensation grossly intact   Psych: Awake, Alert, & Orientedx3;  Appropriate mood and affect, cooperative  Concern for rebleed as a result will obtain repeat head ct, pt w/ multiple negative troponins, unlikely ACS, ekg w/ no ischemic changes. Given duration of symptoms since sx, unlikely an acute process pt found to be thrombocytopenic, will have oupt follow up. Additionally unlikely PE as pt is not hypoxic on RA, is sating  percent w/ no tachypnea, no features to suggest lower extremity edmea.

## 2020-08-18 NOTE — ED PROVIDER NOTE - NSFOLLOWUPINSTRUCTIONS_ED_ALL_ED_FT
1) Please follow-up with your primary care doctor in the next 1-2 days.  Please call tomorrow for an appointment.  If you cannot follow-up with your primary care doctor please return to the ED for any urgent issues. Your platelets was low today please talk to your doctor about repeating blood work.   2) You were given a copy of the tests performed today.  Please bring the results with you and review them with your primary care doctor.  3) If you have any worsening of symptoms or any other concerns please return to the ED immediately. Such as but not limited to seizures, weakness, numbness, visual changes, slurred speech  4) Please continue taking your home medications as directed. Would suggest stopping Protonix as it can cause thrombocytopenia.

## 2020-08-18 NOTE — ED ADULT NURSE NOTE - NSIMPLEMENTINTERV_GEN_ALL_ED
Implemented All Fall Risk Interventions:  [place] to call system. Call bell, personal items and telephone within reach. Instruct patient to call for assistance. Room bathroom lighting operational. Non-slip footwear when patient is off stretcher. Physically safe environment: no spills, clutter or unnecessary equipment. Stretcher in lowest position, wheels locked, appropriate side rails in place. Provide visual cue, wrist band, yellow gown, etc. Monitor gait and stability. Monitor for mental status changes and reorient to person, place, and time. Review medications for side effects contributing to fall risk. Reinforce activity limits and safety measures with patient and family.

## 2020-08-18 NOTE — ED PROVIDER NOTE - OBJECTIVE STATEMENT
Deepthi Lemos MD: 65 yoF PMH SDH s/p craniotomy 07/26 p/w high /80. Reports Left sided intermittent HA and tingling in all 4 ext.  Denies fevers, chill abd pain, SOB.    Neurosurgery Dr. Harris

## 2020-08-18 NOTE — ED PROVIDER NOTE - PATIENT PORTAL LINK FT
You can access the FollowMyHealth Patient Portal offered by [facility] by registering at the following website: [email or web address]. By joining [employer]’s FollowMyHealth portal, you will also be able to view your health information using other applications (apps) compatible with our system.

## 2020-08-18 NOTE — ED PROVIDER NOTE - NS ED ROS FT
GENERAL: No fever or chills, EYES: no change in vision, HEENT: no trouble speaking, CARDIAC: no chest pain, palpitation PULMONARY: no cough or SOB, GI: no abdominal pain, no nausea, no vomiting, no diarrhea or constipation, : No changes in urination, SKIN: no rashes, NEURO: + headache, +numbness,  MSK: No muscle pain ~Aluko Gift, MD

## 2020-08-18 NOTE — ED PROVIDER NOTE - PROGRESS NOTE DETAILS
Deepthi Lemos MD: [facility] stable, new thrombocytopenia not seen in previous labs 2 days ago pt only med is for her GERD but family cant remember the name, spoke to family about the thrombocytopenia and they agree to f/u with PCP in the morning. Pt not having any active bleeding. will speak to pt's family about stopping her GERD med bc can cause thrombocytopenia. pt ready for d/c

## 2020-08-21 ENCOUNTER — APPOINTMENT (OUTPATIENT)
Dept: UROLOGY | Facility: CLINIC | Age: 65
End: 2020-08-21
Payer: MEDICARE

## 2020-08-21 ENCOUNTER — TRANSCRIPTION ENCOUNTER (OUTPATIENT)
Age: 65
End: 2020-08-21

## 2020-08-21 ENCOUNTER — APPOINTMENT (OUTPATIENT)
Dept: NEUROSURGERY | Facility: CLINIC | Age: 65
End: 2020-08-21
Payer: MEDICARE

## 2020-08-21 VITALS
WEIGHT: 102 LBS | RESPIRATION RATE: 16 BRPM | BODY MASS INDEX: 18.77 KG/M2 | SYSTOLIC BLOOD PRESSURE: 130 MMHG | OXYGEN SATURATION: 99 % | DIASTOLIC BLOOD PRESSURE: 78 MMHG | HEART RATE: 115 BPM | TEMPERATURE: 97.8 F | HEIGHT: 62 IN

## 2020-08-21 VITALS
HEART RATE: 114 BPM | DIASTOLIC BLOOD PRESSURE: 79 MMHG | BODY MASS INDEX: 18.2 KG/M2 | WEIGHT: 102.74 LBS | RESPIRATION RATE: 17 BRPM | HEIGHT: 62.99 IN | SYSTOLIC BLOOD PRESSURE: 146 MMHG | OXYGEN SATURATION: 100 %

## 2020-08-21 PROCEDURE — 99024 POSTOP FOLLOW-UP VISIT: CPT

## 2020-08-21 PROCEDURE — 51702 INSERT TEMP BLADDER CATH: CPT

## 2020-08-21 PROCEDURE — 99215 OFFICE O/P EST HI 40 MIN: CPT | Mod: 25

## 2020-08-21 NOTE — REVIEW OF SYSTEMS
[Numbness] : numbness [Tingling] : tingling [Difficulty Walking] : difficulty walking [Joint Pain] : joint pain [Negative] : Respiratory

## 2020-08-21 NOTE — PHYSICAL EXAM
[General Appearance - Well Nourished] : well nourished [Normal Appearance] : normal appearance [Well Groomed] : well groomed [General Appearance - In No Acute Distress] : no acute distress [Abdomen Soft] : soft [Abdomen Tenderness] : non-tender [Urethral Meatus] : normal urethra [Costovertebral Angle Tenderness] : no ~M costovertebral angle tenderness [External Female Genitalia] : normal external genitalia [Urinary Bladder Findings] : the bladder was normal on palpation [] : no respiratory distress [Edema] : no peripheral edema [Respiration, Rhythm And Depth] : normal respiratory rhythm and effort [Exaggerated Use Of Accessory Muscles For Inspiration] : no accessory muscle use [Oriented To Time, Place, And Person] : oriented to person, place, and time [Not Anxious] : not anxious [FreeTextEntry1] : difficulty speaking [No Palpable Adenopathy] : no palpable adenopathy

## 2020-08-21 NOTE — REVIEW OF SYSTEMS
[Difficulty Walking] : difficulty walking [see HPI] : see HPI [Feelings Of Weakness] : feelings of weakness [Muscle Weakness] : muscle weakness [Negative] : Heme/Lymph

## 2020-08-21 NOTE — HISTORY OF PRESENT ILLNESS
[FreeTextEntry1] : 64 yo Georgian speaking F presents with persistent UTI. Had issues with recurrent UTI many years ago after her second child but was doing well for years with no issues. Starting last , has been having some dysuria and found to have UTI. Since then, she has been treated with antibiotics which helps with symptoms but will recur shortly after completing the antibiotics. Review of labworks shows perisistent e.coli UTI twice in February and most recently a few weeks ago. Last course of antibiotics was 10 days ago. Hasn't noticed exacerbation of LUTS but most bothersome is the urethral discomfort. Drinks multiple bottles of water per day and voids every 2 hrs or so. Denies any incontinence, history of gross hematuria, fever, chills, flank pain. Does have issues with constipation and will often have to perform enemas in order to feel comfortable. 2 children, , LMP was more than 10 yrs ago\par \par 18 Interval history: Since last visit, pt has been doing well with no minimal issues. Cysto at last visit was unremarkable\par \par 19 Interval history: 1 month ago had routine UA done with PCP and found to have UTI\par States that she was given abx but never started it because she didn't have any symptoms\par Has been completely asymptomatic for the last 6 months\par Shoulder surgery in  and has needed to take some pain meds recently for this\par Some constipation issues due to the pain meds\par No incontinence, no more urethral discomfort\par Has been double voiding\par \par 19 Interval history: s/p cholecystectomy and had some postop urinary retention\par Now voiding well. Still doing double voids\par A little dysuria\par Still having constipation\par Drinking 7-8 cups of water\par \par 20 Interval history: Pt hospitalized recently after a fall and ended up getting an urgent craniotomy secondary to subdural hematoma\par Pt was noted to have incomplete bladder emptying during hospitalization and had indwelling catheter placed\par Denies any urinary issues since hospital discharge with drainage of good clear yellow urine

## 2020-08-21 NOTE — ASSESSMENT
[FreeTextEntry1] : 64 yo F with urinary retention\par \par - Instilled about 250ml of fluid into bladder and removed catheter. Pt was unable to void at all\par - 16 Fr catheter replaced under usual sterile conditions\par - REviewed options with pt and family. FOr now, will continue catheter and repeat TOV in 1 month

## 2020-08-24 NOTE — HISTORY OF PRESENT ILLNESS
[FreeTextEntry1] : \par 65 yoF PMH of DM, HTN,UTI  had fall on and suffered SDH s/p craniotomy 07/26. Was in hospital for 3 weeks and staples were out. She had two times ER visit for chest pain and High BP. CT head repeated during those visit showed stable.\par \par Today Ms Cline  present on wheel chair, reporting headache every day. She speech impairment, vision problems or seizures.  Her surgical wound is healing well with no signs of infection.Reports Left sided intermittent HA and tingling , taking Tylenol 500 mg Q6 hours. Still Keppra 500 mg BID. Daughter c/o some episodes of stirring and non focused situation where patient unable to answer questions. \par \par

## 2020-08-24 NOTE — RESULTS/DATA
[FreeTextEntry1] : \par EXAM: CT BRAIN \par \par \par PROCEDURE DATE: 08/18/2020 \par \par \par \par \par INTERPRETATION: HISTORY: Headache for two weeks. Status post craniotomy and drainage of subdural collection on 07/26/2020. \par \par COMPARISON STUDIES: Most recent head CT scans of 8/15/2020 and 08/05/2020. \par \par TECHNIQUE: Noncontrast axial CT images were acquired to the head. Sagittal and coronal reformats were generated. \par \par FINDINGS: \par The patient is status post left frontal parietal-temporal craniotomy. A low-attenuation extra-axial collection subjacent to the bone flap, measuring up to 1.5 cm in greatest thickness (2:22 and 601:24), is grossly stable from 08/15/2020 and slightly improved from 08/05/2020. There is stable mass effect on the left frontoparietal convexity and left temporal lobe. Approximately 6 mm of midline shift to the right (2:16-17) is grossly stable from 08/15/2020 and slightly improved from 08/05/2020. \par \par There is no CT evidence of acute intracranial hemorrhage, central herniation, hydrocephalus or acute territorial infarct. \par \par There is slight prominence the ventricles and sulci. Patchy subcortical white matter hypoattenuation is nonspecific in etiology but likely reflects chronic microvascular ischemic disease in this age group.. \par \par \par Opacification of several ethmoid air cells and near complete opacification the right sphenoid sinus is unchanged from 08/05/2020. \par \par The mastoid air cells and middle ear cavities are clear bilaterally. \par \par The orbits appear within normal limits. \par \par IMPRESSION: \par Stable CT scan from 08/15/2020. \par \par Opacification of several ethmoid air cells and near complete opacification of the right sphenoid sinus is unchanged from 08/05/2020. \par \par \par

## 2020-08-24 NOTE — PHYSICAL EXAM
[General Appearance - Well Nourished] : well nourished [General Appearance - Alert] : alert [General Appearance - Well-Appearing] : healthy appearing [Irregular] : irregular [Healing Well] : healing well [Clean] : clean [Oriented To Time, Place, And Person] : oriented to person, place, and time [Person] : oriented to person [Place] : oriented to place [Time] : oriented to time [Cranial Nerves Optic (II)] : visual acuity intact bilaterally,  pupils equal round and reactive to light [Cranial Nerves Vestibulocochlear (VIII)] : hearing was intact bilaterally [Cranial Nerves Facial (VII)] : face symmetrical [Cranial Nerves Oculomotor (III)] : extraocular motion intact [Motor Strength] : muscle strength was normal in all four extremities [Motor Tone] : muscle tone was normal in all four extremities [Cranial Nerves Accessory (XI - Cranial And Spinal)] : head turning and shoulder shrug symmetric [Sclera] : the sclera and conjunctiva were normal [Neck Appearance] : the appearance of the neck was normal [Outer Ear] : the ears and nose were normal in appearance [] : no respiratory distress [Heart Rate And Rhythm] : heart rate was normal and rhythm regular [Involuntary Movements] : no involuntary movements were seen [FreeTextEntry8] : no pronator drift

## 2020-08-24 NOTE — ASSESSMENT
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 Surgical wound healing well with out any signs of infection.  She has residual left post operative epidural collection.  Size has remained the same, but the midline shift has improved a little bit since hospitalization (from 8 mm to 5 mm)\par \par Plan:\par \par Keep taking Keppra 500 mg BID\par MRI Head  in one month\par Resume Gabapentin 300mg TID for paraesthesia\par Follow up with PCP for adjusting Antihypertensives and GERD medications\par Will follow up in 1 month after imaging\par \par \par

## 2020-08-25 ENCOUNTER — INPATIENT (INPATIENT)
Facility: HOSPITAL | Age: 65
LOS: 5 days | Discharge: HOME | DRG: 698 | End: 2020-08-31
Attending: INTERNAL MEDICINE | Admitting: STUDENT IN AN ORGANIZED HEALTH CARE EDUCATION/TRAINING PROGRAM
Payer: MEDICARE

## 2020-08-25 VITALS
WEIGHT: 100.09 LBS | DIASTOLIC BLOOD PRESSURE: 67 MMHG | SYSTOLIC BLOOD PRESSURE: 104 MMHG | OXYGEN SATURATION: 99 % | RESPIRATION RATE: 20 BRPM | HEART RATE: 145 BPM | HEIGHT: 62 IN

## 2020-08-25 DIAGNOSIS — Z86.79 PERSONAL HISTORY OF OTHER DISEASES OF THE CIRCULATORY SYSTEM: Chronic | ICD-10-CM

## 2020-08-25 DIAGNOSIS — Z98.890 OTHER SPECIFIED POSTPROCEDURAL STATES: Chronic | ICD-10-CM

## 2020-08-25 LAB
ALBUMIN SERPL ELPH-MCNC: 4 G/DL — SIGNIFICANT CHANGE UP (ref 3.3–5)
ALP SERPL-CCNC: 83 U/L — SIGNIFICANT CHANGE UP (ref 40–120)
ALT FLD-CCNC: 27 U/L — SIGNIFICANT CHANGE UP (ref 10–45)
ANION GAP SERPL CALC-SCNC: 22 MMOL/L — HIGH (ref 5–17)
APPEARANCE UR: ABNORMAL
APTT BLD: 27.8 SEC — SIGNIFICANT CHANGE UP (ref 27.5–35.5)
AST SERPL-CCNC: 28 U/L — SIGNIFICANT CHANGE UP (ref 10–40)
BASOPHILS # BLD AUTO: 0.01 K/UL — SIGNIFICANT CHANGE UP (ref 0–0.2)
BASOPHILS NFR BLD AUTO: 0.1 % — SIGNIFICANT CHANGE UP (ref 0–2)
BILIRUB SERPL-MCNC: 0.7 MG/DL — SIGNIFICANT CHANGE UP (ref 0.2–1.2)
BILIRUB UR-MCNC: NEGATIVE — SIGNIFICANT CHANGE UP
BUN SERPL-MCNC: 8 MG/DL — SIGNIFICANT CHANGE UP (ref 7–23)
CALCIUM SERPL-MCNC: 9.6 MG/DL — SIGNIFICANT CHANGE UP (ref 8.4–10.5)
CHLORIDE SERPL-SCNC: 93 MMOL/L — LOW (ref 96–108)
CO2 SERPL-SCNC: 18 MMOL/L — LOW (ref 22–31)
COLOR SPEC: SIGNIFICANT CHANGE UP
CREAT SERPL-MCNC: 0.51 MG/DL — SIGNIFICANT CHANGE UP (ref 0.5–1.3)
DIFF PNL FLD: ABNORMAL
EOSINOPHIL # BLD AUTO: 0.01 K/UL — SIGNIFICANT CHANGE UP (ref 0–0.5)
EOSINOPHIL NFR BLD AUTO: 0.1 % — SIGNIFICANT CHANGE UP (ref 0–6)
GAS PNL BLDV: SIGNIFICANT CHANGE UP
GAS PNL BLDV: SIGNIFICANT CHANGE UP
GLUCOSE SERPL-MCNC: 309 MG/DL — HIGH (ref 70–99)
GLUCOSE UR QL: ABNORMAL
HCT VFR BLD CALC: 30.7 % — LOW (ref 34.5–45)
HGB BLD-MCNC: 9.3 G/DL — LOW (ref 11.5–15.5)
IMM GRANULOCYTES NFR BLD AUTO: 0.4 % — SIGNIFICANT CHANGE UP (ref 0–1.5)
INR BLD: 1.08 RATIO — SIGNIFICANT CHANGE UP (ref 0.88–1.16)
KETONES UR-MCNC: NEGATIVE — SIGNIFICANT CHANGE UP
LEUKOCYTE ESTERASE UR-ACNC: SIGNIFICANT CHANGE UP
LYMPHOCYTES # BLD AUTO: 0.61 K/UL — LOW (ref 1–3.3)
LYMPHOCYTES # BLD AUTO: 6.5 % — LOW (ref 13–44)
MCHC RBC-ENTMCNC: 25.9 PG — LOW (ref 27–34)
MCHC RBC-ENTMCNC: 30.3 GM/DL — LOW (ref 32–36)
MCV RBC AUTO: 85.5 FL — SIGNIFICANT CHANGE UP (ref 80–100)
MONOCYTES # BLD AUTO: 0.38 K/UL — SIGNIFICANT CHANGE UP (ref 0–0.9)
MONOCYTES NFR BLD AUTO: 4 % — SIGNIFICANT CHANGE UP (ref 2–14)
NEUTROPHILS # BLD AUTO: 8.37 K/UL — HIGH (ref 1.8–7.4)
NEUTROPHILS NFR BLD AUTO: 88.9 % — HIGH (ref 43–77)
NITRITE UR-MCNC: POSITIVE
NRBC # BLD: 0 /100 WBCS — SIGNIFICANT CHANGE UP (ref 0–0)
PH UR: 6 — SIGNIFICANT CHANGE UP (ref 5–8)
PLATELET # BLD AUTO: 203 K/UL — SIGNIFICANT CHANGE UP (ref 150–400)
POTASSIUM SERPL-MCNC: 4.6 MMOL/L — SIGNIFICANT CHANGE UP (ref 3.5–5.3)
POTASSIUM SERPL-SCNC: 4.6 MMOL/L — SIGNIFICANT CHANGE UP (ref 3.5–5.3)
PROT SERPL-MCNC: 6.8 G/DL — SIGNIFICANT CHANGE UP (ref 6–8.3)
PROT UR-MCNC: ABNORMAL
PROTHROM AB SERPL-ACNC: 12.8 SEC — SIGNIFICANT CHANGE UP (ref 10.6–13.6)
RBC # BLD: 3.59 M/UL — LOW (ref 3.8–5.2)
RBC # FLD: 15.7 % — HIGH (ref 10.3–14.5)
SODIUM SERPL-SCNC: 133 MMOL/L — LOW (ref 135–145)
SP GR SPEC: 1.02 — SIGNIFICANT CHANGE UP (ref 1.01–1.02)
UROBILINOGEN FLD QL: NEGATIVE — SIGNIFICANT CHANGE UP
WBC # BLD: 9.42 K/UL — SIGNIFICANT CHANGE UP (ref 3.8–10.5)
WBC # FLD AUTO: 9.42 K/UL — SIGNIFICANT CHANGE UP (ref 3.8–10.5)

## 2020-08-25 PROCEDURE — 70496 CT ANGIOGRAPHY HEAD: CPT | Mod: 26

## 2020-08-25 PROCEDURE — 93010 ELECTROCARDIOGRAM REPORT: CPT

## 2020-08-25 PROCEDURE — 71045 X-RAY EXAM CHEST 1 VIEW: CPT | Mod: 26

## 2020-08-25 PROCEDURE — 99285 EMERGENCY DEPT VISIT HI MDM: CPT

## 2020-08-25 PROCEDURE — 70498 CT ANGIOGRAPHY NECK: CPT | Mod: 26

## 2020-08-25 RX ORDER — SODIUM CHLORIDE 9 MG/ML
1000 INJECTION INTRAMUSCULAR; INTRAVENOUS; SUBCUTANEOUS ONCE
Refills: 0 | Status: COMPLETED | OUTPATIENT
Start: 2020-08-25 | End: 2020-08-25

## 2020-08-25 RX ORDER — ACETAMINOPHEN 500 MG
650 TABLET ORAL ONCE
Refills: 0 | Status: COMPLETED | OUTPATIENT
Start: 2020-08-25 | End: 2020-08-25

## 2020-08-25 RX ORDER — SODIUM CHLORIDE 9 MG/ML
1400 INJECTION INTRAMUSCULAR; INTRAVENOUS; SUBCUTANEOUS ONCE
Refills: 0 | Status: COMPLETED | OUTPATIENT
Start: 2020-08-25 | End: 2020-08-25

## 2020-08-25 RX ORDER — CEFTRIAXONE 500 MG/1
1000 INJECTION, POWDER, FOR SOLUTION INTRAMUSCULAR; INTRAVENOUS ONCE
Refills: 0 | Status: COMPLETED | OUTPATIENT
Start: 2020-08-25 | End: 2020-08-25

## 2020-08-25 RX ORDER — INSULIN HUMAN 100 [IU]/ML
5 INJECTION, SOLUTION SUBCUTANEOUS ONCE
Refills: 0 | Status: COMPLETED | OUTPATIENT
Start: 2020-08-25 | End: 2020-08-25

## 2020-08-25 RX ADMIN — SODIUM CHLORIDE 1400 MILLILITER(S): 9 INJECTION INTRAMUSCULAR; INTRAVENOUS; SUBCUTANEOUS at 21:10

## 2020-08-25 RX ADMIN — CEFTRIAXONE 100 MILLIGRAM(S): 500 INJECTION, POWDER, FOR SOLUTION INTRAMUSCULAR; INTRAVENOUS at 21:10

## 2020-08-25 RX ADMIN — SODIUM CHLORIDE 1000 MILLILITER(S): 9 INJECTION INTRAMUSCULAR; INTRAVENOUS; SUBCUTANEOUS at 23:41

## 2020-08-25 RX ADMIN — CEFTRIAXONE 1000 MILLIGRAM(S): 500 INJECTION, POWDER, FOR SOLUTION INTRAMUSCULAR; INTRAVENOUS at 22:00

## 2020-08-25 RX ADMIN — Medication 650 MILLIGRAM(S): at 22:14

## 2020-08-25 RX ADMIN — INSULIN HUMAN 5 UNIT(S): 100 INJECTION, SOLUTION SUBCUTANEOUS at 23:48

## 2020-08-25 RX ADMIN — SODIUM CHLORIDE 1400 MILLILITER(S): 9 INJECTION INTRAMUSCULAR; INTRAVENOUS; SUBCUTANEOUS at 22:30

## 2020-08-25 RX ADMIN — Medication 650 MILLIGRAM(S): at 21:10

## 2020-08-25 NOTE — ED ADULT NURSE REASSESSMENT NOTE - NS ED NURSE REASSESS COMMENT FT1
rectal temp 103.3, patient indicated for cooling blanket, blanket applied to pt.    2200- per MD Rojas, rectal temp 100.6, ok to dc cooling blanket.

## 2020-08-25 NOTE — ED PROVIDER NOTE - OBJECTIVE STATEMENT
65y F with PMHx of DMT2, HTN, SDH 2/2 Fall S/P left craniotomy on 7/26/2020 (D/C'ed on 8/14/2020 with Das for retention, recently changed on 8/21/2020) presents to the ED c/o fever (TMAX: 101F) with chills, generalized weakness, and inability to speak in complete sentences since 1930 today. Pt is minimally responsive, but able to follow commands, per daughter at bedside. On Dysphagia 1 diet, currently tolerating PO intake. Took Tylenol 500mg x2 at 1930 today. NKDA. No diarrhea, CP, visual changes, cough, vomiting as per daughter.

## 2020-08-25 NOTE — ED PROVIDER NOTE - ATTENDING CONTRIBUTION TO CARE
65 F primarily Vietnamese speaking w/ daughter translating at bedside presents to the ED w/ hx of HTN, recent SDH s/p evacuation on 7/26 p/w decreased speech, weakness and fever Tmax 101 checked at home under the axilla received 500 mg of tylenol for symptoms. No numbness. Generalized weakness. decreased po intake over the past 24 hours  Const: chronically ill appearing   Head: surgical scar on L side of scalp well appearing  Eyes: no conjunctival injection and no scleral icterus  ENMT: Atraumatic external nose and ears, dry mucus membranes  Neck: Symmetric, trachea midline,  CVS: +S1/S2, tachycardic dorsalis pedis/radial pulse 2+ bilaterally, no lower extremity edema  RESP: Unlabored respiratory effort, Clear to auscultation bilaterally  GI: Nontender/Nondistended, soft abdomen  Neuro: GCS=15, motor in all 4 extremities equal, Sensation grossly intact   Psych: Awake, Alert, & Orientedx3;  Appropriate mood and affect, cooperative  Concern for sepsis, w/ febrile, likely urinary source, indwelling charles, pt to have broad spectrum antibiotics. Unlikely ACS, ekg w/ no ischemic changes. Unlikely pneumonia as pt is not hypoxic on RA, is sating  percent w/ no tachypnea/ Pt w/ no abdominal tenderness unlikely intraabdominal source

## 2020-08-25 NOTE — ED PROVIDER NOTE - CLINICAL SUMMARY MEDICAL DECISION MAKING FREE TEXT BOX
Meet Sawyer (MD): 65y F p/w fever, tachycardia, and AMS. Will conduct infectious workup. Pt with Das in place for urinary retention, concern for urosepsis. Neuro exam nonfocal. Given recent craniotomy, not TPA candidate, so will not call Code Stroke. EKG for tachycardia to R/O Afib. Will give fluids, BP is acceptable, also give abx, CTH w/ IV contrast to R/O abscess, and reassess.

## 2020-08-25 NOTE — ED PROVIDER NOTE - TEMPLATE, MLM
"Problem: Patient Care Overview  Goal: Plan of Care Review  Outcome: Ongoing (interventions implemented as appropriate)  Denies anxiety or depression. Continues to be oriented to person only. Thought date was \"August\". Didn't know her age, but did know her birthdate. Other than that patient unable to accurately answer most questions. Often just wanders around the unit and had to be taken to bed several times before she finally stayed and went to sleep. Does say she wants to go home, but doesn't know who she lives with.   10/08/18 0406   Coping/Psychosocial   Plan of Care Reviewed With patient   Coping/Psychosocial   Patient Agreement with Plan of Care agrees   Plan of Care Review   Progress no change       Problem: Overarching Goals (Adult)  Goal: Adheres to Safety Considerations for Self and Others  Outcome: Ongoing (interventions implemented as appropriate)    Goal: Optimized Coping Skills in Response to Life Stressors  Outcome: Ongoing (interventions implemented as appropriate)    Goal: Develops/Participates in Therapeutic [place] to Support Successful Transition  Outcome: Ongoing (interventions implemented as appropriate)        " Communicable/Infectious

## 2020-08-25 NOTE — ED ADULT NURSE NOTE - OBJECTIVE STATEMENT
65y female from triage w/ ams, fever, tachycardia, daughter in law reports 101F at home, given 1g acetaminophen, PMH HTN, DM, SDH s/p craniometry, dc home with charles for urinary retention, patient rectal temp 103.3 in ed, patient lethargic bu table to answer some questions w/daughter in law present, denies headache, dizziness, changes in vision, patient reports some shortness of breath and chest pain since dc home from hospital, +pulses in all extremities, IV lock placed, labs drawn, ekg done, skin assessed with second RN, bed in lowest position, comfort and safety provided.

## 2020-08-25 NOTE — ED PROVIDER NOTE - PROGRESS NOTE DETAILS
Terese Rojas MD pt reassessed is awake and alert w/ improvement in speech. per daughter, back at baseline

## 2020-08-25 NOTE — ED PROVIDER NOTE - PSH
Fall and skin precautions  Consult physical therapy and Occupational therapy     H/O shoulder surgery    History of varicose veins of lower extremity  s/p surgery in July 2019  SDH (subdural hematoma)  S/P left craniotomy on 7/26/2020

## 2020-08-26 DIAGNOSIS — S06.5X9A TRAUMATIC SUBDURAL HEMORRHAGE WITH LOSS OF CONSCIOUSNESS OF UNSPECIFIED DURATION, INITIAL ENCOUNTER: Chronic | ICD-10-CM

## 2020-08-26 DIAGNOSIS — S06.5X9A TRAUMATIC SUBDURAL HEMORRHAGE WITH LOSS OF CONSCIOUSNESS OF UNSPECIFIED DURATION, INITIAL ENCOUNTER: ICD-10-CM

## 2020-08-26 DIAGNOSIS — R33.9 RETENTION OF URINE, UNSPECIFIED: ICD-10-CM

## 2020-08-26 DIAGNOSIS — A41.9 SEPSIS, UNSPECIFIED ORGANISM: ICD-10-CM

## 2020-08-26 DIAGNOSIS — Z29.9 ENCOUNTER FOR PROPHYLACTIC MEASURES, UNSPECIFIED: ICD-10-CM

## 2020-08-26 DIAGNOSIS — Z02.9 ENCOUNTER FOR ADMINISTRATIVE EXAMINATIONS, UNSPECIFIED: ICD-10-CM

## 2020-08-26 DIAGNOSIS — G93.41 METABOLIC ENCEPHALOPATHY: ICD-10-CM

## 2020-08-26 DIAGNOSIS — R13.10 DYSPHAGIA, UNSPECIFIED: ICD-10-CM

## 2020-08-26 DIAGNOSIS — N39.0 URINARY TRACT INFECTION, SITE NOT SPECIFIED: ICD-10-CM

## 2020-08-26 DIAGNOSIS — E11.65 TYPE 2 DIABETES MELLITUS WITH HYPERGLYCEMIA: ICD-10-CM

## 2020-08-26 LAB
ALBUMIN SERPL ELPH-MCNC: 3.8 G/DL — SIGNIFICANT CHANGE UP (ref 3.3–5)
ALP SERPL-CCNC: 79 U/L — SIGNIFICANT CHANGE UP (ref 40–120)
ALT FLD-CCNC: 25 U/L — SIGNIFICANT CHANGE UP (ref 10–45)
ANION GAP SERPL CALC-SCNC: 13 MMOL/L — SIGNIFICANT CHANGE UP (ref 5–17)
AST SERPL-CCNC: 26 U/L — SIGNIFICANT CHANGE UP (ref 10–40)
BASE EXCESS BLDV CALC-SCNC: -1.7 MMOL/L — SIGNIFICANT CHANGE UP (ref -2–2)
BASOPHILS # BLD AUTO: 0.02 K/UL — SIGNIFICANT CHANGE UP (ref 0–0.2)
BASOPHILS NFR BLD AUTO: 0.2 % — SIGNIFICANT CHANGE UP (ref 0–2)
BILIRUB SERPL-MCNC: 0.5 MG/DL — SIGNIFICANT CHANGE UP (ref 0.2–1.2)
BUN SERPL-MCNC: 5 MG/DL — LOW (ref 7–23)
CA-I SERPL-SCNC: 1.05 MMOL/L — LOW (ref 1.12–1.3)
CALCIUM SERPL-MCNC: 9.4 MG/DL — SIGNIFICANT CHANGE UP (ref 8.4–10.5)
CHLORIDE BLDV-SCNC: 111 MMOL/L — HIGH (ref 96–108)
CHLORIDE SERPL-SCNC: 106 MMOL/L — SIGNIFICANT CHANGE UP (ref 96–108)
CO2 BLDV-SCNC: 24 MMOL/L — SIGNIFICANT CHANGE UP (ref 22–30)
CO2 SERPL-SCNC: 24 MMOL/L — SIGNIFICANT CHANGE UP (ref 22–31)
CREAT SERPL-MCNC: 0.36 MG/DL — LOW (ref 0.5–1.3)
E COLI DNA BLD POS QL NAA+NON-PROBE: SIGNIFICANT CHANGE UP
EOSINOPHIL # BLD AUTO: 0 K/UL — SIGNIFICANT CHANGE UP (ref 0–0.5)
EOSINOPHIL NFR BLD AUTO: 0 % — SIGNIFICANT CHANGE UP (ref 0–6)
GAS PNL BLDV: 139 MMOL/L — SIGNIFICANT CHANGE UP (ref 135–145)
GAS PNL BLDV: SIGNIFICANT CHANGE UP
GAS PNL BLDV: SIGNIFICANT CHANGE UP
GLUCOSE BLDV-MCNC: 165 MG/DL — HIGH (ref 70–99)
GLUCOSE SERPL-MCNC: 186 MG/DL — HIGH (ref 70–99)
GRAM STN FLD: SIGNIFICANT CHANGE UP
HCO3 BLDV-SCNC: 23 MMOL/L — SIGNIFICANT CHANGE UP (ref 21–29)
HCT VFR BLD CALC: 29.7 % — LOW (ref 34.5–45)
HCT VFR BLDA CALC: 25 % — LOW (ref 39–50)
HGB BLD CALC-MCNC: 7.9 G/DL — LOW (ref 11.5–15.5)
HGB BLD-MCNC: 9.1 G/DL — LOW (ref 11.5–15.5)
IMM GRANULOCYTES NFR BLD AUTO: 0.6 % — SIGNIFICANT CHANGE UP (ref 0–1.5)
LACTATE BLDV-MCNC: 3.1 MMOL/L — HIGH (ref 0.7–2)
LACTATE SERPL-SCNC: 2.1 MMOL/L — HIGH (ref 0.7–2)
LYMPHOCYTES # BLD AUTO: 0.82 K/UL — LOW (ref 1–3.3)
LYMPHOCYTES # BLD AUTO: 6.6 % — LOW (ref 13–44)
MAGNESIUM SERPL-MCNC: 1.8 MG/DL — SIGNIFICANT CHANGE UP (ref 1.6–2.6)
MCHC RBC-ENTMCNC: 26.5 PG — LOW (ref 27–34)
MCHC RBC-ENTMCNC: 30.6 GM/DL — LOW (ref 32–36)
MCV RBC AUTO: 86.3 FL — SIGNIFICANT CHANGE UP (ref 80–100)
METHOD TYPE: SIGNIFICANT CHANGE UP
MONOCYTES # BLD AUTO: 0.66 K/UL — SIGNIFICANT CHANGE UP (ref 0–0.9)
MONOCYTES NFR BLD AUTO: 5.3 % — SIGNIFICANT CHANGE UP (ref 2–14)
NEUTROPHILS # BLD AUTO: 10.9 K/UL — HIGH (ref 1.8–7.4)
NEUTROPHILS NFR BLD AUTO: 87.3 % — HIGH (ref 43–77)
NRBC # BLD: 0 /100 WBCS — SIGNIFICANT CHANGE UP (ref 0–0)
PCO2 BLDV: 41 MMHG — SIGNIFICANT CHANGE UP (ref 35–50)
PH BLDV: 7.37 — SIGNIFICANT CHANGE UP (ref 7.35–7.45)
PHOSPHATE SERPL-MCNC: 3.2 MG/DL — SIGNIFICANT CHANGE UP (ref 2.5–4.5)
PLATELET # BLD AUTO: 163 K/UL — SIGNIFICANT CHANGE UP (ref 150–400)
PO2 BLDV: 27 MMHG — SIGNIFICANT CHANGE UP (ref 25–45)
POTASSIUM BLDV-SCNC: 3 MMOL/L — LOW (ref 3.5–5.3)
POTASSIUM SERPL-MCNC: 3.8 MMOL/L — SIGNIFICANT CHANGE UP (ref 3.5–5.3)
POTASSIUM SERPL-SCNC: 3.8 MMOL/L — SIGNIFICANT CHANGE UP (ref 3.5–5.3)
PROT SERPL-MCNC: 6.3 G/DL — SIGNIFICANT CHANGE UP (ref 6–8.3)
RBC # BLD: 3.44 M/UL — LOW (ref 3.8–5.2)
RBC # FLD: 16 % — HIGH (ref 10.3–14.5)
SAO2 % BLDV: 43 % — LOW (ref 67–88)
SARS-COV-2 IGG SERPL QL IA: NEGATIVE — SIGNIFICANT CHANGE UP
SARS-COV-2 IGM SERPL IA-ACNC: <0.1 INDEX — SIGNIFICANT CHANGE UP
SARS-COV-2 RNA SPEC QL NAA+PROBE: SIGNIFICANT CHANGE UP
SODIUM SERPL-SCNC: 143 MMOL/L — SIGNIFICANT CHANGE UP (ref 135–145)
SPECIMEN SOURCE: SIGNIFICANT CHANGE UP
SPECIMEN SOURCE: SIGNIFICANT CHANGE UP
WBC # BLD: 12.48 K/UL — HIGH (ref 3.8–10.5)
WBC # FLD AUTO: 12.48 K/UL — HIGH (ref 3.8–10.5)

## 2020-08-26 PROCEDURE — 12345: CPT | Mod: NC,GC

## 2020-08-26 PROCEDURE — 99223 1ST HOSP IP/OBS HIGH 75: CPT

## 2020-08-26 RX ORDER — DEXTROSE 50 % IN WATER 50 %
15 SYRINGE (ML) INTRAVENOUS ONCE
Refills: 0 | Status: DISCONTINUED | OUTPATIENT
Start: 2020-08-26 | End: 2020-08-31

## 2020-08-26 RX ORDER — LISINOPRIL 2.5 MG/1
1 TABLET ORAL
Qty: 0 | Refills: 0 | DISCHARGE

## 2020-08-26 RX ORDER — PANTOPRAZOLE SODIUM 20 MG/1
40 TABLET, DELAYED RELEASE ORAL
Refills: 0 | Status: COMPLETED | OUTPATIENT
Start: 2020-08-26 | End: 2020-08-26

## 2020-08-26 RX ORDER — DEXTROSE 50 % IN WATER 50 %
12.5 SYRINGE (ML) INTRAVENOUS ONCE
Refills: 0 | Status: DISCONTINUED | OUTPATIENT
Start: 2020-08-26 | End: 2020-08-31

## 2020-08-26 RX ORDER — LISINOPRIL 2.5 MG/1
5 TABLET ORAL DAILY
Refills: 0 | Status: DISCONTINUED | OUTPATIENT
Start: 2020-08-26 | End: 2020-08-29

## 2020-08-26 RX ORDER — GABAPENTIN 400 MG/1
1 CAPSULE ORAL
Qty: 0 | Refills: 0 | DISCHARGE

## 2020-08-26 RX ORDER — LEVETIRACETAM 250 MG/1
500 TABLET, FILM COATED ORAL
Refills: 0 | Status: DISCONTINUED | OUTPATIENT
Start: 2020-08-26 | End: 2020-08-31

## 2020-08-26 RX ORDER — OXYCODONE HYDROCHLORIDE 5 MG/1
5 TABLET ORAL EVERY 6 HOURS
Refills: 0 | Status: DISCONTINUED | OUTPATIENT
Start: 2020-08-26 | End: 2020-08-31

## 2020-08-26 RX ORDER — INSULIN LISPRO 100/ML
VIAL (ML) SUBCUTANEOUS
Refills: 0 | Status: DISCONTINUED | OUTPATIENT
Start: 2020-08-26 | End: 2020-08-31

## 2020-08-26 RX ORDER — FAMOTIDINE 10 MG/ML
20 INJECTION INTRAVENOUS DAILY
Refills: 0 | Status: DISCONTINUED | OUTPATIENT
Start: 2020-08-26 | End: 2020-08-29

## 2020-08-26 RX ORDER — GLIMEPIRIDE 1 MG
1 TABLET ORAL
Qty: 0 | Refills: 0 | DISCHARGE

## 2020-08-26 RX ORDER — DEXTROSE 50 % IN WATER 50 %
25 SYRINGE (ML) INTRAVENOUS ONCE
Refills: 0 | Status: DISCONTINUED | OUTPATIENT
Start: 2020-08-26 | End: 2020-08-31

## 2020-08-26 RX ORDER — VANCOMYCIN HCL 1 G
1000 VIAL (EA) INTRAVENOUS ONCE
Refills: 0 | Status: COMPLETED | OUTPATIENT
Start: 2020-08-26 | End: 2020-08-26

## 2020-08-26 RX ORDER — INSULIN LISPRO 100/ML
VIAL (ML) SUBCUTANEOUS AT BEDTIME
Refills: 0 | Status: DISCONTINUED | OUTPATIENT
Start: 2020-08-26 | End: 2020-08-31

## 2020-08-26 RX ORDER — ACETAMINOPHEN 500 MG
650 TABLET ORAL EVERY 6 HOURS
Refills: 0 | Status: DISCONTINUED | OUTPATIENT
Start: 2020-08-26 | End: 2020-08-31

## 2020-08-26 RX ORDER — CEFTRIAXONE 500 MG/1
1000 INJECTION, POWDER, FOR SOLUTION INTRAMUSCULAR; INTRAVENOUS EVERY 24 HOURS
Refills: 0 | Status: DISCONTINUED | OUTPATIENT
Start: 2020-08-26 | End: 2020-08-27

## 2020-08-26 RX ORDER — SENNA PLUS 8.6 MG/1
1 TABLET ORAL
Qty: 0 | Refills: 0 | DISCHARGE

## 2020-08-26 RX ORDER — GABAPENTIN 400 MG/1
300 CAPSULE ORAL AT BEDTIME
Refills: 0 | Status: DISCONTINUED | OUTPATIENT
Start: 2020-08-26 | End: 2020-08-31

## 2020-08-26 RX ORDER — METFORMIN HYDROCHLORIDE 850 MG/1
1 TABLET ORAL
Qty: 0 | Refills: 0 | DISCHARGE

## 2020-08-26 RX ORDER — SODIUM CHLORIDE 9 MG/ML
1000 INJECTION, SOLUTION INTRAVENOUS
Refills: 0 | Status: DISCONTINUED | OUTPATIENT
Start: 2020-08-26 | End: 2020-08-31

## 2020-08-26 RX ORDER — GLUCAGON INJECTION, SOLUTION 0.5 MG/.1ML
1 INJECTION, SOLUTION SUBCUTANEOUS ONCE
Refills: 0 | Status: DISCONTINUED | OUTPATIENT
Start: 2020-08-26 | End: 2020-08-31

## 2020-08-26 RX ADMIN — GABAPENTIN 300 MILLIGRAM(S): 400 CAPSULE ORAL at 22:12

## 2020-08-26 RX ADMIN — SODIUM CHLORIDE 1000 MILLILITER(S): 9 INJECTION INTRAMUSCULAR; INTRAVENOUS; SUBCUTANEOUS at 00:40

## 2020-08-26 RX ADMIN — LISINOPRIL 5 MILLIGRAM(S): 2.5 TABLET ORAL at 06:02

## 2020-08-26 RX ADMIN — Medication 650 MILLIGRAM(S): at 07:45

## 2020-08-26 RX ADMIN — Medication 2: at 12:28

## 2020-08-26 RX ADMIN — Medication 1 TABLET(S): at 17:24

## 2020-08-26 RX ADMIN — Medication 1 TABLET(S): at 06:28

## 2020-08-26 RX ADMIN — PANTOPRAZOLE SODIUM 40 MILLIGRAM(S): 20 TABLET, DELAYED RELEASE ORAL at 06:04

## 2020-08-26 RX ADMIN — SODIUM CHLORIDE 1000 MILLILITER(S): 9 INJECTION INTRAMUSCULAR; INTRAVENOUS; SUBCUTANEOUS at 02:24

## 2020-08-26 RX ADMIN — Medication 1000 MILLIGRAM(S): at 02:24

## 2020-08-26 RX ADMIN — CEFTRIAXONE 100 MILLIGRAM(S): 500 INJECTION, POWDER, FOR SOLUTION INTRAMUSCULAR; INTRAVENOUS at 06:05

## 2020-08-26 RX ADMIN — Medication 30 MILLILITER(S): at 08:40

## 2020-08-26 RX ADMIN — Medication 1: at 17:24

## 2020-08-26 RX ADMIN — Medication 650 MILLIGRAM(S): at 06:28

## 2020-08-26 RX ADMIN — LEVETIRACETAM 500 MILLIGRAM(S): 250 TABLET, FILM COATED ORAL at 06:02

## 2020-08-26 RX ADMIN — LEVETIRACETAM 500 MILLIGRAM(S): 250 TABLET, FILM COATED ORAL at 17:24

## 2020-08-26 RX ADMIN — OXYCODONE HYDROCHLORIDE 5 MILLIGRAM(S): 5 TABLET ORAL at 11:55

## 2020-08-26 RX ADMIN — FAMOTIDINE 20 MILLIGRAM(S): 10 INJECTION INTRAVENOUS at 17:24

## 2020-08-26 RX ADMIN — Medication 0: at 22:33

## 2020-08-26 RX ADMIN — Medication 1: at 08:21

## 2020-08-26 RX ADMIN — Medication 250 MILLIGRAM(S): at 01:21

## 2020-08-26 RX ADMIN — SODIUM CHLORIDE 1000 MILLILITER(S): 9 INJECTION INTRAMUSCULAR; INTRAVENOUS; SUBCUTANEOUS at 01:21

## 2020-08-26 RX ADMIN — OXYCODONE HYDROCHLORIDE 5 MILLIGRAM(S): 5 TABLET ORAL at 11:25

## 2020-08-26 RX ADMIN — Medication 30 MILLILITER(S): at 14:22

## 2020-08-26 NOTE — CONSULT NOTE ADULT - SUBJECTIVE AND OBJECTIVE BOX
HPI:   Patient is a 65y female with hx DM, HTNfall, SDH with craniotomy x2, urinary retention had charles since her discharge on . Pt unable to offer any c/o, as per daughter she was seen by urology last friday and charles was changed, but now developed fever, lethargy and chills. She had fever 103, hypotensive in ER, CXR; clear. She received Vanc and CTX in ED, blood cult with GNR. Her previous urine cult with pansensitive E coli    REVIEW OF SYSTEMS:  All other review of systems negative (Comprehensive ROS)    PAST MEDICAL & SURGICAL HISTORY:  Diabetes  Cholelithiasis  Hypertension  History of varicose veins of lower extremity: s/p surgery in 2019  H/O shoulder surgery      Allergies    No Known Allergies    Intolerances        Antimicrobials Day #    cefTRIAXone   IVPB 1000 milliGRAM(s) IV Intermittent every 24 hours    Other Medications:  acetaminophen   Tablet .. 650 milliGRAM(s) Oral every 6 hours PRN  aluminum hydroxide/magnesium hydroxide/simethicone Suspension 30 milliLiter(s) Oral every 4 hours PRN  bisacodyl 5 milliGRAM(s) Oral every 12 hours PRN  calcium carbonate 1250 mG  + Vitamin D (OsCal 500 + D) 1 Tablet(s) Oral two times a day  dextrose 40% Gel 15 Gram(s) Oral once PRN  dextrose 5%. 1000 milliLiter(s) IV Continuous <Continuous>  dextrose 50% Injectable 12.5 Gram(s) IV Push once  dextrose 50% Injectable 25 Gram(s) IV Push once  dextrose 50% Injectable 25 Gram(s) IV Push once  gabapentin 300 milliGRAM(s) Oral at bedtime  glucagon  Injectable 1 milliGRAM(s) IntraMuscular once PRN  insulin lispro (HumaLOG) corrective regimen sliding scale   SubCutaneous three times a day before meals  insulin lispro (HumaLOG) corrective regimen sliding scale   SubCutaneous at bedtime  levETIRAcetam 500 milliGRAM(s) Oral two times a day  lisinopril 5 milliGRAM(s) Oral daily  oxyCODONE    IR 5 milliGRAM(s) Oral every 6 hours PRN      FAMILY HISTORY:  No pertinent family history in first degree relatives      SOCIAL HISTORY:  Smoking:     ETOH:     Drug Use:     Single     T(F): 97.7 (20 @ 13:59), Max: 103.3 (20 @ 21:08)  HR: 98 (20 @ 13:59)  BP: 129/77 (20 @ 13:59)  RR: 18 (20 @ 13:59)  SpO2: 100% (20 @ 13:59)  Wt(kg): --    PHYSICAL EXAM:  General: non-verbal  Eyes:  anicteric, no conjunctival injection, no discharge  Oropharynx: no lesions or injection 	  Neck: supple, without adenopathy  Lungs: clear to auscultation  Heart: regular rate and rhythm; no murmur, rubs or gallops  Abdomen: soft, nondistended, nontender, without mass or organomegaly  Skin: no lesions  Extremities: no clubbing, cyanosis, or edema  Neurologic: non-verbal    LAB RESULTS:                        9.1    12.48 )-----------( 163      ( 26 Aug 2020 08:20 )             29.7     08-    143  |  106  |  5<L>  ----------------------------<  186<H>  3.8   |  24  |  0.36<L>    Ca    9.4      26 Aug 2020 08:20  Phos  3.2     08-  Mg     1.8         TPro  6.3  /  Alb  3.8  /  TBili  0.5  /  DBili  x   /  AST  26  /  ALT  25  /  AlkPhos  79  08-26    LIVER FUNCTIONS - ( 26 Aug 2020 08:20 )  Alb: 3.8 g/dL / Pro: 6.3 g/dL / ALK PHOS: 79 U/L / ALT: 25 U/L / AST: 26 U/L / GGT: x           Urinalysis Basic - ( 25 Aug 2020 21:14 )    Color: Light Yellow / Appearance: Slightly Turbid / S.025 / pH: x  Gluc: x / Ketone: Negative  / Bili: Negative / Urobili: Negative   Blood: x / Protein: 300 mg/dL / Nitrite: Positive   Leuk Esterase: Trace / RBC: x / WBC x   Sq Epi: x / Non Sq Epi: x / Bacteria: x        MICROBIOLOGY REVIEWED:  Culture - Blood (20 @ 01:09)    Gram Stain:   Growth in anaerobic bottle: Gram Negative Rods    Specimen Source: .Blood Blood-Peripheral    Culture Results:   Growth in anaerobic bottle: Gram Negative Rods  "Due to technical problems, Proteus sp. will Not be reported as part of  the BCID panel until further notice"  ***Blood Panel PCR results on this specimen are available  approximately 3 hours after the Gram stain result.***  Gram stain, PCR, and/or culture results may not always  correspond due to difference in methodologies.  ************************************************************  This PCR assay was performed using [employer].  The following targets are tested for: Enterococcus,  vancomycin resistant enterococci, Listeria monocytogenes,  coagulase negative staphylococci, S. aureus,  methicillin resistant S. aureus, Streptococcus agalactiae  (Group B), S. pneumoniae, S. pyogenes (Group A),  Acinetobacter baumannii, Enterobacter cloacae, E. coli,  Klebsiella oxytoca, K. pneumoniae, Proteus sp.,  Serratia marcescens, Haemophilus influenzae,  Neisseria meningitidis, Pseudomonas aeruginosa, Candida  albicans, C. glabrata, C krusei, C parapsilosis,  C. tropicalis and the KPC resistance gene.      RADIOLOGY REVIEWED:

## 2020-08-26 NOTE — H&P ADULT - PROBLEM SELECTOR PLAN 3
-pt minimally responsive upon arrival; workup/mgt of sepsis as documented in ER note with abx/fluids, and now mentation improved to baseline  -CTH/CTA h/n show grossly stable imaging. Neuro exam appears grossly stable.   -at this time suspect sepsis encephalopathy/delirium in setting of sepsis given actuity and rapidity of return to baseline and stable imaging.   -cont to monitor closely with serial exams; if worsening mentation can get stat repeat imaging +/- neurosurgical eval.  Pt already seen in office last week no need for urgent consult overnight.

## 2020-08-26 NOTE — H&P ADULT - PROBLEM SELECTOR PLAN 2
-fever, tachycardia, elevated lactate  -s/p IVF, downtrending lactate documented  -urinary source, s/p empiric abx as noted  -c/w IV ceftriaxone, f/u pending cultures  -interval exam: warm, well perfused, mentating well, good cap refill <2  -mentation improved.

## 2020-08-26 NOTE — PATIENT PROFILE ADULT - FUNCTIONAL SCREEN CURRENT LEVEL: COMMUNICATION, MLM
0 = understands/communicates without difficulty/Unable to assess patient currently intubated, no family at bedside

## 2020-08-26 NOTE — CONSULT NOTE ADULT - SUBJECTIVE AND OBJECTIVE BOX
p (1480)     HPI:  65F PMH, recent fall c/b SDH s/p L craniotomy/evacuation of hematoma x 2, urinary retention s/p chronic charles, T2DM, HTN, dysphagia, p/w fever. Pt/family decline Irish translation services, DIL at bedside providing translation to English. Pt previously admitted 7/25 - 8/14/20 for mechanical fall/emesis, rapid neurologic deterioration, intubated for airway protection, and eventually code stroke was called and pt was dx with Large L SDH with MLS.  Taken to OR for L crani/SDH evacuation 7/26. Poor response, neurologically continued to deteriorate with increase in L pupillary size, interval CTH unchanged.  Given potential for further seizure risk and ongoing MLS, pt taken was RTOR for repeat L crani/exploration, then s/p mild recurrent SDH/clot evacuation, with bone flap replaced on 7/31. Started on amantadine for neuro-stimulation. Course c/b persistent urinary retention, evaluated by urology and started on flomax and hadchronic charles placed for discharge. Of note, UA at the time was positive and Ucx grew out 100K pansensitive ecoli, but pt was asx/afebrile/without leukocytosis; ID was consulted and rec no abx as thought was that she was chronically colonized. PT/PMR rec Acute rehab, family declined and took her home instead.     Since discharge, pt was seen twice in the ER.  First time was for pleuritic chest pain on 8/15. EKG nonischemic, trop neg, CTA neg for PE. Presumed gastritis, started on ppi. Second visit was for uncontrolled HTN sbp 180s, HA, and tingling in extremities on 8/18/20. CTH then was unchanged from 8/15.  Workup only revealed thrombocytopenia to 100; ER advised pt to stop prior Rx'd protonix and was sent home for pcp f/u.  In interim, pt has followed with her neurosurgeon who reviewed her interval scans and restarted pt's gabapentin 300 qhs for c/o tingling in extremities. Flomax has since been d/c as well.  Per DIL, pt today was noted to be febrile, and with decreased responsiveness, not following commands like she ordinarily did (prev could stick tongue out when asked, was not doing it today). +ongoing mild HA related to prior craniotomies.  Onset of this deterioration was since around 7:30 pm.  Denies c/o cp, sob, n/v/d, decreased/increased urine production in charles, hematuria, abd pain, back pain, visual changes. Pt tolerating her dysphagia diet without choking episodes. No new rash, joint pain.     VS: Tm 103.3, 133 -->115, 106/63 --> 141/65, 20, 100% Ra.  Labs: no leukocytosis 9.4, chronic stable anemia, plt wnl, mild pseudohypoNa 133 fsg 300s, GFR intact scr 0.5, bhb 0.6, Lactate 7.9 -->4.1-->3.1. UA pos. Covid 19 pcr neg. CXR prelim clear lungs. CTH/CTA h/n: +redemonstrated L frontal/L parietal/L temporaral subdural collection, grossly stable from 8/18/20. +patent ant/post circulation. In ER pt received vanco, ceftriaxone, IVF, 5u regular insulin, and tylenol prior to medicine team involvement. (26 Aug 2020 02:02)      Imaging: CTH grossly unchanged from 8/18  Exam: Awake, EOMI, JENSEN, FC, 5/5 throughout, Incision CDI      --Anticoagulation:    =====================  PAST MEDICAL HISTORY   Diabetes  Cholelithiasis  Hypertension    PAST SURGICAL HISTORY   History of varicose veins of lower extremity  H/O shoulder surgery        MEDICATIONS:  Antibiotics:  cefTRIAXone   IVPB 1000 milliGRAM(s) IV Intermittent every 24 hours    Neuro:  acetaminophen   Tablet .. 650 milliGRAM(s) Oral every 6 hours PRN  gabapentin 300 milliGRAM(s) Oral at bedtime  levETIRAcetam 500 milliGRAM(s) Oral two times a day  oxyCODONE    IR 5 milliGRAM(s) Oral every 6 hours PRN    Other:  aluminum hydroxide/magnesium hydroxide/simethicone Suspension 30 milliLiter(s) Oral every 4 hours PRN  bisacodyl 5 milliGRAM(s) Oral every 12 hours PRN  calcium carbonate 1250 mG  + Vitamin D (OsCal 500 + D) 1 Tablet(s) Oral two times a day  dextrose 40% Gel 15 Gram(s) Oral once PRN  dextrose 5%. 1000 milliLiter(s) IV Continuous <Continuous>  dextrose 50% Injectable 12.5 Gram(s) IV Push once  dextrose 50% Injectable 25 Gram(s) IV Push once  dextrose 50% Injectable 25 Gram(s) IV Push once  glucagon  Injectable 1 milliGRAM(s) IntraMuscular once PRN  insulin lispro (HumaLOG) corrective regimen sliding scale   SubCutaneous three times a day before meals  insulin lispro (HumaLOG) corrective regimen sliding scale   SubCutaneous at bedtime  lisinopril 5 milliGRAM(s) Oral daily      SOCIAL HISTORY:   Occupation:   Marital Status:     FAMILY HISTORY:  No pertinent family history in first degree relatives      ROS: Negative except per HPI    LABS:  PT/INR - ( 25 Aug 2020 21:01 )   PT: 12.8 sec;   INR: 1.08 ratio         PTT - ( 25 Aug 2020 21:01 )  PTT:27.8 sec                        9.1    12.48 )-----------( 163      ( 26 Aug 2020 08:20 )             29.7     08-26    143  |  106  |  5<L>  ----------------------------<  186<H>  3.8   |  24  |  0.36<L>    Ca    9.4      26 Aug 2020 08:20  Phos  3.2     08-26  Mg     1.8     08-26    TPro  6.3  /  Alb  3.8  /  TBili  0.5  /  DBili  x   /  AST  26  /  ALT  25  /  AlkPhos  79  08-26

## 2020-08-26 NOTE — H&P ADULT - PROBLEM SELECTOR PROBLEM 5
Type 2 diabetes mellitus with hyperglycemia, without long-term current use of insulin Subdural hematoma

## 2020-08-26 NOTE — PATIENT PROFILE ADULT - VISION (WITH CORRECTIVE LENSES IF THE PATIENT USUALLY WEARS THEM):
Unable to assess patient currently intubated, no family at bedside/Normal vision: sees adequately in most situations; can see medication labels, newsprint

## 2020-08-26 NOTE — PATIENT PROFILE ADULT - ABILITY TO HEAR (WITH HEARING AID OR HEARING APPLIANCE IF NORMALLY USED):
Adequate: hears normal conversation without difficulty/Unable to assess patient currently intubated, no family at bedside

## 2020-08-26 NOTE — H&P ADULT - PROBLEM SELECTOR PLAN 6
-s/p 5u Regular insulin in ER, downtrending FSG.   -Hold oral hypoglycemics  -Start HISS, check fsg qac/qhs  -c/w gabapentin  -consistent carb diet

## 2020-08-26 NOTE — H&P ADULT - NSHPREVIEWOFSYSTEMS_GEN_ALL_CORE
REVIEW OF SYSTEMS:  CONSTITUTIONAL: No weakness. No fevers. No chills. No weight loss. Good appetite.  EYES: No visual changes. No eye pain.  ENT: No hearing difficulty. No vertigo. No dysphagia. No Sinusitis/rhinorrhea.  NECK: No pain. No stiffness/rigidity.  CARDIAC: No chest pain. No palpitations.  RESPIRATORY: No cough. No SOB. No hemoptysis.  GASTROINTESTINAL: No abdominal pain. No nausea. No vomiting. No hematemesis. No diarrhea. No constipation. No melena. No hematochezia.  GENITOURINARY: No dysuria. No frequency. No hesitancy. No hematuria.  NEUROLOGICAL: No numbness. No focal weakness. No incontinence. No headache.  BACK: No back pain.  EXTREMITIES: No lower extremity edema. Full ROM.  SKIN: No rashes. No itching. No other lesions.  PSYCHIATRIC: No depression. No anxiety. No SI/HI.  ALLERGIC: No lip swelling. No hives.  All other review of systems is negative unless indicated above.  [  ] Unable to assess ROS because REVIEW OF SYSTEMS:  CONSTITUTIONAL: +weakness. +fevers. No chills. No weight loss. Good appetite.  EYES: No visual changes. No eye pain.  ENT: No hearing difficulty. No vertigo. No dysphagia. No Sinusitis/rhinorrhea.  NECK: No pain. No stiffness/rigidity.  CARDIAC: No chest pain. No palpitations.  RESPIRATORY: No cough. No SOB. No hemoptysis.  GASTROINTESTINAL: No abdominal pain. No nausea. No vomiting. No hematemesis. No diarrhea. No constipation. No melena. No hematochezia.  GENITOURINARY: No dysuria. No frequency. No hesitancy. No hematuria.  NEUROLOGICAL: No numbness. No focal weakness. No incontinence. No headache.  BACK: No back pain.  EXTREMITIES: No lower extremity edema. Full ROM.  SKIN: No rashes. No itching. No other lesions.  PSYCHIATRIC: No depression. No anxiety. No SI/HI.  ALLERGIC: No lip swelling. No hives.  All other review of systems is negative unless indicated above.

## 2020-08-26 NOTE — PROGRESS NOTE ADULT - PROBLEM SELECTOR PLAN 1
-pt noted on prior admission to have 100K pansensitive ecoli, thought at that time to be asymptomatic colonization.  Now presenting with sepsis and encephalopathy  -s/p empiric 1 dose vanco/ceftriaxone in ER. Vanco dc'd. Will continue with Ceftriaxone 1g.   - ID consulted (Dr. Cooper)  -R CVA and suprapubic tenderness   - will consider future urology input for chronic urinary retention  -f/u bcx, ucx.  -c/w charles -pt noted on prior admission to have 100K pansensitive ecoli, thought at that time to be asymptomatic colonization.  Now presenting with sepsis and encephalopathy  -s/p empiric 1 dose vanco/ceftriaxone in ER. Vanco dc'd. Will continue with Ceftriaxone 1g.   - ID consulted (Dr. Cooper)  -R CVA and suprapubic tenderness   - will consider future urology input for chronic urinary retention  -f/u bcx, ucx. Preliminary growth shows gram negative rods in blood.   -c/w charles

## 2020-08-26 NOTE — CONSULT NOTE ADULT - ASSESSMENT
Maria Esther Cline    65F DM, HTN, craniotomy for hematoma evacuation 7/26 with re-exploration 7/31 presents after being less responsive and febrile. Stable HA since discharge, has otherwise been doing well. Imaging: CTH grossly unchanged from 8/18. Febrile to 100.5 this AM, UA positive, Blood glucose 309, Na 133. CXR negative.  Exam: Awake, EOMI, JENSEN, FC, 5/5 throughout, Incision CDI.   -MRI to rule out intracranial infection  -Fever likely 2/2 UTI given +UA   -Patients AMS can be due to UTI and poorly controlled diabetes

## 2020-08-26 NOTE — H&P ADULT - ASSESSMENT
65F PMH, recent fall c/b SDH s/p L craniotomy/evacuation of hematoma x 2, urinary retention s/p chronic charles, T2DM, HTN, dysphagia, p/w fever.

## 2020-08-26 NOTE — H&P ADULT - HISTORY OF PRESENT ILLNESS
65F PMH, recent fall c/b SDH s/p L craniotomy/evacuation of hematoma x 2, urinary retention s/p chronic charles, T2DM, HTN, dysphagia, p/w fever.    Pt previously admitted 7/25 - 8/14/20 for mechanical fall/emesis, rapid neurologic deterioration, intubated for airway protection, and eventually code stroke was called and pt was dx with Large L SDH with MLS.  Taken to OR for L crani/SDH evacuation 7/26. Poor response, neurologically continued to deteriorate with increase in L pupillary size, interval CTH unchanged.  Given potential for further seizure risk and ongoing MLS, pt taken was RTOR for repeat L crani/exploration, then s/p mild recurrent SDH/clot evacuation, with bone flap replaced on 7/31. Started on amantadine for neuro-stimulation. Course c/b persistent urinary retention, evaluated by urology and started on flomax and hadchronic charles placed for discharge. Of note, UA at the time was positive and Ucx grew out 100K pansensitive ecoli, but pt was asx/afebrile/without leukocytosis; ID was consulted and rec no abx as thought was that she was chronically colonized. PT/PMR rec Acute rehab, family declined and took her home instead.     VS: Tm 103.3, 133 -->115, 106/63 --> 141/65, 20, 100% Ra.  Labs: no leukocytosis 9.4, chronic stable anemia, plt wnl, mild pseudohypoNa 133 fsg 300s, GFR intact scr 0.5, bhb 0.6, Lactate 7.9 -->4.1-->3.1. UA pos. Covid 19 pcr neg. CXR prelim clear lungs. CTH/CTA h/n: +redemonstrated L frontal/L parietal/L temporaral subdural collection, grossly stable from 8/18/20. +patent ant/post circulation. In ER pt received vanco, ceftriaxone, IVF, 5u regular insulin, and tylenol prior to medicine team involvement. 65F PMH, recent fall c/b SDH s/p L craniotomy/evacuation of hematoma x 2, urinary retention s/p chronic charles, T2DM, HTN, dysphagia, p/w fever. Pt/family decline Serbian translation services, DIL at bedside providing translation to English. Pt previously admitted 7/25 - 8/14/20 for mechanical fall/emesis, rapid neurologic deterioration, intubated for airway protection, and eventually code stroke was called and pt was dx with Large L SDH with MLS.  Taken to OR for L crani/SDH evacuation 7/26. Poor response, neurologically continued to deteriorate with increase in L pupillary size, interval CTH unchanged.  Given potential for further seizure risk and ongoing MLS, pt taken was RTOR for repeat L crani/exploration, then s/p mild recurrent SDH/clot evacuation, with bone flap replaced on 7/31. Started on amantadine for neuro-stimulation. Course c/b persistent urinary retention, evaluated by urology and started on flomax and hadchronic charles placed for discharge. Of note, UA at the time was positive and Ucx grew out 100K pansensitive ecoli, but pt was asx/afebrile/without leukocytosis; ID was consulted and rec no abx as thought was that she was chronically colonized. PT/PMR rec Acute rehab, family declined and took her home instead.     Since discharge, pt was seen twice in the ER.  First time was for pleuritic chest pain on 8/15. EKG nonischemic, trop neg, CTA neg for PE. Presumed gastritis, started on ppi. Second visit was for uncontrolled HTN sbp 180s, HA, and tingling in extremities on 8/18/20. CTH then was unchanged from 8/15.  Workup only revealed thrombocytopenia to 100; ER advised pt to stop prior Rx'd protonix and was sent home for pcp f/u.  In interim, pt has followed with her neurosurgeon who reviewed her interval scans and restarted pt's gabapentin 300 qhs for c/o tingling in extremities. Flomax has since been d/c as well.  Per DIL, pt today was noted to be febrile, and with decreased responsiveness, not following commands like she ordinarily did (prev could stick tongue out when asked, was not doing it today). +ongoing mild HA related to prior craniotomies.  Onset of this deterioration was since around 7:30 pm.  Denies c/o cp, sob, n/v/d, decreased/increased urine production in charles, hematuria, abd pain, back pain, visual changes. Pt tolerating her dysphagia diet without choking episodes. No new rash, joint pain.     VS: Tm 103.3, 133 -->115, 106/63 --> 141/65, 20, 100% Ra.  Labs: no leukocytosis 9.4, chronic stable anemia, plt wnl, mild pseudohypoNa 133 fsg 300s, GFR intact scr 0.5, bhb 0.6, Lactate 7.9 -->4.1-->3.1. UA pos. Covid 19 pcr neg. CXR prelim clear lungs. CTH/CTA h/n: +redemonstrated L frontal/L parietal/L temporaral subdural collection, grossly stable from 8/18/20. +patent ant/post circulation. In ER pt received vanco, ceftriaxone, IVF, 5u regular insulin, and tylenol prior to medicine team involvement.

## 2020-08-26 NOTE — PROGRESS NOTE ADULT - PROBLEM SELECTOR PLAN 5
-CTH/CTA h/n show grossly stable imaging. Neuro exam appears grossly stable.   -at this time suspect sepsis encephalopathy/delirium in setting of sepsis given actuity and rapidity of return to baseline and stable imaging.   -cont to monitor closely with serial exams; if worsening mentation can get stat repeat imaging +/- neurosurgical eval.  Pt already seen in office last week no need for urgent consult overnight.  -start SCD, no pharm vte ppx.  -c/w keppra  -c/w oxy 5 q6 for pain prn.  - appreciate neurosurgery recs for possible bacteremic source

## 2020-08-26 NOTE — H&P ADULT - NSHPLABSRESULTS_GEN_ALL_CORE
Labs personally reviewed : no leukocytosis 9.4, chronic stable anemia, plt wnl, mild pseudohypoNa 133 fsg 300s, GFR intact scr 0.5, bhb 0.6, Lactate 7.9 -->4.1-->3.1. UA pos. Covid 19 pcr neg.   Imaging personally reviewed CXR prelim clear lungs. CTH/CTA h/n: +redemonstrated L frontal/L parietal/L temporaral subdural collection, grossly stable from 8/18/20. +patent ant/post circulation. EKG personally reviewed

## 2020-08-26 NOTE — PATIENT PROFILE ADULT - FUNCTIONAL SCREEN CURRENT LEVEL: SWALLOWING (IF SCORE 2 OR MORE FOR ANY ITEM, CONSULT REHAB SERVICES), MLM)
0 = swallows foods/liquids without difficulty/Unable to assess patient currently intubated, no family at bedside

## 2020-08-26 NOTE — H&P ADULT - PROBLEM SELECTOR PLAN 1
-pt noted on prior admission to have 100K pansensitive ecoli, thought at that time to be asymptomatic colonization.  Now presenting with sepsis and encephalopathy  -s/p empiric vanco/ceftriaxone in ER.   -Hold further vanco, given prior Ucx will continue with IV CTX 1 gram qd for now. -f/u bcx, ucx.  -c/w charles

## 2020-08-26 NOTE — CHART NOTE - NSCHARTNOTEFT_GEN_A_CORE
TO BE COMPLETED WITHIN 6 HOURS OF INITIAL ASSESSMENT:    For use in patients that have 2 sepsis criteria and new organ dysfunction   •	New or increased oxygen requirement  •	Creatinine >2mg/dL  •	Bilirubin>2mg/dL  •	Platelet <100,000/mm3  •	INR >1.5, PTT>60  •	Lactate >2    If patient persistent hypotension (SBP<90) or any lactate >4 then provider evaluation (Physician/PA/NP) within 30 minutes of bolus completion is required.    Vital Signs Last 24 Hrs  T(C): 38.1 (25 Aug 2020 22:13), Max: 39.6 (25 Aug 2020 21:08)  T(F): 100.6 (25 Aug 2020 22:13), Max: 103.3 (25 Aug 2020 21:08)  HR: 115 (26 Aug 2020 01:46) (111 - 146)  BP: 141/65 (26 Aug 2020 01:46) (101/64 - 159/75)  BP(mean): --  RR: 20 (25 Aug 2020 23:46) (20 - 22)  SpO2: 100% (25 Aug 2020 23:46) (99% - 100%)  		  LUNGS:  [ x ] Clear bilaterally [  ] Wheeze [  ] Rhonchi [  ] Rales [  ] Crackles; Other:  HEART: [ x ]RRR [  ] No murmur [  ]  Normal S1S2 [  ] Tachycardia;  Other:  CAPILLARY REFILL:  	Fingers: [x  ] less than 2 seconds [  ] more than 2 seconds                                           Toes: [  ]  less than 2 seconds [  ] more than 2 seconds   PERIPHERAL PULSES:  Radial: [x  ] Palpable  [  ]  non-palpable                                         Dorsalis Pedis: [  ] Palpable  [  ] non-palpable                                         Posterior Tibial: [  ] Palpable  [  ] non-palpable                                          Other:  SKIN:   [  ]  Diaphoretic  [  ]  Mottling  [  ]  Cold extremities  [ x ]  Warm [x  ]  Dry                      Other:    BEDSIDE ULTRASOUND FINDINGS (IF APPLICABLE):    Labs:  25 Aug 2020 21:01    133    |  93     |  8      ----------------------------<  309    4.6     |  18     |  0.51     Ca    9.6        25 Aug 2020 21:01    TPro  6.8    /  Alb  4.0    /  TBili  0.7    /  DBili  x      /  AST  28     /  ALT  27     /  AlkPhos  83     25 Aug 2020 21:01                          9.3    9.42  )-----------( 203      ( 25 Aug 2020 21:01 )             30.7     PT/INR - ( 25 Aug 2020 21:01 )   PT: 12.8 sec;   INR: 1.08 ratio         PTT - ( 25 Aug 2020 21:01 )  PTT:27.8 sec  Lactate: Blood Gas Venous - Lactate: 3.1: Elevated lactate. Consider ordering follow-up lactate to trend. mmoL/L (08.26.20 @ 01:09)      [x ] I have re-evaluated the patient's fluid status and reviewed vital signs. Clinical evaluation demonstrates an appropriate response to fluid resuscitation, with subsequent plan as follows:    Patient received a modified total of fluid resuscitation for the following reason:  [ ] obesity BMI > 30, patient received 30 cc/kg according to Ideal Body Weight   [ ] acute, decompensated CHF   [ ] pulmonary edema    [ ] ESRD with signs of fluid overload  [ ] presence of LVAD     Plan (orders must be placed in EMR):     [  ]  Check Repeat Lactate   [ x ]  No change in current plan  [  ]  Start Vasopressors:  [  ]  Repeat Fluid Bolus:  [  ] other:    Care Discussed with Consultants/Other Providers [ ] YES  [ ] NO

## 2020-08-26 NOTE — PROGRESS NOTE ADULT - SUBJECTIVE AND OBJECTIVE BOX
Jonathan Matt DO PGY1    CHIEF COMPLAINT: Patient is a 65y old  Female who presents with a chief complaint of sepsis 2/2 UTI (26 Aug 2020 02:02)      INTERVAL HPI/OVERNIGHT EVENTS: No acte event since admission. Patients mental status has improved and is now able to respond to questions via her daughter in law. She is currently afebrile w/o shortness of breath or chest pain. She complains of numbness and tingling sensation in her b/l LE. Gabapentin was held last night but restarted today. Additionally she reports that looking at light is painful.    MEDICATIONS (STANDING):  calcium carbonate 1250 mG  + Vitamin D (OsCal 500 + D) 1 Tablet(s) Oral two times a day  cefTRIAXone   IVPB 1000 milliGRAM(s) IV Intermittent every 24 hours  dextrose 5%. 1000 milliLiter(s) IV Continuous <Continuous>  dextrose 50% Injectable 12.5 Gram(s) IV Push once  dextrose 50% Injectable 25 Gram(s) IV Push once  dextrose 50% Injectable 25 Gram(s) IV Push once  gabapentin 300 milliGRAM(s) Oral at bedtime  insulin lispro (HumaLOG) corrective regimen sliding scale   SubCutaneous three times a day before meals  insulin lispro (HumaLOG) corrective regimen sliding scale   SubCutaneous at bedtime  levETIRAcetam 500 milliGRAM(s) Oral two times a day  lisinopril 5 milliGRAM(s) Oral daily    MEDICATIONS  (PRN):  acetaminophen   Tablet .. 650 milliGRAM(s) Oral every 6 hours PRN  aluminum hydroxide/magnesium hydroxide/simethicone Suspension 30 milliLiter(s) Oral every 4 hours PRN  bisacodyl 5 milliGRAM(s) Oral every 12 hours PRN  dextrose 40% Gel 15 Gram(s) Oral once PRN  glucagon  Injectable 1 milliGRAM(s) IntraMuscular once PRN  oxyCODONE    IR 5 milliGRAM(s) Oral every 6 hours PRN      REVIEW OF SYSTEMS:  CONSTITUTIONAL: No fever  EYES: No eye pain  ENMT: No sinus or throat pain  NECK: mild neck pain  RESPIRATORY: No shortness of breath  CARDIOVASCULAR: No chest pain, dizziness  GASTROINTESTINAL: No abdominal or epigastric pain. No diarrhea or constipation. No melena or hematochezia.  GENITOURINARY: No dysuria, hematuria  NEUROLOGICAL: No headaches  SKIN: No itching, burning, rashes, or lesions   MUSCULOSKELETAL: No muscle or joint pain. B/L LE numbness and tingling.     T(F): 98.1 (20 @ 07:30), Max: 103.3 (20 @ 21:08)  HR: 108 (20 @ 07:30) (99 - 146)  BP: 157/76 (20 @ 07:30) (101/64 - 159/75)  RR: 20 (20 @ 07:30) (20 - 22)  SpO2: 100% (20 @ 07:30) (99% - 100%)  Wt(kg): --  CAPILLARY BLOOD GLUCOSE      POCT Blood Glucose.: 210 mg/dL (26 Aug 2020 11:58)  POCT Blood Glucose.: 187 mg/dL (26 Aug 2020 08:12)  POCT Blood Glucose.: 221 mg/dL (25 Aug 2020 23:45)  POCT Blood Glucose.: 325 mg/dL (25 Aug 2020 20:44)    I&O's Summary    26 Aug 2020 07:01  -  26 Aug 2020 12:50  --------------------------------------------------------  IN: 0 mL / OUT: 3300 mL / NET: -3300 mL        PHYSICAL EXAM:  GENERAL: NAD, well-groomed, well-developed. Appears mildly lethargic and weak.   HEAD:  Atraumatic, Normocephalic  EYES: PERRLA, conjunctiva and sclera clear  ENMT: No tonsillar erythema, exudates, or enlargement; Moist mucous membranes  NECK: Supple. Some tenderness to palpation b/l.  NERVOUS SYSTEM:  Alert & Oriented X3, Good concentration; Motor Strength 5/5 B/L upper and lower extremities. Neck stiffness and pain with neck flexion. Positivly   CHEST/LUNG: Clear to auscultation bilaterally; No rales, rhonchi, wheezing, or rubs  HEART: Regular rate and rhythm; No murmurs, rubs, or gallops  ABDOMEN: Soft, Nontender, Nondistended; Bowel sounds present  EXTREMITIES:  2+ Peripheral Pulses, No edema  SKIN: No rashes or lesions    LABS:                        9.1    12.48 )-----------( 163      ( 26 Aug 2020 08:20 )             29.7     08    143  |  106  |  5<L>  ----------------------------<  186<H>  3.8   |  24  |  0.36<L>    Ca    9.4      26 Aug 2020 08:20  Phos  3.2       Mg     1.8         TPro  6.3  /  Alb  3.8  /  TBili  0.5  /  DBili  x   /  AST  26  /  ALT  25  /  AlkPhos  79      PT/INR - ( 25 Aug 2020 21:01 )   PT: 12.8 sec;   INR: 1.08 ratio         PTT - ( 25 Aug 2020 21:01 )  PTT:27.8 sec  Urinalysis Basic - ( 25 Aug 2020 21:14 )    Color: Light Yellow / Appearance: Slightly Turbid / S.025 / pH: x  Gluc: x / Ketone: Negative  / Bili: Negative / Urobili: Negative   Blood: x / Protein: 300 mg/dL / Nitrite: Positive   Leuk Esterase: Trace / RBC: x / WBC x   Sq Epi: x / Non Sq Epi: x / Bacteria: x          RADIOLOGY & ADDITIONAL TESTS: Jonathan Matt DO PGY1    CHIEF COMPLAINT: Patient is a 65y old  Female who presents with a chief complaint of sepsis 2/2 UTI (26 Aug 2020 02:02)      INTERVAL HPI/OVERNIGHT EVENTS: No acte event since admission. Patients mental status has improved and is now able to respond to questions via her daughter in law. She is currently afebrile w/o shortness of breath or chest pain. She complains of numbness and tingling sensation in her b/l LE. Gabapentin was held last night but restarted today. Additionally she reports that looking at light is painful.    MEDICATIONS (STANDING):  calcium carbonate 1250 mG  + Vitamin D (OsCal 500 + D) 1 Tablet(s) Oral two times a day  cefTRIAXone   IVPB 1000 milliGRAM(s) IV Intermittent every 24 hours  dextrose 5%. 1000 milliLiter(s) IV Continuous <Continuous>  dextrose 50% Injectable 12.5 Gram(s) IV Push once  dextrose 50% Injectable 25 Gram(s) IV Push once  dextrose 50% Injectable 25 Gram(s) IV Push once  gabapentin 300 milliGRAM(s) Oral at bedtime  insulin lispro (HumaLOG) corrective regimen sliding scale   SubCutaneous three times a day before meals  insulin lispro (HumaLOG) corrective regimen sliding scale   SubCutaneous at bedtime  levETIRAcetam 500 milliGRAM(s) Oral two times a day  lisinopril 5 milliGRAM(s) Oral daily    MEDICATIONS  (PRN):  acetaminophen   Tablet .. 650 milliGRAM(s) Oral every 6 hours PRN  aluminum hydroxide/magnesium hydroxide/simethicone Suspension 30 milliLiter(s) Oral every 4 hours PRN  bisacodyl 5 milliGRAM(s) Oral every 12 hours PRN  dextrose 40% Gel 15 Gram(s) Oral once PRN  glucagon  Injectable 1 milliGRAM(s) IntraMuscular once PRN  oxyCODONE    IR 5 milliGRAM(s) Oral every 6 hours PRN      REVIEW OF SYSTEMS:  CONSTITUTIONAL: No fever  EYES: No eye pain. Photophobia  ENMT: No sinus or throat pain  NECK: mild neck pain  RESPIRATORY: No shortness of breath  CARDIOVASCULAR: No chest pain, dizziness  GASTROINTESTINAL: No abdominal or epigastric pain. No diarrhea or constipation. No melena or hematochezia.  GENITOURINARY: No dysuria, hematuria  NEUROLOGICAL: No headaches  SKIN: No itching, burning, rashes, or lesions   MUSCULOSKELETAL: No muscle or joint pain. B/L LE numbness and tingling.     T(F): 98.1 (20 @ 07:30), Max: 103.3 (20 @ 21:08)  HR: 108 (20 @ 07:30) (99 - 146)  BP: 157/76 (20 @ 07:30) (101/64 - 159/75)  RR: 20 (20 @ 07:30) (20 - 22)  SpO2: 100% (20 @ 07:30) (99% - 100%)  Wt(kg): --  CAPILLARY BLOOD GLUCOSE      POCT Blood Glucose.: 210 mg/dL (26 Aug 2020 11:58)  POCT Blood Glucose.: 187 mg/dL (26 Aug 2020 08:12)  POCT Blood Glucose.: 221 mg/dL (25 Aug 2020 23:45)  POCT Blood Glucose.: 325 mg/dL (25 Aug 2020 20:44)    I&O's Summary    26 Aug 2020 07:01  -  26 Aug 2020 12:50  --------------------------------------------------------  IN: 0 mL / OUT: 3300 mL / NET: -3300 mL        PHYSICAL EXAM:  GENERAL: NAD, well-groomed, well-developed. Appears mildly lethargic and weak.   HEAD:  Atraumatic, Normocephalic  EYES: PERRLA, conjunctiva and sclera clear  ENMT: No tonsillar erythema, exudates, or enlargement; Moist mucous membranes  NECK: Supple. Some tenderness to palpation b/l. Neck stiffness and pain with flexion.   NERVOUS SYSTEM:  Alert & Oriented X3, Good concentration; Motor Strength 5/5 B/L upper and lower extremities. Neck stiffness and pain with neck flexion. Positive Kernigs sign.    CHEST/LUNG: Clear to auscultation bilaterally; No rales, rhonchi, wheezing, or rubs  HEART: Regular rate and rhythm; No murmurs, rubs, or gallops  ABDOMEN: Soft, Nontender, Nondistended; Bowel sounds present. Suprapubic tenderness. Right sided CVA tenderness.   EXTREMITIES:  2+ Peripheral Pulses, No edema  SKIN: No rashes or lesions    LABS:                        9.1    12.48 )-----------( 163      ( 26 Aug 2020 08:20 )             29.7         143  |  106  |  5<L>  ----------------------------<  186<H>  3.8   |  24  |  0.36<L>    Ca    9.4      26 Aug 2020 08:20  Phos  3.2       Mg     1.8         TPro  6.3  /  Alb  3.8  /  TBili  0.5  /  DBili  x   /  AST  26  /  ALT  25  /  AlkPhos  79      PT/INR - ( 25 Aug 2020 21:01 )   PT: 12.8 sec;   INR: 1.08 ratio         PTT - ( 25 Aug 2020 21:01 )  PTT:27.8 sec  Urinalysis Basic - ( 25 Aug 2020 21:14 )    Color: Light Yellow / Appearance: Slightly Turbid / S.025 / pH: x  Gluc: x / Ketone: Negative  / Bili: Negative / Urobili: Negative   Blood: x / Protein: 300 mg/dL / Nitrite: Positive   Leuk Esterase: Trace / RBC: x / WBC x   Sq Epi: x / Non Sq Epi: x / Bacteria: x          RADIOLOGY & ADDITIONAL TESTS:  < from: CT Head No Cont (20 @ 22:39) >  IMPRESSION:    NONCONTRAST HEAD CT SCAN: Redemonstration of left frontal, parietal, and temporal subdural collection, grossly stable from 2020. Stable left-to-right midline shift of approximately 6 mm.    CT ANGIOGRAPHY NECK: Patent anterior and posterior circulations. No significant ICA stenosis as per NASCET criteria.    CT ANGIOGRAPHY BRAIN: Grossly patent anterior and posterior circulations. Mild atherosclerotic narrowing of bilateral cavernous ICAs and mild atherosclerotic narrowing of the distal left vertebral artery at the skull base. Jonathan Matt DO PGY1    CHIEF COMPLAINT: Patient is a 65y old  Female who presents with a chief complaint of sepsis 2/2 UTI (26 Aug 2020 02:02)      INTERVAL HPI/OVERNIGHT EVENTS: No acte event since admission. Patients mental status has improved and is now able to respond to questions via her daughter in law. She is currently afebrile w/o shortness of breath or chest pain. She complains of numbness and tingling sensation in her b/l LE. Gabapentin was held last night but restarted today. Additionally she reports that looking at light is painful.    MEDICATIONS (STANDING):  calcium carbonate 1250 mG  + Vitamin D (OsCal 500 + D) 1 Tablet(s) Oral two times a day  cefTRIAXone   IVPB 1000 milliGRAM(s) IV Intermittent every 24 hours  dextrose 5%. 1000 milliLiter(s) IV Continuous <Continuous>  dextrose 50% Injectable 12.5 Gram(s) IV Push once  dextrose 50% Injectable 25 Gram(s) IV Push once  dextrose 50% Injectable 25 Gram(s) IV Push once  gabapentin 300 milliGRAM(s) Oral at bedtime  insulin lispro (HumaLOG) corrective regimen sliding scale   SubCutaneous three times a day before meals  insulin lispro (HumaLOG) corrective regimen sliding scale   SubCutaneous at bedtime  levETIRAcetam 500 milliGRAM(s) Oral two times a day  lisinopril 5 milliGRAM(s) Oral daily    MEDICATIONS  (PRN):  acetaminophen   Tablet .. 650 milliGRAM(s) Oral every 6 hours PRN  aluminum hydroxide/magnesium hydroxide/simethicone Suspension 30 milliLiter(s) Oral every 4 hours PRN  bisacodyl 5 milliGRAM(s) Oral every 12 hours PRN  dextrose 40% Gel 15 Gram(s) Oral once PRN  glucagon  Injectable 1 milliGRAM(s) IntraMuscular once PRN  oxyCODONE    IR 5 milliGRAM(s) Oral every 6 hours PRN      REVIEW OF SYSTEMS:  CONSTITUTIONAL: No fever  EYES: No eye pain. Photophobia  ENMT: No sinus or throat pain  NECK: mild neck pain  RESPIRATORY: No shortness of breath  CARDIOVASCULAR: No chest pain, dizziness  GASTROINTESTINAL: No abdominal or epigastric pain. No diarrhea or constipation. No melena or hematochezia.  GENITOURINARY: No dysuria, hematuria  NEUROLOGICAL: No headaches  SKIN: No itching, burning, rashes, or lesions   MUSCULOSKELETAL: No muscle or joint pain. B/L LE numbness and tingling.     T(F): 98.1 (20 @ 07:30), Max: 103.3 (20 @ 21:08)  HR: 108 (20 @ 07:30) (99 - 146)  BP: 157/76 (20 @ 07:30) (101/64 - 159/75)  RR: 20 (20 @ 07:30) (20 - 22)  SpO2: 100% (20 @ 07:30) (99% - 100%)  Wt(kg): --  CAPILLARY BLOOD GLUCOSE      POCT Blood Glucose.: 210 mg/dL (26 Aug 2020 11:58)  POCT Blood Glucose.: 187 mg/dL (26 Aug 2020 08:12)  POCT Blood Glucose.: 221 mg/dL (25 Aug 2020 23:45)  POCT Blood Glucose.: 325 mg/dL (25 Aug 2020 20:44)    I&O's Summary    26 Aug 2020 07:01  -  26 Aug 2020 12:50  --------------------------------------------------------  IN: 0 mL / OUT: 3300 mL / NET: -3300 mL        PHYSICAL EXAM:  GENERAL: NAD, well-groomed, well-developed. Appears mildly lethargic and weak.   HEAD:  Atraumatic, Normocephalic  EYES: PERRLA, conjunctiva and sclera clear  ENMT: No tonsillar erythema, exudates, or enlargement; Moist mucous membranes  NECK: Supple. Some tenderness to palpation b/l. Neck stiffness and pain with flexion.   NERVOUS SYSTEM:  Alert & Oriented X3, Good concentration; Motor Strength 5/5 B/L upper and lower extremities. Neck stiffness and pain with neck flexion.   CHEST/LUNG: Clear to auscultation bilaterally; No rales, rhonchi, wheezing, or rubs  HEART: Regular rate and rhythm; No murmurs, rubs, or gallops  ABDOMEN: Soft, Nontender, Nondistended; Bowel sounds present. Suprapubic tenderness. Right sided CVA tenderness.   EXTREMITIES:  2+ Peripheral Pulses, No edema  SKIN: No rashes or lesions    LABS:                        9.1    12.48 )-----------( 163      ( 26 Aug 2020 08:20 )             29.7         143  |  106  |  5<L>  ----------------------------<  186<H>  3.8   |  24  |  0.36<L>    Ca    9.4      26 Aug 2020 08:20  Phos  3.2       Mg     1.8         TPro  6.3  /  Alb  3.8  /  TBili  0.5  /  DBili  x   /  AST    /  ALT  25  /  AlkPhos  79      PT/INR - ( 25 Aug 2020 21:01 )   PT: 12.8 sec;   INR: 1.08 ratio         PTT - ( 25 Aug 2020 21:01 )  PTT:27.8 sec  Urinalysis Basic - ( 25 Aug 2020 21:14 )    Color: Light Yellow / Appearance: Slightly Turbid / S.025 / pH: x  Gluc: x / Ketone: Negative  / Bili: Negative / Urobili: Negative   Blood: x / Protein: 300 mg/dL / Nitrite: Positive   Leuk Esterase: Trace / RBC: x / WBC x   Sq Epi: x / Non Sq Epi: x / Bacteria: x      RADIOLOGY & ADDITIONAL TESTS:  < from: CT Head No Cont (20 @ 22:39) >  IMPRESSION:    NONCONTRAST HEAD CT SCAN: Redemonstration of left frontal, parietal, and temporal subdural collection, grossly stable from 2020. Stable left-to-right midline shift of approximately 6 mm.    CT ANGIOGRAPHY NECK: Patent anterior and posterior circulations. No significant ICA stenosis as per NASCET criteria.    CT ANGIOGRAPHY BRAIN: Grossly patent anterior and posterior circulations. Mild atherosclerotic narrowing of bilateral cavernous ICAs and mild atherosclerotic narrowing of the distal left vertebral artery at the skull base.

## 2020-08-26 NOTE — PROGRESS NOTE ADULT - PROBLEM SELECTOR PLAN 3
-pt minimally responsive upon arrival; workup/mgt of sepsis as documented in ER note with abx/fluids, and now mentation improved to baseline  -CTH/CTA h/n show grossly stable imaging. Neuro exam appears grossly stable.   -at this time suspect sepsis encephalopathy/delirium in setting of sepsis given actuity and rapidity of return to baseline and stable imaging.   -cont to monitor closely with serial exams; if worsening mentation can get stat repeat imaging +/- neurosurgical eval.  Pt already seen in office last week no need for urgent consult overnight.  - Monitor electrolytes

## 2020-08-26 NOTE — H&P ADULT - NSHPSOCIALHISTORY_GEN_ALL_CORE
Social History:    Marital Status:  (   )    (   ) Single    (   )    (  )   Occupation:   Lives with: (  ) alone  (  ) children   (  ) spouse   (  ) parents  (  ) other    Substance Use (street drugs): (  ) never used  (  ) other:  Tobacco Usage:  (   ) never smoked   (   ) former smoker   (   ) current smoker  (     ) pack year  (        ) last cigarette date  Alcohol Usage:  Sexual History: Social History:      Occupation: retired small business owner  Lives with: (  ) alone  ( x ) children   (  ) spouse   (  ) parents  (  ) other    Substance Use (street drugs): (x  ) never used  (  ) other:  Tobacco Usage:  (   x) never smoked   (   ) former smoker   (   ) current smoker  (     ) pack year  (        ) last cigarette date  Alcohol Usage: denies

## 2020-08-26 NOTE — PROGRESS NOTE ADULT - PROBLEM SELECTOR PLAN 2
-fever, tachycardia, elevated lactate  -s/p IVF, downtrending lactate documented  -urinary source, s/p empiric abx as noted  -c/w IV ceftriaxone, f/u pending cultures  -interval exam: warm, well perfused, mentating well, good cap refill <2  -mentation improved this am  - Will pursue MRI w/ Contrast to r/o cranial abscess vs meningitis.  - Positive BCx for gram negative rods.

## 2020-08-26 NOTE — H&P ADULT - PROBLEM SELECTOR PROBLEM 6
Dysphagia, unspecified type Type 2 diabetes mellitus with hyperglycemia, without long-term current use of insulin

## 2020-08-26 NOTE — H&P ADULT - NSHPPHYSICALEXAM_GEN_ALL_CORE
PHYSICAL EXAM:    Vital Signs Last 24 Hrs  T(C): 38.1 (25 Aug 2020 22:13), Max: 39.6 (25 Aug 2020 21:08)  T(F): 100.6 (25 Aug 2020 22:13), Max: 103.3 (25 Aug 2020 21:08)  HR: 115 (26 Aug 2020 01:46) (111 - 146)  BP: 141/65 (26 Aug 2020 01:46) (101/64 - 159/75)  BP(mean): --  RR: 20 (25 Aug 2020 23:46) (20 - 22)  SpO2: 100% (25 Aug 2020 23:46) (99% - 100%)    GENERAL: NAD, well-groomed, well-developed  HEAD:  Atraumatic, Normocephalic  EYES: EOMI, PERRLA, conjunctiva and sclera clear  ENMT: No tonsillar erythema, exudates, or enlargement; Moist mucous membranes, Good dentition, No lesions  NECK: Supple, No JVD, Normal thyroid  CHEST/LUNG: Clear to percussion bilaterally; No rales, rhonchi, wheezing, or rubs  HEART: Regular rate and rhythm; No murmurs, rubs, or gallops  ABDOMEN: Soft, Nontender, Nondistended; Bowel sounds present  EXTREMITIES:  2+ Peripheral Pulses, No clubbing, cyanosis, or edema  LYMPH: No lymphadenopathy noted  SKIN: No rashes or lesions  NERVOUS SYSTEM:  Alert & Oriented X3, Good concentration; Motor Strength 5/5 B/L upper and lower extremities; DTRs 2+ intact and symmetric PHYSICAL EXAM:    Vital Signs Last 24 Hrs  T(C): 38.1 (25 Aug 2020 22:13), Max: 39.6 (25 Aug 2020 21:08)  T(F): 100.6 (25 Aug 2020 22:13), Max: 103.3 (25 Aug 2020 21:08)  HR: 115 (26 Aug 2020 01:46) (111 - 146)  BP: 141/65 (26 Aug 2020 01:46) (101/64 - 159/75)  BP(mean): --  RR: 20 (25 Aug 2020 23:46) (20 - 22)  SpO2: 100% (25 Aug 2020 23:46) (99% - 100%)    GENERAL: NAD, well-groomed, well-developed  HEAD:  post craniotomy scars. no bleeding.  EYES: EOMI, PERRLA, conjunctiva and sclera clear  ENMT: No tonsillar erythema, exudates, or enlargement; Moist mucous membranes, Good dentition, No lesions  NECK: Supple, No JVD, Normal thyroid  CHEST/LUNG: Clear to percussion bilaterally; No rales, rhonchi, wheezing, or rubs no resp distress or accessory muscle usag.e  HEART: Regular rate and rhythm; No murmurs, rubs, or gallops, no sig LE edema.   ABDOMEN: Soft, Nontender, Nondistended; Bowel sounds present, no rebound/guarding.   EXTREMITIES:  2+ Peripheral Pulses, No clubbing, cyanosis  LYMPH: No lymphadenopathy noted, no lymphangitis  SKIN: No rashes or lesions, no petechiae  NERVOUS SYSTEM:  Alert & Oriented X1-2, fair concentration following simple commands, per daughter on my interview pt back to near baseline but prev was more nonresponsive upon arrival; Motor Strength 5/5 B/L upper and lower extremities; sensation grossly intact. Tongue midline. no facial droop.  Psych: no si no hi normal affect.

## 2020-08-26 NOTE — CONSULT NOTE ADULT - ASSESSMENT
65y female with hx DM, HTN, fall, SDH with craniotomy x2, urinary retention had charles since her discharge on 8/13.   Pt unable to offer any c/o, as per daughter she was seen by urology last friday and charles was changed,   Pt now developed fever, lethargy and chills.   Blood cult with GNR.   Her previous urine cult with pansensitive E coli    PLAN:  Cont CTX for now for suspected GNR bacteremia, most likely  focus  f/u senstivity  monitor WBC and fever trend  d/w pt's daughter-in-law at the bedside

## 2020-08-26 NOTE — PATIENT PROFILE ADULT - FALL HARM RISK
coagulation(Bleeding disorder R/T clinical cond/anti-coags)/Unable to assess patient currently intubated, no family at bedside

## 2020-08-27 LAB
ANION GAP SERPL CALC-SCNC: 12 MMOL/L — SIGNIFICANT CHANGE UP (ref 5–17)
BASOPHILS # BLD AUTO: 0.01 K/UL — SIGNIFICANT CHANGE UP (ref 0–0.2)
BASOPHILS NFR BLD AUTO: 0.1 % — SIGNIFICANT CHANGE UP (ref 0–2)
BUN SERPL-MCNC: 8 MG/DL — SIGNIFICANT CHANGE UP (ref 7–23)
CALCIUM SERPL-MCNC: 9.6 MG/DL — SIGNIFICANT CHANGE UP (ref 8.4–10.5)
CHLORIDE SERPL-SCNC: 102 MMOL/L — SIGNIFICANT CHANGE UP (ref 96–108)
CO2 SERPL-SCNC: 27 MMOL/L — SIGNIFICANT CHANGE UP (ref 22–31)
CREAT SERPL-MCNC: 0.39 MG/DL — LOW (ref 0.5–1.3)
EOSINOPHIL # BLD AUTO: 0.03 K/UL — SIGNIFICANT CHANGE UP (ref 0–0.5)
EOSINOPHIL NFR BLD AUTO: 0.4 % — SIGNIFICANT CHANGE UP (ref 0–6)
GLUCOSE SERPL-MCNC: 140 MG/DL — HIGH (ref 70–99)
HCT VFR BLD CALC: 29.6 % — LOW (ref 34.5–45)
HGB BLD-MCNC: 8.9 G/DL — LOW (ref 11.5–15.5)
IMM GRANULOCYTES NFR BLD AUTO: 0.6 % — SIGNIFICANT CHANGE UP (ref 0–1.5)
LACTATE BLDV-MCNC: 2.2 MMOL/L — HIGH (ref 0.7–2)
LYMPHOCYTES # BLD AUTO: 1.31 K/UL — SIGNIFICANT CHANGE UP (ref 1–3.3)
LYMPHOCYTES # BLD AUTO: 18.3 % — SIGNIFICANT CHANGE UP (ref 13–44)
MAGNESIUM SERPL-MCNC: 2.1 MG/DL — SIGNIFICANT CHANGE UP (ref 1.6–2.6)
MCHC RBC-ENTMCNC: 25.9 PG — LOW (ref 27–34)
MCHC RBC-ENTMCNC: 30.1 GM/DL — LOW (ref 32–36)
MCV RBC AUTO: 86 FL — SIGNIFICANT CHANGE UP (ref 80–100)
MONOCYTES # BLD AUTO: 0.58 K/UL — SIGNIFICANT CHANGE UP (ref 0–0.9)
MONOCYTES NFR BLD AUTO: 8.1 % — SIGNIFICANT CHANGE UP (ref 2–14)
NEUTROPHILS # BLD AUTO: 5.19 K/UL — SIGNIFICANT CHANGE UP (ref 1.8–7.4)
NEUTROPHILS NFR BLD AUTO: 72.5 % — SIGNIFICANT CHANGE UP (ref 43–77)
NRBC # BLD: 0 /100 WBCS — SIGNIFICANT CHANGE UP (ref 0–0)
PHOSPHATE SERPL-MCNC: 2.8 MG/DL — SIGNIFICANT CHANGE UP (ref 2.5–4.5)
PLATELET # BLD AUTO: 155 K/UL — SIGNIFICANT CHANGE UP (ref 150–400)
RBC # BLD: 3.44 M/UL — LOW (ref 3.8–5.2)
RBC # FLD: 15.9 % — HIGH (ref 10.3–14.5)
SODIUM SERPL-SCNC: 141 MMOL/L — SIGNIFICANT CHANGE UP (ref 135–145)
WBC # BLD: 7.16 K/UL — SIGNIFICANT CHANGE UP (ref 3.8–10.5)
WBC # FLD AUTO: 7.16 K/UL — SIGNIFICANT CHANGE UP (ref 3.8–10.5)

## 2020-08-27 PROCEDURE — 74177 CT ABD & PELVIS W/CONTRAST: CPT | Mod: 26

## 2020-08-27 PROCEDURE — 99233 SBSQ HOSP IP/OBS HIGH 50: CPT | Mod: GC

## 2020-08-27 RX ORDER — CEFTRIAXONE 500 MG/1
2000 INJECTION, POWDER, FOR SOLUTION INTRAMUSCULAR; INTRAVENOUS ONCE
Refills: 0 | Status: DISCONTINUED | OUTPATIENT
Start: 2020-08-27 | End: 2020-08-27

## 2020-08-27 RX ORDER — POTASSIUM CHLORIDE 20 MEQ
10 PACKET (EA) ORAL
Refills: 0 | Status: COMPLETED | OUTPATIENT
Start: 2020-08-27 | End: 2020-08-27

## 2020-08-27 RX ORDER — CEFTRIAXONE 500 MG/1
2 INJECTION, POWDER, FOR SOLUTION INTRAMUSCULAR; INTRAVENOUS EVERY 24 HOURS
Refills: 0 | Status: DISCONTINUED | OUTPATIENT
Start: 2020-08-28 | End: 2020-08-28

## 2020-08-27 RX ORDER — POTASSIUM CHLORIDE 20 MEQ
20 PACKET (EA) ORAL
Refills: 0 | Status: COMPLETED | OUTPATIENT
Start: 2020-08-27 | End: 2020-08-27

## 2020-08-27 RX ADMIN — LEVETIRACETAM 500 MILLIGRAM(S): 250 TABLET, FILM COATED ORAL at 17:45

## 2020-08-27 RX ADMIN — Medication 100 MILLIEQUIVALENT(S): at 09:08

## 2020-08-27 RX ADMIN — Medication 100 MILLIEQUIVALENT(S): at 10:36

## 2020-08-27 RX ADMIN — Medication 2: at 12:45

## 2020-08-27 RX ADMIN — LEVETIRACETAM 500 MILLIGRAM(S): 250 TABLET, FILM COATED ORAL at 06:16

## 2020-08-27 RX ADMIN — FAMOTIDINE 20 MILLIGRAM(S): 10 INJECTION INTRAVENOUS at 17:45

## 2020-08-27 RX ADMIN — LISINOPRIL 5 MILLIGRAM(S): 2.5 TABLET ORAL at 06:16

## 2020-08-27 RX ADMIN — Medication 1 TABLET(S): at 06:16

## 2020-08-27 RX ADMIN — Medication 20 MILLIEQUIVALENT(S): at 12:13

## 2020-08-27 RX ADMIN — CEFTRIAXONE 100 MILLIGRAM(S): 500 INJECTION, POWDER, FOR SOLUTION INTRAMUSCULAR; INTRAVENOUS at 06:16

## 2020-08-27 RX ADMIN — Medication 20 MILLIEQUIVALENT(S): at 09:08

## 2020-08-27 RX ADMIN — Medication 100 MILLIEQUIVALENT(S): at 12:13

## 2020-08-27 RX ADMIN — Medication 2: at 17:45

## 2020-08-27 RX ADMIN — Medication 1: at 09:08

## 2020-08-27 RX ADMIN — Medication 1 TABLET(S): at 17:45

## 2020-08-27 RX ADMIN — GABAPENTIN 300 MILLIGRAM(S): 400 CAPSULE ORAL at 21:46

## 2020-08-27 NOTE — SWALLOW BEDSIDE ASSESSMENT ADULT - SLP GENERAL OBSERVATIONS
Pt encountered somnolent in bed, able to be aroused given repeated verbal bids at increased loudness. Pt with difficulty keeping eyes open and benefited from multiple redirects. A&Ox3, on room air, +verbally responsive to simple questions, however with minimal output suspected related to current weak and deconditioned state. Vocal quality mildly hyophonic and with reduced loudness. She was brought to an upright position for safe administration of PO trials. Pt encountered somnolent in bed, able to be aroused given repeated verbal bids at increased loudness. Pt is primarily Japanese speaking and benefited from Lexdir  Service (Renny ID#[id number]). Pt with difficulty keeping eyes open and benefited from multiple redirects. A&Ox3, on room air, +verbally responsive to simple questions, however with minimal output suspected related to current weak and deconditioned state. Vocal quality mildly hyophonic and with reduced loudness. She was brought to an upright position for safe administration of PO trials.

## 2020-08-27 NOTE — PROGRESS NOTE ADULT - SUBJECTIVE AND OBJECTIVE BOX
CC: f/u for    Patient reports    REVIEW OF SYSTEMS: no fevers, no chills, no nausea, no vomiting, no abd pain, no resp symptoms  All other review of systems negative (Comprehensive ROS)    Antimicrobials Day #      Other Medications Reviewed    T(F): 98.3 (20 @ 09:46), Max: 98.5 (20 @ 20:50)  HR: 102 (20 @ 09:46)  BP: 149/77 (20 @ 09:46)  RR: 18 (20 @ 09:46)  SpO2: 100% (20 @ 09:46)    PHYSICAL EXAM:    General: non-verbal  Eyes:  anicteric, no conjunctival injection, no discharge  Oropharynx: no lesions or injection 	  Neck: supple, without adenopathy  Lungs: clear to auscultation  Heart: regular rate and rhythm; no murmur, rubs or gallops  Abdomen: soft, nondistended, nontender, without mass or organomegaly  Skin: no lesions  Extremities: no clubbing, cyanosis, or edema  Neurologic: non-verbal    LAB RESULTS:                        8.9    7.16  )-----------( 155      ( 27 Aug 2020 07:09 )             29.6         141  |  102  |  8   ----------------------------<  140<H>  2.9<LL>   |  27  |  0.39<L>    Ca    9.6      27 Aug 2020 07:06  Phos  2.8       Mg     2.1         TPro  6.3  /  Alb  3.8  /  TBili  0.5  /  DBili  x   /  AST  26  /  ALT  25  /  AlkPhos  79  08-    LIVER FUNCTIONS - ( 26 Aug 2020 08:20 )  Alb: 3.8 g/dL / Pro: 6.3 g/dL / ALK PHOS: 79 U/L / ALT: 25 U/L / AST: 26 U/L / GGT: x           Urinalysis Basic - ( 25 Aug 2020 21:14 )    Color: Light Yellow / Appearance: Slightly Turbid / S.025 / pH: x  Gluc: x / Ketone: Negative  / Bili: Negative / Urobili: Negative   Blood: x / Protein: 300 mg/dL / Nitrite: Positive   Leuk Esterase: Trace / RBC: x / WBC x   Sq Epi: x / Non Sq Epi: x / Bacteria: x        MICROBIOLOGY REVIEWED:  Culture - Urine (20 @ 01:26)    Specimen Source: .Urine Clean Catch (Midstream)    Culture Results:   >100,000 CFU/ml Gram Negative Rods    Culture - Blood (20 @ 01:19)    Gram Stain:   Growth in aerobic and anaerobic bottles: Gram Negative Rods    Specimen Source: .Blood Blood-Peripheral    Culture Results:   Growth in aerobic and anaerobic bottles: Gram Negative Rods      RADIOLOGY REVIEWED:

## 2020-08-27 NOTE — SWALLOW BEDSIDE ASSESSMENT ADULT - SWALLOW EVAL: DIAGNOSIS
Pt being seen for bedside swallow evaluation given recent hx SDH w/ evacuation, +redemonstrated L frontal/L parietal/L temporaral subdural collection, grossly stable from 8/18/20, now +UTI +Sepsis. Pt presents with an oral dysphagia marked by mild-moderate reduced lingual strength/coordination, prolonged mastication, and slowed AP transfer. Pharyngeal stage initiation noted to be timely, hyolaryngeal elevation present upon palpaition, with no overt s/s penetration/aspiration.

## 2020-08-27 NOTE — PROGRESS NOTE ADULT - PROBLEM SELECTOR PLAN 2
-fever, tachycardia, elevated lactate on admission. No afebrile and normotensive  -Lactate now clearing at 2.2  -urinary source, s/p empiric abx as noted  -c/w IV ceftriaxone, f/u pending sensitivities  -interval exam: warm, well perfused, mentating well, good cap refill <2  -mentation improved this am  - Will pursue MRI w/ Contrast to r/o cranial abscess vs meningitis. -fever, tachycardia, elevated lactate on admission. No afebrile and normotensive  -Lactate now clearing at 2.2  -urinary source, s/p empiric abx as noted  -c/w IV ceftriaxone, f/u pending sensitivities  -interval exam: warm, well perfused, mentating well, good cap refill <2  -mentation improved this am  -Reapeat BCx tomorrow  - Will pursue MRI w/ Contrast to r/o cranial abscess vs meningitis.

## 2020-08-27 NOTE — PROVIDER CONTACT NOTE (CRITICAL VALUE NOTIFICATION) - ASSESSMENT
Pt A+Ox3, [place] speaking able to make basic needs known in english, denies CP SOB at this time. Pt at rest at time of critical

## 2020-08-27 NOTE — PROGRESS NOTE ADULT - PROBLEM SELECTOR PLAN 4
-c/w charles. Good UOP  -urology f/u, eventual TOV, vs straight cath regimen -Das removed with spontaneous urination 30 min later  -urology f/u, eventual TOV, vs straight cath regimen

## 2020-08-27 NOTE — PROGRESS NOTE ADULT - PROBLEM SELECTOR PLAN 3
-pt minimally responsive upon arrival  -CTH/CTA h/n show grossly stable imaging. Neuro exam appears grossly stable.   -at this time suspect sepsis encephalopathy/delirium in setting of sepsis 2/2 to UTI   -cont to monitor closely with serial exams; if worsening mentation can get stat repeat imaging +/- neurosurgical eval.    - Monitor electrolytes. Replete PRN

## 2020-08-27 NOTE — PROGRESS NOTE ADULT - PROBLEM SELECTOR PLAN 1
-pt noted on prior admission to have 100K pansensitive ecoli, thought at that time to be asymptomatic colonization.  Now presenting with sepsis and encephalopathy  -s/p empiric 1 dose vanco/ceftriaxone in ER. Vanco dc'd. Will continue with Ceftriaxone 1g.   - ID consulted (Dr. Cooper).- UTI likely source of bacteremia.   -R CVA and suprapubic tenderness improved today  -f/u bcx, ucx. Preliminary growth shows gram negative rods in blood.   -c/w charles  - will consider future urology input for chronic urinary retention -pt noted on prior admission to have 100K pansensitive ecoli, thought at that time to be asymptomatic colonization.  Now presenting with sepsis and encephalopathy  -s/p empiric 1 dose vanco/ceftriaxone in ER. Vanco dc'd. Will continue with Ceftriaxone, increased starting tomorrow morn to 2g.   - ID consulted (Dr. Cooper).- UTI likely source of bacteremia.   -R CVA and suprapubic tenderness improved today  -f/u bcx, ucx. Preliminary growth shows gram negative rods in blood.   -Das d/c'd today with passed trial of void  - CT bdomen ordered to evalate for stone vs pyelonephritis -pt noted on prior admission to have 100K pansensitive ecoli, thought at that time to be asymptomatic colonization.  Now presenting with sepsis and encephalopathy  -s/p empiric 1 dose vanco/ceftriaxone in ER. Vanco dc'd. Will continue with Ceftriaxone, increased starting tomorrow morn to 2g.   - ID consulted (Dr. Cooper).- UTI likely source of bacteremia.   -R CVA and suprapubic tenderness improved today  -f/u bcx, ucx. Preliminary growth shows E coli   -Das d/c'd today with passed trial of void  - CT bdomen ordered to evalate for stone vs pyelonephritis  - F/up repeat Blood CX to be done in AM

## 2020-08-27 NOTE — PROGRESS NOTE ADULT - ASSESSMENT
65F PMH, recent fall c/b SDH s/p L craniotomy/evacuation of hematoma x 2, urinary retention s/p chronic charles, T2DM, HTN, dysphagia, p/w fever. 65F PMH, recent fall c/b SDH s/p L craniotomy/evacuation of hematoma x 2, urinary retention s/p chronic charles, T2DM, HTN, dysphagia, p/w fever and is found to have E coli bacteremia with urine as the most likely source.

## 2020-08-27 NOTE — PROGRESS NOTE ADULT - SUBJECTIVE AND OBJECTIVE BOX
Jonathan Matt PGY1    CHIEF COMPLAINT: Patient is a 65y old  Female who presents with a chief complaint of sepsis 2/2 UTI (26 Aug 2020 14:46)      INTERVAL HPI/OVERNIGHT EVENTS: No acute overnight events. Patient has been afebrile for 24h with good urine output.  BCx came back with E. Coli. Will continue with CFX and wait for sensitivities. Potasium was 2.9 this am and subsequently repleted.     MEDICATIONS (STANDING):  calcium carbonate 1250 mG  + Vitamin D (OsCal 500 + D) 1 Tablet(s) Oral two times a day  cefTRIAXone   IVPB 1000 milliGRAM(s) IV Intermittent every 24 hours  dextrose 5%. 1000 milliLiter(s) IV Continuous <Continuous>  dextrose 50% Injectable 12.5 Gram(s) IV Push once  dextrose 50% Injectable 25 Gram(s) IV Push once  dextrose 50% Injectable 25 Gram(s) IV Push once  famotidine Injectable 20 milliGRAM(s) IV Push daily  gabapentin 300 milliGRAM(s) Oral at bedtime  insulin lispro (HumaLOG) corrective regimen sliding scale   SubCutaneous three times a day before meals  insulin lispro (HumaLOG) corrective regimen sliding scale   SubCutaneous at bedtime  levETIRAcetam 500 milliGRAM(s) Oral two times a day  lisinopril 5 milliGRAM(s) Oral daily  potassium chloride    Tablet ER 20 milliEquivalent(s) Oral every 2 hours  potassium chloride  10 mEq/100 mL IVPB 10 milliEquivalent(s) IV Intermittent every 1 hour    MEDICATIONS  (PRN):  acetaminophen   Tablet .. 650 milliGRAM(s) Oral every 6 hours PRN  aluminum hydroxide/magnesium hydroxide/simethicone Suspension 30 milliLiter(s) Oral every 4 hours PRN  bisacodyl 5 milliGRAM(s) Oral every 12 hours PRN  dextrose 40% Gel 15 Gram(s) Oral once PRN  glucagon  Injectable 1 milliGRAM(s) IntraMuscular once PRN  oxyCODONE    IR 5 milliGRAM(s) Oral every 6 hours PRN      REVIEW OF SYSTEMS:  CONSTITUTIONAL: No fever  EYES: No eye pain  ENMT: No sinus or throat pain  NECK: No pain  RESPIRATORY: No shortness of breath  CARDIOVASCULAR: No chest pain, dizziness  GASTROINTESTINAL: No abdominal or epigastric pain. No diarrhea or constipation. No melena or hematochezia.  GENITOURINARY: No dysuria, hematuria  NEUROLOGICAL: No headaches  SKIN: No itching, burning, rashes, or lesions   MUSCULOSKELETAL: No muscle or joint pain    T(F): 97.6 (20 @ 04:59), Max: 98.5 (20 @ 20:50)  HR: 82 (20 @ 06:07) (82 - 107)  BP: 105/65 (20 @ 06:07) (100/62 - 148/78)  RR: 19 (20 @ 04:59) (18 - 20)  SpO2: 98% (20 @ 04:59) (98% - 100%)  Wt(kg): --  CAPILLARY BLOOD GLUCOSE      POCT Blood Glucose.: 193 mg/dL (27 Aug 2020 08:47)  POCT Blood Glucose.: 215 mg/dL (26 Aug 2020 22:06)  POCT Blood Glucose.: 179 mg/dL (26 Aug 2020 17:01)  POCT Blood Glucose.: 210 mg/dL (26 Aug 2020 11:58)    I&O's Summary    26 Aug 2020 07:01  -  27 Aug 2020 07:00  --------------------------------------------------------  IN: 800 mL / OUT: 5600 mL / NET: -4800 mL        PHYSICAL EXAM:  GENERAL: NAD, well-groomed, well-developed  HEAD:  Atraumatic, Normocephalic  EYES: PERRLA, conjunctiva and sclera clear  ENMT: No tonsillar erythema, exudates, or enlargement; Moist mucous membranes  NECK: Supple. Mildly tender to palpation   NERVOUS SYSTEM:  Alert & Oriented X3, Good concentration; Motor Strength 5/5 B/L upper and lower extremities  CHEST/LUNG: CTA b/l; No rales, rhonchi, wheezing, or rubs  HEART: Regular rate and rhythm; No murmurs, rubs, or gallops  ABDOMEN: Soft, Nontender, Nondistended; Bowel sounds present. Mild supra pubic tenderness No CVA tenderness today   EXTREMITIES:  2+ Peripheral Pulses, No edema  SKIN: No rashes or lesions    LABS:                        8.9    7.16  )-----------( 155      ( 27 Aug 2020 07:09 )             29.6     08-27    141  |  102  |  8   ----------------------------<  140<H>  2.9<LL>   |  27  |  0.39<L>    Ca    9.6      27 Aug 2020 07:06  Phos  2.8       Mg     2.1         TPro  6.3  /  Alb  3.8  /  TBili  0.5  /  DBili  x   /  AST  26  /  ALT  25  /  AlkPhos  79  08-26    PT/INR - ( 25 Aug 2020 21:01 )   PT: 12.8 sec;   INR: 1.08 ratio         PTT - ( 25 Aug 2020 21:01 )  PTT:27.8 sec  Urinalysis Basic - ( 25 Aug 2020 21:14 )    Color: Light Yellow / Appearance: Slightly Turbid / S.025 / pH: x  Gluc: x / Ketone: Negative  / Bili: Negative / Urobili: Negative   Blood: x / Protein: 300 mg/dL / Nitrite: Positive   Leuk Esterase: Trace / RBC: x / WBC x   Sq Epi: x / Non Sq Epi: x / Bacteria: x          RADIOLOGY & ADDITIONAL TESTS: Jonathan Matt PGY1    CHIEF COMPLAINT: Patient is a 65y old  Female who presents with a chief complaint of sepsis 2/2 UTI (26 Aug 2020 14:46)      INTERVAL HPI/OVERNIGHT EVENTS: No acute overnight events. Patient has been afebrile for 24h with good urine output.  BCx came back with E. Coli. Will continue with CFX and wait for sensitivities. Potasium was 2.9 this am and subsequently repleted.     MEDICATIONS (STANDING):  calcium carbonate 1250 mG  + Vitamin D (OsCal 500 + D) 1 Tablet(s) Oral two times a day  cefTRIAXone   IVPB 1000 milliGRAM(s) IV Intermittent every 24 hours  dextrose 5%. 1000 milliLiter(s) IV Continuous <Continuous>  dextrose 50% Injectable 12.5 Gram(s) IV Push once  dextrose 50% Injectable 25 Gram(s) IV Push once  dextrose 50% Injectable 25 Gram(s) IV Push once  famotidine Injectable 20 milliGRAM(s) IV Push daily  gabapentin 300 milliGRAM(s) Oral at bedtime  insulin lispro (HumaLOG) corrective regimen sliding scale   SubCutaneous three times a day before meals  insulin lispro (HumaLOG) corrective regimen sliding scale   SubCutaneous at bedtime  levETIRAcetam 500 milliGRAM(s) Oral two times a day  lisinopril 5 milliGRAM(s) Oral daily  potassium chloride    Tablet ER 20 milliEquivalent(s) Oral every 2 hours  potassium chloride  10 mEq/100 mL IVPB 10 milliEquivalent(s) IV Intermittent every 1 hour    MEDICATIONS  (PRN):  acetaminophen   Tablet .. 650 milliGRAM(s) Oral every 6 hours PRN  aluminum hydroxide/magnesium hydroxide/simethicone Suspension 30 milliLiter(s) Oral every 4 hours PRN  bisacodyl 5 milliGRAM(s) Oral every 12 hours PRN  dextrose 40% Gel 15 Gram(s) Oral once PRN  glucagon  Injectable 1 milliGRAM(s) IntraMuscular once PRN  oxyCODONE    IR 5 milliGRAM(s) Oral every 6 hours PRN      REVIEW OF SYSTEMS:  CONSTITUTIONAL: No fever  EYES: No eye pain  ENMT: No sinus or throat pain  NECK: No pain  RESPIRATORY: No shortness of breath  CARDIOVASCULAR: No chest pain, dizziness  GASTROINTESTINAL: No abdominal or epigastric pain. No diarrhea or constipation. No melena or hematochezia.  GENITOURINARY: No dysuria, hematuria  NEUROLOGICAL: No headaches  SKIN: No itching, burning, rashes, or lesions   MUSCULOSKELETAL: No muscle or joint pain    T(F): 97.6 (20 @ 04:59), Max: 98.5 (20 @ 20:50)  HR: 82 (20 @ 06:07) (82 - 107)  BP: 105/65 (20 @ 06:07) (100/62 - 148/78)  RR: 19 (20 @ 04:59) (18 - 20)  SpO2: 98% (20 @ 04:59) (98% - 100%)  Wt(kg): --  CAPILLARY BLOOD GLUCOSE      POCT Blood Glucose.: 193 mg/dL (27 Aug 2020 08:47)  POCT Blood Glucose.: 215 mg/dL (26 Aug 2020 22:06)  POCT Blood Glucose.: 179 mg/dL (26 Aug 2020 17:01)  POCT Blood Glucose.: 210 mg/dL (26 Aug 2020 11:58)    I&O's Summary    26 Aug 2020 07:01  -  27 Aug 2020 07:00  --------------------------------------------------------  IN: 800 mL / OUT: 5600 mL / NET: -4800 mL        PHYSICAL EXAM:  GENERAL: NAD, well-groomed, well-developed  HEAD:  Atraumatic, Normocephalic  EYES: PERRLA, conjunctiva and sclera clear  ENMT: No tonsillar erythema, exudates, or enlargement; Moist mucous membranes  NECK: Supple. Mildly tender to palpation   NERVOUS SYSTEM:  Alert & Oriented X2, Good concentration; Motor Strength 5/5 B/L upper and lower extremities  CHEST/LUNG: CTA b/l; No rales, rhonchi, wheezing, or rubs  HEART: Regular rate and rhythm; No murmurs, rubs, or gallops  ABDOMEN: Soft, Nontender, Nondistended; Bowel sounds present. Mild supra pubic tenderness No CVA tenderness today   EXTREMITIES:  2+ Peripheral Pulses, No edema  SKIN: No rashes or lesions    LABS:                        8.9    7.16  )-----------( 155      ( 27 Aug 2020 07:09 )             29.6     08-27    141  |  102  |  8   ----------------------------<  140<H>  2.9<LL>   |  27  |  0.39<L>    Ca    9.6      27 Aug 2020 07:06  Phos  2.8       Mg     2.1         TPro  6.3  /  Alb  3.8  /  TBili  0.5  /  DBili  x   /  AST  26  /  ALT  25  /  AlkPhos  79  08-26    PT/INR - ( 25 Aug 2020 21:01 )   PT: 12.8 sec;   INR: 1.08 ratio         PTT - ( 25 Aug 2020 21:01 )  PTT:27.8 sec  Urinalysis Basic - ( 25 Aug 2020 21:14 )    Color: Light Yellow / Appearance: Slightly Turbid / S.025 / pH: x  Gluc: x / Ketone: Negative  / Bili: Negative / Urobili: Negative   Blood: x / Protein: 300 mg/dL / Nitrite: Positive   Leuk Esterase: Trace / RBC: x / WBC x   Sq Epi: x / Non Sq Epi: x / Bacteria: x          RADIOLOGY & ADDITIONAL TESTS:

## 2020-08-27 NOTE — PROGRESS NOTE ADULT - ASSESSMENT
65y female with hx DM, HTN, fall, SDH with craniotomy x2, urinary retention had charles since her discharge on 8/13.   Pt unable to offer any c/o, as per daughter she was seen by urology last friday and charles was changed,   Pt now developed fever, lethargy and chills.   Blood cult with GNR.   Her previous urine cult with pansensitive E coli    PLAN:  Cont CTX for now for suspected GNR bacteremia, most likely  focus  f/u senstivity  monitor WBC and fever trend  consider urology input, charles maintained for now  repeat blood cult to ensure clearance of bacteremia

## 2020-08-27 NOTE — SWALLOW BEDSIDE ASSESSMENT ADULT - COMMENTS
Per H&P 8/26, "Taken to OR for L crani/SDH evacuation 7/26. Poor response, neurologically continued to deteriorate with increase in L pupillary size, interval CTH unchanged.  Given potential for further seizure risk and ongoing MLS, pt taken was RTOR for repeat L crani/exploration, then s/p mild recurrent SDH/clot evacuation, with bone flap replaced on 7/31. Course c/b persistent urinary retention, evaluated by urology and started on flomax and hadchronic charles placed for discharge. PT/PMR rec Acute rehab, family declined and took her home instead.     Since discharge, pt was seen twice in the ER.  First time was for pleuritic chest pain on 8/15.  Presumed gastritis, started on ppi.  Workup only revealed thrombocytopenia to 100; ER advised pt to stop prior Rx'd protonix and was sent home for pcp f/u. In interim, pt has followed with her neurosurgeon. Per DIL, pt today was noted to be febrile, and with decreased responsiveness, not following commands like she ordinarily did (prev could stick tongue out when asked, was not doing it today). +ongoing mild HA related to prior craniotomies.  Onset of this deterioration was since around 7:30 pm.  Denies c/o cp, sob, n/v/d, decreased/increased urine production in charles, hematuria, abd pain, back pain, visual changes. Pt tolerating her dysphagia diet without choking episodes. No new rash, joint pain."    MBS 8/10/20 during previous admission with recommendations for Puree diet with all liquids provided via single small sip/no straw. Per H&P 8/26, "Taken to OR for L crani/SDH evacuation 7/26. Poor response, neurologically continued to deteriorate with increase in L pupillary size, interval CTH unchanged.  Given potential for further seizure risk and ongoing MLS, pt taken was RTOR for repeat L crani/exploration, then s/p mild recurrent SDH/clot evacuation, with bone flap replaced on 7/31. Course c/b persistent urinary retention, evaluated by urology and started on flomax and hadchronic charles placed for discharge. PT/PMR rec Acute rehab, family declined and took her home instead.     Since discharge, pt was seen twice in the ER.  First time was for pleuritic chest pain on 8/15.  Presumed gastritis, started on ppi.  Workup only revealed thrombocytopenia to 100; ER advised pt to stop prior Rx'd protonix and was sent home for pcp f/u. In interim, pt has followed with her neurosurgeon. Per DIL, pt today was noted to be febrile, and with decreased responsiveness, not following commands like she ordinarily did (prev could stick tongue out when asked, was not doing it today). +ongoing mild HA related to prior craniotomies.  Onset of this deterioration was since around 7:30 pm.  Denies c/o cp, sob, n/v/d, decreased/increased urine production in charles, hematuria, abd pain, back pain, visual changes. Pt tolerating her dysphagia diet without choking episodes. No new rash, joint pain."    CXR prelim clear lungs. CTH/CTA h/n: +redemonstrated L frontal/L parietal/L temporaral subdural collection, grossly stable from 8/18/20.     MBS 8/10/20 during previous admission with recommendations for Puree diet with all liquids provided via single small sip/no straw.

## 2020-08-27 NOTE — SWALLOW BEDSIDE ASSESSMENT ADULT - SLP PERTINENT HISTORY OF CURRENT PROBLEM
Per charting pt is a 65F PMH, recent fall c/b SDH s/p L craniotomy/evacuation of hematoma x 2, urinary retention s/p chronic charles, T2DM, HTN, dysphagia, p/w fever. Pt previously admitted 7/25 - 8/14/20 for mechanical fall/emesis, rapid neurologic deterioration, intubated for airway protection, and eventually code stroke was called and pt was dx with Large L SDH with MLS.

## 2020-08-27 NOTE — PROGRESS NOTE ADULT - PROBLEM SELECTOR PLAN 5
-CTH/CTA h/n show grossly stable imaging. Neuro exam appears grossly stable.   -at this time suspect sepsis encephalopathy/delirium in setting of sepsis given acuity and rapidity of return to baseline and stable imaging.   -cont to monitor closely with serial exams; if worsening mentation can get stat repeat imaging +/- neurosurgical eval.   -start SCD, no pharm vte ppx.  -c/w keppra  -c/w oxy 5 q6 for pain prn.  - MRI as per neurosurgery to r/o cranial source -CTH/CTA h/n show grossly stable imaging. Neuro exam appears grossly stable.   -at this time suspect sepsis encephalopathy/delirium in setting of sepsis given acuity and rapidity of return to baseline and stable imaging.   -cont to monitor closely with serial exams; if worsening mentation can get stat repeat imaging +/- neurosurgical eval.   -start SCD, no pharm vte ppx.  -c/w keppra  -c/w oxy 5 q6 for pain prn.  - MRI no longer needed to r/o source

## 2020-08-28 LAB
-  AMIKACIN: SIGNIFICANT CHANGE UP
-  AMIKACIN: SIGNIFICANT CHANGE UP
-  AMOXICILLIN/CLAVULANIC ACID: SIGNIFICANT CHANGE UP
-  AMPICILLIN/SULBACTAM: SIGNIFICANT CHANGE UP
-  AMPICILLIN/SULBACTAM: SIGNIFICANT CHANGE UP
-  AMPICILLIN: SIGNIFICANT CHANGE UP
-  AMPICILLIN: SIGNIFICANT CHANGE UP
-  AZTREONAM: SIGNIFICANT CHANGE UP
-  AZTREONAM: SIGNIFICANT CHANGE UP
-  CEFAZOLIN: SIGNIFICANT CHANGE UP
-  CEFAZOLIN: SIGNIFICANT CHANGE UP
-  CEFEPIME: SIGNIFICANT CHANGE UP
-  CEFEPIME: SIGNIFICANT CHANGE UP
-  CEFOXITIN: SIGNIFICANT CHANGE UP
-  CEFOXITIN: SIGNIFICANT CHANGE UP
-  CEFTRIAXONE: SIGNIFICANT CHANGE UP
-  CEFTRIAXONE: SIGNIFICANT CHANGE UP
-  CIPROFLOXACIN: SIGNIFICANT CHANGE UP
-  CIPROFLOXACIN: SIGNIFICANT CHANGE UP
-  ERTAPENEM: SIGNIFICANT CHANGE UP
-  ERTAPENEM: SIGNIFICANT CHANGE UP
-  GENTAMICIN: SIGNIFICANT CHANGE UP
-  GENTAMICIN: SIGNIFICANT CHANGE UP
-  IMIPENEM: SIGNIFICANT CHANGE UP
-  IMIPENEM: SIGNIFICANT CHANGE UP
-  LEVOFLOXACIN: SIGNIFICANT CHANGE UP
-  LEVOFLOXACIN: SIGNIFICANT CHANGE UP
-  MEROPENEM: SIGNIFICANT CHANGE UP
-  MEROPENEM: SIGNIFICANT CHANGE UP
-  NITROFURANTOIN: SIGNIFICANT CHANGE UP
-  PIPERACILLIN/TAZOBACTAM: SIGNIFICANT CHANGE UP
-  PIPERACILLIN/TAZOBACTAM: SIGNIFICANT CHANGE UP
-  TIGECYCLINE: SIGNIFICANT CHANGE UP
-  TOBRAMYCIN: SIGNIFICANT CHANGE UP
-  TOBRAMYCIN: SIGNIFICANT CHANGE UP
-  TRIMETHOPRIM/SULFAMETHOXAZOLE: SIGNIFICANT CHANGE UP
-  TRIMETHOPRIM/SULFAMETHOXAZOLE: SIGNIFICANT CHANGE UP
ANION GAP SERPL CALC-SCNC: 10 MMOL/L — SIGNIFICANT CHANGE UP (ref 5–17)
BASOPHILS # BLD AUTO: 0.01 K/UL — SIGNIFICANT CHANGE UP (ref 0–0.2)
BASOPHILS NFR BLD AUTO: 0.2 % — SIGNIFICANT CHANGE UP (ref 0–2)
BUN SERPL-MCNC: 10 MG/DL — SIGNIFICANT CHANGE UP (ref 7–23)
CALCIUM SERPL-MCNC: 9.4 MG/DL — SIGNIFICANT CHANGE UP (ref 8.4–10.5)
CHLORIDE SERPL-SCNC: 101 MMOL/L — SIGNIFICANT CHANGE UP (ref 96–108)
CK MB CFR SERPL CALC: <1 NG/ML — SIGNIFICANT CHANGE UP (ref 0–3.8)
CK SERPL-CCNC: 18 U/L — LOW (ref 25–170)
CO2 SERPL-SCNC: 28 MMOL/L — SIGNIFICANT CHANGE UP (ref 22–31)
CREAT SERPL-MCNC: 0.41 MG/DL — LOW (ref 0.5–1.3)
CULTURE RESULTS: SIGNIFICANT CHANGE UP
CULTURE RESULTS: SIGNIFICANT CHANGE UP
EOSINOPHIL # BLD AUTO: 0.01 K/UL — SIGNIFICANT CHANGE UP (ref 0–0.5)
EOSINOPHIL NFR BLD AUTO: 0.2 % — SIGNIFICANT CHANGE UP (ref 0–6)
GLUCOSE SERPL-MCNC: 202 MG/DL — HIGH (ref 70–99)
HCT VFR BLD CALC: 28.7 % — LOW (ref 34.5–45)
HCT VFR BLD CALC: 29.4 % — LOW (ref 34.5–45)
HGB BLD-MCNC: 8.7 G/DL — LOW (ref 11.5–15.5)
HGB BLD-MCNC: 9.2 G/DL — LOW (ref 11.5–15.5)
IMM GRANULOCYTES NFR BLD AUTO: 0.5 % — SIGNIFICANT CHANGE UP (ref 0–1.5)
LACTATE BLDV-MCNC: 1.4 MMOL/L — SIGNIFICANT CHANGE UP (ref 0.7–2)
LEVETIRACETAM SERPL-MCNC: 24.4 MCG/ML — SIGNIFICANT CHANGE UP (ref 12–46)
LYMPHOCYTES # BLD AUTO: 0.91 K/UL — LOW (ref 1–3.3)
LYMPHOCYTES # BLD AUTO: 16.5 % — SIGNIFICANT CHANGE UP (ref 13–44)
MAGNESIUM SERPL-MCNC: 2.2 MG/DL — SIGNIFICANT CHANGE UP (ref 1.6–2.6)
MCHC RBC-ENTMCNC: 25.7 PG — LOW (ref 27–34)
MCHC RBC-ENTMCNC: 25.8 PG — LOW (ref 27–34)
MCHC RBC-ENTMCNC: 30.3 GM/DL — LOW (ref 32–36)
MCHC RBC-ENTMCNC: 31.3 GM/DL — LOW (ref 32–36)
MCV RBC AUTO: 82.4 FL — SIGNIFICANT CHANGE UP (ref 80–100)
MCV RBC AUTO: 84.9 FL — SIGNIFICANT CHANGE UP (ref 80–100)
METHOD TYPE: SIGNIFICANT CHANGE UP
METHOD TYPE: SIGNIFICANT CHANGE UP
MONOCYTES # BLD AUTO: 0.56 K/UL — SIGNIFICANT CHANGE UP (ref 0–0.9)
MONOCYTES NFR BLD AUTO: 10.2 % — SIGNIFICANT CHANGE UP (ref 2–14)
NEUTROPHILS # BLD AUTO: 3.98 K/UL — SIGNIFICANT CHANGE UP (ref 1.8–7.4)
NEUTROPHILS NFR BLD AUTO: 72.4 % — SIGNIFICANT CHANGE UP (ref 43–77)
NRBC # BLD: 0 /100 WBCS — SIGNIFICANT CHANGE UP (ref 0–0)
NRBC # BLD: 0 /100 WBCS — SIGNIFICANT CHANGE UP (ref 0–0)
NT-PROBNP SERPL-SCNC: 663 PG/ML — HIGH (ref 0–300)
ORGANISM # SPEC MICROSCOPIC CNT: SIGNIFICANT CHANGE UP
PHOSPHATE SERPL-MCNC: 3.2 MG/DL — SIGNIFICANT CHANGE UP (ref 2.5–4.5)
PLATELET # BLD AUTO: 180 K/UL — SIGNIFICANT CHANGE UP (ref 150–400)
PLATELET # BLD AUTO: 199 K/UL — SIGNIFICANT CHANGE UP (ref 150–400)
POTASSIUM SERPL-MCNC: 3.8 MMOL/L — SIGNIFICANT CHANGE UP (ref 3.5–5.3)
POTASSIUM SERPL-SCNC: 3.8 MMOL/L — SIGNIFICANT CHANGE UP (ref 3.5–5.3)
RBC # BLD: 3.38 M/UL — LOW (ref 3.8–5.2)
RBC # BLD: 3.57 M/UL — LOW (ref 3.8–5.2)
RBC # FLD: 15.1 % — HIGH (ref 10.3–14.5)
RBC # FLD: 15.3 % — HIGH (ref 10.3–14.5)
SODIUM SERPL-SCNC: 139 MMOL/L — SIGNIFICANT CHANGE UP (ref 135–145)
SPECIMEN SOURCE: SIGNIFICANT CHANGE UP
SPECIMEN SOURCE: SIGNIFICANT CHANGE UP
TROPONIN T, HIGH SENSITIVITY RESULT: 14 NG/L — SIGNIFICANT CHANGE UP (ref 0–51)
WBC # BLD: 5.5 K/UL — SIGNIFICANT CHANGE UP (ref 3.8–10.5)
WBC # BLD: 5.95 K/UL — SIGNIFICANT CHANGE UP (ref 3.8–10.5)
WBC # FLD AUTO: 5.5 K/UL — SIGNIFICANT CHANGE UP (ref 3.8–10.5)
WBC # FLD AUTO: 5.95 K/UL — SIGNIFICANT CHANGE UP (ref 3.8–10.5)

## 2020-08-28 PROCEDURE — 70450 CT HEAD/BRAIN W/O DYE: CPT | Mod: 26

## 2020-08-28 PROCEDURE — 71045 X-RAY EXAM CHEST 1 VIEW: CPT | Mod: 26

## 2020-08-28 PROCEDURE — 99233 SBSQ HOSP IP/OBS HIGH 50: CPT | Mod: GC

## 2020-08-28 PROCEDURE — 93010 ELECTROCARDIOGRAM REPORT: CPT

## 2020-08-28 PROCEDURE — 74019 RADEX ABDOMEN 2 VIEWS: CPT | Mod: 26

## 2020-08-28 RX ORDER — POLYETHYLENE GLYCOL 3350 17 G/17G
17 POWDER, FOR SOLUTION ORAL DAILY
Refills: 0 | Status: DISCONTINUED | OUTPATIENT
Start: 2020-08-28 | End: 2020-08-31

## 2020-08-28 RX ORDER — LABETALOL HCL 100 MG
5 TABLET ORAL ONCE
Refills: 0 | Status: COMPLETED | OUTPATIENT
Start: 2020-08-28 | End: 2020-08-28

## 2020-08-28 RX ORDER — SENNA PLUS 8.6 MG/1
2 TABLET ORAL AT BEDTIME
Refills: 0 | Status: DISCONTINUED | OUTPATIENT
Start: 2020-08-28 | End: 2020-08-31

## 2020-08-28 RX ORDER — INSULIN GLARGINE 100 [IU]/ML
5 INJECTION, SOLUTION SUBCUTANEOUS AT BEDTIME
Refills: 0 | Status: DISCONTINUED | OUTPATIENT
Start: 2020-08-28 | End: 2020-08-29

## 2020-08-28 RX ORDER — CEFTRIAXONE 500 MG/1
1000 INJECTION, POWDER, FOR SOLUTION INTRAMUSCULAR; INTRAVENOUS EVERY 24 HOURS
Refills: 0 | Status: DISCONTINUED | OUTPATIENT
Start: 2020-08-28 | End: 2020-08-30

## 2020-08-28 RX ORDER — CEFTRIAXONE 500 MG/1
2000 INJECTION, POWDER, FOR SOLUTION INTRAMUSCULAR; INTRAVENOUS ONCE
Refills: 0 | Status: DISCONTINUED | OUTPATIENT
Start: 2020-08-28 | End: 2020-08-28

## 2020-08-28 RX ADMIN — Medication 2: at 12:58

## 2020-08-28 RX ADMIN — Medication 5 MILLIGRAM(S): at 17:55

## 2020-08-28 RX ADMIN — SENNA PLUS 2 TABLET(S): 8.6 TABLET ORAL at 21:48

## 2020-08-28 RX ADMIN — Medication 2: at 09:08

## 2020-08-28 RX ADMIN — INSULIN GLARGINE 5 UNIT(S): 100 INJECTION, SOLUTION SUBCUTANEOUS at 21:48

## 2020-08-28 RX ADMIN — Medication 650 MILLIGRAM(S): at 16:58

## 2020-08-28 RX ADMIN — LEVETIRACETAM 500 MILLIGRAM(S): 250 TABLET, FILM COATED ORAL at 16:58

## 2020-08-28 RX ADMIN — Medication 1 TABLET(S): at 05:15

## 2020-08-28 RX ADMIN — GABAPENTIN 300 MILLIGRAM(S): 400 CAPSULE ORAL at 21:48

## 2020-08-28 RX ADMIN — Medication 1 TABLET(S): at 16:58

## 2020-08-28 RX ADMIN — Medication 2: at 17:55

## 2020-08-28 RX ADMIN — CEFTRIAXONE 100 MILLIGRAM(S): 500 INJECTION, POWDER, FOR SOLUTION INTRAMUSCULAR; INTRAVENOUS at 06:24

## 2020-08-28 RX ADMIN — LEVETIRACETAM 500 MILLIGRAM(S): 250 TABLET, FILM COATED ORAL at 05:15

## 2020-08-28 RX ADMIN — FAMOTIDINE 20 MILLIGRAM(S): 10 INJECTION INTRAVENOUS at 14:36

## 2020-08-28 RX ADMIN — LISINOPRIL 5 MILLIGRAM(S): 2.5 TABLET ORAL at 05:15

## 2020-08-28 RX ADMIN — Medication 650 MILLIGRAM(S): at 17:56

## 2020-08-28 NOTE — PROGRESS NOTE ADULT - PROBLEM SELECTOR PLAN 3
-pt minimally responsive upon arrival  -CTH/CTA h/n show grossly stable imaging. Neuro exam appears grossly stable.   -at this time suspect sepsis encephalopathy/delirium in setting of sepsis 2/2 to UTI   -cont to monitor closely with serial exams; if worsening mentation can get stat repeat imaging +/- neurosurgical eval.    - Monitor electrolytes. Replete PRN -pt minimally responsive upon arrival  -CTH/CTA h/n show grossly stable imaging. Neuro exam appears grossly stable.   -at this time suspect sepsis encephalopathy/delirium in setting of sepsis 2/2 to UTI   -cont to monitor closely with serial exams; if worsening mentation can get stat repeat imaging +/- neurosurgical eval.    - Monitor electrolytes. Replete PRN  - Improved mentation

## 2020-08-28 NOTE — PROGRESS NOTE ADULT - SUBJECTIVE AND OBJECTIVE BOX
Cam Matt, PGY-1    CHIEF COMPLAINT: Patient is a 65y old  Female who presents with a chief complaint of sepsis 2/2 UTI (27 Aug 2020 12:17)      INTERVAL HPI/OVERNIGHT EVENTS: No acute overnight events. Patient is now voiding spontaneously to a prima fit. Urine culture and blood culture both growing E. coli sensitive to Ceftriaxone. Repeat culture done this morning. CT showed right sided pyelonephritis/cystitis w/o evidence of stones.      MEDICATIONS (STANDING):  calcium carbonate 1250 mG  + Vitamin D (OsCal 500 + D) 1 Tablet(s) Oral two times a day  dextrose 5%. 1000 milliLiter(s) IV Continuous <Continuous>  dextrose 50% Injectable 12.5 Gram(s) IV Push once  dextrose 50% Injectable 25 Gram(s) IV Push once  dextrose 50% Injectable 25 Gram(s) IV Push once  famotidine Injectable 20 milliGRAM(s) IV Push daily  gabapentin 300 milliGRAM(s) Oral at bedtime  insulin lispro (HumaLOG) corrective regimen sliding scale   SubCutaneous three times a day before meals  insulin lispro (HumaLOG) corrective regimen sliding scale   SubCutaneous at bedtime  levETIRAcetam 500 milliGRAM(s) Oral two times a day  lisinopril 5 milliGRAM(s) Oral daily    MEDICATIONS  (PRN):  acetaminophen   Tablet .. 650 milliGRAM(s) Oral every 6 hours PRN  aluminum hydroxide/magnesium hydroxide/simethicone Suspension 30 milliLiter(s) Oral every 4 hours PRN  bisacodyl 5 milliGRAM(s) Oral every 12 hours PRN  dextrose 40% Gel 15 Gram(s) Oral once PRN  glucagon  Injectable 1 milliGRAM(s) IntraMuscular once PRN  oxyCODONE    IR 5 milliGRAM(s) Oral every 6 hours PRN      REVIEW OF SYSTEMS:  CONSTITUTIONAL: No fever  EYES: No eye pain  ENMT: No sinus or throat pain  NECK: No pain  RESPIRATORY: No shortness of breath  CARDIOVASCULAR: No chest pain, dizziness  GASTROINTESTINAL: No abdominal or epigastric pain. No diarrhea or constipation. No melena or hematochezia.  GENITOURINARY: No dysuria, hematuria  NEUROLOGICAL: No headaches  SKIN: No itching, burning, rashes, or lesions   MUSCULOSKELETAL: No muscle or joint pain    T(F): 98.5 (08-28-20 @ 04:58), Max: 99.3 (08-27-20 @ 20:02)  HR: 104 (08-28-20 @ 04:58) (88 - 104)  BP: 150/84 (08-28-20 @ 04:58) (146/80 - 150/84)  RR: 18 (08-28-20 @ 04:58) (18 - 18)  SpO2: 100% (08-28-20 @ 04:58) (99% - 100%)  Wt(kg): --  CAPILLARY BLOOD GLUCOSE      POCT Blood Glucose.: 203 mg/dL (28 Aug 2020 08:24)  POCT Blood Glucose.: 187 mg/dL (27 Aug 2020 21:55)  POCT Blood Glucose.: 186 mg/dL (27 Aug 2020 21:34)  POCT Blood Glucose.: 206 mg/dL (27 Aug 2020 17:01)  POCT Blood Glucose.: 212 mg/dL (27 Aug 2020 12:20)    I&O's Summary    27 Aug 2020 07:01  -  28 Aug 2020 07:00  --------------------------------------------------------  IN: 720 mL / OUT: 4100 mL / NET: -3380 mL        PHYSICAL EXAM:  GENERAL: NAD, well-groomed, well-developed  HEAD:  Atraumatic, Normocephalic  EYES: PERRLA, conjunctiva and sclera clear  ENMT: No tonsillar erythema, exudates, or enlargement; Moist mucous membranes  Neck: Supple  NERVOUS SYSTEM:  Alert & Oriented X3, Good concentration; Motor Strength 5/5 B/L upper and lower extremities  CHEST/LUNG: Clear to auscultation bilaterally; No rales, rhonchi, wheezing, or rubs  HEART: Regular rate and rhythm; No murmurs, rubs, or gallops  ABDOMEN: Soft, Nontender, Nondistended; Bowel sounds present. Less CVA tenderness that preceding two exams. No suprapubic tenderness.   EXTREMITIES:  2+ Peripheral Pulses, No edema  SKIN: No rashes or lesions    LABS:                        8.7    5.50  )-----------( 180      ( 28 Aug 2020 07:45 )             28.7     08-28    139  |  101  |  10  ----------------------------<  202<H>  3.8   |  28  |  0.41<L>    Ca    9.4      28 Aug 2020 07:48  Phos  3.2     08-28  Mg     2.2     08-28              RADIOLOGY & ADDITIONAL TESTS:  < from: CT Abdomen and Pelvis w/ IV Cont (08.27.20 @ 23:03) >  IMPRESSION:    Findings compatible with right pyelonephritis/cystitis. No perinephric abscess.

## 2020-08-28 NOTE — PROGRESS NOTE ADULT - PROBLEM SELECTOR PLAN 5
-CTH/CTA h/n show grossly stable imaging. Neuro exam appears grossly stable.   -at this time suspect sepsis encephalopathy/delirium in setting of sepsis given acuity and rapidity of return to baseline and stable imaging.   -cont to monitor closely with serial exams; if worsening mentation can get stat repeat imaging +/- neurosurgical eval.   -start SCD, no pharm vte ppx.  -c/w keppra  -c/w oxy 5 q6 for pain prn.  - MRI no longer needed to r/o source

## 2020-08-28 NOTE — PROGRESS NOTE ADULT - ASSESSMENT
65F PMH, recent fall c/b SDH s/p L craniotomy/evacuation of hematoma x 2, urinary retention s/p chronic charles, T2DM, HTN, dysphagia, p/w fever and is found to have E coli bacteremia with urine as the most likely source.

## 2020-08-28 NOTE — CHART NOTE - NSCHARTNOTEFT_GEN_A_CORE
6pm 8/28- patient was febrile to 100, hypertensive to 180s/80s, tachy to 110, SP02 100, shortness of breath and headache.  CXR, EKG, CTH, CBC, Trops, CKMB, CK, ordered  EKG and CXR normal.  Patient responsive to 5 Labetalol IV to /79, HR 94, SP02 @100, Temp98.4F. Mentating and perfusing well.     If repeat episode, evaluate patient, consider RRT  - r/o PE vs brain bleed vs  symptomatic bacteremia   - bladder scan indicated patient is not retaining   - consider Labetalol vs Hydralazine  - as per neurosurg, pt bleed history is subdural. Ok to lower BP as needed.

## 2020-08-28 NOTE — PROGRESS NOTE ADULT - PROBLEM SELECTOR PLAN 7
c/w dysphagia 1, pureed thin liquid diet c/w dysphagia 1, pureed thin liquid diet  f/up speech and swallow report

## 2020-08-28 NOTE — PROGRESS NOTE ADULT - PROBLEM SELECTOR PLAN 6
-s/p 5u Regular insulin in ER, downtrending FSG.   -Hold oral hypoglycemics  -Start HISS, check fsg qac/qhs  -c/w gabapentin  -consistent carb diet -s/p 5u Regular insulin in ER, downtrending FSG.   -Hold oral hypoglycemics  -Start HISS, check fsg qac/qhs  - A1c = 7.2 in 7/2020  -c/w gabapentin  -consistent carb diet  Start Lantus at 5 units at night and cont slidding scale for now

## 2020-08-28 NOTE — PHARMACOTHERAPY INTERVENTION NOTE - COMMENTS
LARRYLETICIA PRIYANKA, 65y Female with E. coli bacteremia, 2/2  source per team/ID, on ceftriaxone, was ordered for 2 milligrams IV Q24H.    Recommendation(s):  1) Suggest changing order to ceftriaxone 2 grams IV Q24H.    With kind regards,  Darion Rodriguez, JosefinaD  Infectious Diseases Clinical Pharmacist  .

## 2020-08-28 NOTE — PROGRESS NOTE ADULT - PROBLEM SELECTOR PLAN 1
-pt noted on prior admission to have 100K pansensitive ecoli, thought at that time to be asymptomatic colonization.  Now presenting with sepsis and encephalopathy  -s/p empiric 1 dose vanco/ceftriaxone in ER. Vanco dc'd. Will continue with Ceftriaxone, increased  to 2g today.  - ID consulted (Dr. Cooper).- UTI likely source of bacteremia.   -R CVA and suprapubic tenderness improved today  -f/u bcx, ucx. Preliminary growth shows E coli   -Das d/c'd today with passed trial of void  - CT showed right sided pyelonephritis/cystitis  - F/up repeat Blood CX done this am -pt noted on prior admission to have 100K pansensitive ecoli, thought at that time to be asymptomatic colonization.  Now presenting with sepsis and encephalopathy  -s/p empiric 1 dose vanco/ceftriaxone in ER. Vanco dc'd. Will continue with Ceftriaxone, increased  to 2g today.  - ID consulted (Dr. Cooper).- UTI likely source of bacteremia.   -R CVA and suprapubic tenderness improved today  -f/u final bcx, ucx. Preliminary growth shows E coli   -Das d/c'd today with passed trial of void  - CT showed right sided pyelonephritis/cystitis  - F/up repeat Blood CX done this am

## 2020-08-28 NOTE — PROVIDER CONTACT NOTE (CHANGE IN STATUS NOTIFICATION) - ACTION/TREATMENT ORDERED:
MD Matt made aware. Labetelol 5mgx1 given, EKG, CT head, Cardiac enzymes, & chest x-ray ordered & performed. BP in 160's post labetalol. Will continue to monitor.

## 2020-08-28 NOTE — PROGRESS NOTE ADULT - SUBJECTIVE AND OBJECTIVE BOX
CC: f/u for GNR bacteremia    Patient reports no new c/o today    REVIEW OF SYSTEMS: no fevers, no chills, no nausea, no vomiting, no abd pain, no resp symptoms  All other review of systems negative (Comprehensive ROS)    Antimicrobials Day #      Other Medications Reviewed    T(F): 98.3 (20 @ 09:46), Max: 98.5 (20 @ 20:50)  HR: 102 (20 @ 09:46)  BP: 149/77 (20 @ 09:46)  RR: 18 (20 @ 09:46)  SpO2: 100% (20 @ 09:46)    PHYSICAL EXAM:    General: non-verbal  Eyes:  anicteric, no conjunctival injection, no discharge  Oropharynx: no lesions or injection 	  Neck: supple, without adenopathy  Lungs: clear to auscultation  Heart: regular rate and rhythm; no murmur, rubs or gallops  Abdomen: soft, nondistended, nontender, without mass or organomegaly  Skin: no lesions  Extremities: no clubbing, cyanosis, or edema  Neurologic: non-verbal    LAB RESULTS:                        8.9    7.16  )-----------( 155      ( 27 Aug 2020 07:09 )             29.6         141  |  102  |  8   ----------------------------<  140<H>  2.9<LL>   |  27  |  0.39<L>    Ca    9.6      27 Aug 2020 07:06  Phos  2.8       Mg     2.1         TPro  6.3  /  Alb  3.8  /  TBili  0.5  /  DBili  x   /  AST  26  /  ALT  25  /  AlkPhos  79  08-26    LIVER FUNCTIONS - ( 26 Aug 2020 08:20 )  Alb: 3.8 g/dL / Pro: 6.3 g/dL / ALK PHOS: 79 U/L / ALT: 25 U/L / AST: 26 U/L / GGT: x           Urinalysis Basic - ( 25 Aug 2020 21:14 )    Color: Light Yellow / Appearance: Slightly Turbid / S.025 / pH: x  Gluc: x / Ketone: Negative  / Bili: Negative / Urobili: Negative   Blood: x / Protein: 300 mg/dL / Nitrite: Positive   Leuk Esterase: Trace / RBC: x / WBC x   Sq Epi: x / Non Sq Epi: x / Bacteria: x        MICROBIOLOGY REVIEWED:  Culture - Urine (20 @ 01:26)    Specimen Source: .Urine Clean Catch (Midstream)    Culture Results:   >100,000 CFU/ml Gram Negative Rods    Culture - Blood (20 @ 01:19)    Gram Stain:   Growth in aerobic and anaerobic bottles: Gram Negative Rods    Specimen Source: .Blood Blood-Peripheral    Culture Results:   Growth in aerobic and anaerobic bottles: Gram Negative Rods      RADIOLOGY REVIEWED: CC: f/u for GNR bacteremia    Patient reports no new c/o today    REVIEW OF SYSTEMS: no fevers, no chills, no nausea, no vomiting, no abd pain, no resp symptoms  All other review of systems negative (Comprehensive ROS)    Antimicrobials Day #    cefTRIAXone   IVPB 1000 milliGRAM(s) IV Intermittent every 24 hours    Other Medications Reviewed    T(F): 98.3 (20 @ 09:46), Max: 98.5 (20 @ 20:50)  HR: 102 (20 @ 09:46)  BP: 149/77 (20 @ 09:46)  RR: 18 (20 @ 09:46)  SpO2: 100% (20 @ 09:46)    PHYSICAL EXAM:    General: poorly interactive  Eyes:  anicteric, no conjunctival injection, no discharge  Oropharynx: no lesions or injection 	  Neck: supple, without adenopathy  Lungs: clear to auscultation  Heart: regular rate and rhythm; no murmur, rubs or gallops  Abdomen: soft, nondistended, nontender, without mass or organomegaly  Skin: no lesions  Extremities: no clubbing, cyanosis, or edema  Neurologic: weak, poorly interactive    LAB RESULTS:                        8.9    7.16  )-----------( 155      ( 27 Aug 2020 07:09 )             29.6     08-    141  |  102  |  8   ----------------------------<  140<H>  2.9<LL>   |  27  |  0.39<L>    Ca    9.6      27 Aug 2020 07:06  Phos  2.8       Mg     2.1         TPro  6.3  /  Alb  3.8  /  TBili  0.5  /  DBili  x   /  AST  26  /  ALT  25  /  AlkPhos  79  08-26    LIVER FUNCTIONS - ( 26 Aug 2020 08:20 )  Alb: 3.8 g/dL / Pro: 6.3 g/dL / ALK PHOS: 79 U/L / ALT: 25 U/L / AST: 26 U/L / GGT: x           Urinalysis Basic - ( 25 Aug 2020 21:14 )    Color: Light Yellow / Appearance: Slightly Turbid / S.025 / pH: x  Gluc: x / Ketone: Negative  / Bili: Negative / Urobili: Negative   Blood: x / Protein: 300 mg/dL / Nitrite: Positive   Leuk Esterase: Trace / RBC: x / WBC x   Sq Epi: x / Non Sq Epi: x / Bacteria: x        MICROBIOLOGY REVIEWED:  Culture - Urine (20 @ 01:26)    Specimen Source: .Urine Clean Catch (Midstream)    Culture Results:   >100,000 CFU/ml Gram Negative Rods    Culture - Blood (20 @ 01:19)    Gram Stain:   Growth in aerobic and anaerobic bottles: Gram Negative Rods    Specimen Source: .Blood Blood-Peripheral    Culture Results:   Growth in aerobic and anaerobic bottles: Gram Negative Rods      RADIOLOGY REVIEWED:

## 2020-08-28 NOTE — PROGRESS NOTE ADULT - PROBLEM SELECTOR PLAN 2
-fever, tachycardia, elevated lactate on admission. No afebrile and normotensive  -Lactate now clearing at 2.2  -urinary source, s/p empiric abx as noted  -c/w IV ceftriaxone, f/u pending sensitivities  -interval exam: warm, well perfused, mentating well, good cap refill <2  -mentation at baseline today  -Repeat BCx today. Patient to have 2 weeks antibiotics from first negative culture. -fever, tachycardia, elevated lactate on admission. No afebrile and normotensive  -Lactate now clearing at 1.4  -urinary source,  empiric abx as noted  -c/w IV ceftriaxone  -interval exam: warm, well perfused, mentating well, good cap refill <2  -mentation at baseline today  -Repeat BCx today. Patient to have 2 weeks antibiotics from first negative culture.

## 2020-08-28 NOTE — PROGRESS NOTE ADULT - ASSESSMENT
65y female with hx DM, HTN, fall, SDH with craniotomy x2, urinary retention had charles since her discharge on 8/13.   Pt unable to offer any c/o, as per daughter she was seen by urology last friday and charles was changed,   Pt now developed fever, lethargy and chills.   Blood cult with GNR.   Her previous urine cult with pansensitive E coli  blood cult now with pansensitive E coli as well    PLAN:  Cont CTX for now for E coli  source of bacteremia  monitor WBC and fever trend  consider urology input, charles maintained for now  repeat blood cult to ensure clearance of bacteremia  Oral ceftin 500 bid is an option when ready for dc to complete 10 days course 65y female with hx DM, HTN, fall, SDH with craniotomy x2, urinary retention had charles since her discharge on 8/13.   Pt unable to offer any c/o, as per daughter she was seen by urology last friday and charles was changed,   Pt now developed fever, lethargy and chills.   Blood cult with GNR.   Her previous urine cult with pansensitive E coli  blood cult now with pansensitive E coli as well    PLAN:  Cont CTX 1g daily for now for E coli  source of bacteremia  monitor WBC and fever trend  consider urology input, charles maintained for now  f/u repeat blood cult to ensure clearance of bacteremia  Oral ceftin 500 bid is an option when ready for dc to complete 10 days course

## 2020-08-29 DIAGNOSIS — I10 ESSENTIAL (PRIMARY) HYPERTENSION: ICD-10-CM

## 2020-08-29 LAB
-  AMIKACIN: SIGNIFICANT CHANGE UP
-  AMOXICILLIN/CLAVULANIC ACID: SIGNIFICANT CHANGE UP
-  AMPICILLIN/SULBACTAM: SIGNIFICANT CHANGE UP
-  AMPICILLIN: SIGNIFICANT CHANGE UP
-  AZTREONAM: SIGNIFICANT CHANGE UP
-  CEFAZOLIN: SIGNIFICANT CHANGE UP
-  CEFEPIME: SIGNIFICANT CHANGE UP
-  CEFOXITIN: SIGNIFICANT CHANGE UP
-  CEFTRIAXONE: SIGNIFICANT CHANGE UP
-  CIPROFLOXACIN: SIGNIFICANT CHANGE UP
-  ERTAPENEM: SIGNIFICANT CHANGE UP
-  GENTAMICIN: SIGNIFICANT CHANGE UP
-  IMIPENEM: SIGNIFICANT CHANGE UP
-  LEVOFLOXACIN: SIGNIFICANT CHANGE UP
-  MEROPENEM: SIGNIFICANT CHANGE UP
-  NITROFURANTOIN: SIGNIFICANT CHANGE UP
-  PIPERACILLIN/TAZOBACTAM: SIGNIFICANT CHANGE UP
-  TIGECYCLINE: SIGNIFICANT CHANGE UP
-  TOBRAMYCIN: SIGNIFICANT CHANGE UP
-  TRIMETHOPRIM/SULFAMETHOXAZOLE: SIGNIFICANT CHANGE UP
ALBUMIN SERPL ELPH-MCNC: 3.7 G/DL — SIGNIFICANT CHANGE UP (ref 3.3–5)
ALP SERPL-CCNC: 79 U/L — SIGNIFICANT CHANGE UP (ref 40–120)
ALT FLD-CCNC: 28 U/L — SIGNIFICANT CHANGE UP (ref 10–45)
ANION GAP SERPL CALC-SCNC: 12 MMOL/L — SIGNIFICANT CHANGE UP (ref 5–17)
AST SERPL-CCNC: 31 U/L — SIGNIFICANT CHANGE UP (ref 10–40)
BASOPHILS # BLD AUTO: 0.01 K/UL — SIGNIFICANT CHANGE UP (ref 0–0.2)
BASOPHILS NFR BLD AUTO: 0.2 % — SIGNIFICANT CHANGE UP (ref 0–2)
BILIRUB SERPL-MCNC: 0.4 MG/DL — SIGNIFICANT CHANGE UP (ref 0.2–1.2)
BUN SERPL-MCNC: 11 MG/DL — SIGNIFICANT CHANGE UP (ref 7–23)
CALCIUM SERPL-MCNC: 9.6 MG/DL — SIGNIFICANT CHANGE UP (ref 8.4–10.5)
CHLORIDE SERPL-SCNC: 101 MMOL/L — SIGNIFICANT CHANGE UP (ref 96–108)
CO2 SERPL-SCNC: 27 MMOL/L — SIGNIFICANT CHANGE UP (ref 22–31)
CREAT SERPL-MCNC: 0.41 MG/DL — LOW (ref 0.5–1.3)
CULTURE RESULTS: SIGNIFICANT CHANGE UP
EOSINOPHIL # BLD AUTO: 0.02 K/UL — SIGNIFICANT CHANGE UP (ref 0–0.5)
EOSINOPHIL NFR BLD AUTO: 0.4 % — SIGNIFICANT CHANGE UP (ref 0–6)
GLUCOSE SERPL-MCNC: 226 MG/DL — HIGH (ref 70–99)
HCT VFR BLD CALC: 30.1 % — LOW (ref 34.5–45)
HGB BLD-MCNC: 9.1 G/DL — LOW (ref 11.5–15.5)
IMM GRANULOCYTES NFR BLD AUTO: 0.6 % — SIGNIFICANT CHANGE UP (ref 0–1.5)
LYMPHOCYTES # BLD AUTO: 1.06 K/UL — SIGNIFICANT CHANGE UP (ref 1–3.3)
LYMPHOCYTES # BLD AUTO: 22.2 % — SIGNIFICANT CHANGE UP (ref 13–44)
MAGNESIUM SERPL-MCNC: 2.3 MG/DL — SIGNIFICANT CHANGE UP (ref 1.6–2.6)
MCHC RBC-ENTMCNC: 25.1 PG — LOW (ref 27–34)
MCHC RBC-ENTMCNC: 30.2 GM/DL — LOW (ref 32–36)
MCV RBC AUTO: 82.9 FL — SIGNIFICANT CHANGE UP (ref 80–100)
METHOD TYPE: SIGNIFICANT CHANGE UP
MONOCYTES # BLD AUTO: 0.52 K/UL — SIGNIFICANT CHANGE UP (ref 0–0.9)
MONOCYTES NFR BLD AUTO: 10.9 % — SIGNIFICANT CHANGE UP (ref 2–14)
NEUTROPHILS # BLD AUTO: 3.13 K/UL — SIGNIFICANT CHANGE UP (ref 1.8–7.4)
NEUTROPHILS NFR BLD AUTO: 65.7 % — SIGNIFICANT CHANGE UP (ref 43–77)
NRBC # BLD: 0 /100 WBCS — SIGNIFICANT CHANGE UP (ref 0–0)
ORGANISM # SPEC MICROSCOPIC CNT: SIGNIFICANT CHANGE UP
PHOSPHATE SERPL-MCNC: 4.7 MG/DL — HIGH (ref 2.5–4.5)
PLATELET # BLD AUTO: 202 K/UL — SIGNIFICANT CHANGE UP (ref 150–400)
POTASSIUM SERPL-MCNC: 3.9 MMOL/L — SIGNIFICANT CHANGE UP (ref 3.5–5.3)
POTASSIUM SERPL-SCNC: 3.9 MMOL/L — SIGNIFICANT CHANGE UP (ref 3.5–5.3)
PROT SERPL-MCNC: 6.5 G/DL — SIGNIFICANT CHANGE UP (ref 6–8.3)
RBC # BLD: 3.63 M/UL — LOW (ref 3.8–5.2)
RBC # FLD: 15.3 % — HIGH (ref 10.3–14.5)
SODIUM SERPL-SCNC: 140 MMOL/L — SIGNIFICANT CHANGE UP (ref 135–145)
SPECIMEN SOURCE: SIGNIFICANT CHANGE UP
WBC # BLD: 4.77 K/UL — SIGNIFICANT CHANGE UP (ref 3.8–10.5)
WBC # FLD AUTO: 4.77 K/UL — SIGNIFICANT CHANGE UP (ref 3.8–10.5)

## 2020-08-29 PROCEDURE — 99233 SBSQ HOSP IP/OBS HIGH 50: CPT | Mod: GC

## 2020-08-29 RX ORDER — FAMOTIDINE 10 MG/ML
20 INJECTION INTRAVENOUS DAILY
Refills: 0 | Status: DISCONTINUED | OUTPATIENT
Start: 2020-08-29 | End: 2020-08-31

## 2020-08-29 RX ORDER — LISINOPRIL 2.5 MG/1
10 TABLET ORAL DAILY
Refills: 0 | Status: DISCONTINUED | OUTPATIENT
Start: 2020-08-29 | End: 2020-08-31

## 2020-08-29 RX ORDER — INSULIN GLARGINE 100 [IU]/ML
12 INJECTION, SOLUTION SUBCUTANEOUS AT BEDTIME
Refills: 0 | Status: DISCONTINUED | OUTPATIENT
Start: 2020-08-29 | End: 2020-08-30

## 2020-08-29 RX ADMIN — LISINOPRIL 10 MILLIGRAM(S): 2.5 TABLET ORAL at 17:50

## 2020-08-29 RX ADMIN — Medication 3: at 17:45

## 2020-08-29 RX ADMIN — INSULIN GLARGINE 12 UNIT(S): 100 INJECTION, SOLUTION SUBCUTANEOUS at 22:57

## 2020-08-29 RX ADMIN — GABAPENTIN 300 MILLIGRAM(S): 400 CAPSULE ORAL at 20:21

## 2020-08-29 RX ADMIN — OXYCODONE HYDROCHLORIDE 5 MILLIGRAM(S): 5 TABLET ORAL at 20:19

## 2020-08-29 RX ADMIN — Medication 2: at 09:01

## 2020-08-29 RX ADMIN — LISINOPRIL 5 MILLIGRAM(S): 2.5 TABLET ORAL at 05:28

## 2020-08-29 RX ADMIN — POLYETHYLENE GLYCOL 3350 17 GRAM(S): 17 POWDER, FOR SOLUTION ORAL at 13:04

## 2020-08-29 RX ADMIN — LEVETIRACETAM 500 MILLIGRAM(S): 250 TABLET, FILM COATED ORAL at 17:45

## 2020-08-29 RX ADMIN — SENNA PLUS 2 TABLET(S): 8.6 TABLET ORAL at 20:21

## 2020-08-29 RX ADMIN — Medication 2: at 13:09

## 2020-08-29 RX ADMIN — Medication 1 TABLET(S): at 05:28

## 2020-08-29 RX ADMIN — Medication 1 TABLET(S): at 17:45

## 2020-08-29 RX ADMIN — CEFTRIAXONE 100 MILLIGRAM(S): 500 INJECTION, POWDER, FOR SOLUTION INTRAMUSCULAR; INTRAVENOUS at 05:27

## 2020-08-29 RX ADMIN — LEVETIRACETAM 500 MILLIGRAM(S): 250 TABLET, FILM COATED ORAL at 05:28

## 2020-08-29 RX ADMIN — FAMOTIDINE 20 MILLIGRAM(S): 10 INJECTION INTRAVENOUS at 13:09

## 2020-08-29 NOTE — PHYSICAL THERAPY INITIAL EVALUATION ADULT - ADDITIONAL COMMENTS
pt lives with spouse Florencia ([phone])  and son Jordan ([phone]) and Jordan's family (dtr in law Tatum Culp [phone]) in house with 7 steps to enter with HR and 14 steps to bedroom with HR (pt reports there is bedroom on main level and pt can stay on that level); someone always home to assist her per pt ;pt was independent prior to fall and craniotomy now require some assist with adl's, personal care and mobility ; supervision with RW PTA; pt went home from hospital to dtr's home upon d/c now per pt require more assist

## 2020-08-29 NOTE — PHYSICAL THERAPY INITIAL EVALUATION ADULT - DISCHARGE DISPOSITION, PT EVAL
rehabilitation facility/PT recommend Acute rehab to increase fxl mobility , strength, balance , endurance , fall prevention education continued ; pt needs assist all fxl acitvity and adl's and pt agreeable for Acute Rehab ; if pt family decide to take pt home pt needs assist all fxl activity and adl's and can benefit from Home PT pt understood all

## 2020-08-29 NOTE — PROGRESS NOTE ADULT - SUBJECTIVE AND OBJECTIVE BOX
CC: f/u for GNR bacteremia    Patient reports no new c/o today    REVIEW OF SYSTEMS: no fevers, no chills, no nausea, no vomiting, no abd pain, no resp symptoms  All other review of systems negative (Comprehensive ROS)    Antimicrobials Day #    cefTRIAXone   IVPB 1000 milliGRAM(s) IV Intermittent every 24 hours    Other Medications Reviewed    Vital Signs Last 24 Hrs  T(C): 36.6 (29 Aug 2020 04:25), Max: 37.9 (28 Aug 2020 12:17)  T(F): 97.8 (29 Aug 2020 04:25), Max: 100.2 (28 Aug 2020 12:17)  HR: 89 (29 Aug 2020 04:25) (88 - 110)  BP: 108/71 (29 Aug 2020 04:25) (108/71 - 182/97)  BP(mean): --  RR: 18 (29 Aug 2020 04:25) (18 - 18)  SpO2: 98% (29 Aug 2020 04:25) (98% - 99%)    PHYSICAL EXAM:    General: poorly interactive  Eyes:  anicteric, no conjunctival injection, no discharge  Oropharynx: no lesions or injection 	  Neck: supple, without adenopathy  Lungs: clear to auscultation  Heart: regular rate and rhythm; no murmur, rubs or gallops  Abdomen: soft, nondistended, nontender, without mass or organomegaly  Skin: no lesions  Extremities: no clubbing, cyanosis, or edema  Neurologic: weak, poorly interactive    LAB RESULTS:                                   9.1    4.77  )-----------( 202      ( 29 Aug 2020 07:07 )             30.1   08-29    140  |  101  |  11  ----------------------------<  226<H>  3.9   |  27  |  0.41<L>    Ca    9.6      29 Aug 2020 07:05  Phos  4.7     08-  Mg     2.3     08-    TPro  6.5  /  Alb  3.7  /  TBili  0.4  /  DBili  x   /  AST  31  /  ALT  28  /  AlkPhos  79  08-           Urinalysis Basic - ( 25 Aug 2020 21:14 )    Color: Light Yellow / Appearance: Slightly Turbid / S.025 / pH: x  Gluc: x / Ketone: Negative  / Bili: Negative / Urobili: Negative   Blood: x / Protein: 300 mg/dL / Nitrite: Positive   Leuk Esterase: Trace / RBC: x / WBC x   Sq Epi: x / Non Sq Epi: x / Bacteria: x        MICROBIOLOGY REVIEWED:    Culture - Blood (20 @ 01:19)    Gram Stain:   Growth in aerobic and anaerobic bottles: Gram Negative Rods    Specimen Source: .Blood Blood-Peripheral    Culture Results:   Growth in aerobic and anaerobic bottles: Escherichia coli  See previous culture [id number]    Culture - Urine (20 @ 01:26)    -  Gentamicin: S <=2    -  Imipenem: S <=1    -  Levofloxacin: S <=0.5    -  Meropenem: S <=1    -  Nitrofurantoin: S <=32 Should not be used to treat pyelonephritis    -  Piperacillin/Tazobactam: S <=8    -  Tigecycline: S <=2    -  Tobramycin: S <=2    -  Trimethoprim/Sulfamethoxazole: S <=0.5/9.5    -  Amikacin: S <=16    -  Amoxicillin/Clavulanic Acid: S <=8/4    -  Ampicillin: S <=8 These ampicillin results predict results for amoxicillin    -  Ampicillin/Sulbactam: S <=4/2 Enterobacter, Citrobacter, and Serratia may develop resistance during prolonged therapy (3-4 days)    -  Aztreonam: S <=4    -  Cefazolin: S <=2 (MIC_CL_COM_ENTERIC_CEFAZU) For uncomplicated UTI with K. pneumoniae, E. coli, or P. mirablis: ERICKA <=16 is sensitive and ERICKA >=32 is resistant. This also predicts results for oral agents cefaclor, cefdinir, cefpodoxime, cefprozil, cefuroxime axetil, cephalexin and locarbef for uncomplicated UTI. Note that some isolates may be susceptible to these agents while testing resistant to cefazolin.    -  Cefepime: S <=2    -  Cefoxitin: S <=8    -  Ceftriaxone: S <=1 Enterobacter, Citrobacter, and Serratia may develop resistance during prolonged therapy    -  Ciprofloxacin: S <=0.25    -  Ertapenem: S <=0.5    Specimen Source: .Urine Clean Catch (Midstream)    Culture Results:   >100,000 CFU/ml Escherichia coli  50,000 - 99,000 CFU/mL Klebsiella pneumoniae    Organism Identification: Escherichia coli    Organism: Escherichia coli    Method Type: ERICKA        RADIOLOGY REVIEWED:    < from: CT Abdomen and Pelvis w/ IV Cont (20 @ 23:03) >  Findings compatible with right pyelonephritis/cystitis. No perinephric abscess.    < end of copied text >

## 2020-08-29 NOTE — PHYSICAL THERAPY INITIAL EVALUATION ADULT - PERTINENT HX OF CURRENT PROBLEM, REHAB EVAL
65F PMH, recent fall c/b SDH s/p L craniotomy/evacuation of hematoma x 2, urinary retention s/p chronic charles, T2DM, HTN, dysphagia, p/w fever and is found to have E coli bacteremia with urine as the most likely source.  CTH/CTA h/n show grossly stable imaging. CT showed right sided pyelonephritis/cystitis

## 2020-08-29 NOTE — PROGRESS NOTE ADULT - PROBLEM SELECTOR PLAN 7
c/w dysphagia 1, pureed thin liquid diet  f/up speech and swallow report -Hold oral hypoglycemics  -Start HISS, check fsg qac/qhs  - A1c = 7.2 in 7/2020  -c/w gabapentin  -consistent carb diet  Increase lantus to 12U and continue with SSI

## 2020-08-29 NOTE — PROGRESS NOTE ADULT - ASSESSMENT
65y female with hx DM, HTN, fall, SDH with craniotomy x2, urinary retention had charles since her discharge on 8/13.   As per daughter she was seen by urology last friday and charles was changed,   Pt now developed fever, lethargy and chills.   Blood cult with GNR.   Her previous urine cult with pansensitive E coli  blood cult now with pansensitive E coli as well in urine    PLAN:  CTX 1g daily for now for E coli bacteremia, R pyelo on CT  monitor WBC and fever trend  monitor for retention  f/u repeat blood cult to ensure clearance of bacteremia  Oral ceftin 500 bid is an option when ready for dc, 10-14 days course for R pyelo and E coli bacteremia

## 2020-08-29 NOTE — PROGRESS NOTE ADULT - PROBLEM SELECTOR PLAN 2
-fever, tachycardia, elevated lactate on admission. No afebrile and normotensive  -Lactate now clearing at 1.4  -urinary source,  empiric abx as noted  -c/w IV ceftriaxone  -interval exam: warm, well perfused, mentating well, good cap refill <2  -mentation at baseline today  -Repeat BCx today. Patient to have 2 weeks antibiotics from first negative culture. resolved  -fever, tachycardia, elevated lactate on admission. No afebrile and normotensive  -Lactate now clearing at 1.4  -c/w IV ceftriaxone  -mentation at baseline today  -f/u bcx

## 2020-08-29 NOTE — PHYSICAL THERAPY INITIAL EVALUATION ADULT - GAIT DEVIATIONS NOTED, PT EVAL
decreased stride length/decreased weight-shifting ability/decreased step length/increased time in double stance/decreased kya

## 2020-08-29 NOTE — PHYSICAL THERAPY INITIAL EVALUATION ADULT - PRECAUTIONS/LIMITATIONS, REHAB EVAL
fall precautions fall precautions/+ EColi Bacteremia (urine source likely) ; CT R side pyleonephritis/ cystitis , + UTI , IV Antibiotics

## 2020-08-29 NOTE — PROGRESS NOTE ADULT - PROBLEM SELECTOR PLAN 6
-s/p 5u Regular insulin in ER, downtrending FSG.   -Hold oral hypoglycemics  -Start HISS, check fsg qac/qhs  - A1c = 7.2 in 7/2020  -c/w gabapentin  -consistent carb diet  Start Lantus at 5 units at night and cont slidding scale for now -Hold oral hypoglycemics  -Start HISS, check fsg qac/qhs  - A1c = 7.2 in 7/2020  -c/w gabapentin  -consistent carb diet  Start Lantus at 10 units at night and cont slidding scale for now -cont to monitor closely with serial exams; if worsening mentation can get stat repeat imaging +/- neurosurgical eval.   -start SCD, no pharm vte ppx.  -c/w keppra  -c/w oxy 5 q6 for pain prn.  - MRI no longer needed to r/o source

## 2020-08-29 NOTE — PROGRESS NOTE ADULT - SUBJECTIVE AND OBJECTIVE BOX
Patient is a 65y old  Female who presents with a chief complaint of sepsis 2/2 UTI (28 Aug 2020 13:22)      SUBJECTIVE / OVERNIGHT EVENTS:  incomplete     MEDICATIONS  (STANDING):  calcium carbonate 1250 mG  + Vitamin D (OsCal 500 + D) 1 Tablet(s) Oral two times a day  cefTRIAXone   IVPB 1000 milliGRAM(s) IV Intermittent every 24 hours  dextrose 5%. 1000 milliLiter(s) (50 mL/Hr) IV Continuous <Continuous>  dextrose 50% Injectable 12.5 Gram(s) IV Push once  dextrose 50% Injectable 25 Gram(s) IV Push once  dextrose 50% Injectable 25 Gram(s) IV Push once  famotidine Injectable 20 milliGRAM(s) IV Push daily  gabapentin 300 milliGRAM(s) Oral at bedtime  insulin glargine Injectable (LANTUS) 5 Unit(s) SubCutaneous at bedtime  insulin lispro (HumaLOG) corrective regimen sliding scale   SubCutaneous three times a day before meals  insulin lispro (HumaLOG) corrective regimen sliding scale   SubCutaneous at bedtime  levETIRAcetam 500 milliGRAM(s) Oral two times a day  lisinopril 5 milliGRAM(s) Oral daily  polyethylene glycol 3350 17 Gram(s) Oral daily  senna 2 Tablet(s) Oral at bedtime    MEDICATIONS  (PRN):  acetaminophen   Tablet .. 650 milliGRAM(s) Oral every 6 hours PRN Temp greater or equal to 38C (100.4F), Mild Pain (1 - 3), Moderate Pain (4 - 6), Severe Pain (7 - 10)  aluminum hydroxide/magnesium hydroxide/simethicone Suspension 30 milliLiter(s) Oral every 4 hours PRN Dyspepsia  bisacodyl 5 milliGRAM(s) Oral every 12 hours PRN Constipation  dextrose 40% Gel 15 Gram(s) Oral once PRN Blood Glucose LESS THAN 70 milliGRAM(s)/deciliter  glucagon  Injectable 1 milliGRAM(s) IntraMuscular once PRN Glucose LESS THAN 70 milligrams/deciliter  oxyCODONE    IR 5 milliGRAM(s) Oral every 6 hours PRN Severe Pain (7 - 10)      CAPILLARY BLOOD GLUCOSE      POCT Blood Glucose.: 240 mg/dL (28 Aug 2020 21:00)  POCT Blood Glucose.: 244 mg/dL (28 Aug 2020 17:35)  POCT Blood Glucose.: 240 mg/dL (28 Aug 2020 12:21)  POCT Blood Glucose.: 203 mg/dL (28 Aug 2020 08:24)    I&O's Summary    28 Aug 2020 07:01  -  29 Aug 2020 07:00  --------------------------------------------------------  IN: 690 mL / OUT: 1450 mL / NET: -760 mL        PHYSICAL EXAM:  Vital Signs Last 24 Hrs  T(C): 36.6 (08-29-20 @ 04:25)  T(F): 97.8 (08-29-20 @ 04:25), Max: 100.2 (08-28-20 @ 12:17)  HR: 89 (08-29-20 @ 04:25) (88 - 110)  BP: 108/71 (08-29-20 @ 04:25)  BP(mean): --  RR: 18 (08-29-20 @ 04:25) (18 - 18)  SpO2: 98% (08-29-20 @ 04:25) (98% - 99%)  Wt(kg): --    08-28 @ 07:01  -  08-29 @ 07:00  --------------------------------------------------------  IN: 690 mL / OUT: 1450 mL / NET: -760 mL      Constitutional: NAD, awake and alert  EYES: EOMI  ENT:  Normal Hearing, no tonsillar exudates   Neck: Soft and supple , no thyromegaly   Respiratory: Breath sounds are clear bilaterally, No wheezing, rales or rhonchi  Cardiovascular: S1 and S2, regular rate and rhythm, no Murmurs, gallops or rubs, no JVD,    Gastrointestinal: Bowel Sounds present, soft, nontender, nondistended, no guarding, no rebound  Extremities: No cyanosis or clubbing; warm to touch  Vascular: 2+ peripheral pulses lower ex  Neurological: No focal deficits, CN II-XII intact bilaterally, sensation to light touch intact in all extremities, gait intact. Pupils are equally reactive to light and symmetrical in size.   Musculoskeletal: 5/5 strength b/l upper and lower extremities; no joint swelling.  Skin: No rashes  Psych: no depression or anhedonia, AAOx3  HEME: no bruises, no nose bleeds      Personally reviewed imaging, labs, EKG  LABS:                        9.2    5.95  )-----------( 199      ( 28 Aug 2020 17:54 )             29.4     08-28    139  |  101  |  10  ----------------------------<  202<H>  3.8   |  28  |  0.41<L>    Ca    9.4      28 Aug 2020 07:48  Phos  3.2     08-28  Mg     2.2     08-28        CARDIAC MARKERS ( 28 Aug 2020 17:54 )  x     / x     / 18 U/L / x     / <1.0 ng/mL          RADIOLOGY & ADDITIONAL TESTS:    Imaging Personally Reviewed:    Consultant(s) Notes Reviewed:      Care Discussed with Consultants/Other Providers: Patient is a 65y old  Female who presents with a chief complaint of sepsis 2/2 UTI (28 Aug 2020 13:22)      SUBJECTIVE / OVERNIGHT EVENTS:  Patient had no acute events overnight. This morning had no complaints and wanted water because she was thirsty. No fevers, chills, nausea, vomiting, or diarrhea overnight. Had 1 bowel movement yesterday and is adequately draining urine via primacath.     MEDICATIONS  (STANDING):  calcium carbonate 1250 mG  + Vitamin D (OsCal 500 + D) 1 Tablet(s) Oral two times a day  cefTRIAXone   IVPB 1000 milliGRAM(s) IV Intermittent every 24 hours  dextrose 5%. 1000 milliLiter(s) (50 mL/Hr) IV Continuous <Continuous>  dextrose 50% Injectable 12.5 Gram(s) IV Push once  dextrose 50% Injectable 25 Gram(s) IV Push once  dextrose 50% Injectable 25 Gram(s) IV Push once  famotidine Injectable 20 milliGRAM(s) IV Push daily  gabapentin 300 milliGRAM(s) Oral at bedtime  insulin glargine Injectable (LANTUS) 5 Unit(s) SubCutaneous at bedtime  insulin lispro (HumaLOG) corrective regimen sliding scale   SubCutaneous three times a day before meals  insulin lispro (HumaLOG) corrective regimen sliding scale   SubCutaneous at bedtime  levETIRAcetam 500 milliGRAM(s) Oral two times a day  lisinopril 5 milliGRAM(s) Oral daily  polyethylene glycol 3350 17 Gram(s) Oral daily  senna 2 Tablet(s) Oral at bedtime    MEDICATIONS  (PRN):  acetaminophen   Tablet .. 650 milliGRAM(s) Oral every 6 hours PRN Temp greater or equal to 38C (100.4F), Mild Pain (1 - 3), Moderate Pain (4 - 6), Severe Pain (7 - 10)  aluminum hydroxide/magnesium hydroxide/simethicone Suspension 30 milliLiter(s) Oral every 4 hours PRN Dyspepsia  bisacodyl 5 milliGRAM(s) Oral every 12 hours PRN Constipation  dextrose 40% Gel 15 Gram(s) Oral once PRN Blood Glucose LESS THAN 70 milliGRAM(s)/deciliter  glucagon  Injectable 1 milliGRAM(s) IntraMuscular once PRN Glucose LESS THAN 70 milligrams/deciliter  oxyCODONE    IR 5 milliGRAM(s) Oral every 6 hours PRN Severe Pain (7 - 10)      CAPILLARY BLOOD GLUCOSE      POCT Blood Glucose.: 240 mg/dL (28 Aug 2020 21:00)  POCT Blood Glucose.: 244 mg/dL (28 Aug 2020 17:35)  POCT Blood Glucose.: 240 mg/dL (28 Aug 2020 12:21)  POCT Blood Glucose.: 203 mg/dL (28 Aug 2020 08:24)    I&O's Summary    28 Aug 2020 07:01  -  29 Aug 2020 07:00  --------------------------------------------------------  IN: 690 mL / OUT: 1450 mL / NET: -760 mL        PHYSICAL EXAM:  Vital Signs Last 24 Hrs  T(C): 36.6 (08-29-20 @ 04:25)  T(F): 97.8 (08-29-20 @ 04:25), Max: 100.2 (08-28-20 @ 12:17)  HR: 89 (08-29-20 @ 04:25) (88 - 110)  BP: 108/71 (08-29-20 @ 04:25)  BP(mean): --  RR: 18 (08-29-20 @ 04:25) (18 - 18)  SpO2: 98% (08-29-20 @ 04:25) (98% - 99%)  Wt(kg): --    08-28 @ 07:01  -  08-29 @ 07:00  --------------------------------------------------------  IN: 690 mL / OUT: 1450 mL / NET: -760 mL      Constitutional: NAD, awake and alert  EYES: EOMI  ENT:  Normal Hearing, no tonsillar exudates   Neck: Soft and supple , no thyromegaly   Respiratory: Breath sounds are clear bilaterally, No wheezing, rales or rhonchi  Cardiovascular: S1 and S2, regular rate and rhythm, no Murmurs, gallops or rubs, no JVD,    Gastrointestinal: Bowel Sounds present, soft, nontender, nondistended, no guarding, no rebound  Extremities: No cyanosis or clubbing; warm to touch  Vascular: 2+ peripheral pulses lower ex  Neurological: No focal deficits, CN II-XII intact bilaterally, sensation to light touch intact in all extremities, gait intact. Pupils are equally reactive to light and symmetrical in size.   Musculoskeletal: 5/5 strength b/l upper and lower extremities; no joint swelling.  Skin: No rashes  Psych: no depression or anhedonia, AAOx3  HEME: no bruises, no nose bleeds      Personally reviewed imaging, labs, EKG  LABS:                        9.2    5.95  )-----------( 199      ( 28 Aug 2020 17:54 )             29.4     08-28    139  |  101  |  10  ----------------------------<  202<H>  3.8   |  28  |  0.41<L>    Ca    9.4      28 Aug 2020 07:48  Phos  3.2     08-28  Mg     2.2     08-28        CARDIAC MARKERS ( 28 Aug 2020 17:54 )  x     / x     / 18 U/L / x     / <1.0 ng/mL

## 2020-08-29 NOTE — PROGRESS NOTE ADULT - PROBLEM SELECTOR PLAN 3
-pt minimally responsive upon arrival  -CTH/CTA h/n show grossly stable imaging. Neuro exam appears grossly stable.   -at this time suspect sepsis encephalopathy/delirium in setting of sepsis 2/2 to UTI   -cont to monitor closely with serial exams; if worsening mentation can get stat repeat imaging +/- neurosurgical eval.    - Monitor electrolytes. Replete PRN  - Improved mentation

## 2020-08-29 NOTE — PHYSICAL THERAPY INITIAL EVALUATION ADULT - IMPAIRED TRANSFERS: SIT/STAND, REHAB EVAL
impaired balance/decreased strength/decreased endurance , min unsteady perform x 2/impaired postural control

## 2020-08-29 NOTE — PHYSICAL THERAPY INITIAL EVALUATION ADULT - BED MOBILITY TRAINING, PT EVAL
Mammogram order entered please call pt and schedule this test    GOAL: pt will perform bed mobility with close supervision in 2-3 weeks

## 2020-08-29 NOTE — PHYSICAL THERAPY INITIAL EVALUATION ADULT - IMPAIRMENTS CONTRIBUTING IMPAIRED BED MOBILITY, REHAB EVAL
decreased endurance , sat EOB x 5 min cg of 1 (initially min of 1 )/impaired postural control/decreased strength/impaired balance

## 2020-08-29 NOTE — PROGRESS NOTE ADULT - PROBLEM SELECTOR PLAN 5
-CTH/CTA h/n show grossly stable imaging. Neuro exam appears grossly stable.   -at this time suspect sepsis encephalopathy/delirium in setting of sepsis given acuity and rapidity of return to baseline and stable imaging.   -cont to monitor closely with serial exams; if worsening mentation can get stat repeat imaging +/- neurosurgical eval.   -start SCD, no pharm vte ppx.  -c/w keppra  -c/w oxy 5 q6 for pain prn.  - MRI no longer needed to r/o source -cont to monitor closely with serial exams; if worsening mentation can get stat repeat imaging +/- neurosurgical eval.   -start SCD, no pharm vte ppx.  -c/w keppra  -c/w oxy 5 q6 for pain prn.  - MRI no longer needed to r/o source -on lisinopril 5mg   -BP have been labile, with high values   -s/p IV labetalol yesterday   -may reintroduce other anti-hypertensives

## 2020-08-29 NOTE — PROGRESS NOTE ADULT - PROBLEM SELECTOR PLAN 4
-Das removed with spontaneous urination 30 min later  - currently urinating well to prima cath -s/p charles   - currently urinating well to prima cath

## 2020-08-29 NOTE — PROGRESS NOTE ADULT - PROBLEM SELECTOR PLAN 8
scd c/w dysphagia 1, pureed thin liquid diet  f/up speech and swallow report  -change IV Pantroprazole to PO c/w dysphagia 1, pureed thin liquid diet  f/up speech and swallow report  -change IV famotidine to PO

## 2020-08-29 NOTE — PHYSICAL THERAPY INITIAL EVALUATION ADULT - PLANNED THERAPY INTERVENTIONS, PT EVAL
GOAL: stair train : pt will negotiate a full flight of steps with HR with cg of 1 in 2 weeks/bed mobility training/strengthening/transfer training/balance training/gait training

## 2020-08-29 NOTE — PHYSICAL THERAPY INITIAL EVALUATION ADULT - GENERAL OBSERVATIONS, REHAB EVAL
Pt encountered in sup in NAD Pt encountered in sup in NAD; eyes closed but arousable to verbal ; pt follow simple 1-2 step commands in English with visual cues for rom assess in bed ; PT used free  phone service in session ;  number [phone]  Name Radha as pt speaks [place]ian

## 2020-08-29 NOTE — PROGRESS NOTE ADULT - PROBLEM SELECTOR PLAN 1
-pt noted on prior admission to have 100K pansensitive ecoli, thought at that time to be asymptomatic colonization.  Now presenting with sepsis and encephalopathy  -s/p empiric 1 dose vanco/ceftriaxone in ER. Vanco dc'd. Will continue with Ceftriaxone, increased  to 2g today.  - ID consulted (Dr. Cooper).- UTI likely source of bacteremia.   -R CVA and suprapubic tenderness improved today  -f/u final bcx, ucx. Preliminary growth shows E coli   -Das d/c'd today with passed trial of void  - CT showed right sided pyelonephritis/cystitis  - F/up repeat Blood CX done this am -no urinary complaints, adequately draining urine via primacath   -continue with IV Ceftriaxone as per ID 1g daily until BCx return, want bacteremia cleared   -no evidence of CVA tenderness

## 2020-08-30 LAB
ALBUMIN SERPL ELPH-MCNC: 3.8 G/DL — SIGNIFICANT CHANGE UP (ref 3.3–5)
ALP SERPL-CCNC: 84 U/L — SIGNIFICANT CHANGE UP (ref 40–120)
ALT FLD-CCNC: 33 U/L — SIGNIFICANT CHANGE UP (ref 10–45)
ANION GAP SERPL CALC-SCNC: 12 MMOL/L — SIGNIFICANT CHANGE UP (ref 5–17)
AST SERPL-CCNC: 37 U/L — SIGNIFICANT CHANGE UP (ref 10–40)
BASOPHILS # BLD AUTO: 0.02 K/UL — SIGNIFICANT CHANGE UP (ref 0–0.2)
BASOPHILS NFR BLD AUTO: 0.4 % — SIGNIFICANT CHANGE UP (ref 0–2)
BILIRUB SERPL-MCNC: 0.4 MG/DL — SIGNIFICANT CHANGE UP (ref 0.2–1.2)
BUN SERPL-MCNC: 12 MG/DL — SIGNIFICANT CHANGE UP (ref 7–23)
CALCIUM SERPL-MCNC: 9.6 MG/DL — SIGNIFICANT CHANGE UP (ref 8.4–10.5)
CHLORIDE SERPL-SCNC: 99 MMOL/L — SIGNIFICANT CHANGE UP (ref 96–108)
CO2 SERPL-SCNC: 27 MMOL/L — SIGNIFICANT CHANGE UP (ref 22–31)
CREAT SERPL-MCNC: 0.43 MG/DL — LOW (ref 0.5–1.3)
EOSINOPHIL # BLD AUTO: 0.01 K/UL — SIGNIFICANT CHANGE UP (ref 0–0.5)
EOSINOPHIL NFR BLD AUTO: 0.2 % — SIGNIFICANT CHANGE UP (ref 0–6)
GLUCOSE SERPL-MCNC: 248 MG/DL — HIGH (ref 70–99)
HCT VFR BLD CALC: 31.2 % — LOW (ref 34.5–45)
HGB BLD-MCNC: 9.5 G/DL — LOW (ref 11.5–15.5)
IMM GRANULOCYTES NFR BLD AUTO: 1.3 % — SIGNIFICANT CHANGE UP (ref 0–1.5)
LYMPHOCYTES # BLD AUTO: 1.17 K/UL — SIGNIFICANT CHANGE UP (ref 1–3.3)
LYMPHOCYTES # BLD AUTO: 21.7 % — SIGNIFICANT CHANGE UP (ref 13–44)
MAGNESIUM SERPL-MCNC: 2.2 MG/DL — SIGNIFICANT CHANGE UP (ref 1.6–2.6)
MCHC RBC-ENTMCNC: 25.5 PG — LOW (ref 27–34)
MCHC RBC-ENTMCNC: 30.4 GM/DL — LOW (ref 32–36)
MCV RBC AUTO: 83.6 FL — SIGNIFICANT CHANGE UP (ref 80–100)
MONOCYTES # BLD AUTO: 0.61 K/UL — SIGNIFICANT CHANGE UP (ref 0–0.9)
MONOCYTES NFR BLD AUTO: 11.3 % — SIGNIFICANT CHANGE UP (ref 2–14)
NEUTROPHILS # BLD AUTO: 3.51 K/UL — SIGNIFICANT CHANGE UP (ref 1.8–7.4)
NEUTROPHILS NFR BLD AUTO: 65.1 % — SIGNIFICANT CHANGE UP (ref 43–77)
NRBC # BLD: 0 /100 WBCS — SIGNIFICANT CHANGE UP (ref 0–0)
PHOSPHATE SERPL-MCNC: 4.2 MG/DL — SIGNIFICANT CHANGE UP (ref 2.5–4.5)
PLATELET # BLD AUTO: 242 K/UL — SIGNIFICANT CHANGE UP (ref 150–400)
POTASSIUM SERPL-MCNC: 4.1 MMOL/L — SIGNIFICANT CHANGE UP (ref 3.5–5.3)
POTASSIUM SERPL-SCNC: 4.1 MMOL/L — SIGNIFICANT CHANGE UP (ref 3.5–5.3)
PROT SERPL-MCNC: 6.7 G/DL — SIGNIFICANT CHANGE UP (ref 6–8.3)
RBC # BLD: 3.73 M/UL — LOW (ref 3.8–5.2)
RBC # FLD: 15.3 % — HIGH (ref 10.3–14.5)
SODIUM SERPL-SCNC: 138 MMOL/L — SIGNIFICANT CHANGE UP (ref 135–145)
WBC # BLD: 5.39 K/UL — SIGNIFICANT CHANGE UP (ref 3.8–10.5)
WBC # FLD AUTO: 5.39 K/UL — SIGNIFICANT CHANGE UP (ref 3.8–10.5)

## 2020-08-30 PROCEDURE — 99232 SBSQ HOSP IP/OBS MODERATE 35: CPT | Mod: GC

## 2020-08-30 RX ORDER — INSULIN LISPRO 100/ML
4 VIAL (ML) SUBCUTANEOUS
Refills: 0 | Status: DISCONTINUED | OUTPATIENT
Start: 2020-08-30 | End: 2020-08-31

## 2020-08-30 RX ORDER — INSULIN GLARGINE 100 [IU]/ML
14 INJECTION, SOLUTION SUBCUTANEOUS AT BEDTIME
Refills: 0 | Status: DISCONTINUED | OUTPATIENT
Start: 2020-08-30 | End: 2020-08-31

## 2020-08-30 RX ORDER — CEFUROXIME AXETIL 250 MG
500 TABLET ORAL EVERY 12 HOURS
Refills: 0 | Status: DISCONTINUED | OUTPATIENT
Start: 2020-08-31 | End: 2020-08-31

## 2020-08-30 RX ADMIN — GABAPENTIN 300 MILLIGRAM(S): 400 CAPSULE ORAL at 20:00

## 2020-08-30 RX ADMIN — Medication 650 MILLIGRAM(S): at 20:30

## 2020-08-30 RX ADMIN — Medication 2: at 17:12

## 2020-08-30 RX ADMIN — Medication 4: at 12:40

## 2020-08-30 RX ADMIN — Medication 3: at 08:34

## 2020-08-30 RX ADMIN — POLYETHYLENE GLYCOL 3350 17 GRAM(S): 17 POWDER, FOR SOLUTION ORAL at 12:41

## 2020-08-30 RX ADMIN — LISINOPRIL 10 MILLIGRAM(S): 2.5 TABLET ORAL at 06:57

## 2020-08-30 RX ADMIN — Medication 1 TABLET(S): at 17:31

## 2020-08-30 RX ADMIN — FAMOTIDINE 20 MILLIGRAM(S): 10 INJECTION INTRAVENOUS at 12:41

## 2020-08-30 RX ADMIN — Medication 1: at 22:28

## 2020-08-30 RX ADMIN — Medication 4 UNIT(S): at 17:11

## 2020-08-30 RX ADMIN — LEVETIRACETAM 500 MILLIGRAM(S): 250 TABLET, FILM COATED ORAL at 06:57

## 2020-08-30 RX ADMIN — Medication 5 MILLIGRAM(S): at 19:59

## 2020-08-30 RX ADMIN — SENNA PLUS 2 TABLET(S): 8.6 TABLET ORAL at 19:59

## 2020-08-30 RX ADMIN — INSULIN GLARGINE 14 UNIT(S): 100 INJECTION, SOLUTION SUBCUTANEOUS at 22:28

## 2020-08-30 RX ADMIN — LEVETIRACETAM 500 MILLIGRAM(S): 250 TABLET, FILM COATED ORAL at 17:12

## 2020-08-30 RX ADMIN — CEFTRIAXONE 100 MILLIGRAM(S): 500 INJECTION, POWDER, FOR SOLUTION INTRAMUSCULAR; INTRAVENOUS at 06:57

## 2020-08-30 RX ADMIN — Medication 650 MILLIGRAM(S): at 19:59

## 2020-08-30 RX ADMIN — Medication 1 TABLET(S): at 06:57

## 2020-08-30 NOTE — PROGRESS NOTE ADULT - PROBLEM SELECTOR PLAN 2
resolved  -fever, tachycardia, elevated lactate on admission. No afebrile and normotensive  -Lactate now clearing at 1.4  -c/w IV ceftriaxone  -mentation at baseline today  -f/u bcx resolved  -fever, tachycardia, elevated lactate on admission. No afebrile and normotensive  -c/w IV ceftriaxone with swtich to PO ceftin on DC   -mentation at baseline today  -f/u bcx--> NGTD almost 48 hrs

## 2020-08-30 NOTE — PROGRESS NOTE ADULT - ASSESSMENT
65y female with hx DM, HTN, fall, SDH with craniotomy x2, urinary retention had charles since her discharge on 8/13.   As per daughter she was seen by urology last friday and charles was changed,   Pt now developed fever, lethargy and chills.   Blood cult with GNR.   Her previous urine cult with pansensitive E coli  blood cult now with pansensitive E coli as well in urine    PLAN:  CTX 1g daily for now for E coli bacteremia, R pyelo on CT  monitor WBC and fever trend  monitor for retention  f/u repeat blood cult--no growth  no objection to switch to oral ceftin 500 bid when ready for dc, 10-14 days course total for R pyelo and E coli bacteremia as seen on CT

## 2020-08-30 NOTE — PROGRESS NOTE ADULT - SUBJECTIVE AND OBJECTIVE BOX
CC: f/u for GNR bacteremia    Patient reports no new c/o today    REVIEW OF SYSTEMS: no fevers, no chills, no nausea, no vomiting, no abd pain, no resp symptoms  All other review of systems negative (Comprehensive ROS)    Antimicrobials Day #    cefTRIAXone   IVPB 1000 milliGRAM(s) IV Intermittent every 24 hours      Other Medications Reviewed    Vital Signs Last 24 Hrs  T(C): 36.9 (30 Aug 2020 05:44), Max: 37.2 (29 Aug 2020 16:37)  T(F): 98.4 (30 Aug 2020 05:44), Max: 98.9 (29 Aug 2020 16:37)  HR: 95 (30 Aug 2020 05:44) (91 - 96)  BP: 158/85 (30 Aug 2020 05:44) (128/76 - 164/84)  BP(mean): --  RR: 16 (30 Aug 2020 05:44) (16 - 18)  SpO2: 97% (30 Aug 2020 05:44) (97% - 99%)    PHYSICAL EXAM:    General: poorly interactive  Eyes:  anicteric, no conjunctival injection, no discharge  Oropharynx: no lesions or injection 	  Neck: supple, without adenopathy  Lungs: clear to auscultation  Heart: regular rate and rhythm; no murmur, rubs or gallops  Abdomen: soft, nondistended, nontender, without mass or organomegaly  Skin: no lesions  Extremities: no clubbing, cyanosis, or edema  Neurologic: weak, poorly interactive    LAB RESULTS:                                                   9.5    5.39  )-----------( 242      ( 30 Aug 2020 07:28 )             31.2   08-30    138  |  99  |  12  ----------------------------<  248<H>  4.1   |  27  |  0.43<L>    Ca    9.6      30 Aug 2020 07:28  Phos  4.2     08-30  Mg     2.2     08-30    TPro  6.7  /  Alb  3.8  /  TBili  0.4  /  DBili  x   /  AST  37  /  ALT  33  /  AlkPhos  84  08-30      Urinalysis Basic - ( 25 Aug 2020 21:14 )    Color: Light Yellow / Appearance: Slightly Turbid / S.025 / pH: x  Gluc: x / Ketone: Negative  / Bili: Negative / Urobili: Negative   Blood: x / Protein: 300 mg/dL / Nitrite: Positive   Leuk Esterase: Trace / RBC: x / WBC x   Sq Epi: x / Non Sq Epi: x / Bacteria: x        MICROBIOLOGY REVIEWED:  Culture - Blood (20 @ 10:31)    Specimen Source: .Blood Blood    Culture Results:   No growth to date.      Culture - Blood (20 @ 01:19)    Gram Stain:   Growth in aerobic and anaerobic bottles: Gram Negative Rods    Specimen Source: .Blood Blood-Peripheral    Culture Results:   Growth in aerobic and anaerobic bottles: Escherichia coli  See previous culture [id number]    Culture - Urine (20 @ 01:26)    -  Gentamicin: S <=2    -  Imipenem: S <=1    -  Levofloxacin: S <=0.5    -  Meropenem: S <=1    -  Nitrofurantoin: S <=32 Should not be used to treat pyelonephritis    -  Piperacillin/Tazobactam: S <=8    -  Tigecycline: S <=2    -  Tobramycin: S <=2    -  Trimethoprim/Sulfamethoxazole: S <=0.5/9.5    -  Amikacin: S <=16    -  Amoxicillin/Clavulanic Acid: S <=8/4    -  Ampicillin: S <=8 These ampicillin results predict results for amoxicillin    -  Ampicillin/Sulbactam: S <=4/2 Enterobacter, Citrobacter, and Serratia may develop resistance during prolonged therapy (3-4 days)    -  Aztreonam: S <=4    -  Cefazolin: S <=2 (MIC_CL_[id number]) For uncomplicated UTI with K. pneumoniae, E. coli, or P. mirablis: ERICKA <=16 is sensitive and ERICKA >=32 is resistant. This also predicts results for oral agents cefaclor, cefdinir, cefpodoxime, cefprozil, cefuroxime axetil, cephalexin and locarbef for uncomplicated UTI. Note that some isolates may be susceptible to these agents while testing resistant to cefazolin.    -  Cefepime: S <=2    -  Cefoxitin: S <=8    -  Ceftriaxone: S <=1 Enterobacter, Citrobacter, and Serratia may develop resistance during prolonged therapy    -  Ciprofloxacin: S <=0.25    -  Ertapenem: S <=0.5    Specimen Source: .Urine Clean Catch (Midstream)    Culture Results:   >100,000 CFU/ml Escherichia coli  50,000 - 99,000 CFU/mL Klebsiella pneumoniae    Organism Identification: Escherichia coli    Organism: Escherichia coli    Method Type: ERICKA        RADIOLOGY REVIEWED:    < from: CT Abdomen and Pelvis w/ IV Cont (20 @ 23:03) >  Findings compatible with right pyelonephritis/cystitis. No perinephric abscess.    < end of copied text >

## 2020-08-30 NOTE — PROGRESS NOTE ADULT - PROBLEM SELECTOR PLAN 3
-pt minimally responsive upon arrival  -CTH/CTA h/n show grossly stable imaging. Neuro exam appears grossly stable.   -at this time suspect sepsis encephalopathy/delirium in setting of sepsis 2/2 to UTI   -cont to monitor closely with serial exams; if worsening mentation can get stat repeat imaging +/- neurosurgical eval.    - Monitor electrolytes. Replete PRN  - Improved mentation resolved, patient at baseline   - Monitor electrolytes. Replete PRN  - Improved mentation

## 2020-08-30 NOTE — PROGRESS NOTE ADULT - PROBLEM SELECTOR PLAN 7
-Hold oral hypoglycemics  -Start HISS, check fsg qac/qhs  - A1c = 7.2 in 7/2020  -c/w gabapentin  -consistent carb diet  Increase lantus to 12U and continue with SSI -high fingerstick glucose values  -c/w gabapentin  -consistent carb diet  Increase lantus to 17U and continue with SSI

## 2020-08-30 NOTE — PROGRESS NOTE ADULT - PROBLEM SELECTOR PLAN 5
-on lisinopril 5mg   -BP have been labile, with high values   -s/p IV labetalol yesterday   -may reintroduce other anti-hypertensives -on lisinopril 10mg  -BP have been labile, with high values   -may reintroduce other anti-hypertensives  PRN

## 2020-08-30 NOTE — PROGRESS NOTE ADULT - PROBLEM SELECTOR PLAN 8
c/w dysphagia 1, pureed thin liquid diet  f/up speech and swallow report  -change IV famotidine to PO

## 2020-08-30 NOTE — PROGRESS NOTE ADULT - SUBJECTIVE AND OBJECTIVE BOX
Patient is a 65y old  Female who presents with a chief complaint of sepsis 2/2 UTI (29 Aug 2020 08:00)      SUBJECTIVE / OVERNIGHT EVENTS:  incomplete note     MEDICATIONS  (STANDING):  calcium carbonate 1250 mG  + Vitamin D (OsCal 500 + D) 1 Tablet(s) Oral two times a day  cefTRIAXone   IVPB 1000 milliGRAM(s) IV Intermittent every 24 hours  dextrose 5%. 1000 milliLiter(s) (50 mL/Hr) IV Continuous <Continuous>  dextrose 50% Injectable 12.5 Gram(s) IV Push once  dextrose 50% Injectable 25 Gram(s) IV Push once  dextrose 50% Injectable 25 Gram(s) IV Push once  famotidine    Tablet 20 milliGRAM(s) Oral daily  gabapentin 300 milliGRAM(s) Oral at bedtime  insulin glargine Injectable (LANTUS) 12 Unit(s) SubCutaneous at bedtime  insulin lispro (HumaLOG) corrective regimen sliding scale   SubCutaneous three times a day before meals  insulin lispro (HumaLOG) corrective regimen sliding scale   SubCutaneous at bedtime  levETIRAcetam 500 milliGRAM(s) Oral two times a day  lisinopril 10 milliGRAM(s) Oral daily  polyethylene glycol 3350 17 Gram(s) Oral daily  senna 2 Tablet(s) Oral at bedtime    MEDICATIONS  (PRN):  acetaminophen   Tablet .. 650 milliGRAM(s) Oral every 6 hours PRN Temp greater or equal to 38C (100.4F), Mild Pain (1 - 3), Moderate Pain (4 - 6), Severe Pain (7 - 10)  aluminum hydroxide/magnesium hydroxide/simethicone Suspension 30 milliLiter(s) Oral every 4 hours PRN Dyspepsia  bisacodyl 5 milliGRAM(s) Oral every 12 hours PRN Constipation  dextrose 40% Gel 15 Gram(s) Oral once PRN Blood Glucose LESS THAN 70 milliGRAM(s)/deciliter  glucagon  Injectable 1 milliGRAM(s) IntraMuscular once PRN Glucose LESS THAN 70 milligrams/deciliter  oxyCODONE    IR 5 milliGRAM(s) Oral every 6 hours PRN Severe Pain (7 - 10)      CAPILLARY BLOOD GLUCOSE      POCT Blood Glucose.: 218 mg/dL (29 Aug 2020 22:25)  POCT Blood Glucose.: 265 mg/dL (29 Aug 2020 17:14)  POCT Blood Glucose.: 222 mg/dL (29 Aug 2020 13:05)  POCT Blood Glucose.: 233 mg/dL (29 Aug 2020 08:43)    I&O's Summary    28 Aug 2020 07:01  -  29 Aug 2020 07:00  --------------------------------------------------------  IN: 690 mL / OUT: 1450 mL / NET: -760 mL    29 Aug 2020 07:01  -  30 Aug 2020 06:45  --------------------------------------------------------  IN: 120 mL / OUT: 0 mL / NET: 120 mL        PHYSICAL EXAM:  Vital Signs Last 24 Hrs  T(C): 36.9 (08-30-20 @ 05:44)  T(F): 98.4 (08-30-20 @ 05:44), Max: 98.9 (08-29-20 @ 16:37)  HR: 95 (08-30-20 @ 05:44) (91 - 96)  BP: 158/85 (08-30-20 @ 05:44)  BP(mean): --  RR: 16 (08-30-20 @ 05:44) (16 - 18)  SpO2: 97% (08-30-20 @ 05:44) (97% - 99%)  Wt(kg): --    08-28 @ 07:01  -  08-29 @ 07:00  --------------------------------------------------------  IN: 690 mL / OUT: 1450 mL / NET: -760 mL    08-29 @ 07:01  -  08-30 @ 06:45  --------------------------------------------------------  IN: 120 mL / OUT: 0 mL / NET: 120 mL      Constitutional: NAD, awake and alert  EYES: EOMI  ENT:  Normal Hearing, no tonsillar exudates   Neck: Soft and supple , no thyromegaly   Respiratory: Breath sounds are clear bilaterally, No wheezing, rales or rhonchi  Cardiovascular: S1 and S2, regular rate and rhythm, no Murmurs, gallops or rubs, no JVD,    Gastrointestinal: Bowel Sounds present, soft, nontender, nondistended, no guarding, no rebound  Extremities: No cyanosis or clubbing; warm to touch  Vascular: 2+ peripheral pulses lower ex  Neurological: No focal deficits, CN II-XII intact bilaterally, sensation to light touch intact in all extremities, gait intact. Pupils are equally reactive to light and symmetrical in size.   Musculoskeletal: 5/5 strength b/l upper and lower extremities; no joint swelling.  Skin: No rashes  Psych: no depression or anhedonia, AAOx3  HEME: no bruises, no nose bleeds      Personally reviewed imaging, labs, EKG  LABS:                        9.1    4.77  )-----------( 202      ( 29 Aug 2020 07:07 )             30.1     08-29    140  |  101  |  11  ----------------------------<  226<H>  3.9   |  27  |  0.41<L>    Ca    9.6      29 Aug 2020 07:05  Phos  4.7     08-29  Mg     2.3     08-29    TPro  6.5  /  Alb  3.7  /  TBili  0.4  /  DBili  x   /  AST  31  /  ALT  28  /  AlkPhos  79  08-29      CARDIAC MARKERS ( 28 Aug 2020 17:54 )  x     / x     / 18 U/L / x     / <1.0 ng/mL          RADIOLOGY & ADDITIONAL TESTS:    Imaging Personally Reviewed:    Consultant(s) Notes Reviewed:      Care Discussed with Consultants/Other Providers: Patient is a 65y old  Female who presents with a chief complaint of sepsis 2/2 UTI (29 Aug 2020 08:00)      SUBJECTIVE / OVERNIGHT EVENTS:  No overnight events. This AM patient had no complaints. voided total of 1L as per record of output. No fevers, no chills, nausea/vomiting, SOB, chest pain. Still on IV ABX     MEDICATIONS  (STANDING):  calcium carbonate 1250 mG  + Vitamin D (OsCal 500 + D) 1 Tablet(s) Oral two times a day  cefTRIAXone   IVPB 1000 milliGRAM(s) IV Intermittent every 24 hours  dextrose 5%. 1000 milliLiter(s) (50 mL/Hr) IV Continuous <Continuous>  dextrose 50% Injectable 12.5 Gram(s) IV Push once  dextrose 50% Injectable 25 Gram(s) IV Push once  dextrose 50% Injectable 25 Gram(s) IV Push once  famotidine    Tablet 20 milliGRAM(s) Oral daily  gabapentin 300 milliGRAM(s) Oral at bedtime  insulin glargine Injectable (LANTUS) 12 Unit(s) SubCutaneous at bedtime  insulin lispro (HumaLOG) corrective regimen sliding scale   SubCutaneous three times a day before meals  insulin lispro (HumaLOG) corrective regimen sliding scale   SubCutaneous at bedtime  levETIRAcetam 500 milliGRAM(s) Oral two times a day  lisinopril 10 milliGRAM(s) Oral daily  polyethylene glycol 3350 17 Gram(s) Oral daily  senna 2 Tablet(s) Oral at bedtime    MEDICATIONS  (PRN):  acetaminophen   Tablet .. 650 milliGRAM(s) Oral every 6 hours PRN Temp greater or equal to 38C (100.4F), Mild Pain (1 - 3), Moderate Pain (4 - 6), Severe Pain (7 - 10)  aluminum hydroxide/magnesium hydroxide/simethicone Suspension 30 milliLiter(s) Oral every 4 hours PRN Dyspepsia  bisacodyl 5 milliGRAM(s) Oral every 12 hours PRN Constipation  dextrose 40% Gel 15 Gram(s) Oral once PRN Blood Glucose LESS THAN 70 milliGRAM(s)/deciliter  glucagon  Injectable 1 milliGRAM(s) IntraMuscular once PRN Glucose LESS THAN 70 milligrams/deciliter  oxyCODONE    IR 5 milliGRAM(s) Oral every 6 hours PRN Severe Pain (7 - 10)      CAPILLARY BLOOD GLUCOSE      POCT Blood Glucose.: 218 mg/dL (29 Aug 2020 22:25)  POCT Blood Glucose.: 265 mg/dL (29 Aug 2020 17:14)  POCT Blood Glucose.: 222 mg/dL (29 Aug 2020 13:05)  POCT Blood Glucose.: 233 mg/dL (29 Aug 2020 08:43)    I&O's Summary    28 Aug 2020 07:01  -  29 Aug 2020 07:00  --------------------------------------------------------  IN: 690 mL / OUT: 1450 mL / NET: -760 mL    29 Aug 2020 07:01  -  30 Aug 2020 06:45  --------------------------------------------------------  IN: 120 mL / OUT: 0 mL / NET: 120 mL        PHYSICAL EXAM:  Vital Signs Last 24 Hrs  T(C): 36.9 (08-30-20 @ 05:44)  T(F): 98.4 (08-30-20 @ 05:44), Max: 98.9 (08-29-20 @ 16:37)  HR: 95 (08-30-20 @ 05:44) (91 - 96)  BP: 158/85 (08-30-20 @ 05:44)  BP(mean): --  RR: 16 (08-30-20 @ 05:44) (16 - 18)  SpO2: 97% (08-30-20 @ 05:44) (97% - 99%)  Wt(kg): --    08-28 @ 07:01  -  08-29 @ 07:00  --------------------------------------------------------  IN: 690 mL / OUT: 1450 mL / NET: -760 mL    08-29 @ 07:01  -  08-30 @ 06:45  --------------------------------------------------------  IN: 120 mL / OUT: 0 mL / NET: 120 mL      Constitutional: NAD, awake and alert, obeys commands   EYES: EOMI  ENT:  Normal Hearing, no tonsillar exudates   Neck: Soft and supple , no thyromegaly   Respiratory: Breath sounds are clear bilaterally, No wheezing, rales or rhonchi, auscultated anteriorly   Cardiovascular: S1 and S2, regular rate and rhythm, no Murmurs, gallops or rubs, no JVD,    Gastrointestinal: Bowel Sounds present, soft, nontender, nondistended, no guarding, no rebound  Extremities: No cyanosis or clubbing; warm to touch  Vascular: 2+ peripheral pulses lower ex  Neurological: No focal deficits, CN II-XII intact bilaterally, sensation to light touch intact in all extremities, gait intact. Pupils are equally reactive to light and symmetrical in size.   Musculoskeletal: 5/5 strength b/l upper and lower extremities; no joint swelling.  Skin: raised papules around eyes apparently have been there chronically as per nurse no record in previous notes   Psych: no depression or anhedonia, AAOx3  HEME: no bruises, no nose bleeds      Personally reviewed imaging, labs, EKG  LABS:                        9.1    4.77  )-----------( 202      ( 29 Aug 2020 07:07 )             30.1     08-29    140  |  101  |  11  ----------------------------<  226<H>  3.9   |  27  |  0.41<L>    Ca    9.6      29 Aug 2020 07:05  Phos  4.7     08-29  Mg     2.3     08-29    TPro  6.5  /  Alb  3.7  /  TBili  0.4  /  DBili  x   /  AST  31  /  ALT  28  /  AlkPhos  79  08-29      CARDIAC MARKERS ( 28 Aug 2020 17:54 )  x     / x     / 18 U/L / x     / <1.0 ng/mL          RADIOLOGY & ADDITIONAL TESTS:    Imaging Personally Reviewed:    Consultant(s) Notes Reviewed:      Care Discussed with Consultants/Other Providers:

## 2020-08-30 NOTE — PROGRESS NOTE ADULT - PROBLEM SELECTOR PLAN 1
-no urinary complaints, adequately draining urine via primacath   -continue with IV Ceftriaxone as per ID 1g daily until BCx return, want bacteremia cleared   -no evidence of CVA tenderness -no urinary complaints, adequately draining urine via primacath (1L)  -continue with IV Ceftriaxone as per ID 1g daily--> can switch to PO ceftin per ID as BCX NGTD  -no evidence of CVA tenderness

## 2020-08-30 NOTE — PROGRESS NOTE ADULT - PROBLEM SELECTOR PLAN 6
-cont to monitor closely with serial exams; if worsening mentation can get stat repeat imaging +/- neurosurgical eval.   -start SCD, no pharm vte ppx.  -c/w keppra  -c/w oxy 5 q6 for pain prn.  - MRI no longer needed to r/o source

## 2020-08-31 ENCOUNTER — TRANSCRIPTION ENCOUNTER (OUTPATIENT)
Age: 65
End: 2020-08-31

## 2020-08-31 VITALS
RESPIRATION RATE: 18 BRPM | OXYGEN SATURATION: 97 % | HEART RATE: 111 BPM | DIASTOLIC BLOOD PRESSURE: 67 MMHG | TEMPERATURE: 98 F | SYSTOLIC BLOOD PRESSURE: 128 MMHG

## 2020-08-31 LAB
ALBUMIN SERPL ELPH-MCNC: 3.8 G/DL — SIGNIFICANT CHANGE UP (ref 3.3–5)
ALP SERPL-CCNC: 81 U/L — SIGNIFICANT CHANGE UP (ref 40–120)
ALT FLD-CCNC: 36 U/L — SIGNIFICANT CHANGE UP (ref 10–45)
ANION GAP SERPL CALC-SCNC: 10 MMOL/L — SIGNIFICANT CHANGE UP (ref 5–17)
AST SERPL-CCNC: 32 U/L — SIGNIFICANT CHANGE UP (ref 10–40)
BASOPHILS # BLD AUTO: 0.02 K/UL — SIGNIFICANT CHANGE UP (ref 0–0.2)
BASOPHILS NFR BLD AUTO: 0.3 % — SIGNIFICANT CHANGE UP (ref 0–2)
BILIRUB SERPL-MCNC: 0.4 MG/DL — SIGNIFICANT CHANGE UP (ref 0.2–1.2)
BUN SERPL-MCNC: 14 MG/DL — SIGNIFICANT CHANGE UP (ref 7–23)
CALCIUM SERPL-MCNC: 10 MG/DL — SIGNIFICANT CHANGE UP (ref 8.4–10.5)
CHLORIDE SERPL-SCNC: 96 MMOL/L — SIGNIFICANT CHANGE UP (ref 96–108)
CO2 SERPL-SCNC: 30 MMOL/L — SIGNIFICANT CHANGE UP (ref 22–31)
CREAT SERPL-MCNC: 0.5 MG/DL — SIGNIFICANT CHANGE UP (ref 0.5–1.3)
EOSINOPHIL # BLD AUTO: 0.03 K/UL — SIGNIFICANT CHANGE UP (ref 0–0.5)
EOSINOPHIL NFR BLD AUTO: 0.4 % — SIGNIFICANT CHANGE UP (ref 0–6)
GLUCOSE SERPL-MCNC: 253 MG/DL — HIGH (ref 70–99)
HCT VFR BLD CALC: 30.8 % — LOW (ref 34.5–45)
HGB BLD-MCNC: 9.5 G/DL — LOW (ref 11.5–15.5)
IMM GRANULOCYTES NFR BLD AUTO: 1.6 % — HIGH (ref 0–1.5)
LYMPHOCYTES # BLD AUTO: 2.09 K/UL — SIGNIFICANT CHANGE UP (ref 1–3.3)
LYMPHOCYTES # BLD AUTO: 29.9 % — SIGNIFICANT CHANGE UP (ref 13–44)
MAGNESIUM SERPL-MCNC: 2.4 MG/DL — SIGNIFICANT CHANGE UP (ref 1.6–2.6)
MCHC RBC-ENTMCNC: 25.7 PG — LOW (ref 27–34)
MCHC RBC-ENTMCNC: 30.8 GM/DL — LOW (ref 32–36)
MCV RBC AUTO: 83.2 FL — SIGNIFICANT CHANGE UP (ref 80–100)
MONOCYTES # BLD AUTO: 0.74 K/UL — SIGNIFICANT CHANGE UP (ref 0–0.9)
MONOCYTES NFR BLD AUTO: 10.6 % — SIGNIFICANT CHANGE UP (ref 2–14)
NEUTROPHILS # BLD AUTO: 4 K/UL — SIGNIFICANT CHANGE UP (ref 1.8–7.4)
NEUTROPHILS NFR BLD AUTO: 57.2 % — SIGNIFICANT CHANGE UP (ref 43–77)
NRBC # BLD: 0 /100 WBCS — SIGNIFICANT CHANGE UP (ref 0–0)
PHOSPHATE SERPL-MCNC: 3.6 MG/DL — SIGNIFICANT CHANGE UP (ref 2.5–4.5)
PLATELET # BLD AUTO: 242 K/UL — SIGNIFICANT CHANGE UP (ref 150–400)
POTASSIUM SERPL-MCNC: 4.8 MMOL/L — SIGNIFICANT CHANGE UP (ref 3.5–5.3)
POTASSIUM SERPL-SCNC: 4.8 MMOL/L — SIGNIFICANT CHANGE UP (ref 3.5–5.3)
PROT SERPL-MCNC: 6.9 G/DL — SIGNIFICANT CHANGE UP (ref 6–8.3)
RBC # BLD: 3.7 M/UL — LOW (ref 3.8–5.2)
RBC # FLD: 15.2 % — HIGH (ref 10.3–14.5)
SODIUM SERPL-SCNC: 136 MMOL/L — SIGNIFICANT CHANGE UP (ref 135–145)
WBC # BLD: 6.99 K/UL — SIGNIFICANT CHANGE UP (ref 3.8–10.5)
WBC # FLD AUTO: 6.99 K/UL — SIGNIFICANT CHANGE UP (ref 3.8–10.5)

## 2020-08-31 PROCEDURE — 85610 PROTHROMBIN TIME: CPT

## 2020-08-31 PROCEDURE — 85014 HEMATOCRIT: CPT

## 2020-08-31 PROCEDURE — 70496 CT ANGIOGRAPHY HEAD: CPT

## 2020-08-31 PROCEDURE — 71045 X-RAY EXAM CHEST 1 VIEW: CPT

## 2020-08-31 PROCEDURE — 74019 RADEX ABDOMEN 2 VIEWS: CPT

## 2020-08-31 PROCEDURE — 83880 ASSAY OF NATRIURETIC PEPTIDE: CPT

## 2020-08-31 PROCEDURE — 82010 KETONE BODYS QUAN: CPT

## 2020-08-31 PROCEDURE — 70450 CT HEAD/BRAIN W/O DYE: CPT

## 2020-08-31 PROCEDURE — 85027 COMPLETE CBC AUTOMATED: CPT

## 2020-08-31 PROCEDURE — 85018 HEMOGLOBIN: CPT

## 2020-08-31 PROCEDURE — 85730 THROMBOPLASTIN TIME PARTIAL: CPT

## 2020-08-31 PROCEDURE — 97162 PT EVAL MOD COMPLEX 30 MIN: CPT

## 2020-08-31 PROCEDURE — 84100 ASSAY OF PHOSPHORUS: CPT

## 2020-08-31 PROCEDURE — 83605 ASSAY OF LACTIC ACID: CPT

## 2020-08-31 PROCEDURE — 82330 ASSAY OF CALCIUM: CPT

## 2020-08-31 PROCEDURE — 86769 SARS-COV-2 COVID-19 ANTIBODY: CPT

## 2020-08-31 PROCEDURE — 84132 ASSAY OF SERUM POTASSIUM: CPT

## 2020-08-31 PROCEDURE — 83735 ASSAY OF MAGNESIUM: CPT

## 2020-08-31 PROCEDURE — 87086 URINE CULTURE/COLONY COUNT: CPT

## 2020-08-31 PROCEDURE — 87186 SC STD MICRODIL/AGAR DIL: CPT

## 2020-08-31 PROCEDURE — U0003: CPT

## 2020-08-31 PROCEDURE — 84295 ASSAY OF SERUM SODIUM: CPT

## 2020-08-31 PROCEDURE — 74177 CT ABD & PELVIS W/CONTRAST: CPT

## 2020-08-31 PROCEDURE — 82962 GLUCOSE BLOOD TEST: CPT

## 2020-08-31 PROCEDURE — 80048 BASIC METABOLIC PNL TOTAL CA: CPT

## 2020-08-31 PROCEDURE — 80053 COMPREHEN METABOLIC PANEL: CPT

## 2020-08-31 PROCEDURE — 82803 BLOOD GASES ANY COMBINATION: CPT

## 2020-08-31 PROCEDURE — 80177 DRUG SCRN QUAN LEVETIRACETAM: CPT

## 2020-08-31 PROCEDURE — 87040 BLOOD CULTURE FOR BACTERIA: CPT

## 2020-08-31 PROCEDURE — 96365 THER/PROPH/DIAG IV INF INIT: CPT

## 2020-08-31 PROCEDURE — 84484 ASSAY OF TROPONIN QUANT: CPT

## 2020-08-31 PROCEDURE — 70498 CT ANGIOGRAPHY NECK: CPT

## 2020-08-31 PROCEDURE — 82553 CREATINE MB FRACTION: CPT

## 2020-08-31 PROCEDURE — 96375 TX/PRO/DX INJ NEW DRUG ADDON: CPT

## 2020-08-31 PROCEDURE — 82435 ASSAY OF BLOOD CHLORIDE: CPT

## 2020-08-31 PROCEDURE — 99239 HOSP IP/OBS DSCHRG MGMT >30: CPT | Mod: GC

## 2020-08-31 PROCEDURE — 93005 ELECTROCARDIOGRAM TRACING: CPT

## 2020-08-31 PROCEDURE — 82947 ASSAY GLUCOSE BLOOD QUANT: CPT

## 2020-08-31 PROCEDURE — 87150 DNA/RNA AMPLIFIED PROBE: CPT

## 2020-08-31 PROCEDURE — 99285 EMERGENCY DEPT VISIT HI MDM: CPT | Mod: 25

## 2020-08-31 PROCEDURE — 96361 HYDRATE IV INFUSION ADD-ON: CPT

## 2020-08-31 PROCEDURE — 82550 ASSAY OF CK (CPK): CPT

## 2020-08-31 RX ORDER — FAMOTIDINE 10 MG/ML
1 INJECTION INTRAVENOUS
Qty: 30 | Refills: 0
Start: 2020-08-31 | End: 2020-09-29

## 2020-08-31 RX ORDER — CEFUROXIME AXETIL 250 MG
1 TABLET ORAL
Qty: 20 | Refills: 0
Start: 2020-08-31 | End: 2020-09-09

## 2020-08-31 RX ORDER — CEFUROXIME AXETIL 250 MG
1 TABLET ORAL
Qty: 28 | Refills: 0
Start: 2020-08-31 | End: 2020-09-13

## 2020-08-31 RX ADMIN — Medication 4 UNIT(S): at 12:36

## 2020-08-31 RX ADMIN — Medication 30 MILLILITER(S): at 15:22

## 2020-08-31 RX ADMIN — Medication 650 MILLIGRAM(S): at 05:32

## 2020-08-31 RX ADMIN — Medication 1 TABLET(S): at 17:10

## 2020-08-31 RX ADMIN — LEVETIRACETAM 500 MILLIGRAM(S): 250 TABLET, FILM COATED ORAL at 17:10

## 2020-08-31 RX ADMIN — POLYETHYLENE GLYCOL 3350 17 GRAM(S): 17 POWDER, FOR SOLUTION ORAL at 12:36

## 2020-08-31 RX ADMIN — Medication 2: at 12:36

## 2020-08-31 RX ADMIN — Medication 1 TABLET(S): at 05:32

## 2020-08-31 RX ADMIN — Medication 2: at 08:45

## 2020-08-31 RX ADMIN — Medication 650 MILLIGRAM(S): at 06:10

## 2020-08-31 RX ADMIN — Medication 4 UNIT(S): at 08:44

## 2020-08-31 RX ADMIN — FAMOTIDINE 20 MILLIGRAM(S): 10 INJECTION INTRAVENOUS at 12:36

## 2020-08-31 RX ADMIN — LISINOPRIL 10 MILLIGRAM(S): 2.5 TABLET ORAL at 05:32

## 2020-08-31 RX ADMIN — Medication 500 MILLIGRAM(S): at 05:32

## 2020-08-31 RX ADMIN — Medication 500 MILLIGRAM(S): at 17:10

## 2020-08-31 RX ADMIN — Medication 3: at 17:56

## 2020-08-31 RX ADMIN — LEVETIRACETAM 500 MILLIGRAM(S): 250 TABLET, FILM COATED ORAL at 05:32

## 2020-08-31 RX ADMIN — Medication 4 UNIT(S): at 17:56

## 2020-08-31 NOTE — PROGRESS NOTE ADULT - SUBJECTIVE AND OBJECTIVE BOX
PROGRESS NOTE:     CONTACT INFO:  Kaylee Sawyer MD   PGY-1  Pager: [phone] [facility]    Patient is a 65y old  Female who presents with a chief complaint of sepsis 2/2 UTI (30 Aug 2020 08:25)      SUBJECTIVE / OVERNIGHT EVENTS:  No acute events overnight. Patient seen and evaluated at bedside. Endorses mild headache. States that she has mild SOB which is baseline for her. Denies any urinary symptoms. States she feels cold. No fever.  Denies chest pain, palpitations, abdominal pain, nausea/vomiting.     ADDITIONAL REVIEW OF SYSTEMS:    Negative except as above.     MEDICATIONS  (STANDING):  calcium carbonate 1250 mG  + Vitamin D (OsCal 500 + D) 1 Tablet(s) Oral two times a day  cefuroxime   Tablet 500 milliGRAM(s) Oral every 12 hours  dextrose 5%. 1000 milliLiter(s) (50 mL/Hr) IV Continuous <Continuous>  dextrose 50% Injectable 12.5 Gram(s) IV Push once  dextrose 50% Injectable 25 Gram(s) IV Push once  dextrose 50% Injectable 25 Gram(s) IV Push once  famotidine    Tablet 20 milliGRAM(s) Oral daily  gabapentin 300 milliGRAM(s) Oral at bedtime  insulin glargine Injectable (LANTUS) 14 Unit(s) SubCutaneous at bedtime  insulin lispro (HumaLOG) corrective regimen sliding scale   SubCutaneous three times a day before meals  insulin lispro (HumaLOG) corrective regimen sliding scale   SubCutaneous at bedtime  insulin lispro Injectable (HumaLOG) 4 Unit(s) SubCutaneous three times a day before meals  levETIRAcetam 500 milliGRAM(s) Oral two times a day  lisinopril 10 milliGRAM(s) Oral daily  polyethylene glycol 3350 17 Gram(s) Oral daily  senna 2 Tablet(s) Oral at bedtime    MEDICATIONS  (PRN):  acetaminophen   Tablet .. 650 milliGRAM(s) Oral every 6 hours PRN Temp greater or equal to 38C (100.4F), Mild Pain (1 - 3), Moderate Pain (4 - 6), Severe Pain (7 - 10)  aluminum hydroxide/magnesium hydroxide/simethicone Suspension 30 milliLiter(s) Oral every 4 hours PRN Dyspepsia  bisacodyl 5 milliGRAM(s) Oral every 12 hours PRN Constipation  dextrose 40% Gel 15 Gram(s) Oral once PRN Blood Glucose LESS THAN 70 milliGRAM(s)/deciliter  glucagon  Injectable 1 milliGRAM(s) IntraMuscular once PRN Glucose LESS THAN 70 milligrams/deciliter  oxyCODONE    IR 5 milliGRAM(s) Oral every 6 hours PRN Severe Pain (7 - 10)      CAPILLARY BLOOD GLUCOSE      POCT Blood Glucose.: 223 mg/dL (31 Aug 2020 12:31)  POCT Blood Glucose.: 250 mg/dL (31 Aug 2020 08:28)  POCT Blood Glucose.: 267 mg/dL (30 Aug 2020 21:56)  POCT Blood Glucose.: 236 mg/dL (30 Aug 2020 17:10)    I&O's Summary    30 Aug 2020 07:01  -  31 Aug 2020 07:00  --------------------------------------------------------  IN: 250 mL / OUT: 1900 mL / NET: -1650 mL    31 Aug 2020 07:01  -  31 Aug 2020 13:28  --------------------------------------------------------  IN: 480 mL / OUT: 0 mL / NET: 480 mL        PHYSICAL EXAM:  Vital Signs Last 24 Hrs  T(C): 36.4 (31 Aug 2020 09:07), Max: 36.9 (30 Aug 2020 19:37)  T(F): 97.5 (31 Aug 2020 09:07), Max: 98.5 (30 Aug 2020 19:37)  HR: 111 (31 Aug 2020 09:07) (93 - 111)  BP: 128/67 (31 Aug 2020 09:07) (125/77 - 169/80)  BP(mean): --  RR: 18 (31 Aug 2020 09:07) (16 - 18)  SpO2: 97% (31 Aug 2020 09:07) (97% - 98%)    Constitutional: NAD, awake and alert, obeys commands   EYES: EOMI  ENT:  Normal Hearing, no tonsillar exudates   Neck: Soft and supple , no thyromegaly   Respiratory: Breath sounds are clear bilaterally, No wheezing, rales or rhonchi, auscultated anteriorly   Cardiovascular: S1 and S2, regular rate and rhythm, no Murmurs, gallops or rubs, no JVD,    Gastrointestinal: Bowel Sounds present, soft, nontender, nondistended, no guarding, no rebound  Extremities: No cyanosis or clubbing; warm to touch  Vascular: 2+ peripheral pulses lower ex  Neurological: No focal deficits, CN II-XII intact bilaterally, sensation to light touch intact in all extremities, gait intact. Pupils are equally reactive to light and symmetrical in size.   Musculoskeletal: 5/5 strength b/l upper and lower extremities; no joint swelling.  Skin: raised papules around eyes apparently have been there chronically as per nurse no record in previous notes   Psych: no depression or anhedonia, AAOx3  HEME: no bruises, no nose bleeds    LABS:                        9.5    6.99  )-----------( 242      ( 31 Aug 2020 06:53 )             30.8     08-31    136  |  96  |  14  ----------------------------<  253<H>  4.8   |  30  |  0.50    Ca    10.0      31 Aug 2020 06:52  Phos  3.6     08-31  Mg     2.4     08-31    TPro  6.9  /  Alb  3.8  /  TBili  0.4  /  DBili  x   /  AST  32  /  ALT  36  /  AlkPhos  81  08-31    Culture Results:   No growth to date. (08.28.20 @ 10:31)

## 2020-08-31 NOTE — PROGRESS NOTE ADULT - PROBLEM SELECTOR PLAN 5
-on lisinopril 10mg  -BP have been labile, with high values   -may reintroduce other anti-hypertensives as needed

## 2020-08-31 NOTE — DISCHARGE NOTE PROVIDER - NSDCCPCAREPLAN_GEN_ALL_CORE_FT
PRINCIPAL DISCHARGE DIAGNOSIS  Diagnosis: Sepsis  Assessment and Plan of Treatment: You came in to the hospital with fever and signs of severe infection, or sepsis. You had a bacterial known as E coli in your urine, which is what we suspect was the source of your infectious symptoms. We started you on some IV antibiotics, which cleared up your infection. On discharge, you will be sent home with a 14 day course of oral antibiotics.      SECONDARY DISCHARGE DIAGNOSES  Diagnosis: Urinary retention  Assessment and Plan of Treatment: You had some urinary retention, meaning that it was difficult for you to urinate fully. This likely contribued to the urinary infection mentioned above. We placed a catheter to help you drain out urine and removed it prior to discharging you. You should follow up with a urologist in a week to evaluate to see if you're still retaining.    Diagnosis: Subdural hematoma  Assessment and Plan of Treatment: You had a history of a bleed in your head following a fall. You had neurosurgical evacuation of the bleed in prior admissions. During this admission, continued your Keppra medication to prevent seizures.

## 2020-08-31 NOTE — PROGRESS NOTE ADULT - PROBLEM SELECTOR PROBLEM 9
Prophylactic measure
Discharge planning issues
Discharge planning issues
Prophylactic measure
Discharge planning issues
Prophylactic measure

## 2020-08-31 NOTE — PROGRESS NOTE ADULT - PROBLEM SELECTOR PLAN 6
- Pt's neuro exam at baseline  -cont to monitor closely with serial exams; if worsening mentation can get stat repeat imaging +/- neurosurgical eval.   -start SCD, no pharm vte ppx.  -c/w keppra  -c/w oxy 5 q6 for pain prn.  - MRI no longer needed to r/o source

## 2020-08-31 NOTE — PROGRESS NOTE ADULT - PROBLEM SELECTOR PLAN 7
-fingerstick glucose values in 200s  - Lantus 14 units qhs and lispro 4 units qhs  - ISS  -c/w gabapentin  -consistent carb diet

## 2020-08-31 NOTE — DISCHARGE NOTE PROVIDER - NSDCFUSCHEDAPPT_GEN_ALL_CORE_FT
[facility] ; 09/18/2020 ; NPP Urology [address] [facility] ; 09/18/2020 ; NPP Urology [address] [facility] ; 09/21/2020 ; NPP Rad MRI [address] [facility] ; 09/25/2020 ; NPP NeuroSurg [address] [facility] ; 09/18/2020 ; NPP Urology [address] [facility] ; 09/18/2020 ; NPP Urology [address] [facility] ; 09/21/2020 ; NPP Rad MRI 10 [address] [facility] ; 09/25/2020 ; NPP NeuroSurg [address] [facility] ; 09/18/2020 ; NPP Urology [address] [facility] ; 09/18/2020 ; NPP Urology [facility] ; 09/21/2020 ; NPP Rad MRI [address] [facility] ; 09/25/2020 ; NPP NeuroSurg [address]

## 2020-08-31 NOTE — DISCHARGE NOTE PROVIDER - NSDCMRMEDTOKEN_GEN_ALL_CORE_FT
acetaminophen 160 mg/5 mL oral suspension: 20.31 milliliter(s) orally every 6 hours, As needed, Temp greater or equal to 38C (100.4F), Mild Pain (1 - 3)  bisacodyl 5 mg oral delayed release tablet: 1 tab(s) orally every 12 hours, As needed, Constipation  Calcium 500+D oral tablet, chewable: 1 tab(s) orally 2 times a day  commmode: s/p Left SDH evacuation   gabapentin 300 mg oral capsule: 1 cap(s) orally once a day (at bedtime)  glimepiride 2 mg oral tablet: 1 tab(s) orally once a day  levETIRAcetam 500 mg oral tablet: 1 tab(s) orally 2 times a day  lisinopril 5 mg oral tablet: 1 tab(s) orally once a day  metFORMIN 500 mg oral tablet: 1 tab(s) orally 2 times a day  ocular lubricant ophthalmic solution: 1 drop(s) to each affected eye every 4 hours  oxyCODONE 5 mg oral tablet: 1 tab(s) orally every 4 hours, As needed, Moderate Pain (4 - 6) MDD:6  Rolling Walker: s/p L SDH evacuation   Senna 8.6 mg oral tablet: 1 tab(s) orally 2 times a day  shower chair: Left  SDH evacuation   wheel chair : DX subdural hematoma hematoma   gait ataxia acetaminophen 160 mg/5 mL oral suspension: 20.31 milliliter(s) orally every 6 hours, As needed, Temp greater or equal to 38C (100.4F), Mild Pain (1 - 3)  bisacodyl 5 mg oral delayed release tablet: 1 tab(s) orally every 12 hours, As needed, Constipation  Calcium 500+D oral tablet, chewable: 1 tab(s) orally 2 times a day  cefuroxime 500 mg oral tablet: 1 tab(s) orally every 12 hours  commmode: s/p Left SDH evacuation   famotidine 20 mg oral tablet: 1 tab(s) orally once a day  gabapentin 300 mg oral capsule: 1 cap(s) orally once a day (at bedtime)  glimepiride 2 mg oral tablet: 1 tab(s) orally once a day  levETIRAcetam 500 mg oral tablet: 1 tab(s) orally 2 times a day  lisinopril 5 mg oral tablet: 1 tab(s) orally once a day  metFORMIN 500 mg oral tablet: 1 tab(s) orally 2 times a day  ocular lubricant ophthalmic solution: 1 drop(s) to each affected eye every 4 hours  oxyCODONE 5 mg oral tablet: 1 tab(s) orally every 4 hours, As needed, Moderate Pain (4 - 6) MDD:6  Rolling Walker: s/p L SDH evacuation   Senna 8.6 mg oral tablet: 1 tab(s) orally 2 times a day  shower chair: Left  SDH evacuation   wheel chair : DX subdural hematoma hematoma   gait ataxia

## 2020-08-31 NOTE — DISCHARGE NOTE PROVIDER - NSDCCPTREATMENT_GEN_ALL_CORE_FT
PRINCIPAL PROCEDURE  Procedure: XR abdomen 1V  Findings and Treatment:   IMPRESSION:  Nonobstructive bowel gas pattern. Stool throughout the descending colon.      SECONDARY PROCEDURE  Procedure: XR chest AP  Findings and Treatment: FINDINGS/  IMPRESSION:  The cardiomediastinal silhouette is normal.  Clear lungs. No pleural effusion or pneumothorax.

## 2020-08-31 NOTE — DISCHARGE NOTE NURSING/CASE MANAGEMENT/SOCIAL WORK - PATIENT PORTAL LINK FT
You can access the FollowMyHealth Patient Portal offered by [facility] by registering at the following website: [email or web address]. By joining [facility]’s FollowMyHealth portal, you will also be able to view your health information using other applications (apps) compatible with our system.

## 2020-08-31 NOTE — DISCHARGE NOTE PROVIDER - CARE PROVIDER_API CALL
Carlitos Strange [address] [address]  Phone: [phone]  Fax: [phone]  Follow Up Time:

## 2020-08-31 NOTE — PROGRESS NOTE ADULT - PROBLEM SELECTOR PLAN 9
scd    Possible discharge to acute rehab
Transitions of Care Status:  1.  Name of PCP:  2.  PCP Contacted on Admission: [ ] Y    [ ] N    3.  PCP contacted at Discharge: [ ] Y    [ ] N    [ ] N/A  4.  Post-Discharge Appointment Date and Location:  5.  Summary of Handoff given to PCP:
Transitions of Care Status:  1.  Name of PCP:  2.  PCP Contacted on Admission: [ ] Y    [ ] N    3.  PCP contacted at Discharge: [ ] Y    [ ] N    [ ] N/A  4.  Post-Discharge Appointment Date and Location:  5.  Summary of Handoff given to PCP:
scd
Transitions of Care Status:  1.  Name of PCP:  2.  PCP Contacted on Admission: [ ] Y    [ ] N    3.  PCP contacted at Discharge: [ ] Y    [ ] N    [ ] N/A  4.  Post-Discharge Appointment Date and Location:  5.  Summary of Handoff given to PCP:
scd

## 2020-08-31 NOTE — PROGRESS NOTE ADULT - PROBLEM SELECTOR PLAN 1
-no urinary complaints, adequately draining urine via primacath (1.9L)  - PO cefuroxime per ID on discharge as BCX NGTD  -no evidence of CVA tenderness

## 2020-08-31 NOTE — PROGRESS NOTE ADULT - PROBLEM SELECTOR PROBLEM 2
Sepsis without acute organ dysfunction, due to unspecified organism

## 2020-08-31 NOTE — PROGRESS NOTE ADULT - ATTENDING COMMENTS
Bladder scan with 250ml of urine remained in the bladder.  family refuses acute rehab and wants to take patient home.  Patient will need to f/up with urologist and PCP within one week of hospital dc.
65F PMH, recent fall c/b SDH s/p L craniotomy/evacuation of hematoma x 2, urinary retention s/p chronic charles, T2DM, HTN, dysphagia, p/w fever.  Pt previously admitted 7/25 - 8/14/20 for mechanical fall/emesis, rapid neurologic deterioration, intubated for airway protection, and eventually code stroke was called and pt was dx with Large L SDH with MLS.  Taken to OR for L crani/SDH evacuation 7/26. Poor response, neurologically continued to deteriorate with increase in L pupillary size, interval CTH unchanged.  Given potential for further seizure risk and ongoing MLS, pt taken was RTOR for repeat L crani/exploration, then s/p mild recurrent SDH/clot evacuation, with bone flap replaced on 7/31. Started on amantadine for neuro-stimulation. Course c/b persistent urinary retention, evaluated by urology and started on flomax and hadchronic charles placed for discharge. Of note, UA at the time was positive and Ucx grew out 100K pansensitive ecoli, but pt was asx/afebrile/without leukocytosis; ID was consulted and rec no abx as thought was that she was chronically colonized.    In the ED , Tm was 103.3F  HR=  , during my physical examination , pt has photophobia but neg Brudzinski and neg kernig.  Pt is currently on Ceftriaxone which will continue and Neurosurgery and ID consults are called , awaiting on recommendations.      Anna Nixon   Hospitalist   [phone]
Pt aw E coli bacteremia, repeat cx negative. Transition to po abx. D/c planning.
65F PMH, recent fall c/b SDH s/p L craniotomy/evacuation of hematoma x 2, urinary retention s/p chronic charles, T2DM, HTN, dysphagia, p/w fever.  Pt previously admitted 7/25 - 8/14/20 for mechanical fall/emesis, rapid neurologic deterioration, intubated for airway protection, and eventually code stroke was called and pt was dx with Large L SDH with MLS.  Taken to OR for L crani/SDH evacuation 7/26. Poor response, neurologically continued to deteriorate with increase in L pupillary size, interval CTH unchanged.  Given potential for further seizure risk and ongoing MLS, pt taken was RTOR for repeat L crani/exploration, then s/p mild recurrent SDH/clot evacuation, with bone flap replaced on 7/31. Started on amantadine for neuro-stimulation. Course c/b persistent urinary retention, evaluated by urology and started on flomax and hadchronic charles placed for discharge. Of note, UA at the time was positive and Ucx grew out 100K pansensitive ecoli, but pt was asx/afebrile/without leukocytosis; ID was consulted and rec no abx as thought was that she was chronically colonized.    In the ED , Tm was 103.3F  HR=  and received Ceftriaxone and Vanco in the ED .  Blood CX revealed 2/2 sets with E coli awaiting on Sensitivity .  Cont Ceftriaxone .  F/up CT Of abd/pelvis to r/o stone/pyelonephritis.  Repeat Blood CX in AM.    Anna Nixon   Hospitalist   [phone].
65F PMH, recent fall c/b SDH s/p L craniotomy/evacuation of hematoma x 2, urinary retention s/p chronic charles, T2DM, HTN, dysphagia, p/w fever.  Pt previously admitted 7/25 - 8/14/20 for mechanical fall/emesis, rapid neurologic deterioration, intubated for airway protection, and eventually code stroke was called and pt was dx with Large L SDH.  Taken to OR for L crani/SDH evacuation 7/26. Poor response, neurologically continued to deteriorate with increase in L pupillary size, interval CTH unchanged.  Given potential for further seizure risk and ongoing MLS, pt taken was RTOR for repeat L crani/exploration, then s/p mild recurrent SDH/clot evacuation, with bone flap replaced on 7/31. Started on amantadine for neuro-stimulation. Course c/b persistent urinary retention, evaluated by urology and started on flomax and hadchronic charles placed for discharge. Of note, UA at the time was positive and Ucx grew out 100K pansensitive ecoli, but pt was asx/afebrile/without leukocytosis; ID was consulted and rec no abx as thought was that she was chronically colonized. On 8/26 pt was brought to ED for fever.  In the ED , Tm was 103.3F  HR=  and received Ceftriaxone and Vanco in the ED .  Blood CX  and UCX with  E coli.  CT of abd with Pyelonephritis with no stone.   Repeat Blood CX Is done on 8/28 and f/up results to see if bacteremia is cleared .       Anna Nixon   Hospitalist   [phone].
F/u repeat blood cx.

## 2020-08-31 NOTE — DISCHARGE NOTE PROVIDER - HOSPITAL COURSE
65F PMH, recent fall c/b SDH s/p L craniotomy/evacuation of hematoma x 2, urinary retention s/p chronic charles, T2DM, HTN, dysphagia, p/w fever. Pt/family decline Welsh translation services, DIL at bedside providing translation to English. Pt previously admitted 7/25 - 8/14/20 for mechanical fall/emesis, rapid neurologic deterioration, intubated for airway protection, and eventually code stroke was called and pt was dx with Large L SDH with MLS.  Taken to OR for L crani/SDH evacuation 7/26. Poor response, neurologically continued to deteriorate with increase in L pupillary size, interval CTH unchanged.  Given potential for further seizure risk and ongoing MLS, pt taken was RTOR for repeat L crani/exploration, then s/p mild recurrent SDH/clot evacuation, with bone flap replaced on 7/31. Started on amantadine for neuro-stimulation. Course c/b persistent urinary retention, evaluated by urology and started on flomax and hadchronic charles placed for discharge. Of note, UA at the time was positive and Ucx grew out 100K pansensitive ecoli, but pt was asx/afebrile/without leukocytosis; ID was consulted and rec no abx as thought was that she was chronically colonized. PT/PMR rec Acute rehab, family declined and took her home instead.         Since discharge, pt was seen twice in the ER.  First time was for pleuritic chest pain on 8/15. EKG nonischemic, trop neg, CTA neg for PE. Presumed gastritis, started on ppi. Second visit was for uncontrolled HTN sbp 180s, HA, and tingling in extremities on 8/18/20. CTH then was unchanged from 8/15.  Workup only revealed thrombocytopenia to 100; ER advised pt to stop prior Rx'd protonix and was sent home for pcp f/u.  In interim, pt has followed with her neurosurgeon who reviewed her interval scans and restarted pt's gabapentin 300 qhs for c/o tingling in extremities. Flomax has since been d/c as well.  Per DIL, pt today was noted to be febrile, and with decreased responsiveness, not following commands like she ordinarily did (prev could stick tongue out when asked, was not doing it today). +ongoing mild HA related to prior craniotomies.  Onset of this deterioration was since around 7:30 pm.  Denies c/o cp, sob, n/v/d, decreased/increased urine production in charles, hematuria, abd pain, back pain, visual changes. Pt tolerating her dysphagia diet without choking episodes. No new rash, joint pain.         VS: Tm 103.3, 133 -->115, 106/63 --> 141/65, 20, 100% Ra.  Labs: no leukocytosis 9.4, chronic stable anemia, plt wnl, mild pseudohypoNa 133 fsg 300s, GFR intact scr 0.5, bhb 0.6, Lactate 7.9 -->4.1-->3.1. UA pos. Covid 19 pcr neg. CXR prelim clear lungs. CTH/CTA h/n: +redemonstrated L frontal/L parietal/L temporaral subdural collection, grossly stable from 8/18/20. +patent ant/post circulation. In ER pt received vanco, ceftriaxone, IVF, 5u regular insulin, and tylenol prior to medicine team involvement. 65F PMH, recent fall c/b SDH s/p L craniotomy/evacuation of hematoma x 2, urinary retention s/p chronic charles, T2DM, HTN, dysphagia, p/w fever. Pt/family decline Turkish translation services, DIL at bedside providing translation to English. Pt previously admitted 7/25 - 8/14/20 for mechanical fall/emesis, rapid neurologic deterioration, intubated for airway protection, and eventually code stroke was called and pt was dx with Large L SDH with MLS.  Taken to OR for L crani/SDH evacuation 7/26. Poor response, neurologically continued to deteriorate with increase in L pupillary size, interval CTH unchanged.  Given potential for further seizure risk and ongoing MLS, pt taken was RTOR for repeat L crani/exploration, then s/p mild recurrent SDH/clot evacuation, with bone flap replaced on 7/31. Started on amantadine for neuro-stimulation. Course c/b persistent urinary retention, evaluated by urology and started on flomax and hadchronic charles placed for discharge. Of note, UA at the time was positive and Ucx grew out 100K pansensitive ecoli, but pt was asx/afebrile/without leukocytosis; ID was consulted and rec no abx as thought was that she was chronically colonized. PT/PMR rec Acute rehab, family declined and took her home instead.         Since discharge, pt was seen twice in the ER.  First time was for pleuritic chest pain on 8/15. EKG nonischemic, trop neg, CTA neg for PE. Presumed gastritis, started on ppi. Second visit was for uncontrolled HTN sbp 180s, HA, and tingling in extremities on 8/18/20. CTH then was unchanged from 8/15.  Workup only revealed thrombocytopenia to 100; ER advised pt to stop prior Rx'd protonix and was sent home for pcp f/u.  In interim, pt has followed with her neurosurgeon who reviewed her interval scans and restarted pt's gabapentin 300 qhs for c/o tingling in extremities. Flomax has since been d/c as well.  Per DIL, pt today was noted to be febrile, and with decreased responsiveness, not following commands like she ordinarily did (prev could stick tongue out when asked, was not doing it today). +ongoing mild HA related to prior craniotomies.  Onset of this deterioration was since around 7:30 pm.  Denies c/o cp, sob, n/v/d, decreased/increased urine production in charles, hematuria, abd pain, back pain, visual changes. Pt tolerating her dysphagia diet without choking episodes. No new rash, joint pain.         VS: Tm 103.3, 133 -->115, 106/63 --> 141/65, 20, 100% Ra.  Labs: no leukocytosis 9.4, chronic stable anemia, plt wnl, mild pseudohypoNa 133 fsg 300s, GFR intact scr 0.5, bhb 0.6, Lactate 7.9 -->4.1-->3.1. UA pos. Covid 19 pcr neg. CXR prelim clear lungs. CTH/CTA h/n: +redemonstrated L frontal/L parietal/L temporaral subdural collection, grossly stable from 8/18/20. +patent ant/post circulation. In ER pt received vanco, ceftriaxone, IVF, 5u regular insulin, and tylenol prior to medicine team involvement.         CT of abd confirmed Pyelonephritis with no stone. She was started on ceftriaxone. Found to be retaining urine, charles catheter was placed.  Repeat Blood CX was done on 8/28 was negative. Pt was discharged on cefuroxime. Charles was removed and the patient was voiding spontaneously. Lantus was added for increased POC Glucose. Her mentation improved back to baseline. She was continued on lisinopril 10mg for hypertension.

## 2020-08-31 NOTE — PROGRESS NOTE ADULT - PROBLEM SELECTOR PROBLEM 3
Septic encephalopathy

## 2020-08-31 NOTE — PROGRESS NOTE ADULT - SUBJECTIVE AND OBJECTIVE BOX
CC: f/u for  uti, ecoli bacteremia  Patient reports  she is ok  REVIEW OF SYSTEMS:  All other review of systems negative (Comprehensive ROS)    Antimicrobials Day #  :7/14  cefuroxime   Tablet 500 milliGRAM(s) Oral every 12 hours    Other Medications Reviewed    T(F): 97.5 (08-31-20 @ 09:07), Max: 98.5 (08-30-20 @ 19:37)  HR: 111 (08-31-20 @ 09:07)  BP: 128/67 (08-31-20 @ 09:07)  RR: 18 (08-31-20 @ 09:07)  SpO2: 97% (08-31-20 @ 09:07)  Wt(kg): --    PHYSICAL EXAM:  General: alert, no acute distress  Eyes:  anicteric, no conjunctival injection, no discharge  Oropharynx: no lesions or injection 	  Neck: supple, without adenopathy  Lungs: clear to auscultation  Heart: regular rate and rhythm; no murmur, rubs or gallops  Abdomen: soft, nondistended, nontender, without mass or organomegaly  Skin: no lesions  Extremities: no clubbing, cyanosis, or edema  Neurologic: alert,  moves all extremities    LAB RESULTS:                        9.5    6.99  )-----------( 242      ( 31 Aug 2020 06:53 )             30.8     08-31    136  |  96  |  14  ----------------------------<  253<H>  4.8   |  30  |  0.50    Ca    10.0      31 Aug 2020 06:52  Phos  3.6     08-31  Mg     2.4     08-31    TPro  6.9  /  Alb  3.8  /  TBili  0.4  /  DBili  x   /  AST  32  /  ALT  36  /  AlkPhos  81  08-31    LIVER FUNCTIONS - ( 31 Aug 2020 06:52 )  Alb: 3.8 g/dL / Pro: 6.9 g/dL / ALK PHOS: 81 U/L / ALT: 36 U/L / AST: 32 U/L / GGT: x             MICROBIOLOGY:  RECENT CULTURES:  08-28 @ 10:31 .Blood Blood     No growth to date.          RADIOLOGY REVIEWED:      < from: CT Abdomen and Pelvis w/ IV Cont (08.27.20 @ 23:03) >  EXAM:  CT ABDOMEN AND PELVIS IC                            PROCEDURE DATE:  08/27/2020            INTERPRETATION:  CLINICAL INFORMATION: Pyelonephritis, urosepsis, and bacteremia.    COMPARISON: CT abdomen pelvis 7/25/2020.    PROCEDURE:  CT of theAbdomen and Pelvis was performed with intravenous contrast.  Intravenous contrast: 90 ml Omnipaque 350. 10 ml discarded.  Oral contrast: None.  Sagittal and coronal reformats were performed.    FINDINGS:  LOWER CHEST: Within normal limits.    LIVER: Cirrhotic liver morphology.  BILE DUCTS: Normal caliber.  GALLBLADDER: Cholecystectomy.  SPLEEN: Within normal limits.  PANCREAS: Within normal limits.  ADRENALS: Within normal limits.  KIDNEYS/URETERS: Ill-defined wedge-shaped hypoattenuating areas in the right kidney. No perinephric abscess. Mild fullness of the right renal collecting system without obstructing calculus visualized. Stable left renal cyst and subcentimeter hypodensities too small to characterize.  BLADDER: Contains small focus ofair. Correlate for recent instrumentation. Mild bladder wall thickening.  REPRODUCTIVE ORGANS: Uterus and adnexa within normal limits.    BOWEL: No bowel obstruction. Appendix is normal.  PERITONEUM: No ascites.  VESSELS: Within normal limits.  RETROPERITONEUM/LYMPH NODES: No lymphadenopathy.  ABDOMINAL WALL: Within normal limits.  BONES: Degenerative changes.    IMPRESSION:    Findings compatible with right pyelonephritis/cystitis. No perinephric abscess.      < end of copied text >          Assessment: craniotomy for sdh, developed urinary retention, home with charles a few weeks ago, had charles changed, returned with fever, ecoli bacteremia now had charles pulled and urinates. Follow up blood cultures are negative    Plan: 1 more week of po antibiotics with ceftin, had a week of iv  monitor for retention

## 2020-08-31 NOTE — PROGRESS NOTE ADULT - PROBLEM SELECTOR PLAN 2
resolved  -fever, tachycardia, elevated lactate on admission. No afebrile and normotensive  - PO ceftin on DC   - mentation at baseline today  - BCx negative

## 2020-09-02 LAB
CULTURE RESULTS: SIGNIFICANT CHANGE UP
CULTURE RESULTS: SIGNIFICANT CHANGE UP
SPECIMEN SOURCE: SIGNIFICANT CHANGE UP
SPECIMEN SOURCE: SIGNIFICANT CHANGE UP

## 2020-09-10 ENCOUNTER — EMERGENCY (EMERGENCY)
Facility: HOSPITAL | Age: 65
LOS: 1 days | Discharge: HOME | End: 2020-09-10
Attending: EMERGENCY MEDICINE
Payer: MEDICARE

## 2020-09-10 VITALS
HEART RATE: 112 BPM | DIASTOLIC BLOOD PRESSURE: 84 MMHG | WEIGHT: 98.55 LBS | SYSTOLIC BLOOD PRESSURE: 154 MMHG | TEMPERATURE: 99 F | OXYGEN SATURATION: 100 % | RESPIRATION RATE: 18 BRPM

## 2020-09-10 VITALS
HEART RATE: 104 BPM | SYSTOLIC BLOOD PRESSURE: 146 MMHG | RESPIRATION RATE: 18 BRPM | OXYGEN SATURATION: 100 % | TEMPERATURE: 98 F | DIASTOLIC BLOOD PRESSURE: 90 MMHG

## 2020-09-10 DIAGNOSIS — Z86.79 PERSONAL HISTORY OF OTHER DISEASES OF THE CIRCULATORY SYSTEM: Chronic | ICD-10-CM

## 2020-09-10 DIAGNOSIS — Z98.890 OTHER SPECIFIED POSTPROCEDURAL STATES: Chronic | ICD-10-CM

## 2020-09-10 DIAGNOSIS — S06.5X9A TRAUMATIC SUBDURAL HEMORRHAGE WITH LOSS OF CONSCIOUSNESS OF UNSPECIFIED DURATION, INITIAL ENCOUNTER: Chronic | ICD-10-CM

## 2020-09-10 LAB
ALBUMIN SERPL ELPH-MCNC: 4.1 G/DL — SIGNIFICANT CHANGE UP (ref 3.3–5)
ALP SERPL-CCNC: 72 U/L — SIGNIFICANT CHANGE UP (ref 40–120)
ALT FLD-CCNC: 42 U/L — SIGNIFICANT CHANGE UP (ref 10–45)
ANION GAP SERPL CALC-SCNC: 12 MMOL/L — SIGNIFICANT CHANGE UP (ref 5–17)
APPEARANCE UR: CLEAR — SIGNIFICANT CHANGE UP
AST SERPL-CCNC: 34 U/L — SIGNIFICANT CHANGE UP (ref 10–40)
BACTERIA # UR AUTO: ABNORMAL
BASOPHILS # BLD AUTO: 0.02 K/UL — SIGNIFICANT CHANGE UP (ref 0–0.2)
BASOPHILS NFR BLD AUTO: 0.3 % — SIGNIFICANT CHANGE UP (ref 0–2)
BILIRUB SERPL-MCNC: 0.5 MG/DL — SIGNIFICANT CHANGE UP (ref 0.2–1.2)
BILIRUB UR-MCNC: NEGATIVE — SIGNIFICANT CHANGE UP
BUN SERPL-MCNC: 10 MG/DL — SIGNIFICANT CHANGE UP (ref 7–23)
CALCIUM SERPL-MCNC: 9.5 MG/DL — SIGNIFICANT CHANGE UP (ref 8.4–10.5)
CHLORIDE SERPL-SCNC: 98 MMOL/L — SIGNIFICANT CHANGE UP (ref 96–108)
CO2 SERPL-SCNC: 26 MMOL/L — SIGNIFICANT CHANGE UP (ref 22–31)
COLOR SPEC: COLORLESS — SIGNIFICANT CHANGE UP
CREAT SERPL-MCNC: 0.38 MG/DL — LOW (ref 0.5–1.3)
D DIMER BLD IA.RAPID-MCNC: <150 NG/ML DDU — SIGNIFICANT CHANGE UP
DIFF PNL FLD: NEGATIVE — SIGNIFICANT CHANGE UP
EOSINOPHIL # BLD AUTO: 0 K/UL — SIGNIFICANT CHANGE UP (ref 0–0.5)
EOSINOPHIL NFR BLD AUTO: 0 % — SIGNIFICANT CHANGE UP (ref 0–6)
EPI CELLS # UR: 0 /HPF — SIGNIFICANT CHANGE UP
GLUCOSE SERPL-MCNC: 187 MG/DL — HIGH (ref 70–99)
GLUCOSE UR QL: ABNORMAL
HCT VFR BLD CALC: 31.9 % — LOW (ref 34.5–45)
HGB BLD-MCNC: 9.8 G/DL — LOW (ref 11.5–15.5)
HYALINE CASTS # UR AUTO: 1 /LPF — SIGNIFICANT CHANGE UP (ref 0–2)
IMM GRANULOCYTES NFR BLD AUTO: 0.4 % — SIGNIFICANT CHANGE UP (ref 0–1.5)
KETONES UR-MCNC: NEGATIVE — SIGNIFICANT CHANGE UP
LEUKOCYTE ESTERASE UR-ACNC: ABNORMAL
LIDOCAIN IGE QN: 47 U/L — SIGNIFICANT CHANGE UP (ref 7–60)
LYMPHOCYTES # BLD AUTO: 1.46 K/UL — SIGNIFICANT CHANGE UP (ref 1–3.3)
LYMPHOCYTES # BLD AUTO: 21.2 % — SIGNIFICANT CHANGE UP (ref 13–44)
MAGNESIUM SERPL-MCNC: 2.1 MG/DL — SIGNIFICANT CHANGE UP (ref 1.6–2.6)
MCHC RBC-ENTMCNC: 24.9 PG — LOW (ref 27–34)
MCHC RBC-ENTMCNC: 30.7 GM/DL — LOW (ref 32–36)
MCV RBC AUTO: 81 FL — SIGNIFICANT CHANGE UP (ref 80–100)
MONOCYTES # BLD AUTO: 0.53 K/UL — SIGNIFICANT CHANGE UP (ref 0–0.9)
MONOCYTES NFR BLD AUTO: 7.7 % — SIGNIFICANT CHANGE UP (ref 2–14)
NEUTROPHILS # BLD AUTO: 4.85 K/UL — SIGNIFICANT CHANGE UP (ref 1.8–7.4)
NEUTROPHILS NFR BLD AUTO: 70.4 % — SIGNIFICANT CHANGE UP (ref 43–77)
NITRITE UR-MCNC: POSITIVE
NRBC # BLD: 0 /100 WBCS — SIGNIFICANT CHANGE UP (ref 0–0)
PH UR: 7.5 — SIGNIFICANT CHANGE UP (ref 5–8)
PLATELET # BLD AUTO: 249 K/UL — SIGNIFICANT CHANGE UP (ref 150–400)
POTASSIUM SERPL-MCNC: 3.7 MMOL/L — SIGNIFICANT CHANGE UP (ref 3.5–5.3)
POTASSIUM SERPL-SCNC: 3.7 MMOL/L — SIGNIFICANT CHANGE UP (ref 3.5–5.3)
PROT SERPL-MCNC: 6.8 G/DL — SIGNIFICANT CHANGE UP (ref 6–8.3)
PROT UR-MCNC: NEGATIVE — SIGNIFICANT CHANGE UP
RBC # BLD: 3.94 M/UL — SIGNIFICANT CHANGE UP (ref 3.8–5.2)
RBC # FLD: 14.2 % — SIGNIFICANT CHANGE UP (ref 10.3–14.5)
RBC CASTS # UR COMP ASSIST: 1 /HPF — SIGNIFICANT CHANGE UP (ref 0–4)
SODIUM SERPL-SCNC: 136 MMOL/L — SIGNIFICANT CHANGE UP (ref 135–145)
SP GR SPEC: 1 — LOW (ref 1.01–1.02)
TROPONIN T, HIGH SENSITIVITY RESULT: 17 NG/L — SIGNIFICANT CHANGE UP (ref 0–51)
TROPONIN T, HIGH SENSITIVITY RESULT: 19 NG/L — SIGNIFICANT CHANGE UP (ref 0–51)
UROBILINOGEN FLD QL: NEGATIVE — SIGNIFICANT CHANGE UP
WBC # BLD: 6.89 K/UL — SIGNIFICANT CHANGE UP (ref 3.8–10.5)
WBC # FLD AUTO: 6.89 K/UL — SIGNIFICANT CHANGE UP (ref 3.8–10.5)
WBC UR QL: 56 /HPF — HIGH (ref 0–5)

## 2020-09-10 PROCEDURE — 83690 ASSAY OF LIPASE: CPT

## 2020-09-10 PROCEDURE — 71045 X-RAY EXAM CHEST 1 VIEW: CPT | Mod: 26

## 2020-09-10 PROCEDURE — 84484 ASSAY OF TROPONIN QUANT: CPT

## 2020-09-10 PROCEDURE — 80053 COMPREHEN METABOLIC PANEL: CPT

## 2020-09-10 PROCEDURE — 83735 ASSAY OF MAGNESIUM: CPT

## 2020-09-10 PROCEDURE — 81001 URINALYSIS AUTO W/SCOPE: CPT

## 2020-09-10 PROCEDURE — 93010 ELECTROCARDIOGRAM REPORT: CPT

## 2020-09-10 PROCEDURE — 82962 GLUCOSE BLOOD TEST: CPT

## 2020-09-10 PROCEDURE — 93005 ELECTROCARDIOGRAM TRACING: CPT

## 2020-09-10 PROCEDURE — 96374 THER/PROPH/DIAG INJ IV PUSH: CPT

## 2020-09-10 PROCEDURE — 96375 TX/PRO/DX INJ NEW DRUG ADDON: CPT

## 2020-09-10 PROCEDURE — 99285 EMERGENCY DEPT VISIT HI MDM: CPT

## 2020-09-10 PROCEDURE — 99284 EMERGENCY DEPT VISIT MOD MDM: CPT | Mod: 25

## 2020-09-10 PROCEDURE — 85379 FIBRIN DEGRADATION QUANT: CPT

## 2020-09-10 PROCEDURE — 85025 COMPLETE CBC W/AUTO DIFF WBC: CPT

## 2020-09-10 PROCEDURE — 71045 X-RAY EXAM CHEST 1 VIEW: CPT

## 2020-09-10 RX ORDER — CEFTRIAXONE 500 MG/1
1000 INJECTION, POWDER, FOR SOLUTION INTRAMUSCULAR; INTRAVENOUS ONCE
Refills: 0 | Status: COMPLETED | OUTPATIENT
Start: 2020-09-10 | End: 2020-09-10

## 2020-09-10 RX ORDER — SODIUM CHLORIDE 9 MG/ML
1000 INJECTION INTRAMUSCULAR; INTRAVENOUS; SUBCUTANEOUS ONCE
Refills: 0 | Status: COMPLETED | OUTPATIENT
Start: 2020-09-10 | End: 2020-09-10

## 2020-09-10 RX ORDER — CEFPODOXIME PROXETIL 100 MG
1 TABLET ORAL
Qty: 14 | Refills: 0
Start: 2020-09-10 | End: 2020-09-16

## 2020-09-10 RX ORDER — SUCRALFATE 1 G
1 TABLET ORAL ONCE
Refills: 0 | Status: COMPLETED | OUTPATIENT
Start: 2020-09-10 | End: 2020-09-10

## 2020-09-10 RX ORDER — FAMOTIDINE 10 MG/ML
20 INJECTION INTRAVENOUS ONCE
Refills: 0 | Status: COMPLETED | OUTPATIENT
Start: 2020-09-10 | End: 2020-09-10

## 2020-09-10 RX ORDER — ACETAMINOPHEN 500 MG
975 TABLET ORAL ONCE
Refills: 0 | Status: DISCONTINUED | OUTPATIENT
Start: 2020-09-10 | End: 2020-09-14

## 2020-09-10 RX ADMIN — SODIUM CHLORIDE 1000 MILLILITER(S): 9 INJECTION INTRAMUSCULAR; INTRAVENOUS; SUBCUTANEOUS at 15:31

## 2020-09-10 RX ADMIN — SODIUM CHLORIDE 1000 MILLILITER(S): 9 INJECTION INTRAMUSCULAR; INTRAVENOUS; SUBCUTANEOUS at 14:37

## 2020-09-10 RX ADMIN — FAMOTIDINE 20 MILLIGRAM(S): 10 INJECTION INTRAVENOUS at 14:38

## 2020-09-10 RX ADMIN — CEFTRIAXONE 100 MILLIGRAM(S): 500 INJECTION, POWDER, FOR SOLUTION INTRAMUSCULAR; INTRAVENOUS at 17:36

## 2020-09-10 RX ADMIN — Medication 30 MILLILITER(S): at 14:37

## 2020-09-10 RX ADMIN — Medication 1 GRAM(S): at 15:20

## 2020-09-10 NOTE — ED PROVIDER NOTE - NSFOLLOWUPINSTRUCTIONS_ED_ALL_ED_FT
You were seen in the ER for abdominal pain. Your lab results are reassuring and a copy of the results is attached.     Please  your antibiotics from the pharmacy and take them as prescribed. Continue taking until finished, even if you feel completely better. Inform your primary doctor if you experience rash, shortness of breath, abdominal pain, or diarrhea.     Rest, drink plenty of fluids.  Advance activity as tolerated.  Continue all previously prescribed medications as directed.  Follow up with your primary care physician in 48-72 hours- bring copies of your results.  Return to the ER for worsening or persistent symptoms, and/or ANY NEW OR CONCERNING SYMPTOMS. If you have issues obtaining follow up, please call: [phone] to obtain a doctor or specialist who takes your insurance in your area.

## 2020-09-10 NOTE — ED PROVIDER NOTE - CLINICAL SUMMARY MEDICAL DECISION MAKING FREE TEXT BOX
Pt. is a 65 y.o. F p/w epigastric abd pain radiating to chest for a few weeks.  Hx of cholecystectomy, possibly gastritis.  Complaining of pleuritic CP as well since yesterday.  Will get d-dimer to eval for PE.  Will get cbc, cmp, lipase, trops, ekg, cxr.  If LFT bump on CMP, will get RUQ U/S to eval for CBD dilation.  Will reassess and dispo pending results.

## 2020-09-10 NOTE — ED PROVIDER NOTE - SHIFT CHANGE DETAILS
Sahil Tran MD FACEP note of transfer at the usual time of sign out: Receiving team will follow up on labs, analgesia, any clinical imaging, reassess and disposition as clinically indicated.  Details of patient and plan conveyed to receiving physician and conveyed back for understanding.  There were no questions at this time about the patient's status, disposition, and plan. Patient's care to be taken over by receiving physician at this time, all decisions regarding the progression of care will be made at their discretion.

## 2020-09-10 NOTE — ED PROVIDER NOTE - PHYSICAL EXAMINATION
GENERAL: Patient awake alert NAD.  LUNGS: CTAB, no wheezes or crackles.   CARDIAC: RRR, no m/r/g.  ABDOMEN: Soft, NT, ND, No rebound, guarding.  EXT: No edema. No calf tenderness. CV 2+DP/PT bilaterally.  MSK: No spinal tenderness, no pain with movement, no deformities.  NEURO: A&Ox3. Moving all extremities.  SKIN: Warm and dry. No rash.  PSYCH: Normal affect.

## 2020-09-10 NOTE — ED PROVIDER NOTE - OBJECTIVE STATEMENT
Pt. is a 65 y.o. F PMHx of DMT2, HTN, SDH 2/2 Fall S/P left craniotomy on 7/26/2020 (D/C'ed on 8/14/2020 with Das for retention, recently changed on 8/21/2020), PSHx of cholecystectomy p/w post-prandial epigastric abdominal pain that radiates to her chest.  Pt. states she's had these sxs since the craniotomy.  Yesterday, she developed some nausea and SOB.  Pleuritic CP present.  Denies falls, trauma to the area.  Complaining of b/l LE tingling, which is per baseline as well.  Denies fevers, chills, dysuria, hx of MI.

## 2020-09-10 NOTE — ED ADULT NURSE NOTE - NSIMPLEMENTINTERV_GEN_ALL_ED
Implemented All Universal Safety Interventions:  [place] to call system. Call bell, personal items and telephone within reach. Instruct patient to call for assistance. Room bathroom lighting operational. Non-slip footwear when patient is off stretcher. Physically safe environment: no spills, clutter or unnecessary equipment. Stretcher in lowest position, wheels locked, appropriate side rails in place.

## 2020-09-10 NOTE — ED PROVIDER NOTE - PROGRESS NOTE DETAILS
DH: VS improved - upon chart review, patient persistently tachycardic . Sx improved. Will send on abx for UTI. D/w daughter Shannon. Discussed return precautions and all questions answered. Pt's daughter in agreement w/ plan. Patient in NAD, VSS. Stable for d/c.

## 2020-09-10 NOTE — ED ADULT NURSE REASSESSMENT NOTE - NS ED NURSE REASSESS COMMENT FT1
received patient from [facility], patient at baseline mental status, NAD, VSS, comfort and safety provided.

## 2020-09-10 NOTE — ED PROVIDER NOTE - NS ED ROS FT
General: denies fever, chills, weight loss/weight gain.  HENT: denies nasal congestion, sore throat, rhinorrhea, ear pain  Eyes: denies visual changes, blurred vision, eye discharge, eye redness  Neck: denies neck pain, neck swelling  CV: denies chest pain, palpitations  Resp: denies difficulty breathing, cough  Abdominal: denies nausea, vomiting, diarrhea, abdominal pain, blood in stool, dark stool  MSK: denies muscle aches, bony pain, leg pain, leg swelling  Neuro: denies headaches, numbness, tingling, dizziness, lightheadedness.  Skin: denies rashes, cuts, bruises  Hematologic: denies unexplained bruises General: denies fever, chills, weight loss/weight gain.  HENT: denies nasal congestion, sore throat, rhinorrhea, ear pain  Eyes: denies visual changes, blurred vision, eye discharge, eye redness  Neck: denies neck pain, neck swelling  CV: denies chest pain, palpitations  Resp: +difficulty breathing; denies cough  Abdominal: +nausea, abd pain; denies vomiting, diarrhea, blood in stool, dark stool  MSK: denies muscle aches, bony pain, leg pain, leg swelling  Neuro: denies headaches, numbness, tingling, dizziness, lightheadedness.  Skin: denies rashes, cuts, bruises  Hematologic: denies unexplained bruises

## 2020-09-10 NOTE — ED ADULT NURSE NOTE - OBJECTIVE STATEMENT
64y/o female presented to the ED from PCP office via EMS with complaint of epigastric pain. A&Ox3. PMH- SDH s/p craniotomy 7/26/20, HTN, DM2, Das catheter inserted using sterile technique, draining by gravity, secured with StatLock. Second RN present to confirm sterility. in place for urinary retention, last changed 8/21/20, cholecystectomy/ Patient was seen at PCP earlier today and complaining of epigastric pain radiating to midsternal chest area associated with intermittent nausea and SOB. EKG performed, patient placed on cardiac monitor, Sinus tachycardia.

## 2020-09-10 NOTE — ED PROVIDER NOTE - PATIENT PORTAL LINK FT
You can access the FollowMyHealth Patient Portal offered by [facility] by registering at the following website: [email or web address]. By joining [employer]’s FollowMyHealth portal, you will also be able to view your health information using other applications (apps) compatible with our system.

## 2020-09-10 NOTE — ED PROVIDER NOTE - PSH
H/O shoulder surgery    History of varicose veins of lower extremity  s/p surgery in July 2019  SDH (subdural hematoma)  S/P left craniotomy on 7/26/2020

## 2020-09-10 NOTE — ED PROVIDER NOTE - ATTENDING CONTRIBUTION TO CARE
pt with epigastric pain and fatigue and weakness status post hemicraniotomy 7/26/2020. Patient had a recent Das during admission and has had epigastric and stomach pains since discharge . +nausea, No vomiting, no f/c.  noted  cranial scarring over midline and left parietal scalp   trachea midline,   eomi  no rash/vesicles/petechiae   non-tachycardic, non-tachypneic   soft, ntnd, no rebound/guarding   no gross deformity of extremities, no asymmetry   with capacity and insight   Sahil Tran MD, FACEP: In this physician's medical judgement based on clinical history and physical exam, concern for occult infection vs cbd stone status post cholecystectomy vs viral symptoms   will get iv, cbc, cmp, ua, urine culture, fluids, screen occult cardiac etology and reassess  Will follow up on labs, analgesia, imaging, reassess and disposition as clinically indicated.

## 2020-09-18 ENCOUNTER — APPOINTMENT (OUTPATIENT)
Age: 65
End: 2020-09-18
Payer: MEDICARE

## 2020-09-18 VITALS
TEMPERATURE: 97.7 F | HEART RATE: 105 BPM | SYSTOLIC BLOOD PRESSURE: 130 MMHG | DIASTOLIC BLOOD PRESSURE: 84 MMHG | RESPIRATION RATE: 17 BRPM | OXYGEN SATURATION: 100 %

## 2020-09-18 PROCEDURE — 99214 OFFICE O/P EST MOD 30 MIN: CPT

## 2020-09-20 NOTE — REVIEW OF SYSTEMS
[see HPI] : see HPI [Arthralgias] : arthralgias [Difficulty Walking] : difficulty walking [Muscle Weakness] : muscle weakness [Negative] : Heme/Lymph

## 2020-09-20 NOTE — ASSESSMENT
[FreeTextEntry1] : 66 yo F with history of urinary retention, sepsis from UTI\par \par - PVR = 75ml\par - No indwelling catheter needed at this time\par - Reaffirmed behavioral modifications\par - discussed possible etiologies for UTI. Spent extensive period of time discussed behavioral modification including adequate hydration, cutting back on caffeine intake, timed voiding during the day, importance of controlling any diabetes and chronic constipation and how all of these things could potentially increase risk for persistent or recurrent UTI.\par - FU in 3 months

## 2020-09-20 NOTE — HISTORY OF PRESENT ILLNESS
[FreeTextEntry1] : 62 yo Amharic speaking F presents with persistent UTI. Had issues with recurrent UTI many years ago after her second child but was doing well for years with no issues. Starting last , has been having some dysuria and found to have UTI. Since then, she has been treated with antibiotics which helps with symptoms but will recur shortly after completing the antibiotics. Review of labworks shows perisistent e.coli UTI twice in February and most recently a few weeks ago. Last course of antibiotics was 10 days ago. Hasn't noticed exacerbation of LUTS but most bothersome is the urethral discomfort. Drinks multiple bottles of water per day and voids every 2 hrs or so. Denies any incontinence, history of gross hematuria, fever, chills, flank pain. Does have issues with constipation and will often have to perform enemas in order to feel comfortable. 2 children, , LMP was more than 10 yrs ago\par \par 18 Interval history: Since last visit, pt has been doing well with no minimal issues. Cysto at last visit was unremarkable\par \par 19 Interval history: 1 month ago had routine UA done with PCP and found to have UTI\par States that she was given abx but never started it because she didn't have any symptoms\par Has been completely asymptomatic for the last 6 months\par Shoulder surgery in  and has needed to take some pain meds recently for this\par Some constipation issues due to the pain meds\par No incontinence, no more urethral discomfort\par Has been double voiding\par \par 19 Interval history: s/p cholecystectomy and had some postop urinary retention\par Now voiding well. Still doing double voids\par A little dysuria\par Still having constipation\par Drinking 7-8 cups of water\par \par 20 Interval history: Pt hospitalized recently after a fall and ended up getting an urgent craniotomy secondary to subdural hematoma\par Pt was noted to have incomplete bladder emptying during hospitalization and had indwelling catheter placed\par Denies any urinary issues since hospital discharge with drainage of good clear yellow urine \par \par 20 Interval history: Pt recently presented to hospital with sepsis from urinary source and hospitalized for antibiotics\par During hospitalization, pt subsequently passed TOV\par Currently feeling well and denies any bothersome LUTS\par Pt states she feels like she is getting stronger

## 2020-09-20 NOTE — PHYSICAL EXAM
[General Appearance - Well Nourished] : well nourished [Normal Appearance] : normal appearance [Well Groomed] : well groomed [General Appearance - In No Acute Distress] : no acute distress [Abdomen Soft] : soft [Abdomen Tenderness] : non-tender [Costovertebral Angle Tenderness] : no ~M costovertebral angle tenderness [Urethral Meatus] : normal urethra [Urinary Bladder Findings] : the bladder was normal on palpation [External Female Genitalia] : normal external genitalia [Edema] : no peripheral edema [] : no respiratory distress [Respiration, Rhythm And Depth] : normal respiratory rhythm and effort [Exaggerated Use Of Accessory Muscles For Inspiration] : no accessory muscle use [Oriented To Time, Place, And Person] : oriented to person, place, and time [Not Anxious] : not anxious [FreeTextEntry1] : wheelchair bound [No Palpable Adenopathy] : no palpable adenopathy

## 2020-09-21 ENCOUNTER — APPOINTMENT (OUTPATIENT)
Dept: MRI IMAGING | Facility: HOSPITAL | Age: 65
End: 2020-09-21
Payer: MEDICARE

## 2020-09-21 ENCOUNTER — OUTPATIENT (OUTPATIENT)
Dept: OUTPATIENT SERVICES | Facility: HOSPITAL | Age: 65
LOS: 1 days | End: 2020-09-21
Payer: MEDICARE

## 2020-09-21 DIAGNOSIS — Z98.890 OTHER SPECIFIED POSTPROCEDURAL STATES: Chronic | ICD-10-CM

## 2020-09-21 DIAGNOSIS — S06.5X9A TRAUMATIC SUBDURAL HEMORRHAGE WITH LOSS OF CONSCIOUSNESS OF UNSPECIFIED DURATION, INITIAL ENCOUNTER: ICD-10-CM

## 2020-09-21 DIAGNOSIS — S06.5X9A TRAUMATIC SUBDURAL HEMORRHAGE WITH LOSS OF CONSCIOUSNESS OF UNSPECIFIED DURATION, INITIAL ENCOUNTER: Chronic | ICD-10-CM

## 2020-09-21 DIAGNOSIS — Z86.79 PERSONAL HISTORY OF OTHER DISEASES OF THE CIRCULATORY SYSTEM: Chronic | ICD-10-CM

## 2020-09-21 PROCEDURE — 70551 MRI BRAIN STEM W/O DYE: CPT

## 2020-09-21 PROCEDURE — 70551 MRI BRAIN STEM W/O DYE: CPT | Mod: 26

## 2020-09-24 NOTE — CHART NOTE - NSCHARTNOTEFT_GEN_A_CORE
Please note that patient was  treated for UTI in setting on indwelling charles from home and charles was removed.  She met sepsis criteria which was present from admission.        Anna Nixon   Hospitalist   [phone]

## 2020-09-25 ENCOUNTER — APPOINTMENT (OUTPATIENT)
Dept: NEUROSURGERY | Facility: CLINIC | Age: 65
End: 2020-09-25
Payer: MEDICARE

## 2020-09-25 VITALS
BODY MASS INDEX: 18.77 KG/M2 | DIASTOLIC BLOOD PRESSURE: 82 MMHG | SYSTOLIC BLOOD PRESSURE: 135 MMHG | RESPIRATION RATE: 17 BRPM | HEIGHT: 62 IN | WEIGHT: 102 LBS | TEMPERATURE: 98.3 F | HEART RATE: 99 BPM | OXYGEN SATURATION: 99 %

## 2020-09-25 PROCEDURE — 99024 POSTOP FOLLOW-UP VISIT: CPT

## 2020-09-29 NOTE — RESULTS/DATA
[FreeTextEntry1] : EXAM: MR BRAIN \par \par \par PROCEDURE DATE: 09/21/2020 \par \par \par \par INTERPRETATION: INDICATION: History of subdural hematoma. Follow-up. \par TECHNIQUE: Multiplanar brain imaging was conducted using a 1.5 Sruthi magnet. T1, T2 and FLAIR imaging was performed. In addition, diffusion imaging, diffusion coefficient assessment (ADC) and echo planar T2* was incorporated. No contrast administered \par COMPARISON EXAMINATION: MR 8/8/2020. CT 8/28/2020. \par \par FINDINGS: \par \par \par Again identified is a left-sided frontal parietal subdural collection. This measures 1.2 to 1.3 cm in diameter and is slightly smaller when compared with the prior MR. Again there is evidence of a prior craniotomy/cranioplasty and left frontal stephy hole. \par \par The collection follows the margins of the craniotomy, and is unchanged as compared to the CT study. Membrane formation is suggested along the inner surface of the collection. \par \par Multiple white matter lesions are noted in both hemispheres with suggestion of chronic ischemic changes. No diffusion restriction or acute ischemia is noted. \par \par Again noted is left-sided gyral and sulcal effacement with mild rightward midline shift. This is less prominent when compared with prior MR, and is unchanged as compared to prior CT. \par \par Brainstem and cerebellum are unremarkable. \par \par IMPRESSION: \par 1) stable follow-up study with a chronic left frontal parietal subdural collection subjacent to the margins of the cranioplasty as seen on prior CT.. Less mass effect as compared to the prior MR. \par 2) chronic ischemic changes again noted in both hemispheres with volume loss. Mild rightward midline shift. No diffusion restriction or acute ischemia. \par \par Continued correlation and follow-up recommended.. \par \par \par \par \par

## 2020-09-29 NOTE — ASSESSMENT
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 Surgical wound healed well with out any signs of infection.Recent imaging showed residual left post operative epidural collection remained the same, but the midline shift has improved.\par \par Plan:\par \par Take Keppra 500 mg daily for 1 week and then stop.  Precautions given\par MRI Head w/o contrast in 3  month\par Continue Gabapentin 300 mg TID for paraesthesia\par Will follow up in 3 months after imaging\par All questions and concerns addressed.

## 2020-09-29 NOTE — PHYSICAL EXAM
[General Appearance - Alert] : alert [General Appearance - In No Acute Distress] : in no acute distress [General Appearance - Well Nourished] : well nourished [General Appearance - Well-Appearing] : healthy appearing [Irregular] : irregular [Well-Healed] : well-healed [No Drainage] : without drainage [Normal Skin] : normal [Oriented To Time, Place, And Person] : oriented to person, place, and time [Impaired Insight] : insight and judgment were intact [Affect] : the affect was normal [Memory Recent] : recent memory was not impaired [Person] : oriented to person [Place] : oriented to place [Time] : oriented to time [Cranial Nerves Optic (II)] : visual acuity intact bilaterally,  pupils equal round and reactive to light [Cranial Nerves Oculomotor (III)] : extraocular motion intact [Cranial Nerves Trigeminal (V)] : facial sensation intact symmetrically [Cranial Nerves Facial (VII)] : face symmetrical [Cranial Nerves Vestibulocochlear (VIII)] : hearing was intact bilaterally [Cranial Nerves Accessory (XI - Cranial And Spinal)] : head turning and shoulder shrug symmetric [Motor Tone] : muscle tone was normal in all four extremities [Motor Strength] : muscle strength was normal in all four extremities [Sensation Tactile Decrease] : light touch was intact [Sensation Pain / Temperature Decrease] : pain and temperature was intact [Balance] : balance was intact [Sclera] : the sclera and conjunctiva were normal [Outer Ear] : the ears and nose were normal in appearance [Neck Appearance] : the appearance of the neck was normal [] : no respiratory distress [Apical Impulse] : the apical impulse was normal [Bowel Sounds] : normal bowel sounds [Abnormal Walk] : normal gait [Involuntary Movements] : no involuntary movements were seen [FreeTextEntry8] : no pronator drift

## 2020-09-29 NOTE — HISTORY OF PRESENT ILLNESS
[FreeTextEntry1] : 65 yoF PMH of DM, HTN,UTI had fall on and suffered SDH s/p craniotomy 07/26. Was in hospital for 3 weeks and staples were out. She had two times ER visit for chest pain and High BP. CT head repeated during those visit showed stable. Had a visit on 8/21/20 and reported that few episodes of passing out with brief LOC and advised to continue taking Keppra and started on Gabapentin 300 mg for paraesthesia. \par \par Today Ms Cline presented on wheel chair , Denies headache, speech impairment, vision problems or seizures. Has some feeling of pressure around the surgical wound. Otherwise she is doing well. She walks at home with no help , but always somebody with her for watching. She is still on Keppra and did not have any black out experience so far after the last appointment. \par \par

## 2020-10-29 ENCOUNTER — OUTPATIENT (OUTPATIENT)
Dept: OUTPATIENT SERVICES | Facility: HOSPITAL | Age: 65
LOS: 1 days | Discharge: HOME | End: 2020-10-29

## 2020-10-29 ENCOUNTER — APPOINTMENT (OUTPATIENT)
Dept: SPEECH THERAPY | Facility: HOSPITAL | Age: 65
End: 2020-10-29

## 2020-10-29 ENCOUNTER — APPOINTMENT (OUTPATIENT)
Dept: RADIOLOGY | Facility: HOSPITAL | Age: 65
End: 2020-10-29
Payer: MEDICARE

## 2020-10-29 ENCOUNTER — OUTPATIENT (OUTPATIENT)
Dept: OUTPATIENT SERVICES | Facility: HOSPITAL | Age: 65
LOS: 1 days | End: 2020-10-29

## 2020-10-29 DIAGNOSIS — S06.5X9A TRAUMATIC SUBDURAL HEMORRHAGE WITH LOSS OF CONSCIOUSNESS OF UNSPECIFIED DURATION, INITIAL ENCOUNTER: Chronic | ICD-10-CM

## 2020-10-29 DIAGNOSIS — Z98.890 OTHER SPECIFIED POSTPROCEDURAL STATES: Chronic | ICD-10-CM

## 2020-10-29 DIAGNOSIS — Z86.79 PERSONAL HISTORY OF OTHER DISEASES OF THE CIRCULATORY SYSTEM: Chronic | ICD-10-CM

## 2020-10-29 DIAGNOSIS — I63.9 CEREBRAL INFARCTION, UNSPECIFIED: ICD-10-CM

## 2020-10-29 PROCEDURE — 74230 X-RAY XM SWLNG FUNCJ C+: CPT | Mod: 26

## 2020-10-29 NOTE — ASSESSMENT
[FreeTextEntry1] : MODIFIED BARIUM SWALLOW STUDY\par \par Date of Report: 10/29/20\par Date of Evaluation: 10/29/20\par Patient Name: Maria Esther Cline\par YOB: 1955\par Primary Diagnosis: Oropharyngeal Dysphagia\par Treatment Diagnosis: Oropharyngeal Dysphagia\par Referring Physician: Dr. Carlitos Strange \par \par Results:\par 1.There was no laryngeal penetration/ aspiration observed before, during or after the swallow for puree consistency, regular solids, honey thick liquids and nectar thick liquids\par 2.There was laryngeal penetration during the swallow above the level of the vocal cords without full retrieval for thin liquids\par \par History:  Information on this patient has been provided by EMR and patient/ family report: This 65 year old female was seen for a Modified Barium Swallow Study to rule out aspiration, assess for diet texture change as appropriate, and/or explore positional strategies and/or compensatory techniques to eliminate aspiration.  \par \par Neuro Surgery Note 9/25/20: 65 yoF PMH of DM, HTN,UTI had fall on and suffered SDH s/p craniotomy 07/26. Was in hospital for 3 weeks and staples were out. She had two times ER visit for chest pain and High BP. CT head repeated during those visit showed stable. Had a visit on 8/21/20 and reported that few episodes of passing out with brief LOC and advised to continue taking Keppra and started on Gabapentin 300 mg for paraesthesia.\par \par Patient arrives to today’s study accompanied by her daughter in law who aided in providing information on patient’s medical history and dysphagia state. Patient’s daughter in law reports patient receiving swallow therapy as outpatient and that she is consuming mechanical soft solids (dysphagia 2) and thin liquids (i.e soft fruit, moist rice, fish). Patient endorses occasional coughing/ choking sensation when eating and drinking. Patient denies recent pneumonia upon questioning. \par \par Of Note, MBS and Clinical Bedside Swallow Evaluation was completed at [facility] during hospital stay. MBS completed on 8/10/2020 recommended Puree Consistency and Thin Liquids (See EMR for details). \par \par Reason For Referral: To determine patient's ability to safely tolerate an oral diet.\par \par Current Nutritional Intake: Mechanical Soft Solids (Dysphagia 2) and Thin Liquids per family report \par \par Medical History: Per charting, the patient’s medical history is significant for:\par Active Problems\par Acute urinary retention (788.29) (R33.8)\par Chronic cholecystitis (575.11) (K81.1)\par Diabetes mellitus, type 2 (250.00) (E11.9)\par Hypertension (401.9) (I10)\par Recurrent UTI (599.0) (N39.0)\par SDH (subdural hematoma) (432.1) (S06.5X9A)\par Vaginal atrophy (627.3) (N95.2)\par \par ASSESSMENT\par The patient was assessed seated in the lateral plane in the [facility] Radiology Suite, with Radiologist present.  The patient was alert and cooperative. Secretion management was adequate. There was no coughing, throat clearing or wet/gurgly vocal quality prior to test administration.  [employer] utilized for [place] Interpretation (ID#[id number]). \par \par Consistencies Administered:  \par Solids: Regular Solids and Puree Consistency\par Liquids:  Thin Liquids, Nectar Thick Liquids, and Honey Thick Liquids\par \par SUMMARY & IMPRESSION\par Videofluoroscoopic Evaluation reveals:\par Patient presents with Mild to Moderate Oropharyngeal Dysphagia. The Oral Phase was marked by adequate stripping of bolus from utensil, adequate oral containment, mildly extended mastication for regular solids though able to breakdown bolus, slow bolus manipulation and slow anterior to posterior transport. Piecemeal deglutition/ transfer for puree consistency and regular solids noted with adequate oral clearance. The Pharyngeal Phase was marked by delay in initiation of pharyngeal swallow (head of bolus at level of pyriforms for thin liquids), reduced laryngeal elevation, reduced base of tongue retraction resulting in mild base of tongue surface and valleculae residue post primary swallow, reduced epiglottic deflection, reduced laryngeal vestibular closure and reduced pharyngeal contractility. Patient benefits from nectar thick liquid wash to reduce base of assist with pharyngeal clearance. There was laryngeal penetration during the swallow above the level of the vocal cords without retrieval for thin liquids. Postural compensatory strategy of chin tuck posture with thin liquids did not eliminate laryngeal penetration. There was no laryngeal penetration/aspiration observed before, during or after the swallow for puree consistency, regular solids, honey thick liquids and nectar thick liquids. \par \par #1 Puree Consistency, Regular Solids, Honey Thick Liquids and Nectar Thick Liquids\par #3 Thin Liquids \par Aspiration - Penetration Scale    (Pavelbek et al Dysphagia 11:93-98 (April 1996), Aspiration-Penetration Scale)\par 1.    Material does not enter the airway\par 2.    Material enters the airway, remains above the vocal folds, and is ejected from the airway\par 3.    Material enters the airway, remains above the vocal folds, and is not ejected\par 4.    Material enters the airway, contacts the vocal folds, and is ejected from the airway\par 5.    Material enters the airway, contacts the vocal folds, and is not ejected from the airway\par 6.    Material enters the airway, passes below the vocal folds and is ejected into the larynx or out of the airway\par 7.    Material enters the airway, passes below the vocal folds, and is not ejected from the trachea despite effort\par 8.    Material enters the airway, passes below the vocal folds, and no effort is made to eject\par \par Recommendations:\par 1.Soft Solids and Nectar Thick Liquids \par 2.Alternate Liquids and Solids \par 3.Follow Safe Swallow Guidelines: small/single sips and bites, slow rate of intake, upright position during PO consumption, maintain upright position 30 minutes post PO consumption and maintain good oral hygiene \par 4.Aspiration Precautions \par 5.Follow up with your physician \par 6.Continue Swallow Therapy to maximize swallow function\par \par The above results and recommendations have been discussed with the patient and her family. All questions were answered with family demonstrating good understanding. \par \par Should you have any additional concerns, please contact the Center at [phone].\par \par Irma Angel MA CCC-SLP\par Speech Language Pathologist\par [facility] \par

## 2020-12-09 DIAGNOSIS — R13.10 DYSPHAGIA, UNSPECIFIED: ICD-10-CM

## 2020-12-14 ENCOUNTER — RESULT REVIEW (OUTPATIENT)
Age: 65
End: 2020-12-14

## 2020-12-14 ENCOUNTER — OUTPATIENT (OUTPATIENT)
Dept: OUTPATIENT SERVICES | Facility: HOSPITAL | Age: 65
LOS: 1 days | End: 2020-12-14
Payer: MEDICARE

## 2020-12-14 ENCOUNTER — APPOINTMENT (OUTPATIENT)
Dept: MRI IMAGING | Facility: HOSPITAL | Age: 65
End: 2020-12-14
Payer: MEDICARE

## 2020-12-14 DIAGNOSIS — S06.5X9A TRAUMATIC SUBDURAL HEMORRHAGE WITH LOSS OF CONSCIOUSNESS OF UNSPECIFIED DURATION, INITIAL ENCOUNTER: Chronic | ICD-10-CM

## 2020-12-14 DIAGNOSIS — S06.5X9A TRAUMATIC SUBDURAL HEMORRHAGE WITH LOSS OF CONSCIOUSNESS OF UNSPECIFIED DURATION, INITIAL ENCOUNTER: ICD-10-CM

## 2020-12-14 DIAGNOSIS — Z86.79 PERSONAL HISTORY OF OTHER DISEASES OF THE CIRCULATORY SYSTEM: Chronic | ICD-10-CM

## 2020-12-14 DIAGNOSIS — Z98.890 OTHER SPECIFIED POSTPROCEDURAL STATES: Chronic | ICD-10-CM

## 2020-12-14 PROCEDURE — 70551 MRI BRAIN STEM W/O DYE: CPT

## 2020-12-14 PROCEDURE — 70551 MRI BRAIN STEM W/O DYE: CPT | Mod: 26

## 2020-12-18 ENCOUNTER — LABORATORY RESULT (OUTPATIENT)
Age: 65
End: 2020-12-18

## 2020-12-18 ENCOUNTER — APPOINTMENT (OUTPATIENT)
Age: 65
End: 2020-12-18
Payer: MEDICARE

## 2020-12-18 ENCOUNTER — APPOINTMENT (OUTPATIENT)
Dept: NEUROSURGERY | Facility: CLINIC | Age: 65
End: 2020-12-18
Payer: MEDICARE

## 2020-12-18 VITALS
HEART RATE: 87 BPM | BODY MASS INDEX: 18.77 KG/M2 | RESPIRATION RATE: 18 BRPM | DIASTOLIC BLOOD PRESSURE: 83 MMHG | TEMPERATURE: 97.9 F | WEIGHT: 102 LBS | SYSTOLIC BLOOD PRESSURE: 164 MMHG | OXYGEN SATURATION: 98 % | HEIGHT: 62 IN

## 2020-12-18 VITALS — TEMPERATURE: 98 F

## 2020-12-18 VITALS — DIASTOLIC BLOOD PRESSURE: 83 MMHG | SYSTOLIC BLOOD PRESSURE: 173 MMHG

## 2020-12-18 PROCEDURE — 99072 ADDL SUPL MATRL&STAF TM PHE: CPT

## 2020-12-18 PROCEDURE — 99213 OFFICE O/P EST LOW 20 MIN: CPT

## 2020-12-18 PROCEDURE — 99214 OFFICE O/P EST MOD 30 MIN: CPT

## 2020-12-20 NOTE — PHYSICAL EXAM
[General Appearance - Well Nourished] : well nourished [Normal Appearance] : normal appearance [Well Groomed] : well groomed [General Appearance - In No Acute Distress] : no acute distress [Abdomen Soft] : soft [Abdomen Tenderness] : non-tender [Costovertebral Angle Tenderness] : no ~M costovertebral angle tenderness [Urethral Meatus] : normal urethra [Urinary Bladder Findings] : the bladder was normal on palpation [External Female Genitalia] : normal external genitalia [Edema] : no peripheral edema [] : no respiratory distress [Respiration, Rhythm And Depth] : normal respiratory rhythm and effort [Exaggerated Use Of Accessory Muscles For Inspiration] : no accessory muscle use [Oriented To Time, Place, And Person] : oriented to person, place, and time [Affect] : the affect was normal [Mood] : the mood was normal [Not Anxious] : not anxious [FreeTextEntry1] : wheelchair bound [No Palpable Adenopathy] : no palpable adenopathy

## 2020-12-20 NOTE — HISTORY OF PRESENT ILLNESS
[FreeTextEntry1] : 62 yo Hungarian speaking F presents with persistent UTI. Had issues with recurrent UTI many years ago after her second child but was doing well for years with no issues. Starting last , has been having some dysuria and found to have UTI. Since then, she has been treated with antibiotics which helps with symptoms but will recur shortly after completing the antibiotics. Review of labworks shows perisistent e.coli UTI twice in February and most recently a few weeks ago. Last course of antibiotics was 10 days ago. Hasn't noticed exacerbation of LUTS but most bothersome is the urethral discomfort. Drinks multiple bottles of water per day and voids every 2 hrs or so. Denies any incontinence, history of gross hematuria, fever, chills, flank pain. Does have issues with constipation and will often have to perform enemas in order to feel comfortable. 2 children, , LMP was more than 10 yrs ago\par \par 18 Interval history: Since last visit, pt has been doing well with no minimal issues. Cysto at last visit was unremarkable\par \par 19 Interval history: 1 month ago had routine UA done with PCP and found to have UTI\par States that she was given abx but never started it because she didn't have any symptoms\par Has been completely asymptomatic for the last 6 months\par Shoulder surgery in  and has needed to take some pain meds recently for this\par Some constipation issues due to the pain meds\par No incontinence, no more urethral discomfort\par Has been double voiding\par \par 19 Interval history: s/p cholecystectomy and had some postop urinary retention\par Now voiding well. Still doing double voids\par A little dysuria\par Still having constipation\par Drinking 7-8 cups of water\par \par 20 Interval history: Pt hospitalized recently after a fall and ended up getting an urgent craniotomy secondary to subdural hematoma\par Pt was noted to have incomplete bladder emptying during hospitalization and had indwelling catheter placed\par Denies any urinary issues since hospital discharge with drainage of good clear yellow urine \par \par 20 Interval history: Pt recently presented to hospital with sepsis from urinary source and hospitalized for antibiotics\par During hospitalization, pt subsequently passed TOV\par Currently feeling well and denies any bothersome LUTS\par Pt states she feels like she is getting stronger\par \par 20 Interval history: No issues since last visit\par Feels like she is voiding adequately\par No dysuria, no gross hematuria\par no flank pain

## 2020-12-20 NOTE — ASSESSMENT
[FreeTextEntry1] : 64 yo F with history of recurrent UTI, recent urinary retention\par \par - PVR = 85ml\par - Reaffirmed behavioral modfications, especially increasing hydration and timed voiding\par - FU in 6 months

## 2020-12-23 LAB
APPEARANCE: CLEAR
BILIRUBIN URINE: NEGATIVE
BLOOD URINE: NEGATIVE
COLOR: NORMAL
GLUCOSE QUALITATIVE U: ABNORMAL
KETONES URINE: NEGATIVE
LEUKOCYTE ESTERASE URINE: ABNORMAL
NITRITE URINE: POSITIVE
PH URINE: 6
PROTEIN URINE: NEGATIVE
SPECIFIC GRAVITY URINE: 1.02
UROBILINOGEN URINE: NORMAL

## 2020-12-24 NOTE — HISTORY OF PRESENT ILLNESS
[FreeTextEntry1] : 65 years old  F PMH of DM, HTN,UTI had fall on and suffered SDH s/p craniotomy 07/26. Was in hospital for 3 weeks and staples were out. She had two times ER visit for chest pain and High BP. CT head repeated during those visit showed stable. Had a visit on 8/21/20 and reported that few episodes of passing out with brief LOC and advised to continue taking Keppra and started on Gabapentin 300 mg for paraesthesia.She is  getting better overtime. Surgical wound healed well with out any signs of infection.Recent imaging showed residual left post operative epidural collection remained the same, but the midline shift has improved. She is here again for follow up after a new imaging.\par  \par Today Ms. Cline present on wheel chair, believes that he improved over all. Denies headache, speech impairment, vision problems or seizures. She is doing well, Some times has HA especially when she has high BP. Her BP is very unstable now, changed some antihypertensive  medications recently. She walks at home with no support, with one person observing. Daughter  also reported that she has some dizziness sometime, she thinks that it may be related to her anemia for which PCP recommended iron.   \par \par

## 2020-12-24 NOTE — PHYSICAL EXAM
[General Appearance - Alert] : alert [General Appearance - In No Acute Distress] : in no acute distress [General Appearance - Well Nourished] : well nourished [General Appearance - Well-Appearing] : healthy appearing [Oriented To Time, Place, And Person] : oriented to person, place, and time [Impaired Insight] : insight and judgment were intact [Affect] : the affect was normal [Memory Recent] : recent memory was not impaired [Person] : oriented to person [Place] : oriented to place [Time] : oriented to time [Short Term Intact] : short term memory intact [Cranial Nerves Optic (II)] : visual acuity intact bilaterally,  pupils equal round and reactive to light [Cranial Nerves Oculomotor (III)] : extraocular motion intact [Cranial Nerves Trigeminal (V)] : facial sensation intact symmetrically [Cranial Nerves Facial (VII)] : face symmetrical [Cranial Nerves Vestibulocochlear (VIII)] : hearing was intact bilaterally [Cranial Nerves Accessory (XI - Cranial And Spinal)] : head turning and shoulder shrug symmetric [Motor Tone] : muscle tone was normal in all four extremities [Motor Strength] : muscle strength was normal in all four extremities [Sensation Tactile Decrease] : light touch was intact [Sensation Pain / Temperature Decrease] : pain and temperature was intact [Balance] : balance was intact [Sclera] : the sclera and conjunctiva were normal [PERRL With Normal Accommodation] : pupils were equal in size, round, reactive to light, with normal accommodation [Outer Ear] : the ears and nose were normal in appearance [Both Tympanic Membranes Were Examined] : both tympanic membranes were normal [Neck Appearance] : the appearance of the neck was normal [] : no respiratory distress [Respiration, Rhythm And Depth] : normal respiratory rhythm and effort [Apical Impulse] : the apical impulse was normal [Heart Rate And Rhythm] : heart rate was normal and rhythm regular [Arterial Pulses Carotid] : carotid pulses were normal with no bruits [Abdominal Aorta] : the abdominal aorta was normal [Abnormal Walk] : normal gait [Involuntary Movements] : no involuntary movements were seen [FreeTextEntry8] : no pronator shift

## 2020-12-24 NOTE — RESULTS/DATA
[FreeTextEntry1] : EXAM: MR BRAIN\par \par \par PROCEDURE DATE: 12/14/2020\par \par \par \par INTERPRETATION: MR brain without gadolinium\par \par CLINICAL INFORMATION: Follow-up craniotomy for subdural hemorrhage\par \par TECHNIQUE: Sagittal and axial T1-weighted images, axial FLAIR images, axial gradient echo and T2-weighted images and axial diffusion weighted images of the brain were obtained.\par \par FINDINGS: 09/21/2020 available for review.\par \par The brain again demonstrates mild periventricular and moderate scattered bifrontal and biparietal deep and subcortical white matter ischemia. Chronic epidural collection again noted subjacent to the craniotomy flap minimally smaller. No acute cerebral cortical infarct is found. No intracranial hemorrhage is recognized. No mass effect is found in the brain.\par \par The ventricles, sulci and basal cisterns appear unremarkable.\par \par The vertebral and internal carotid arteries demonstrate expected flow voids indicating their patency.\par \par The orbits are unremarkable. The paranasal sinuses are clear. The nasal cavity appears intact. The nasopharynx is symmetric. The central skull base and temporal bones are intact. The calvarium appears unremarkable.\par \par IMPRESSION: mild periventricular and moderate scattered bifrontal and biparietal deep and subcortical white matter ischemia. Chronic epidural collection again noted subjacent to the craniotomy flap minimally smaller.\par \par [appears smaller, mildly]\par \par

## 2020-12-24 NOTE — ASSESSMENT
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 Surgical wound healed well with out any signs of infection.No neuro deficit. C/o occasional  dizziness reported.  Recent imaging showed residual left post operative epidural collection, but the midline shift has improved.\par \par Plan:\par \par MRI Head w/o contrast in 3 month\par MRA head/Neck W+W/O contrast for evaluation of dizziness.\par Follow up with PCP for evaluation of HTN and Anemia. \par Will follow up in 3 months after imaging\par All questions and concerns addressed. \par

## 2021-02-24 ENCOUNTER — NON-APPOINTMENT (OUTPATIENT)
Age: 66
End: 2021-02-24

## 2021-02-24 ENCOUNTER — TRANSCRIPTION ENCOUNTER (OUTPATIENT)
Age: 66
End: 2021-02-24

## 2021-03-15 ENCOUNTER — OUTPATIENT (OUTPATIENT)
Dept: OUTPATIENT SERVICES | Facility: HOSPITAL | Age: 66
LOS: 1 days | End: 2021-03-15
Payer: MEDICARE

## 2021-03-15 ENCOUNTER — APPOINTMENT (OUTPATIENT)
Dept: MRI IMAGING | Facility: HOSPITAL | Age: 66
End: 2021-03-15
Payer: MEDICARE

## 2021-03-15 DIAGNOSIS — Z86.79 PERSONAL HISTORY OF OTHER DISEASES OF THE CIRCULATORY SYSTEM: Chronic | ICD-10-CM

## 2021-03-15 DIAGNOSIS — Z98.890 OTHER SPECIFIED POSTPROCEDURAL STATES: Chronic | ICD-10-CM

## 2021-03-15 DIAGNOSIS — Z00.8 ENCOUNTER FOR OTHER GENERAL EXAMINATION: ICD-10-CM

## 2021-03-15 DIAGNOSIS — S06.5X9A TRAUMATIC SUBDURAL HEMORRHAGE WITH LOSS OF CONSCIOUSNESS OF UNSPECIFIED DURATION, INITIAL ENCOUNTER: Chronic | ICD-10-CM

## 2021-03-15 PROCEDURE — 70551 MRI BRAIN STEM W/O DYE: CPT | Mod: 26

## 2021-03-15 PROCEDURE — 70551 MRI BRAIN STEM W/O DYE: CPT

## 2021-03-16 ENCOUNTER — APPOINTMENT (OUTPATIENT)
Dept: MRI IMAGING | Facility: HOSPITAL | Age: 66
End: 2021-03-16
Payer: MEDICARE

## 2021-03-16 ENCOUNTER — OUTPATIENT (OUTPATIENT)
Dept: OUTPATIENT SERVICES | Facility: HOSPITAL | Age: 66
LOS: 1 days | End: 2021-03-16
Payer: MEDICARE

## 2021-03-16 DIAGNOSIS — Z86.79 PERSONAL HISTORY OF OTHER DISEASES OF THE CIRCULATORY SYSTEM: Chronic | ICD-10-CM

## 2021-03-16 DIAGNOSIS — S06.5X9A TRAUMATIC SUBDURAL HEMORRHAGE WITH LOSS OF CONSCIOUSNESS OF UNSPECIFIED DURATION, INITIAL ENCOUNTER: ICD-10-CM

## 2021-03-16 DIAGNOSIS — Z98.890 OTHER SPECIFIED POSTPROCEDURAL STATES: Chronic | ICD-10-CM

## 2021-03-16 DIAGNOSIS — S06.5X9A TRAUMATIC SUBDURAL HEMORRHAGE WITH LOSS OF CONSCIOUSNESS OF UNSPECIFIED DURATION, INITIAL ENCOUNTER: Chronic | ICD-10-CM

## 2021-03-16 PROCEDURE — 70546 MR ANGIOGRAPH HEAD W/O&W/DYE: CPT | Mod: 26

## 2021-03-16 PROCEDURE — A9579: CPT

## 2021-03-16 PROCEDURE — 70546 MR ANGIOGRAPH HEAD W/O&W/DYE: CPT

## 2021-03-19 ENCOUNTER — APPOINTMENT (OUTPATIENT)
Dept: NEUROSURGERY | Facility: CLINIC | Age: 66
End: 2021-03-19
Payer: MEDICARE

## 2021-03-19 VITALS
WEIGHT: 102 LBS | TEMPERATURE: 98.4 F | BODY MASS INDEX: 18.77 KG/M2 | RESPIRATION RATE: 16 BRPM | HEIGHT: 62 IN | HEART RATE: 100 BPM | DIASTOLIC BLOOD PRESSURE: 52 MMHG | SYSTOLIC BLOOD PRESSURE: 128 MMHG

## 2021-03-19 PROCEDURE — 99213 OFFICE O/P EST LOW 20 MIN: CPT

## 2021-03-19 PROCEDURE — 99072 ADDL SUPL MATRL&STAF TM PHE: CPT

## 2021-03-19 NOTE — RESULTS/DATA
[FreeTextEntry1] : \par EXAM: MR BRAIN\par \par \par PROCEDURE DATE: 03/15/2021\par \par \par \par INTERPRETATION: MR brain without gadolinium\par \par CLINICAL INFORMATION: Follow-up trauma with loss of consciousness and traumatic subdural.\par \par TECHNIQUE: Sagittal and axial T1-weighted images, axial FLAIR images, axial gradient echo and T2-weighted images and axial diffusion weighted images of the brain were obtained.\par \par FINDINGS: MRI dated 12/14/2020 is available for review.\par \par The brain demonstrates unchanged epidural fluid collection subjacent to the LEFT craniotomy. Moderate periventricular and bifrontal and biparietal subcortical white matter ischemia is noted. No acute cerebral cortical infarct is found. No intracranial hemorrhage is recognized. No mass effect is found in the brain.\par \par The ventricles, sulci and basal cisterns appear unremarkable.\par \par The vertebral and internal carotid arteries demonstrate expected flow voids indicating their patency.\par \par The orbits are unremarkable. The paranasal sinuses are clear. The nasal cavity appears intact. The nasopharynx is symmetric. The central skull base and temporal bones are intact. The calvarium appears unremarkable.\par \par IMPRESSION: Unchanged epidural fluid collection subjacent to the LEFT craniotomy. Unchanged epidural fluid collection subjacent to the LEFT craniotomy. Moderate periventricular and bifrontal and biparietal subcortical white matter ischemia is noted

## 2021-03-19 NOTE — ASSESSMENT
[FreeTextEntry1] : Pt is s/p left subdural and subsequent evac of post op epidural with residual epidural collection that is chronic.  She takes Tylenol and states it helps a bit. She takes it once a day.  Pt not interested in further procedures.\par \par MRI with no change in size of residual extraaxial collection, minimal shift.\par \par Continue Tylenol for HA.\par RTC 3 months\par CT c- Head (MRI is longer and pt prefers to do CT for this interval imaging study)\par

## 2021-03-19 NOTE — HISTORY OF PRESENT ILLNESS
[FreeTextEntry1] : \par 65 years old F PMH of DM, HTN,UTI had fall on and suffered SDH s/p craniotomy 07/26. Was in hospital for 3 weeks and staples were out. She had two times ER visit for chest pain and High BP. CT head repeated during those visit showed stable. Had a visit on 8/21/20 and reported that few episodes of passing out with brief LOC and advised to continue taking Keppra and started on Gabapentin 300 mg for paraesthesia.She is getting better overtime. Surgical wound healed well with out any signs of infection.Recent imaging showed residual left post operative epidural collection remained the same, but the midline shift has improved. She is here again for follow up after a new imaging.\par \par Pt today reports having headaches, sometimes take Tylenol.  Sometimes heavy.  Headache today.  Headaches are everyday and at top of head.  Sometimes has some difficulty picking up things and feels tired.  She is walking around about 4000 steps per day.  She is not doing house work.  She has some assistance getting dressed.  She bathes independently.  Son is here as .\par

## 2021-03-19 NOTE — PHYSICAL EXAM
[General Appearance - Alert] : alert [General Appearance - In No Acute Distress] : in no acute distress [FreeTextEntry1] : pt gait alternating, short steps, narrow based, but steady with no instability.\par JENSEN well with no drift.\par Conversant in [place]

## 2021-06-14 ENCOUNTER — OUTPATIENT (OUTPATIENT)
Dept: OUTPATIENT SERVICES | Facility: HOSPITAL | Age: 66
LOS: 1 days | End: 2021-06-14
Payer: MEDICARE

## 2021-06-14 ENCOUNTER — APPOINTMENT (OUTPATIENT)
Dept: CT IMAGING | Facility: HOSPITAL | Age: 66
End: 2021-06-14
Payer: MEDICARE

## 2021-06-14 DIAGNOSIS — S06.5X9A TRAUMATIC SUBDURAL HEMORRHAGE WITH LOSS OF CONSCIOUSNESS OF UNSPECIFIED DURATION, INITIAL ENCOUNTER: ICD-10-CM

## 2021-06-14 DIAGNOSIS — Z98.890 OTHER SPECIFIED POSTPROCEDURAL STATES: Chronic | ICD-10-CM

## 2021-06-14 DIAGNOSIS — Z86.79 PERSONAL HISTORY OF OTHER DISEASES OF THE CIRCULATORY SYSTEM: Chronic | ICD-10-CM

## 2021-06-14 DIAGNOSIS — S06.5X9A TRAUMATIC SUBDURAL HEMORRHAGE WITH LOSS OF CONSCIOUSNESS OF UNSPECIFIED DURATION, INITIAL ENCOUNTER: Chronic | ICD-10-CM

## 2021-06-14 PROCEDURE — 70450 CT HEAD/BRAIN W/O DYE: CPT | Mod: 26

## 2021-06-14 PROCEDURE — 70450 CT HEAD/BRAIN W/O DYE: CPT

## 2021-06-18 ENCOUNTER — APPOINTMENT (OUTPATIENT)
Dept: NEUROSURGERY | Facility: CLINIC | Age: 66
End: 2021-06-18
Payer: MEDICARE

## 2021-06-18 ENCOUNTER — APPOINTMENT (OUTPATIENT)
Age: 66
End: 2021-06-18
Payer: MEDICARE

## 2021-06-18 VITALS
HEART RATE: 107 BPM | DIASTOLIC BLOOD PRESSURE: 82 MMHG | TEMPERATURE: 98.2 F | SYSTOLIC BLOOD PRESSURE: 144 MMHG | OXYGEN SATURATION: 98 % | WEIGHT: 102 LBS | BODY MASS INDEX: 18.77 KG/M2 | HEIGHT: 62 IN

## 2021-06-18 VITALS — TEMPERATURE: 98 F

## 2021-06-18 DIAGNOSIS — R33.8 OTHER RETENTION OF URINE: ICD-10-CM

## 2021-06-18 PROCEDURE — 99072 ADDL SUPL MATRL&STAF TM PHE: CPT

## 2021-06-18 PROCEDURE — 99213 OFFICE O/P EST LOW 20 MIN: CPT

## 2021-06-18 PROCEDURE — 99214 OFFICE O/P EST MOD 30 MIN: CPT

## 2021-06-19 NOTE — ASSESSMENT
[FreeTextEntry1] : 67 yo F with LUTS\par \par - PVR = 40ml\par - Reaffirmed behavioral modifications and discussed strategies to help manage her LUTS\par - FU in 6 months

## 2021-06-19 NOTE — HISTORY OF PRESENT ILLNESS
[FreeTextEntry1] : 64 yo Ma[place] speaking F presents with persistent UTI. Had issues with recurrent UTI many years ago after her second child but was doing well for years with no issues. Starting last , has been having some dysuria and found to have UTI. Since then, she has been treated with antibiotics which helps with symptoms but will recur shortly after completing the antibiotics. Review of labworks shows perisistent e.coli UTI twice in February and most recently a few weeks ago. Last course of antibiotics was 10 days ago. Hasn't noticed exacerbation of LUTS but most bothersome is the urethral discomfort. Drinks multiple bottles of water per day and voids every 2 hrs or so. Denies any incontinence, history of gross hematuria, fever, chills, flank pain. Does have issues with constipation and will often have to perform enemas in order to feel comfortable. 2 children, , LMP was more than 10 yrs ago\par \par 18 Interval history: Since last visit, pt has been doing well with no minimal issues. Cysto at last visit was unremarkable\par \par 19 Interval history: 1 month ago had routine UA done with PCP and found to have UTI\par States that she was given abx but never started it because she didn't have any symptoms\par Has been completely asymptomatic for the last 6 months\par Shoulder surgery in  and has needed to take some pain meds recently for this\par Some constipation issues due to the pain meds\par No incontinence, no more urethral discomfort\par Has been double voiding\par \par 19 Interval history: s/p cholecystectomy and had some postop urinary retention\par Now voiding well. Still doing double voids\par A little dysuria\par Still having constipation\par Drinking 7-8 cups of water\par \par 20 Interval history: Pt hospitalized recently after a fall and ended up getting an urgent craniotomy secondary to subdural hematoma\par Pt was noted to have incomplete bladder emptying during hospitalization and had indwelling catheter placed\par Denies any urinary issues since hospital discharge with drainage of good clear yellow urine \par \par 20 Interval history: Pt recently presented to hospital with sepsis from urinary source and hospitalized for antibiotics\par During hospitalization, pt subsequently passed TOV\par Currently feeling well and denies any bothersome LUTS\par Pt states she feels like she is getting stronger\par \par 20 Interval history: No issues since last visit\par Feels like she is voiding adequately\par No dysuria, no gross hematuria\par no flank pain\par \par 21 Interval history: sometimes incomplete bladder emptying sensation\par no gross hematuria, no dysuria\par Drinks at least 1 L of water, 1 cup of coffee\par Nocturia 1-2/night\par Uses pull-ups for safety when out of the house\par chronic constipation

## 2021-06-25 NOTE — PHYSICAL EXAM
[General Appearance - Alert] : alert [General Appearance - In No Acute Distress] : in no acute distress [General Appearance - Well Nourished] : well nourished [Oriented To Time, Place, And Person] : oriented to person, place, and time [Impaired Insight] : insight and judgment were intact [Affect] : the affect was normal [Memory Recent] : recent memory was not impaired [Person] : oriented to person [Place] : oriented to place [Time] : oriented to time [Short Term Intact] : short term memory intact [Cranial Nerves Optic (II)] : visual acuity intact bilaterally,  pupils equal round and reactive to light [Cranial Nerves Oculomotor (III)] : extraocular motion intact [Cranial Nerves Trigeminal (V)] : facial sensation intact symmetrically [Cranial Nerves Facial (VII)] : face symmetrical [Cranial Nerves Vestibulocochlear (VIII)] : hearing was intact bilaterally [Cranial Nerves Glossopharyngeal (IX)] : tongue and palate midline [Cranial Nerves Accessory (XI - Cranial And Spinal)] : head turning and shoulder shrug symmetric [Cranial Nerves Hypoglossal (XII)] : there was no tongue deviation with protrusion [Motor Tone] : muscle tone was normal in all four extremities [Motor Strength] : muscle strength was normal in all four extremities [Sensation Tactile Decrease] : light touch was intact [Sensation Pain / Temperature Decrease] : pain and temperature was intact [Balance] : balance was intact [Sclera] : the sclera and conjunctiva were normal [PERRL With Normal Accommodation] : pupils were equal in size, round, reactive to light, with normal accommodation [Outer Ear] : the ears and nose were normal in appearance [Both Tympanic Membranes Were Examined] : both tympanic membranes were normal [Neck Appearance] : the appearance of the neck was normal [] : no respiratory distress [Respiration, Rhythm And Depth] : normal respiratory rhythm and effort [Exaggerated Use Of Accessory Muscles For Inspiration] : no accessory muscle use [Chest Palpation] : palpation of the chest revealed no abnormalities [Apical Impulse] : the apical impulse was normal [Heart Rate And Rhythm] : heart rate was normal and rhythm regular [No CVA Tenderness] : no ~M costovertebral angle tenderness [No Spinal Tenderness] : no spinal tenderness [Abnormal Walk] : normal gait [Nail Clubbing] : no clubbing  or cyanosis of the fingernails [Skin Color & Pigmentation] : normal skin color and pigmentation [FreeTextEntry8] : no pronator drift, no instability on tandem

## 2021-06-25 NOTE — ASSESSMENT
[FreeTextEntry1] : IMPRESSION:\par \par Pt is s/p left subdural and subsequent evac of post op epidural with residual epidural collection that is chronic. Pt neurologically stable with no issues. MRI with decreased  size of residual extraaxial collection. \par \par PLAN:\par \par Continue normal activity\par Precautions given for  prevention of  fall\par Pt may proceed with Cataract surgery\par CT Head in 3 months. \par Will follow up in 3 months after CT scan

## 2021-06-25 NOTE — HISTORY OF PRESENT ILLNESS
[FreeTextEntry1] : 65 years old F PMH of DM, HTN,UTI had fall on and suffered SDH s/p craniotomy 07/26. Was in hospital for 3 weeks and staples were out. She had two times ER visit for chest pain and High BP. CT head repeated during those visit showed stable. Had a visit on 8/21/20 and reported that few episodes of passing out with brief LOC and advised to continue taking Keppra and started on Gabapentin 300 mg for paraesthesia.She is getting better overtime. Surgical wound healed well with out any signs of infection.Recent imaging showed residual left post operative epidural collection remained the same, but the midline shift has improved. She is here again for follow up after a new imaging.\par \par Today Ms Marlyn  present ambulatory believes that she improved over all. Denies headache, dizziness, speech impairment, vision problems or seizures. She is walking well with no symptoms. Has sporadic headache which does not bother much.She is here for a regular follow up after a scan.Pt with ophthalmology for a cataract surgery , would like to know if she is able to proceed with that. \par \par \par

## 2021-07-08 ENCOUNTER — NON-APPOINTMENT (OUTPATIENT)
Age: 66
End: 2021-07-08

## 2021-07-23 ENCOUNTER — TRANSCRIPTION ENCOUNTER (OUTPATIENT)
Age: 66
End: 2021-07-23

## 2021-08-28 ENCOUNTER — OUTPATIENT (OUTPATIENT)
Dept: OUTPATIENT SERVICES | Facility: HOSPITAL | Age: 66
LOS: 1 days | End: 2021-08-28
Payer: MEDICARE

## 2021-08-28 ENCOUNTER — APPOINTMENT (OUTPATIENT)
Dept: MRI IMAGING | Facility: HOSPITAL | Age: 66
End: 2021-08-28
Payer: MEDICARE

## 2021-08-28 ENCOUNTER — RESULT REVIEW (OUTPATIENT)
Age: 66
End: 2021-08-28

## 2021-08-28 DIAGNOSIS — S06.5X9A TRAUMATIC SUBDURAL HEMORRHAGE WITH LOSS OF CONSCIOUSNESS OF UNSPECIFIED DURATION, INITIAL ENCOUNTER: Chronic | ICD-10-CM

## 2021-08-28 DIAGNOSIS — S06.5X9A TRAUMATIC SUBDURAL HEMORRHAGE WITH LOSS OF CONSCIOUSNESS OF UNSPECIFIED DURATION, INITIAL ENCOUNTER: ICD-10-CM

## 2021-08-28 DIAGNOSIS — Z86.79 PERSONAL HISTORY OF OTHER DISEASES OF THE CIRCULATORY SYSTEM: Chronic | ICD-10-CM

## 2021-08-28 DIAGNOSIS — Z98.890 OTHER SPECIFIED POSTPROCEDURAL STATES: Chronic | ICD-10-CM

## 2021-08-28 PROCEDURE — A9579: CPT

## 2021-08-28 PROCEDURE — 70553 MRI BRAIN STEM W/O & W/DYE: CPT | Mod: 26

## 2021-08-28 PROCEDURE — 70553 MRI BRAIN STEM W/O & W/DYE: CPT

## 2021-09-11 ENCOUNTER — APPOINTMENT (OUTPATIENT)
Dept: MRI IMAGING | Facility: HOSPITAL | Age: 66
End: 2021-09-11

## 2021-09-17 ENCOUNTER — APPOINTMENT (OUTPATIENT)
Dept: NEUROSURGERY | Facility: CLINIC | Age: 66
End: 2021-09-17
Payer: MEDICARE

## 2021-09-17 VITALS
OXYGEN SATURATION: 98 % | DIASTOLIC BLOOD PRESSURE: 94 MMHG | HEIGHT: 62 IN | HEART RATE: 90 BPM | BODY MASS INDEX: 19.47 KG/M2 | SYSTOLIC BLOOD PRESSURE: 169 MMHG | WEIGHT: 105.82 LBS

## 2021-09-17 PROCEDURE — 99214 OFFICE O/P EST MOD 30 MIN: CPT

## 2021-09-17 RX ORDER — TRAZODONE HYDROCHLORIDE 300 MG/1
TABLET ORAL
Refills: 0 | Status: ACTIVE | COMMUNITY

## 2021-09-17 RX ORDER — LISINOPRIL AND HYDROCHLOROTHIAZIDE TABLETS 10; 12.5 MG/1; MG/1
10-12.5 TABLET ORAL DAILY
Refills: 0 | Status: ACTIVE | COMMUNITY

## 2021-09-17 RX ORDER — ESTRADIOL 10 UG/1
10 TABLET VAGINAL AS DIRECTED
Qty: 30 | Refills: 3 | Status: DISCONTINUED | COMMUNITY
Start: 2018-05-04 | End: 2021-09-17

## 2021-09-17 RX ORDER — INSULIN GLARGINE 100 [IU]/ML
INJECTION, SOLUTION SUBCUTANEOUS
Refills: 0 | Status: ACTIVE | COMMUNITY

## 2021-09-17 RX ORDER — GLIMEPIRIDE 2 MG/1
2 TABLET ORAL
Refills: 0 | Status: DISCONTINUED | COMMUNITY
End: 2021-09-17

## 2021-09-17 RX ORDER — AMMONIUM LACTATE 12 %
12 CREAM (GRAM) TOPICAL
Refills: 0 | Status: DISCONTINUED | COMMUNITY
End: 2021-09-17

## 2021-09-17 RX ORDER — ENALAPRIL MALEATE 10 MG/1
10 TABLET ORAL
Refills: 0 | Status: DISCONTINUED | COMMUNITY
End: 2021-09-17

## 2021-09-17 RX ORDER — METFORMIN HYDROCHLORIDE 500 MG/1
500 TABLET, COATED ORAL
Refills: 0 | Status: DISCONTINUED | COMMUNITY
End: 2021-09-17

## 2021-09-17 RX ORDER — CHLORHEXIDINE GLUCONATE 4 %
325 (65 FE) LIQUID (ML) TOPICAL
Refills: 0 | Status: ACTIVE | COMMUNITY

## 2021-09-17 RX ORDER — ASPIRIN 81 MG
81 TABLET, DELAYED RELEASE (ENTERIC COATED) ORAL
Refills: 0 | Status: DISCONTINUED | COMMUNITY
End: 2021-09-17

## 2021-09-17 RX ORDER — SULFAMETHOXAZOLE AND TRIMETHOPRIM 800; 160 MG/1; MG/1
800-160 TABLET ORAL
Qty: 14 | Refills: 0 | Status: DISCONTINUED | COMMUNITY
Start: 2019-12-26 | End: 2021-09-17

## 2021-09-17 RX ORDER — ESTRADIOL 0.1 MG/G
0.1 CREAM VAGINAL
Qty: 1 | Refills: 3 | Status: DISCONTINUED | COMMUNITY
Start: 2018-09-05 | End: 2021-09-17

## 2021-09-17 RX ORDER — AMLODIPINE BESYLATE 5 MG/1
5 TABLET ORAL
Refills: 0 | Status: DISCONTINUED | COMMUNITY
End: 2021-09-17

## 2021-09-17 RX ORDER — SENNOSIDES 8.6 MG
TABLET ORAL
Refills: 0 | Status: ACTIVE | COMMUNITY

## 2021-12-06 ENCOUNTER — EMERGENCY (EMERGENCY)
Facility: HOSPITAL | Age: 66
LOS: 1 days | Discharge: HOME | End: 2021-12-06
Attending: EMERGENCY MEDICINE
Payer: MEDICARE

## 2021-12-06 VITALS
SYSTOLIC BLOOD PRESSURE: 159 MMHG | HEIGHT: 62 IN | WEIGHT: 105.82 LBS | RESPIRATION RATE: 18 BRPM | HEART RATE: 109 BPM | DIASTOLIC BLOOD PRESSURE: 88 MMHG | TEMPERATURE: 98 F | OXYGEN SATURATION: 98 %

## 2021-12-06 VITALS
HEART RATE: 93 BPM | SYSTOLIC BLOOD PRESSURE: 174 MMHG | TEMPERATURE: 98 F | RESPIRATION RATE: 19 BRPM | DIASTOLIC BLOOD PRESSURE: 80 MMHG | OXYGEN SATURATION: 95 %

## 2021-12-06 DIAGNOSIS — S06.5X9A TRAUMATIC SUBDURAL HEMORRHAGE WITH LOSS OF CONSCIOUSNESS OF UNSPECIFIED DURATION, INITIAL ENCOUNTER: Chronic | ICD-10-CM

## 2021-12-06 DIAGNOSIS — Z86.79 PERSONAL HISTORY OF OTHER DISEASES OF THE CIRCULATORY SYSTEM: Chronic | ICD-10-CM

## 2021-12-06 DIAGNOSIS — Z98.890 OTHER SPECIFIED POSTPROCEDURAL STATES: Chronic | ICD-10-CM

## 2021-12-06 LAB
ALBUMIN SERPL ELPH-MCNC: 4.4 G/DL — SIGNIFICANT CHANGE UP (ref 3.3–5)
ALP SERPL-CCNC: 46 U/L — SIGNIFICANT CHANGE UP (ref 40–120)
ALT FLD-CCNC: 51 U/L — HIGH (ref 10–45)
ANION GAP SERPL CALC-SCNC: 14 MMOL/L — SIGNIFICANT CHANGE UP (ref 5–17)
APTT BLD: 27.8 SEC — SIGNIFICANT CHANGE UP (ref 27.5–35.5)
AST SERPL-CCNC: 37 U/L — SIGNIFICANT CHANGE UP (ref 10–40)
BASOPHILS # BLD AUTO: 0.02 K/UL — SIGNIFICANT CHANGE UP (ref 0–0.2)
BASOPHILS NFR BLD AUTO: 0.2 % — SIGNIFICANT CHANGE UP (ref 0–2)
BILIRUB SERPL-MCNC: 0.6 MG/DL — SIGNIFICANT CHANGE UP (ref 0.2–1.2)
BUN SERPL-MCNC: 17 MG/DL — SIGNIFICANT CHANGE UP (ref 7–23)
CALCIUM SERPL-MCNC: 9.7 MG/DL — SIGNIFICANT CHANGE UP (ref 8.4–10.5)
CHLORIDE SERPL-SCNC: 95 MMOL/L — LOW (ref 96–108)
CO2 SERPL-SCNC: 23 MMOL/L — SIGNIFICANT CHANGE UP (ref 22–31)
CREAT SERPL-MCNC: 0.44 MG/DL — LOW (ref 0.5–1.3)
EOSINOPHIL # BLD AUTO: 0.05 K/UL — SIGNIFICANT CHANGE UP (ref 0–0.5)
EOSINOPHIL NFR BLD AUTO: 0.5 % — SIGNIFICANT CHANGE UP (ref 0–6)
GLUCOSE SERPL-MCNC: 201 MG/DL — HIGH (ref 70–99)
HCT VFR BLD CALC: 41.6 % — SIGNIFICANT CHANGE UP (ref 34.5–45)
HGB BLD-MCNC: 14.3 G/DL — SIGNIFICANT CHANGE UP (ref 11.5–15.5)
IMM GRANULOCYTES NFR BLD AUTO: 0.6 % — SIGNIFICANT CHANGE UP (ref 0–1.5)
INR BLD: 1.04 RATIO — SIGNIFICANT CHANGE UP (ref 0.88–1.16)
LYMPHOCYTES # BLD AUTO: 2.11 K/UL — SIGNIFICANT CHANGE UP (ref 1–3.3)
LYMPHOCYTES # BLD AUTO: 21.4 % — SIGNIFICANT CHANGE UP (ref 13–44)
MCHC RBC-ENTMCNC: 29.9 PG — SIGNIFICANT CHANGE UP (ref 27–34)
MCHC RBC-ENTMCNC: 34.4 GM/DL — SIGNIFICANT CHANGE UP (ref 32–36)
MCV RBC AUTO: 86.8 FL — SIGNIFICANT CHANGE UP (ref 80–100)
MONOCYTES # BLD AUTO: 0.67 K/UL — SIGNIFICANT CHANGE UP (ref 0–0.9)
MONOCYTES NFR BLD AUTO: 6.8 % — SIGNIFICANT CHANGE UP (ref 2–14)
NEUTROPHILS # BLD AUTO: 6.96 K/UL — SIGNIFICANT CHANGE UP (ref 1.8–7.4)
NEUTROPHILS NFR BLD AUTO: 70.5 % — SIGNIFICANT CHANGE UP (ref 43–77)
NRBC # BLD: 0 /100 WBCS — SIGNIFICANT CHANGE UP (ref 0–0)
PLATELET # BLD AUTO: 168 K/UL — SIGNIFICANT CHANGE UP (ref 150–400)
POTASSIUM SERPL-MCNC: 3.6 MMOL/L — SIGNIFICANT CHANGE UP (ref 3.5–5.3)
POTASSIUM SERPL-SCNC: 3.6 MMOL/L — SIGNIFICANT CHANGE UP (ref 3.5–5.3)
PROT SERPL-MCNC: 7.8 G/DL — SIGNIFICANT CHANGE UP (ref 6–8.3)
PROTHROM AB SERPL-ACNC: 12.4 SEC — SIGNIFICANT CHANGE UP (ref 10.6–13.6)
RBC # BLD: 4.79 M/UL — SIGNIFICANT CHANGE UP (ref 3.8–5.2)
RBC # FLD: 12 % — SIGNIFICANT CHANGE UP (ref 10.3–14.5)
SARS-COV-2 RNA SPEC QL NAA+PROBE: SIGNIFICANT CHANGE UP
SODIUM SERPL-SCNC: 132 MMOL/L — LOW (ref 135–145)
WBC # BLD: 9.87 K/UL — SIGNIFICANT CHANGE UP (ref 3.8–10.5)
WBC # FLD AUTO: 9.87 K/UL — SIGNIFICANT CHANGE UP (ref 3.8–10.5)

## 2021-12-06 PROCEDURE — 85610 PROTHROMBIN TIME: CPT

## 2021-12-06 PROCEDURE — 70450 CT HEAD/BRAIN W/O DYE: CPT | Mod: 26,MA

## 2021-12-06 PROCEDURE — 82962 GLUCOSE BLOOD TEST: CPT

## 2021-12-06 PROCEDURE — 70450 CT HEAD/BRAIN W/O DYE: CPT | Mod: MA

## 2021-12-06 PROCEDURE — U0003: CPT

## 2021-12-06 PROCEDURE — 85025 COMPLETE CBC W/AUTO DIFF WBC: CPT

## 2021-12-06 PROCEDURE — 93005 ELECTROCARDIOGRAM TRACING: CPT | Mod: 76

## 2021-12-06 PROCEDURE — 80053 COMPREHEN METABOLIC PANEL: CPT

## 2021-12-06 PROCEDURE — U0005: CPT

## 2021-12-06 PROCEDURE — 99284 EMERGENCY DEPT VISIT MOD MDM: CPT | Mod: 25

## 2021-12-06 PROCEDURE — 96374 THER/PROPH/DIAG INJ IV PUSH: CPT

## 2021-12-06 PROCEDURE — 99285 EMERGENCY DEPT VISIT HI MDM: CPT

## 2021-12-06 PROCEDURE — 85730 THROMBOPLASTIN TIME PARTIAL: CPT

## 2021-12-06 RX ORDER — SODIUM CHLORIDE 9 MG/ML
1000 INJECTION INTRAMUSCULAR; INTRAVENOUS; SUBCUTANEOUS ONCE
Refills: 0 | Status: COMPLETED | OUTPATIENT
Start: 2021-12-06 | End: 2021-12-06

## 2021-12-06 RX ORDER — METOCLOPRAMIDE HCL 10 MG
10 TABLET ORAL ONCE
Refills: 0 | Status: COMPLETED | OUTPATIENT
Start: 2021-12-06 | End: 2021-12-06

## 2021-12-06 RX ADMIN — Medication 10 MILLIGRAM(S): at 15:23

## 2021-12-06 RX ADMIN — SODIUM CHLORIDE 1000 MILLILITER(S): 9 INJECTION INTRAMUSCULAR; INTRAVENOUS; SUBCUTANEOUS at 15:23

## 2021-12-06 NOTE — ED PROVIDER NOTE - CLINICAL SUMMARY MEDICAL DECISION MAKING FREE TEXT BOX
65 yo F with PMH of DMT2, HTN, SDH 2/2 Fall S/P left craniotomy on 7/26/2020 p/w headache, shortness of breath and chills that began 3 d ago. This is concerning for 65 yo F with PMH of DMT2, HTN, SDH 2/2 Fall S/P left craniotomy on 7/26/2020 p/w headache, shortness of breath and chills that began 3 d ago. This is concerning for a migraine, intracranial bleed or a viral infection. will order CT head. 67 yo F with PMH of DMT2, HTN, SDH 2/2 Fall S/P left craniotomy on 7/26/2020 p/w headache, shortness of breath and chills that began 3 d ago. This is concerning for a migraine, intracranial bleed or a viral infection. will order CT head.    SAPNA Pitts MD: Agree with resident/ACP MDM, assessment and plan as above. No meningismus, f/c or sick contacts. No thunderclap etiology of patient's HA. Will image given her hx of recent trauma and previous SDH, will treat pain, check labs, reassess.

## 2021-12-06 NOTE — ED PROVIDER NOTE - PATIENT PORTAL LINK FT
You can access the FollowMyHealth Patient Portal offered by [facility] by registering at the following website: [email or web address]. By joining [facility]’s FollowMyHealth portal, you will also be able to view your health information using other applications (apps) compatible with our system.

## 2021-12-06 NOTE — ED ADULT NURSE NOTE - OBJECTIVE STATEMENT
67 y/o female PMH brain surgery unsure specifics per pt presents to ED reporting headache. Pt reports headache for three days. Pt also reports falling ten days ago, denies hitting head. Pt also reports blurry vision for an unknown amount of time. Pt reports numbness to bilateral lower extremities. On exam, AOx3, speaking in complete sentences. Unlabored, spontaneous respirations, NAD. Abdomen soft, non-tender, non-distended. Pt denies CP, SOB, v/d, fever/chills at this time. Heplock placed, labs sent. MD at bedside.  used, Chey, ID: [id number].

## 2021-12-06 NOTE — ED PROVIDER NOTE - NSFOLLOWUPINSTRUCTIONS_ED_ALL_ED_FT
Please follow up with your primary care physician within 3-5 days.    You may take 650 mg Tylenol (acetaminophen) every eight hours as needed for pain.    You may take 600mg Ibuprofen (Advil) once every 8 hours as needed for pain. See medication label for warnings and use instructions.      Acute Headache    WHAT YOU NEED TO KNOW:    An acute headache is pain or discomfort that starts suddenly and gets worse quickly. You may have an acute headache only when you feel stress or eat certain foods. Other acute headache pain can happen every day, and sometimes several times a day.     DISCHARGE INSTRUCTIONS:    Seek care immediately if:   •You have severe pain.      •You have numbness or weakness on one side of your face or body.      •You have a headache that occurs after a blow to the head, a fall, or other trauma.       •You have a headache, are forgetful or confused, or have trouble speaking.      •You have a headache, stiff neck, and a fever.      Contact your healthcare provider if:   •You have a constant headache and are vomiting.      •You have a headache each day that does not get better, even after treatment.      •You have changes in your headaches, or new symptoms that occur when you have a headache.      •You have questions or concerns about your condition or care.      Medicines: You may need any of the following:   •Prescription pain medicine may be given. The medicine your healthcare provider recommends will depend on the kind of headaches you have. You will need to take prescription headache medicines as directed to prevent a problem called rebound headache. These headaches happen with regular use of pain relievers for headache disorders.      •NSAIDs, such as ibuprofen, help decrease swelling, pain, and fever. This medicine is available with or without a doctor's order. NSAIDs can cause stomach bleeding or kidney problems in certain people. If you take blood thinner medicine, always ask your healthcare provider if NSAIDs are safe for you. Always read the medicine label and follow directions.      •Acetaminophen decreases pain and fever. It is available without a doctor's order. Ask how much to take and how often to take it. Follow directions. Read the labels of all other medicines you are using to see if they also contain acetaminophen, or ask your doctor or pharmacist. Acetaminophen can cause liver damage if not taken correctly. Do not use more than 3 grams (3,000 milligrams) total of acetaminophen in one day.       •Antidepressants may be given for some kinds of headaches.       •Take your medicine as directed. Contact your healthcare provider if you think your medicine is not helping or if you have side effects. Tell him or her if you are allergic to any medicine. Keep a list of the medicines, vitamins, and herbs you take. Include the amounts, and when and why you take them. Bring the list or the pill bottles to follow-up visits. Carry your medicine list with you in case of an emergency.      Manage your symptoms:   •Apply heat or ice on the headache area. Use a heat or ice pack. For an ice pack, you can also put crushed ice in a plastic bag. Cover the pack or bag with a towel before you apply it to your skin. Ice and heat both help decrease pain, and heat also helps decrease muscle spasms. Apply heat for 20 to 30 minutes every 2 hours. Apply ice for 15 to 20 minutes every hour. Apply heat or ice for as long and for as many days as directed. You may alternate heat and ice.      •Relax your muscles. Lie down in a comfortable position and close your eyes. Relax your muscles slowly. Start at your toes and work your way up your body.      •Keep a record of your headaches. Write down when your headaches start and stop. Include your symptoms and what you were doing when the headache began. Record what you ate or drank for 24 hours before the headache started. Describe the pain and where it hurts. Keep track of what you did to treat your headache and if it worked.       Prevent an acute headache:   •Avoid anything that triggers an acute headache. Examples include exposure to chemicals, going to high altitude, or not getting enough sleep. Create a regular sleep routine. Go to sleep at the same time and wake up at the same time each day. Do not use electronic devices before bedtime. These may trigger a headache or prevent you from sleeping well.      •Do not smoke. Nicotine and other chemicals in cigarettes and cigars can trigger an acute headache or make it worse. Ask your healthcare provider for information if you currently smoke and need help to quit. E-cigarettes or smokeless tobacco still contain nicotine. Talk to your healthcare provider before you use these products.       •Limit alcohol as directed. Alcohol can trigger an acute headache or make it worse. If you have cluster headaches, do not drink alcohol during an episode. For other types of headaches, ask your healthcare provider if it is safe for you to drink alcohol. Ask how much is safe for you to drink, and how often.      •Exercise as directed. Exercise can reduce tension and help with headache pain. Aim for 30 minutes of physical activity on most days of the week. Your healthcare provider can help you create an exercise plan.      •Eat a variety of healthy foods. Healthy foods include fruits, vegetables, low-fat dairy products, lean meats, fish, whole grains, and cooked beans. Your healthcare provider or dietitian can help you create meals plans if you need to avoid foods that trigger headaches.      Follow up with your healthcare provider as directed: Bring your headache record with you when you see your healthcare provider. Write down your questions so you remember to ask them during your visits.

## 2021-12-06 NOTE — ED PROVIDER NOTE - OBJECTIVE STATEMENT
67 yo F with PMH of DMT2, HTN, SDH 2/2 Fall S/P left craniotomy on 7/26/2020 p/w headache, shortness of breath and chills that began 3 d ago. She fell 10d ago and did not hit her head. She took tylenol, it did not give her any relief. She notes she has a constant pressure in her head. She denies cough, fever, chest pain, abdominal pain, changes in urination, dysuria. She has some blurry vision but states she's had it for a while. She complains of some neck stiffness.

## 2021-12-06 NOTE — ED ADULT NURSE REASSESSMENT NOTE - NS ED NURSE REASSESS COMMENT FT1
Pt reports chest heaviness, pt on tele monitor. Repeat EKG obtained. Will continue to monitor. MD Woodward aware.

## 2021-12-06 NOTE — ED PROVIDER NOTE - NSICDXPASTSURGICALHX_GEN_ALL_CORE_FT
PAST SURGICAL HISTORY:  H/O shoulder surgery     History of varicose veins of lower extremity s/p surgery in July 2019    SDH (subdural hematoma) S/P left craniotomy on 7/26/2020

## 2021-12-06 NOTE — ED PROVIDER NOTE - PHYSICAL EXAMINATION
General: WN/WD NAD  Head: Atraumatic, normocephalic  Eyes: EOM grossly in tact, no scleral icterus  ENT: moist mucous membranes  dec neck ROM  Neurology: A&Ox3, nonfocal, JENSEN x 4  Respiratory: normal respiratory effort  CV: Extremities warm and well perfused  Abdominal: Soft, non-distended, non-tender, no masses  Extremities: No edema, no deformities  Skin: warm, pt appears sweaty. No rashes

## 2021-12-06 NOTE — ED ADULT NURSE NOTE - NSIMPLEMENTINTERV_GEN_ALL_ED
Implemented All Fall Risk Interventions:  [place] to call system. Call bell, personal items and telephone within reach. Instruct patient to call for assistance. Room bathroom lighting operational. Non-slip footwear when patient is off stretcher. Physically safe environment: no spills, clutter or unnecessary equipment. Stretcher in lowest position, wheels locked, appropriate side rails in place. Provide visual cue, wrist band, yellow gown, etc. Monitor gait and stability. Monitor for mental status changes and reorient to person, place, and time. Review medications for side effects contributing to fall risk. Reinforce activity limits and safety measures with patient and family.

## 2021-12-06 NOTE — ED PROVIDER NOTE - PROGRESS NOTE DETAILS
Violetta Woodward, PGY-1, EM:  attempted to speak with son, Son unable to provide additional information.

## 2021-12-22 ENCOUNTER — APPOINTMENT (OUTPATIENT)
Age: 66
End: 2021-12-22
Payer: MEDICARE

## 2021-12-22 DIAGNOSIS — E87.1 HYPO-OSMOLALITY AND HYPONATREMIA: ICD-10-CM

## 2021-12-22 PROCEDURE — 99214 OFFICE O/P EST MOD 30 MIN: CPT

## 2021-12-28 PROBLEM — E87.1 HYPONATREMIA: Status: ACTIVE | Noted: 2021-12-28

## 2021-12-28 NOTE — ASSESSMENT
[FreeTextEntry1] : 65 yo F with LUTS, hyponatremia\par \par - PVR = 100ml\par - Reviewed labwork from ER visit and confirmed hyponatremia\par - Discussed ways to hydrate and balance UTI/constipation with hyponatremia\par - FU in 3 months

## 2021-12-28 NOTE — HISTORY OF PRESENT ILLNESS
[FreeTextEntry1] : 62 yo Arabic speaking F presents with persistent UTI. Had issues with recurrent UTI many years ago after her second child but was doing well for years with no issues. Starting last , has been having some dysuria and found to have UTI. Since then, she has been treated with antibiotics which helps with symptoms but will recur shortly after completing the antibiotics. Review of labworks shows perisistent e.coli UTI twice in February and most recently a few weeks ago. Last course of antibiotics was 10 days ago. Hasn't noticed exacerbation of LUTS but most bothersome is the urethral discomfort. Drinks multiple bottles of water per day and voids every 2 hrs or so. Denies any incontinence, history of gross hematuria, fever, chills, flank pain. Does have issues with constipation and will often have to perform enemas in order to feel comfortable. 2 children, , LMP was more than 10 yrs ago\par \par 18 Interval history: Since last visit, pt has been doing well with no minimal issues. Cysto at last visit was unremarkable\par \par 19 Interval history: 1 month ago had routine UA done with PCP and found to have UTI\par States that she was given abx but never started it because she didn't have any symptoms\par Has been completely asymptomatic for the last 6 months\par Shoulder surgery in  and has needed to take some pain meds recently for this\par Some constipation issues due to the pain meds\par No incontinence, no more urethral discomfort\par Has been double voiding\par \par 19 Interval history: s/p cholecystectomy and had some postop urinary retention\par Now voiding well. Still doing double voids\par A little dysuria\par Still having constipation\par Drinking 7-8 cups of water\par \par 20 Interval history: Pt hospitalized recently after a fall and ended up getting an urgent craniotomy secondary to subdural hematoma\par Pt was noted to have incomplete bladder emptying during hospitalization and had indwelling catheter placed\par Denies any urinary issues since hospital discharge with drainage of good clear yellow urine \par \par 20 Interval history: Pt recently presented to hospital with sepsis from urinary source and hospitalized for antibiotics\par During hospitalization, pt subsequently passed TOV\par Currently feeling well and denies any bothersome LUTS\par Pt states she feels like she is getting stronger\par \par 20 Interval history: No issues since last visit\par Feels like she is voiding adequately\par No dysuria, no gross hematuria\par no flank pain\par \par 21 Interval history: sometimes incomplete bladder emptying sensation\par no gross hematuria, no dysuria\par Drinks at least 1 L of water, 1 cup of coffee\par Nocturia 1-2/night\par Uses pull-ups for safety when out of the house\par chronic constipation\par \par 21 Interval history: Had some dysuria about 1 month ago and found to have a UTI\par Was also having some bowel issues at that time\par Feeling better after course of abx\par Did go to ER yesterday for dizziness - found to have hyponatremia\par Drinks 8 cups of water daily\par Voiding every hour, nocturia 1-2/night\par no incontinence\par chronic constipation\par

## 2022-01-15 ENCOUNTER — OUTPATIENT (OUTPATIENT)
Dept: OUTPATIENT SERVICES | Facility: HOSPITAL | Age: 67
LOS: 1 days | End: 2022-01-15
Payer: MEDICARE

## 2022-01-15 ENCOUNTER — APPOINTMENT (OUTPATIENT)
Dept: MRI IMAGING | Facility: HOSPITAL | Age: 67
End: 2022-01-15
Payer: MEDICARE

## 2022-01-15 DIAGNOSIS — Z98.890 OTHER SPECIFIED POSTPROCEDURAL STATES: Chronic | ICD-10-CM

## 2022-01-15 DIAGNOSIS — S06.5X9A TRAUMATIC SUBDURAL HEMORRHAGE WITH LOSS OF CONSCIOUSNESS OF UNSPECIFIED DURATION, INITIAL ENCOUNTER: Chronic | ICD-10-CM

## 2022-01-15 DIAGNOSIS — S06.5X9A TRAUMATIC SUBDURAL HEMORRHAGE WITH LOSS OF CONSCIOUSNESS OF UNSPECIFIED DURATION, INITIAL ENCOUNTER: ICD-10-CM

## 2022-01-15 DIAGNOSIS — Z86.79 PERSONAL HISTORY OF OTHER DISEASES OF THE CIRCULATORY SYSTEM: Chronic | ICD-10-CM

## 2022-01-15 PROCEDURE — 70551 MRI BRAIN STEM W/O DYE: CPT

## 2022-01-15 PROCEDURE — 70551 MRI BRAIN STEM W/O DYE: CPT | Mod: 26

## 2022-01-21 ENCOUNTER — APPOINTMENT (OUTPATIENT)
Dept: NEUROSURGERY | Facility: CLINIC | Age: 67
End: 2022-01-21
Payer: MEDICARE

## 2022-01-21 VITALS
HEART RATE: 104 BPM | TEMPERATURE: 97.6 F | SYSTOLIC BLOOD PRESSURE: 156 MMHG | BODY MASS INDEX: 19.32 KG/M2 | WEIGHT: 105 LBS | DIASTOLIC BLOOD PRESSURE: 82 MMHG | HEIGHT: 62 IN | OXYGEN SATURATION: 98 %

## 2022-01-21 PROCEDURE — 99214 OFFICE O/P EST MOD 30 MIN: CPT

## 2022-01-21 RX ORDER — DEXTROSE 4 G
TABLET,CHEWABLE ORAL
Refills: 0 | Status: DISCONTINUED | COMMUNITY
End: 2022-01-21

## 2022-01-24 NOTE — ASSESSMENT
[FreeTextEntry1] : Impression:\par \par Pt s/p left subdural after a fall. Pt with  post op epidural collection removal with  stable MRI with only mild local left mass effect. Pt dong home PT for ambulation and general strengthening. Pt walking at home. Pt not on any AC prior to fall. MRI head with subdural collection is stable in Jan 2022. \par \par PLAN:\par \par Continue PT and walking  at home \par MRI head in 6 months. \par F/U after 6 months with MRI head.

## 2022-01-24 NOTE — RESULTS/DATA
[FreeTextEntry1] : ACC: [id number] EXAM: MR BRAIN\par \par PROCEDURE DATE: 01/15/2022\par \par \par \par INTERPRETATION: Non-contrast MRI of the brain.\par \par CLINICAL INDICATION: TMR\par \par TECHNIQUE: Multiplanar, multisequence MR images of the brain were obtained without the administration of intravenous contrast.\par \par COMPARISON: CT brain 12/6/2021. MRI brain 8/28/2021.\par \par FINDINGS:\par \par Redemonstration of left hemicraniotomy.\par \par Similar appearing subjacent extra-axial collection measuring 6 mm in greatest depth. The collection predominantly demonstrates CSF signal intensity, however, it also contains regions of mixed signal intensity similar to the prior MRI.\par \par Similar rightward midline shift of 3 mm. No effacement of the basal cisterns. No hydrocephalus.\par \par No acute parenchymal hemorrhage, vasogenic edema, or acute infarction.\par \par Moderate white matter microvascular ischemic disease.\par \par Signal voids are seen within the major intracranial vessels consistent with their patency.\par \par Scattered paranasal sinus mucosal thickening. Mastoid air cells clear.\par \par The orbits, sellar and suprasellar structures, and craniocervical junction are unremarkable.\par \par IMPRESSION:\par \par No significant interval change from 8/20/2021.\par \par Re demonstration of left hemicraniotomy.\par \par Similar appearing subjacent extra-axial collection measuring 6 mm in greatest depth. The collection predominantly demonstrates CSF signal intensity, however, it also contains regions of mixed signal intensity similar to the prior MRI.\par \par Similar rightward midline shift of 3 mm. No effacement of the basal cisterns. No hydrocephalus.\par

## 2022-01-24 NOTE — PHYSICAL EXAM
[General Appearance - Alert] : alert [General Appearance - In No Acute Distress] : in no acute distress [General Appearance - Well Nourished] : well nourished [General Appearance - Well-Appearing] : healthy appearing [Oriented To Time, Place, And Person] : oriented to person, place, and time [Impaired Insight] : insight and judgment were intact [Affect] : the affect was normal [Memory Recent] : recent memory was not impaired [Person] : oriented to person [Place] : oriented to place [Time] : oriented to time [Cranial Nerves Optic (II)] : visual acuity intact bilaterally,  pupils equal round and reactive to light [Cranial Nerves Oculomotor (III)] : extraocular motion intact [Cranial Nerves Trigeminal (V)] : facial sensation intact symmetrically [Cranial Nerves Facial (VII)] : face symmetrical [Cranial Nerves Vestibulocochlear (VIII)] : hearing was intact bilaterally [Cranial Nerves Glossopharyngeal (IX)] : tongue and palate midline [Cranial Nerves Accessory (XI - Cranial And Spinal)] : head turning and shoulder shrug symmetric [Cranial Nerves Hypoglossal (XII)] : there was no tongue deviation with protrusion [Motor Tone] : muscle tone was normal in all four extremities [Motor Strength] : muscle strength was normal in all four extremities [Sensation Tactile Decrease] : light touch was intact [Sensation Pain / Temperature Decrease] : pain and temperature was intact [Balance] : balance was intact [Sclera] : the sclera and conjunctiva were normal [PERRL With Normal Accommodation] : pupils were equal in size, round, reactive to light, with normal accommodation [Outer Ear] : the ears and nose were normal in appearance [Both Tympanic Membranes Were Examined] : both tympanic membranes were normal [Neck Appearance] : the appearance of the neck was normal [] : no respiratory distress [Respiration, Rhythm And Depth] : normal respiratory rhythm and effort [Apical Impulse] : the apical impulse was normal [Heart Rate And Rhythm] : heart rate was normal and rhythm regular [Bowel Sounds] : normal bowel sounds [Abdomen Soft] : soft [No CVA Tenderness] : no ~M costovertebral angle tenderness [No Spinal Tenderness] : no spinal tenderness [Involuntary Movements] : no involuntary movements were seen [Skin Color & Pigmentation] : normal skin color and pigmentation [Short Term Intact] : short term memory impaired [FreeTextEntry8] : alternating but sl slow and mildly stooped posture

## 2022-01-24 NOTE — HISTORY OF PRESENT ILLNESS
[FreeTextEntry1] : 65 years old F PMH of DM, HTN,UTI had fall on and suffered SDH s/p craniotomy 07/26. Was in hospital for 3 weeks and staples were out. She had two times ER visit for chest pain and High BP. CT head repeated during those visit showed stable. Had a visit on 8/21/20 and reported that few episodes of passing out with brief LOC and advised to continue taking Keppra and started on Gabapentin 300 mg for paraesthesia.She is getting better overtime. Surgical wound healed well with out any signs of infection.Recent imaging showed residual left post operative epidural collection remained the same, but the midline shift has improved\par \par Today, pt is reporting that she is slowly getting better. Feel tired some time.  She also report that she feels dizziness occasionally. Otherwise doing well. She has her  cataract surgery scheduled on 11/1/22. MRI head done on 1/15/21 which she would like to discuss today. \par

## 2022-03-21 NOTE — PROGRESS NOTE ADULT - MINUTES
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 Pt explains that below her L shoulder blade has a burning sensation. Pain come and goes randomly. Doing activities like moving around folding laundry makes the pain worse.     OBJECTIVE                                                                                                                     Range of Motion (ROM)   (degrees unless noted; active unless noted; norms in ( ); negative=lacking to 0, positive=beyond 0)   WNL: ADAME, ANTONIETTA  Shoulder:   Functional ROM:     - Place hand on opposite shoulder:  • Left: Normal  • Right: Normal      - Touch top of head:  • Left: Normal  • Right: Normal      - Place hand behind neck:  • Left: Normal  • Right: Normal      - Place hand behind back:  • Left: Normal  • Right: Normal      - Over head reach:  • Left: Normal  • Right: Normal    Details / Comments: Cervical ROM:  Side bendin% available range  Rotation: 25% available range       Special Tests:  Empty Can: Left: negative Right: negative   TREATMENT                                                                                                                  Therapeutic Exercise:    Exercises    · Prone Hip Internal and External Rotation AROM - 1 x daily - 7 x weekly - 3 sets - 10 reps  · Prone Heel Squeeze - 1 x daily - 7 x weekly - 2 sets - 10 reps - 10 hold                     Manual Therapy:  IASTM L scapula region, L trap, L  paraspinals  SCS to left piriformis with good relief  Pt reports feeing better after treatment.     Skilled input: verbal instruction/cues, tactile instruction/cues and posture correction    Writer verbally educated and received verbal consent for hand placement, positioning of patient, and techniques to be performed today from patient for hand placement and palpation for techniques, therapist position for techniques and clothing adjustments for techniques as described above and how they are pertinent to the patient's plan of care.    Home Exercise Program/Education Materials: *Access Code: [email or web address]  URL: [email or web address]  Date: 03/21/2022  Prepared by: CJ Griffith    Exercises  · Hooklying Active Hamstring Stretch - 1 x daily - 7 x weekly - 2 sets - 10 reps - 5 hold  · Supine Piriformis Stretch with Foot on Ground - 1 x daily - 7 x weekly - 2 sets - 10 reps - 5 hold  · Prone Knee Flexion - 1 x daily - 7 x weekly - 3 sets - 10 reps  · Prone Hip Internal and External Rotation AROM - 1 x daily - 7 x weekly - 3 sets - 10 reps  · Prone Heel Squeeze - 1 x daily - 7 x weekly - 2 sets - 10 reps - 10 hold             ASSESSMENT                                                                                                             Pt came into therapy with complaints of burning pain in her L scapula and back. ROM in her thoracic, cervical and scapula were assessed and found crepitus and decreased ROM. Manual therapy of IASTM was performed to decrease muscle tightness and increase blood flow. Pt reported she felt better at the end of therapy today. Massage therapy was recommend to maintain mobility in her back. Pt would benefit from continued therapy.    Patient Education:   Results of above outlined education: Verbalizes understanding and Demonstrates understanding      PLAN                                                                                                                            Suggestions for next session as indicated: Progress per plan of care         Therapy procedure time and total treatment time can be found documented on the Time Entry flowsheet   45

## 2022-03-25 ENCOUNTER — APPOINTMENT (OUTPATIENT)
Age: 67
End: 2022-03-25
Payer: MEDICARE

## 2022-03-25 VITALS
RESPIRATION RATE: 16 BRPM | HEART RATE: 100 BPM | OXYGEN SATURATION: 98 % | BODY MASS INDEX: 19.2 KG/M2 | SYSTOLIC BLOOD PRESSURE: 150 MMHG | TEMPERATURE: 97.3 F | DIASTOLIC BLOOD PRESSURE: 85 MMHG | WEIGHT: 105 LBS

## 2022-03-25 DIAGNOSIS — E11.9 TYPE 2 DIABETES MELLITUS W/OUT COMPLICATIONS: ICD-10-CM

## 2022-03-25 PROCEDURE — 99214 OFFICE O/P EST MOD 30 MIN: CPT

## 2022-03-29 PROBLEM — E11.9 DIABETES MELLITUS, TYPE 2: Status: ACTIVE | Noted: 2018-05-02

## 2022-03-29 NOTE — ASSESSMENT
[FreeTextEntry1] : 65 yo F with LUTS, vaginal atrophy on physical exam\par \par - PVR = 30ml\par - Reaffirmed behavioral modifications and discussed strategies to help manage her LUTS\par - Estrace Rx transmitted for vaginal atrophy\par - FU in 6 months

## 2022-03-29 NOTE — HISTORY OF PRESENT ILLNESS
[FreeTextEntry1] : 64 yo Serbian speaking F presents with persistent UTI. Had issues with recurrent UTI many years ago after her second child but was doing well for years with no issues. Starting last , has been having some dysuria and found to have UTI. Since then, she has been treated with antibiotics which helps with symptoms but will recur shortly after completing the antibiotics. Review of labworks shows perisistent e.coli UTI twice in February and most recently a few weeks ago. Last course of antibiotics was 10 days ago. Hasn't noticed exacerbation of LUTS but most bothersome is the urethral discomfort. Drinks multiple bottles of water per day and voids every 2 hrs or so. Denies any incontinence, history of gross hematuria, fever, chills, flank pain. Does have issues with constipation and will often have to perform enemas in order to feel comfortable. 2 children, , LMP was more than 10 yrs ago\par \par 18 Interval history: Since last visit, pt has been doing well with no minimal issues. Cysto at last visit was unremarkable\par \par 19 Interval history: 1 month ago had routine UA done with PCP and found to have UTI\par States that she was given abx but never started it because she didn't have any symptoms\par Has been completely asymptomatic for the last 6 months\par Shoulder surgery in  and has needed to take some pain meds recently for this\par Some constipation issues due to the pain meds\par No incontinence, no more urethral discomfort\par Has been double voiding\par \par 19 Interval history: s/p cholecystectomy and had some postop urinary retention\par Now voiding well. Still doing double voids\par A little dysuria\par Still having constipation\par Drinking 7-8 cups of water\par \par 20 Interval history: Pt hospitalized recently after a fall and ended up getting an urgent craniotomy secondary to subdural hematoma\par Pt was noted to have incomplete bladder emptying during hospitalization and had indwelling catheter placed\par Denies any urinary issues since hospital discharge with drainage of good clear yellow urine \par \par 20 Interval history: Pt recently presented to hospital with sepsis from urinary source and hospitalized for antibiotics\par During hospitalization, pt subsequently passed TOV\par Currently feeling well and denies any bothersome LUTS\par Pt states she feels like she is getting stronger\par \par 20 Interval history: No issues since last visit\par Feels like she is voiding adequately\par No dysuria, no gross hematuria\par no flank pain\par \par 21 Interval history: sometimes incomplete bladder emptying sensation\par no gross hematuria, no dysuria\par Drinks at least 1 L of water, 1 cup of coffee\par Nocturia 1-2/night\par Uses pull-ups for safety when out of the house\par chronic constipation\par \par 21 Interval history: Had some dysuria about 1 month ago and found to have a UTI\par Was also having some bowel issues at that time\par Feeling better after course of abx\par Did go to ER yesterday for dizziness - found to have hyponatremia\par Drinks 8 cups of water daily\par Voiding every hour, nocturia 1-2/night\par no incontinence\par chronic constipation\par \par 3/25/22 Interval history: Denies any significant issues since last visit\par But sometimes having incomplete bladder emptying sensation lately

## 2022-07-08 ENCOUNTER — OUTPATIENT (OUTPATIENT)
Dept: OUTPATIENT SERVICES | Facility: HOSPITAL | Age: 67
LOS: 1 days | End: 2022-07-08
Payer: COMMERCIAL

## 2022-07-08 ENCOUNTER — APPOINTMENT (OUTPATIENT)
Dept: MRI IMAGING | Facility: HOSPITAL | Age: 67
End: 2022-07-08

## 2022-07-08 DIAGNOSIS — S06.5X9A TRAUMATIC SUBDURAL HEMORRHAGE WITH LOSS OF CONSCIOUSNESS OF UNSPECIFIED DURATION, INITIAL ENCOUNTER: ICD-10-CM

## 2022-07-08 DIAGNOSIS — Z98.890 OTHER SPECIFIED POSTPROCEDURAL STATES: Chronic | ICD-10-CM

## 2022-07-08 DIAGNOSIS — S06.5X9A TRAUMATIC SUBDURAL HEMORRHAGE WITH LOSS OF CONSCIOUSNESS OF UNSPECIFIED DURATION, INITIAL ENCOUNTER: Chronic | ICD-10-CM

## 2022-07-08 DIAGNOSIS — Z86.79 PERSONAL HISTORY OF OTHER DISEASES OF THE CIRCULATORY SYSTEM: Chronic | ICD-10-CM

## 2022-07-08 PROCEDURE — 70551 MRI BRAIN STEM W/O DYE: CPT

## 2022-07-08 PROCEDURE — 70551 MRI BRAIN STEM W/O DYE: CPT | Mod: 26

## 2022-07-11 ENCOUNTER — APPOINTMENT (OUTPATIENT)
Dept: NEUROSURGERY | Facility: CLINIC | Age: 67
End: 2022-07-11

## 2022-07-11 VITALS
BODY MASS INDEX: 20.69 KG/M2 | HEART RATE: 93 BPM | OXYGEN SATURATION: 98 % | WEIGHT: 112.44 LBS | SYSTOLIC BLOOD PRESSURE: 146 MMHG | HEIGHT: 62 IN | DIASTOLIC BLOOD PRESSURE: 82 MMHG

## 2022-07-11 DIAGNOSIS — S06.5X9A TRAUMATIC SUBDURAL HEMORRHAGE WITH LOSS OF CONSCIOUSNESS OF UNSPECIFIED DURATION, INITIAL ENCOUNTER: ICD-10-CM

## 2022-07-11 PROCEDURE — 99214 OFFICE O/P EST MOD 30 MIN: CPT

## 2022-07-20 NOTE — ASSESSMENT
[FreeTextEntry1] : IMPRESSION:\par Hx of SDH after a fall s/p L craniotomy 07/26/20. \par Had a visit on 8/21/20 and reported that few episodes of passing out with brief LOC and advised to continue taking Keppra and started on Gabapentin 300 mg for paraesthesia. New MRI reviewed, with stable left sided subdural collection. Patient here with family members, reports intermittent dizziness when getting up and walking, otherwise no other symptoms, neurologically non-focal. \par \par \par \par \par PLAN:\par - Repeat MRI in 6 months \par - Continue PT for gait training \par - Referral for vestibular therapy provided as well \par Return in 6 months.\par

## 2022-07-20 NOTE — HISTORY OF PRESENT ILLNESS
[FreeTextEntry1] : 67 years old F PMH of DM, HTN, UTI who had a  fall and suffered SDH s/p craniotomy 07/26/20. \par Had a visit on 8/21/20 and reported that few episodes of passing out with brief LOC and advised to continue taking Keppra and started on Gabapentin 300 mg for paraesthesia. \par \par Last visit on 1/21/22, advised to obtain MR brain in 6 months. \par MRI reviewed, with stable left sided subdural collection. Patient here with family members, reports intermittent dizziness when getting up and walking, otherwise no other symptoms, neurologically non-focal. \par \par

## 2022-07-20 NOTE — PHYSICAL EXAM
[General Appearance - Alert] : alert [General Appearance - In No Acute Distress] : in no acute distress [Well-Healed] : well-healed [Intact] : intact [Person] : oriented to person [Place] : oriented to place [Time] : oriented to time [Cranial Nerves Oculomotor (III)] : extraocular motion intact [Cranial Nerves Optic (II)] : visual acuity intact bilaterally,  pupils equal round and reactive to light [Cranial Nerves Trigeminal (V)] : facial sensation intact symmetrically [Cranial Nerves Facial (VII)] : face symmetrical [Cranial Nerves Vestibulocochlear (VIII)] : hearing was intact bilaterally [Cranial Nerves Glossopharyngeal (IX)] : tongue and palate midline [Cranial Nerves Accessory (XI - Cranial And Spinal)] : head turning and shoulder shrug symmetric [Cranial Nerves Hypoglossal (XII)] : there was no tongue deviation with protrusion [Motor Tone] : muscle tone was normal in all four extremities [Motor Strength] : muscle strength was normal in all four extremities [Sensation Tactile Decrease] : light touch was intact [Sclera] : the sclera and conjunctiva were normal [PERRL With Normal Accommodation] : pupils were equal in size, round, reactive to light, with normal accommodation [Extraocular Movements] : extraocular movements were intact [Outer Ear] : the ears and nose were normal in appearance [Hearing Threshold Finger Rub Not [place]] : hearing was normal [] : no respiratory distress [Exaggerated Use Of Accessory Muscles For Inspiration] : no accessory muscle use [Edema] : there was no peripheral edema [Skin Color & Pigmentation] : normal skin color and pigmentation [Skin Turgor] : normal skin turgor [FreeTextEntry8] : slow gait, not using any assistive device  [FreeTextEntry1] : slow gait

## 2022-07-20 NOTE — RESULTS/DATA
[FreeTextEntry1] : ACC: [id number] EXAM: MR BRAIN\par \par PROCEDURE DATE: 07/08/2022\par \par \par \par INTERPRETATION: MR brain without gadolinium\par \par CLINICAL INFORMATION: Follow-up craniotomy\par \par TECHNIQUE: Sagittal and axial T1-weighted images, axial FLAIR images, axial gradient echo and T2-weighted images and axial diffusion weighted images of the brain were obtained.\par \par FINDINGS: 01/15/2022 available for review.\par \par The brain demonstrates moderate periventricular, deep and subcortical white matter ischemia unchanged. LEFT craniotomy with subjacent fluid collection is stable. No acute cerebral cortical infarct is found. No acute intracranial hemorrhage is recognized. No mass effect is found in the brain.\par \par The ventricles, sulci and basal cisterns appear unremarkable.\par \par The vertebral and internal carotid arteries demonstrate expected flow voids indicating their patency.\par \par The orbits are unremarkable. The paranasal sinuses are clear. The nasal cavity appears intact. The nasopharynx is symmetric. The central skull base and temporal bones are intact. The calvarium appears unremarkable.\par \par IMPRESSION: Moderate periventricular, deep and subcortical white matter ischemia unchanged. LEFT craniotomy with subjacent fluid collection is stable.

## 2022-07-24 NOTE — PATIENT PROFILE ADULT - NSPROCHRONICPAIN_GEN_A_NUR
<-- Click to add NO significant Past Surgical History
Unable to assess patient currently intubated, no family at bedside/no

## 2022-08-22 NOTE — H&P ADULT - NSICDXFAMILYHX_GEN_ALL_CORE_FT
Desvenlafaxine & mirtazapine sent to [facility] in [place] on 5/25/22.     No pertinent family history in first degree relatives

## 2022-09-28 ENCOUNTER — LABORATORY RESULT (OUTPATIENT)
Age: 67
End: 2022-09-28

## 2022-09-28 ENCOUNTER — APPOINTMENT (OUTPATIENT)
Age: 67
End: 2022-09-28

## 2022-09-28 VITALS
RESPIRATION RATE: 16 BRPM | DIASTOLIC BLOOD PRESSURE: 79 MMHG | TEMPERATURE: 97.2 F | WEIGHT: 110 LBS | HEIGHT: 62 IN | OXYGEN SATURATION: 98 % | SYSTOLIC BLOOD PRESSURE: 136 MMHG | BODY MASS INDEX: 20.24 KG/M2 | HEART RATE: 91 BPM

## 2022-09-28 PROCEDURE — 99214 OFFICE O/P EST MOD 30 MIN: CPT

## 2022-09-28 NOTE — PHYSICAL EXAM
[General Appearance - Well Nourished] : well nourished [Normal Appearance] : normal appearance [Well Groomed] : well groomed [General Appearance - In No Acute Distress] : no acute distress [Abdomen Soft] : soft [Abdomen Tenderness] : non-tender [Costovertebral Angle Tenderness] : no ~M costovertebral angle tenderness [Urethral Meatus] : normal urethra [Urinary Bladder Findings] : the bladder was normal on palpation [External Female Genitalia] : normal external genitalia [Edema] : no peripheral edema [] : no respiratory distress [Respiration, Rhythm And Depth] : normal respiratory rhythm and effort [Exaggerated Use Of Accessory Muscles For Inspiration] : no accessory muscle use [Oriented To Time, Place, And Person] : oriented to person, place, and time [Affect] : the affect was normal [Mood] : the mood was normal [Not Anxious] : not anxious [No Palpable Adenopathy] : no palpable adenopathy [FreeTextEntry1] : vaginal atrophy - deferred today

## 2022-09-28 NOTE — HISTORY OF PRESENT ILLNESS
[FreeTextEntry1] : 62 yo Swedish speaking F presents with persistent UTI. Had issues with recurrent UTI many years ago after her second child but was doing well for years with no issues. Starting last , has been having some dysuria and found to have UTI. Since then, she has been treated with antibiotics which helps with symptoms but will recur shortly after completing the antibiotics. Review of labworks shows perisistent e.coli UTI twice in February and most recently a few weeks ago. Last course of antibiotics was 10 days ago. Hasn't noticed exacerbation of LUTS but most bothersome is the urethral discomfort. Drinks multiple bottles of water per day and voids every 2 hrs or so. Denies any incontinence, history of gross hematuria, fever, chills, flank pain. Does have issues with constipation and will often have to perform enemas in order to feel comfortable. 2 children, , LMP was more than 10 yrs ago\par \par 18 Interval history: Since last visit, pt has been doing well with no minimal issues. Cysto at last visit was unremarkable\par \par 19 Interval history: 1 month ago had routine UA done with PCP and found to have UTI\par States that she was given abx but never started it because she didn't have any symptoms\par Has been completely asymptomatic for the last 6 months\par Shoulder surgery in  and has needed to take some pain meds recently for this\par Some constipation issues due to the pain meds\par No incontinence, no more urethral discomfort\par Has been double voiding\par \par 19 Interval history: s/p cholecystectomy and had some postop urinary retention\par Now voiding well. Still doing double voids\par A little dysuria\par Still having constipation\par Drinking 7-8 cups of water\par \par 20 Interval history: Pt hospitalized recently after a fall and ended up getting an urgent craniotomy secondary to subdural hematoma\par Pt was noted to have incomplete bladder emptying during hospitalization and had indwelling catheter placed\par Denies any urinary issues since hospital discharge with drainage of good clear yellow urine \par \par 20 Interval history: Pt recently presented to hospital with sepsis from urinary source and hospitalized for antibiotics\par During hospitalization, pt subsequently passed TOV\par Currently feeling well and denies any bothersome LUTS\par Pt states she feels like she is getting stronger\par \par 20 Interval history: No issues since last visit\par Feels like she is voiding adequately\par No dysuria, no gross hematuria\par no flank pain\par \par 21 Interval history: sometimes incomplete bladder emptying sensation\par no gross hematuria, no dysuria\par Drinks at least 1 L of water, 1 cup of coffee\par Nocturia 1-2/night\par Uses pull-ups for safety when out of the house\par chronic constipation\par \par 21 Interval history: Had some dysuria about 1 month ago and found to have a UTI\par Was also having some bowel issues at that time\par Feeling better after course of abx\par Did go to ER yesterday for dizziness - found to have hyponatremia\par Drinks 8 cups of water daily\par Voiding every hour, nocturia 1-2/night\par no incontinence\par chronic constipation\par \par 3/25/22 Interval history: Denies any significant issues since last visit\par But sometimes having incomplete bladder emptying sensation lately \par \par 22 Interval history: Sometimes has incomplete bladder emptying sensation\par Constipation a little better\par Nocturia 1-2/night, voiding every 2-3 hours but feeling like she has to void multiple times each time\par Using estrace

## 2022-09-28 NOTE — ASSESSMENT
[FreeTextEntry1] : 66 yo F with LUTS, vaginal atrophy\par \par - PVR = 20ml\par - UA\par - Reaffirmed behavioral modifications and discussed strategies to help manage her LUTS\par - Continue estrace\par - FU in 6 months

## 2022-10-03 LAB
APPEARANCE: CLEAR
BILIRUBIN URINE: NEGATIVE
BLOOD URINE: NEGATIVE
COLOR: NORMAL
GLUCOSE QUALITATIVE U: ABNORMAL
KETONES URINE: NEGATIVE
LEUKOCYTE ESTERASE URINE: ABNORMAL
NITRITE URINE: NEGATIVE
PH URINE: 6
PROTEIN URINE: NEGATIVE
SPECIFIC GRAVITY URINE: 1.02
UROBILINOGEN URINE: NORMAL

## 2022-10-05 NOTE — ED ADULT NURSE NOTE - PAIN: PRESENCE, MLM
non-verbal indicators present [facility] Pregnancy And Lactation Text: There is insufficient data to evaluate drug-associated risk for major birth defects, miscarriage, or other adverse maternal or fetal outcomes.  There is a pregnancy registry that monitors pregnancy outcomes in pregnant persons exposed to the medication during pregnancy.  It is unknown if this medication is excreted in breast milk.  Do not breastfeed during treatment and for about 4 weeks after the last dose.

## 2022-11-04 ENCOUNTER — APPOINTMENT (OUTPATIENT)
Age: 67
End: 2022-11-04

## 2022-11-04 VITALS
HEART RATE: 100 BPM | DIASTOLIC BLOOD PRESSURE: 84 MMHG | HEIGHT: 62 IN | RESPIRATION RATE: 16 BRPM | BODY MASS INDEX: 20.24 KG/M2 | OXYGEN SATURATION: 98 % | WEIGHT: 110 LBS | TEMPERATURE: 97.7 F | SYSTOLIC BLOOD PRESSURE: 148 MMHG

## 2022-11-04 DIAGNOSIS — R82.81 PYURIA: ICD-10-CM

## 2022-11-04 PROCEDURE — 52000 CYSTOURETHROSCOPY: CPT

## 2022-11-04 PROCEDURE — 76775 US EXAM ABDO BACK WALL LIM: CPT

## 2022-11-10 LAB — BACTERIA UR CULT: NORMAL

## 2022-11-10 RX ORDER — SULFAMETHOXAZOLE AND TRIMETHOPRIM 800; 160 MG/1; MG/1
800-160 TABLET ORAL TWICE DAILY
Qty: 14 | Refills: 0 | Status: ACTIVE | COMMUNITY
Start: 2022-11-10 | End: 1900-01-01

## 2022-11-18 ENCOUNTER — APPOINTMENT (OUTPATIENT)
Age: 67
End: 2022-11-18

## 2022-11-18 VITALS
TEMPERATURE: 98.3 F | OXYGEN SATURATION: 100 % | SYSTOLIC BLOOD PRESSURE: 112 MMHG | BODY MASS INDEX: 20.24 KG/M2 | HEART RATE: 102 BPM | WEIGHT: 110 LBS | DIASTOLIC BLOOD PRESSURE: 71 MMHG | HEIGHT: 62 IN | RESPIRATION RATE: 16 BRPM

## 2022-11-18 PROCEDURE — 52204Z: CUSTOM

## 2022-11-28 LAB — CORE LAB BIOPSY: NORMAL

## 2022-12-02 NOTE — PROGRESS NOTE ADULT - ASSESSMENT
65 Irish-speaking female PMH of HTN on ASA and T2DM, brought in with HA, dizziness and multiple episodes of emesis s/p mechanical fall found to have large LEFT acute SDH with midline shift taken to OR for emergent evacuation of left SDH on 7/26 taken back to OR on 7/31 for L crani/re-exploration with mild subdural collection/clot evacuated. Bone flap replaced on 7/31. Recommended for Rehab, but patient and family declining. Patient's first and last name, , procedure, and correct site confirmed prior to the start of procedure.

## 2022-12-31 ENCOUNTER — OUTPATIENT (OUTPATIENT)
Dept: OUTPATIENT SERVICES | Facility: HOSPITAL | Age: 67
LOS: 1 days | End: 2022-12-31
Payer: COMMERCIAL

## 2022-12-31 ENCOUNTER — APPOINTMENT (OUTPATIENT)
Dept: MRI IMAGING | Facility: HOSPITAL | Age: 67
End: 2022-12-31
Payer: MEDICARE

## 2022-12-31 DIAGNOSIS — Z86.79 PERSONAL HISTORY OF OTHER DISEASES OF THE CIRCULATORY SYSTEM: Chronic | ICD-10-CM

## 2022-12-31 DIAGNOSIS — S06.5X9A TRAUMATIC SUBDURAL HEMORRHAGE WITH LOSS OF CONSCIOUSNESS OF UNSPECIFIED DURATION, INITIAL ENCOUNTER: ICD-10-CM

## 2022-12-31 DIAGNOSIS — Z98.890 OTHER SPECIFIED POSTPROCEDURAL STATES: Chronic | ICD-10-CM

## 2022-12-31 DIAGNOSIS — S06.5X9A TRAUMATIC SUBDURAL HEMORRHAGE WITH LOSS OF CONSCIOUSNESS OF UNSPECIFIED DURATION, INITIAL ENCOUNTER: Chronic | ICD-10-CM

## 2022-12-31 PROCEDURE — 70551 MRI BRAIN STEM W/O DYE: CPT

## 2022-12-31 PROCEDURE — 70551 MRI BRAIN STEM W/O DYE: CPT | Mod: 26

## 2023-01-12 ENCOUNTER — NON-APPOINTMENT (OUTPATIENT)
Age: 68
End: 2023-01-12

## 2023-01-13 ENCOUNTER — APPOINTMENT (OUTPATIENT)
Dept: NEUROSURGERY | Facility: CLINIC | Age: 68
End: 2023-01-13
Payer: MEDICARE

## 2023-01-13 VITALS
HEART RATE: 99 BPM | WEIGHT: 110 LBS | SYSTOLIC BLOOD PRESSURE: 147 MMHG | HEIGHT: 62 IN | OXYGEN SATURATION: 97 % | DIASTOLIC BLOOD PRESSURE: 82 MMHG | BODY MASS INDEX: 20.24 KG/M2

## 2023-01-13 PROCEDURE — 99213 OFFICE O/P EST LOW 20 MIN: CPT

## 2023-01-13 NOTE — RESULTS/DATA
[FreeTextEntry1] : ACC: [id number] EXAM: MR BRAIN\par \par PROCEDURE DATE: 12/31/2022\par \par \par \par INTERPRETATION: Clinical indication: Status post left epidural collection.\par \par MRI of the brain was performed and sagittal T1 axial T1 T2 T2 FLAIR diffusion and susceptibility weighted sequence. Axial coronal and sagittal T1-weighted reconstructed images were performed\par \par This exam is compared with prior brain MRI performed on July 8, 2022.\par \par Postop changes compatible with a left frontal parietal craniotomy is again identified. Subdural collection with mixed signal is again seen involving the left frontal (postop) region. This finding measures approximately 7 mm in widest diameter and previously measured approximately 6.7 mm in widest diameter.\par \par Extensive parenchymal volume loss and chronic microvascular ischemic changes are again seen.\par \par No new areas of acute hemorrhage is seen.\par \par No significant shift or herniation seen\par \par The large vessels demonstrate normal flow voids.\par \par Patient status post bilateral cataract surgery.\par \par The visualized paranasal sinuses mastoid and middle ear regions appear clear.\par \par IMPRESSION: Stable exam when allowing for differences in technique.\par

## 2023-01-13 NOTE — PHYSICAL EXAM
[General Appearance - Alert] : alert [General Appearance - In No Acute Distress] : in no acute distress [Cranial Nerves Oculomotor (III)] : extraocular motion intact [Motor Tone] : muscle tone was normal in all four extremities [Motor Strength] : muscle strength was normal in all four extremities [FreeTextEntry1] : Incision covered with hair. [FreeTextEntry5] : no nystagmus. [FreeTextEntry6] : No pronator drift.  Has resting hand tremors, right> left, not pill rolliing [FreeTextEntry8] : Giat is alternating and cautious, sl bent over, narrow-based.  Son says she is better at home today in terms of walking

## 2023-01-13 NOTE — HISTORY OF PRESENT ILLNESS
[FreeTextEntry1] : 67 years old F PMH of DM, HTN, UTI who had a fall and suffered SDH s/p craniotomy 07/26/20. \par Had a visit on 8/21/20 and reported that few episodes of passing out with brief LOC and advised to continue taking Keppra and started on Gabapentin 300 mg for paraesthesia. \par \par Last visit on 1/21/22, advised to obtain MR brain in 6 months. \par MRI reviewed, with stable left sided subdural collection.  PT today reports feeling dizziness.  Feels dizziness when she is up and working.  A little better when she lays down.  When she is working,feeling that someone is pulling her head.  Dizziness is both light headed and room spinning.

## 2023-01-13 NOTE — ASSESSMENT
[FreeTextEntry1] : Pt s/p SDH evacuated, now with consistent complaints of dizziness. MRI brain was stable with stable post operative collection subjacent to flap. Last imaging of head and neck vasculature was 2 or more years ago.\par \par Plan MRA Head and neck with and without contrast.\par Neurology consultation\par \par Return in 1 month.

## 2023-01-17 ENCOUNTER — RESULT REVIEW (OUTPATIENT)
Age: 68
End: 2023-01-17

## 2023-02-11 ENCOUNTER — APPOINTMENT (OUTPATIENT)
Dept: MRI IMAGING | Facility: HOSPITAL | Age: 68
End: 2023-02-11
Payer: MEDICARE

## 2023-02-11 ENCOUNTER — OUTPATIENT (OUTPATIENT)
Dept: OUTPATIENT SERVICES | Facility: HOSPITAL | Age: 68
LOS: 1 days | End: 2023-02-11
Payer: COMMERCIAL

## 2023-02-11 DIAGNOSIS — S06.5X9A TRAUMATIC SUBDURAL HEMORRHAGE WITH LOSS OF CONSCIOUSNESS OF UNSPECIFIED DURATION, INITIAL ENCOUNTER: Chronic | ICD-10-CM

## 2023-02-11 DIAGNOSIS — Z86.79 PERSONAL HISTORY OF OTHER DISEASES OF THE CIRCULATORY SYSTEM: Chronic | ICD-10-CM

## 2023-02-11 DIAGNOSIS — S06.5X9A TRAUMATIC SUBDURAL HEMORRHAGE WITH LOSS OF CONSCIOUSNESS OF UNSPECIFIED DURATION, INITIAL ENCOUNTER: ICD-10-CM

## 2023-02-11 DIAGNOSIS — Z98.890 OTHER SPECIFIED POSTPROCEDURAL STATES: Chronic | ICD-10-CM

## 2023-02-11 PROCEDURE — 70546 MR ANGIOGRAPH HEAD W/O&W/DYE: CPT | Mod: 26,76

## 2023-02-11 PROCEDURE — 70549 MR ANGIOGRAPH NECK W/O&W/DYE: CPT | Mod: 26,76

## 2023-02-11 PROCEDURE — A9579: CPT

## 2023-02-11 PROCEDURE — 70549 MR ANGIOGRAPH NECK W/O&W/DYE: CPT

## 2023-02-11 PROCEDURE — 70546 MR ANGIOGRAPH HEAD W/O&W/DYE: CPT

## 2023-02-17 ENCOUNTER — APPOINTMENT (OUTPATIENT)
Dept: NEUROSURGERY | Facility: CLINIC | Age: 68
End: 2023-02-17
Payer: MEDICARE

## 2023-02-17 VITALS
HEIGHT: 62 IN | OXYGEN SATURATION: 98 % | DIASTOLIC BLOOD PRESSURE: 79 MMHG | HEART RATE: 98 BPM | WEIGHT: 110 LBS | BODY MASS INDEX: 20.24 KG/M2 | SYSTOLIC BLOOD PRESSURE: 125 MMHG

## 2023-02-17 PROCEDURE — 99214 OFFICE O/P EST MOD 30 MIN: CPT

## 2023-02-17 NOTE — REVIEW OF SYSTEMS
[Dizziness] : dizziness [Lightheadedness] : lightheadedness [Vertigo] : vertigo [Cluster Headache] : cluster headaches [Joint Pain] : joint pain [Limb Pain] : limb pain [Negative] : Heme/Lymph

## 2023-02-24 NOTE — REASON FOR VISIT
[Follow-Up: _____] : a [unfilled] follow-up visit [Family Member] : family member [FreeTextEntry1] : S/P craniotomy for SDH

## 2023-02-24 NOTE — RESULTS/DATA
[FreeTextEntry1] : ACC: [id number] EXAM: MR ANGIO NECK WAW IC ORDERED BY: MAURICIO CESRA\par \par ACC: [id number] EXAM: MR VENOGRAM BRAIN WAW IC ORDERED BY: MAURICIO CESAR\par \par ACC: [id number] EXAM: MR VENOGRAM NECK WAW IC ORDERED BY: MAURICIO CESAR\par \par ACC: [id number] EXAM: MR ANGIO BRAIN WAW IC ORDERED BY: MAURICIO CESAR\par \par PROCEDURE DATE: 02/11/2023\par \par \par \par INTERPRETATION: Four examinations were performed:\par 1. MR angiography NECK circulation with and without gadolinium contrast\par 2. MR angiography intracranial circulation with and without gadolinium contrast\par 3. MR venogram NECK with and without gadolinium contrast\par 4. MR brain venogram with and without without gadolinium contrast\par \par CLINICAL INFORMATION: Dizziness vertigo vertebrobasilar insufficiency subdural hematoma\par \par TECHNIQUE:\par 1. Neck circulation: MR angiography was performed from the arch to the skull base using three-dimensional time-of-flight technique. This data set was reconstructed as maximum intensity pixel images and displayed in multiple rotations. Supplemental 8 cc administeredgadolinium-enhanced MR angiography was performed using a dynamic subtraction first pass technique. This data was also reconstructed as maximum intensity pixel images and displayed in multiple projections. Second acquisition was obtained following gadolinium administration.\par 2. Intracranial circulation: MR angiography was performed using three-dimensional time-of-flight technique. This data set was reconstructed as maximum intensity pixel images and displayed in multiple rotations. Supplemental 8 cc administered gadolinium-enhanced MR angiography was performed using a dynamic subtraction first pass technique. This data was also reconstructed as maximum intensity pixel images and displayed in multiple projections.\par 3. MR venogram was performed in the coronal plane with three-dimensional time-of-flight MR angiography technique following administration 8 cc administered gadolinium. Post processing angiographic reconstruction of images was performed. Each data set was reconstructed as maximum intensity pixel images and displayed in multiple rotations. Supplemental gadolinium-enhanced images were obtained as a volumetric T1-weighted gradient echo acquisition. This data set was reconstructed in the sagittal and coronal planes.\par 4. MR brain venogram: Three-dimensional time of flight MR venogram was performed in the coronal plane. Three-dimensional time-of-flight MR angiography was performed in the sagittal plane following gadolinium administration 8 cc administered. Post processing angiographic reconstruction of images was performed. Each data set was reconstructed as maximum intensity pixel images and displayed in multiple rotations.\par \par COMPARISON: MR brain 12/31/2022 also MR angiography 3/16/2021 available for review.\par \par \par FINDINGS:\par \par 1. NECK CIRCULATION:\par \par The RIGHT CAROTID circulation demonstrates an intact common carotid artery. The carotid bulb appears intact. The internal carotid artery is patent to the skull base.\par \par The LEFT CAROTID circulation demonstrates an intact common carotid artery. The carotid bulb appears intact. The internal carotid artery is patent to the skull base.\par \par The vertebral circulation demonstrates patent right and left vertebral arteries. The vertebral arteries are near symmetric in caliber. The degree of vertebral asymmetry is within physiologic variation. Each vertebral artery demonstrates near constant caliber from its origin to the foramen magnum.\par \par 2. INTRACRANIAL CIRCULATION:\par \par The ANTERIOR circulation demonstrates intact inflow from the ascending cervical segment to the petrous segment of each internal carotid artery. The cavernous and clinoid segments appear intact. The ophthalmic arteries are demonstrated as patent vessels on each side. The anterior cerebral arteries are patent and symmetric. An anterior communicating artery is present. The right middle cerebral artery demonstrates an intact initial M1 segment and patent peripheral anterior and posterior division sylvian branches. The left middle cerebral artery demonstrates an intact initial M1 segment and patent peripheral anterior and posterior division sylvian branches.\par \par The POSTERIOR circulation demonstrates intact inflow from each vertebral artery. The vertebral arteries are near symmetric in caliber. PICA arteries are patent on each side. The left PICA is dominant caliber. The basilar artery appears intact. Superior cerebellar arteries are demonstrated. Posterior communicating arteries are not convincingly demonstrated on this examination. Each posterior cerebral artery is patent to peripheral branching.\par \par No anomalous vessel termination, intracranial aneurysm or arteriovenous malformation is recognized. Note that small intracranial aneurysms less than 0.5 cm may not be detected by this technique.\par \par 3. MRV NECK\par \par The right internal jugular vein is patent from the skull base to the thoracic inlet.\par \par The left internal jugular vein is patent from the skull base to the thoracic inlet.\par \par 4. MR VENOGRAM BRAIN\par \par The superior sagittal sinus is patent in both its anterior and posterior limbs. The right transverse and sigmoid sinuses are patent to the right internal jugular vein. The left transverse and sigmoid sinus are patent to the left internal jugular vein. Asymmetry of the transverse sinus caliber is within the limits of developmental variation.\par \par ADDITIONAL FINDINGS: None\par \par \par IMPRESSION:\par \par 1. RIGHT CAROTID NECK CIRCULATION: Intact.\par \par 2. LEFT CAROTID NECK CIRCULATION: Intact.\par \par 3. VERTEBRAL NECK CIRCULATION: Intact\par \par 4. ANTERIOR INTRACRANIAL CIRCULATION: Intact.\par \par 5. POSTERIOR INTRACRANIAL CIRCULATION: Intact.\par \par 6. RIGHT INTERNAL JUGULAR VEIN: Intact.\par \par 7. LEFT INTERNAL JUGULAR VEIN: Intact.\par \par 8. INTRACRANIAL DURAL SINUSES: Patent dural sinuses. No evidence intracranial dural sinus thrombosis.

## 2023-02-24 NOTE — ASSESSMENT
[FreeTextEntry1] : IMPRESSION:\par \par 67 years old Female  PMH of DM, HTN, UTI who had a fall and suffered SDH s/p craniotomy 07/26/20. PT today reports feeling dizziness which is worse overtime. Completed PT and VT with no much benefit.  Pt has not seen neurologist yet. Pt completed MRA MRV neck and head with no arteria venous malformation/ aneurysm/no specific etiology for dizziness. . \par \par PLAN:\par \par Consult neurology for further evaluation of dizziness \par MRA/ MRV imaging report provided for neurology appointment\par Provided PAS number  for finding a  [place] speaking  provider\par F/U in 3 months

## 2023-03-29 ENCOUNTER — APPOINTMENT (OUTPATIENT)
Age: 68
End: 2023-03-29
Payer: MEDICARE

## 2023-03-29 VITALS
SYSTOLIC BLOOD PRESSURE: 149 MMHG | TEMPERATURE: 97.1 F | DIASTOLIC BLOOD PRESSURE: 78 MMHG | HEART RATE: 96 BPM | RESPIRATION RATE: 16 BRPM | OXYGEN SATURATION: 97 %

## 2023-03-29 DIAGNOSIS — N32.9 BLADDER DISORDER, UNSPECIFIED: ICD-10-CM

## 2023-03-29 PROCEDURE — 99214 OFFICE O/P EST MOD 30 MIN: CPT

## 2023-03-29 RX ORDER — SULFAMETHOXAZOLE AND TRIMETHOPRIM 800; 160 MG/1; MG/1
800-160 TABLET ORAL TWICE DAILY
Qty: 14 | Refills: 0 | Status: ACTIVE | COMMUNITY
Start: 2023-03-29 | End: 1900-01-01

## 2023-03-29 NOTE — HISTORY OF PRESENT ILLNESS
[FreeTextEntry1] : 62 yo [place]ive[place] speaking F presents with persistent UTI. Had issues with recurrent UTI many years ago after her second child but was doing well for years with no issues. Starting last , has been having some dysuria and found to have UTI. Since then, she has been treated with antibiotics which helps with symptoms but will recur shortly after completing the antibiotics. Review of labworks shows perisistent e.coli UTI twice in February and most recently a few weeks ago. Last course of antibiotics was 10 days ago. Hasn't noticed exacerbation of LUTS but most bothersome is the urethral discomfort. Drinks multiple bottles of water per day and voids every 2 hrs or so. Denies any incontinence, history of gross hematuria, fever, chills, flank pain. Does have issues with constipation and will often have to perform enemas in order to feel comfortable. 2 children, , LMP was more than 10 yrs ago\par \par 18 Interval history: Since last visit, pt has been doing well with no minimal issues. Cysto at last visit was unremarkable\par \par 19 Interval history: 1 month ago had routine UA done with PCP and found to have UTI\par States that she was given abx but never started it because she didn't have any symptoms\par Has been completely asymptomatic for the last 6 months\par Shoulder surgery in  and has needed to take some pain meds recently for this\par Some constipation issues due to the pain meds\par No incontinence, no more urethral discomfort\par Has been double voiding\par \par 19 Interval history: s/p cholecystectomy and had some postop urinary retention\par Now voiding well. Still doing double voids\par A little dysuria\par Still having constipation\par Drinking 7-8 cups of water\par \par 20 Interval history: Pt hospitalized recently after a fall and ended up getting an urgent craniotomy secondary to subdural hematoma\par Pt was noted to have incomplete bladder emptying during hospitalization and had indwelling catheter placed\par Denies any urinary issues since hospital discharge with drainage of good clear yellow urine \par \par 20 Interval history: Pt recently presented to hospital with sepsis from urinary source and hospitalized for antibiotics\par During hospitalization, pt subsequently passed TOV\par Currently feeling well and denies any bothersome LUTS\par Pt states she feels like she is getting stronger\par \par 20 Interval history: No issues since last visit\par Feels like she is voiding adequately\par No dysuria, no gross hematuria\par no flank pain\par \par 21 Interval history: sometimes incomplete bladder emptying sensation\par no gross hematuria, no dysuria\par Drinks at least 1 L of water, 1 cup of coffee\par Nocturia 1-2/night\par Uses pull-ups for safety when out of the house\par chronic constipation\par \par 21 Interval history: Had some dysuria about 1 month ago and found to have a UTI\par Was also having some bowel issues at that time\par Feeling better after course of abx\par Did go to ER yesterday for dizziness - found to have hyponatremia\par Drinks 8 cups of water daily\par Voiding every hour, nocturia 1-2/night\par no incontinence\par chronic constipation\par \par 3/25/22 Interval history: Denies any significant issues since last visit\par But sometimes having incomplete bladder emptying sensation lately \par \par 22 Interval history: Sometimes has incomplete bladder emptying sensation\par Constipation a little better\par Nocturia 1-2/night, voiding every 2-3 hours but feeling like she has to void multiple times each time\par Using estrace\par \par 3/29/23 Interval history: Presents with several week history of dysuria and cloudy urine\par no fever or chills, no gross hematuria\par Stable LUTS

## 2023-03-29 NOTE — ASSESSMENT
[FreeTextEntry1] : 68 yo F with LUTS, vaginal atrophy, dysuria, history of recurrent UTI\par \par - PVR = 0ml\par - UA, culture\par - Start Bactrim. May need to tailor based on sensitivities\par - Continue estrace\par - Reaffirmed behavioral modifications for UTI and LUTS control\par - FU in 6 months

## 2023-03-29 NOTE — PHYSICAL EXAM
[General Appearance - Well Nourished] : well nourished [Normal Appearance] : normal appearance [Well Groomed] : well groomed [General Appearance - In No Acute Distress] : no acute distress [Abdomen Soft] : soft [Abdomen Tenderness] : non-tender [Costovertebral Angle Tenderness] : no ~M costovertebral angle tenderness [Urethral Meatus] : normal urethra [Urinary Bladder Findings] : the bladder was normal on palpation [External Female Genitalia] : normal external genitalia [FreeTextEntry1] : vaginal atrophy - deferred today [Edema] : no peripheral edema [] : no respiratory distress [Respiration, Rhythm And Depth] : normal respiratory rhythm and effort [Exaggerated Use Of Accessory Muscles For Inspiration] : no accessory muscle use [Oriented To Time, Place, And Person] : oriented to person, place, and time [Affect] : the affect was normal [Mood] : the mood was normal [Not Anxious] : not anxious [No Palpable Adenopathy] : no palpable adenopathy

## 2023-04-03 LAB
APPEARANCE: ABNORMAL
BACTERIA UR CULT: ABNORMAL
BACTERIA: ABNORMAL
BILIRUBIN URINE: NEGATIVE
BLOOD URINE: ABNORMAL
COLOR: YELLOW
GLUCOSE QUALITATIVE U: ABNORMAL
HYALINE CASTS: 1 /LPF
KETONES URINE: NEGATIVE
LEUKOCYTE ESTERASE URINE: ABNORMAL
MICROSCOPIC-UA: NORMAL
NITRITE URINE: NEGATIVE
PH URINE: 5.5
PROTEIN URINE: ABNORMAL
RED BLOOD CELLS URINE: 17 /HPF
SPECIFIC GRAVITY URINE: 1.03
SQUAMOUS EPITHELIAL CELLS: 4 /HPF
UROBILINOGEN URINE: NORMAL
WHITE BLOOD CELLS URINE: >720 /HPF

## 2023-04-04 ENCOUNTER — NON-APPOINTMENT (OUTPATIENT)
Age: 68
End: 2023-04-04

## 2023-06-09 ENCOUNTER — APPOINTMENT (OUTPATIENT)
Dept: NEUROSURGERY | Facility: CLINIC | Age: 68
End: 2023-06-09
Payer: MEDICARE

## 2023-06-09 VITALS
HEART RATE: 92 BPM | SYSTOLIC BLOOD PRESSURE: 125 MMHG | BODY MASS INDEX: 20.24 KG/M2 | HEIGHT: 62 IN | DIASTOLIC BLOOD PRESSURE: 75 MMHG | OXYGEN SATURATION: 98 % | WEIGHT: 110 LBS

## 2023-06-09 PROCEDURE — 99213 OFFICE O/P EST LOW 20 MIN: CPT

## 2023-06-09 NOTE — ASSESSMENT
[FreeTextEntry1] : IMPRESSION:\par \par 67 years old Female  PMH of DM, HTN, UTI who had a fall and suffered SDH s/p craniotomy 07/26/20. PT today reports feeling dizziness which is unchanged.  Has not seen an ENT in  two years and neurologist did not address the dizziness.   Will send back to neurologist to address the dizziness and to ENT for evaluation.  Pt can get local ENT from PCP.  \par \par PLAN:\par \par Consult neurology for further evaluation of dizziness \par MRA/ MRV imaging report provided for neurology appointment\par ENT referral\par F/U in 3 months

## 2023-06-09 NOTE — HISTORY OF PRESENT ILLNESS
[FreeTextEntry1] : \par 67 years old F PMH of DM, HTN, UTI who had a fall and suffered SDH s/p craniotomy 07/26/20.\par \par Pt today reports having gone to neurologist.  Pt is still having dizziness and pain in head in the back of the head.  Dizziness is the same.  Saw Dr. Plata ([id number] [facility] [address], [facility] [place] [place] [id number] [phone]).  Dr. Plata addressed headache  EG was unremarkable per note provided by pt.  Had neurovascular duplex which was unremarkable.  Dizziness not addressed in the note.  Went to [place] in April and did well.

## 2023-06-09 NOTE — PHYSICAL EXAM
[General Appearance - Alert] : alert [General Appearance - In No Acute Distress] : in no acute distress [Affect] : the affect was normal [Cranial Nerves Oculomotor (III)] : extraocular motion intact [Motor Tone] : muscle tone was normal in all four extremities [Motor Strength] : muscle strength was normal in all four extremities [FreeTextEntry5] : no nystagmus [FreeTextEntry6] : no pronator drift [FreeTextEntry8] : Gait is mildly shuffling, but alternating with short steps, hands at side. no unsteadiness

## 2023-08-02 ENCOUNTER — TRANSCRIPTION ENCOUNTER (OUTPATIENT)
Age: 68
End: 2023-08-02

## 2023-08-05 NOTE — ED PROVIDER NOTE - PRO INTERPRETER NEED 2
-Likely gastritis, given history of gastritis  -Patient without PPI or intervention over last 1-2 years  -Exam reassuring for low suspicion of involvement of gallbladder  -Discussed re-trialing famotidine BID, ordered today  -Encouraged continued avoidance of triggers, including trigger foods  -Plan to follow up in 2 months   [place]

## 2023-09-15 ENCOUNTER — APPOINTMENT (OUTPATIENT)
Dept: NEUROSURGERY | Facility: CLINIC | Age: 68
End: 2023-09-15
Payer: MEDICARE

## 2023-09-15 VITALS
HEART RATE: 100 BPM | BODY MASS INDEX: 20.24 KG/M2 | HEIGHT: 62 IN | DIASTOLIC BLOOD PRESSURE: 78 MMHG | WEIGHT: 110 LBS | OXYGEN SATURATION: 98 % | SYSTOLIC BLOOD PRESSURE: 136 MMHG

## 2023-09-15 PROCEDURE — 99213 OFFICE O/P EST LOW 20 MIN: CPT

## 2023-10-18 ENCOUNTER — APPOINTMENT (OUTPATIENT)
Age: 68
End: 2023-10-18
Payer: MEDICARE

## 2023-10-18 VITALS
OXYGEN SATURATION: 97 % | SYSTOLIC BLOOD PRESSURE: 160 MMHG | WEIGHT: 107 LBS | HEART RATE: 104 BPM | TEMPERATURE: 98 F | BODY MASS INDEX: 19.57 KG/M2 | DIASTOLIC BLOOD PRESSURE: 79 MMHG

## 2023-10-18 PROCEDURE — 99214 OFFICE O/P EST MOD 30 MIN: CPT

## 2023-10-18 RX ORDER — ESTRADIOL 0.1 MG/G
0.1 CREAM VAGINAL
Qty: 1 | Refills: 5 | Status: ACTIVE | COMMUNITY
Start: 2022-03-25 | End: 1900-01-01

## 2023-10-18 RX ORDER — SULFAMETHOXAZOLE AND TRIMETHOPRIM 800; 160 MG/1; MG/1
800-160 TABLET ORAL TWICE DAILY
Qty: 14 | Refills: 0 | Status: ACTIVE | COMMUNITY
Start: 2023-10-18 | End: 1900-01-01

## 2023-10-24 LAB
APPEARANCE: ABNORMAL
BACTERIA UR CULT: NORMAL
BACTERIA: ABNORMAL /HPF
BILIRUBIN URINE: NEGATIVE
BLOOD URINE: ABNORMAL
CAST: 0 /LPF
COLOR: YELLOW
EPITHELIAL CELLS: 3 /HPF
GLUCOSE QUALITATIVE U: >=1000 MG/DL
KETONES URINE: NEGATIVE MG/DL
LEUKOCYTE ESTERASE URINE: ABNORMAL
MICROSCOPIC-UA: NORMAL
NITRITE URINE: NEGATIVE
PH URINE: 5
PROTEIN URINE: NORMAL MG/DL
RED BLOOD CELLS URINE: 2 /HPF
REVIEW: NORMAL
SPECIFIC GRAVITY URINE: 1.02
UROBILINOGEN URINE: 0.2 MG/DL
WHITE BLOOD CELLS URINE: 885 /HPF

## 2023-11-15 ENCOUNTER — APPOINTMENT (OUTPATIENT)
Dept: OPHTHALMOLOGY | Facility: CLINIC | Age: 68
End: 2023-11-15
Payer: MEDICARE

## 2023-11-15 ENCOUNTER — NON-APPOINTMENT (OUTPATIENT)
Age: 68
End: 2023-11-15

## 2023-11-15 PROCEDURE — 99204 OFFICE O/P NEW MOD 45 MIN: CPT

## 2023-11-15 PROCEDURE — 92083 EXTENDED VISUAL FIELD XM: CPT

## 2023-12-15 ENCOUNTER — APPOINTMENT (OUTPATIENT)
Dept: NEUROSURGERY | Facility: CLINIC | Age: 68
End: 2023-12-15
Payer: MEDICARE

## 2023-12-15 DIAGNOSIS — Z98.890 OTHER SPECIFIED POSTPROCEDURAL STATES: ICD-10-CM

## 2023-12-15 PROCEDURE — 99213 OFFICE O/P EST LOW 20 MIN: CPT

## 2023-12-25 NOTE — HISTORY OF PRESENT ILLNESS
[FreeTextEntry1] : 67 years old F PMH of DM, HTN, UTI who had a fall and suffered SDH s/p craniotomy 07/26/20 and recovered well, and following with related dizziness. . Pt c/o dizziness still, no headache. Denies double vision. Pt underwent EMG which showed unilateral weakness of 22% on the left side.  Consistent with peripheral vestibular pathology. Consulted ENT few months ago. Pt had completed neuro ophthalmology recently with no major deficit report doing well, still continuing with occasional dizziness, but no headache.

## 2023-12-25 NOTE — ASSESSMENT
[FreeTextEntry1] : IMPRESSION:  67 years old Female PMH of DM, HTN, UTI who had a fall and suffered SDH s/p craniotomy 07/26/20. PT today reports feeling dizziness which is unchanged. Saw ENT who recommended VT with no change and shows left vestibular dysfunction with possible ocular issues, completed neuro psych evaluation with no major deficit.   PLAN:  MRI Head w/o contrast  F/U in 3 months.

## 2023-12-25 NOTE — PHYSICAL EXAM
[General Appearance - Alert] : alert [General Appearance - In No Acute Distress] : in no acute distress [Impaired Insight] : insight and judgment were intact [Affect] : the affect was normal [Person] : oriented to person [Place] : oriented to place [Time] : oriented to time [Short Term Intact] : short term memory intact [Cranial Nerves Optic (II)] : visual acuity intact bilaterally,  pupils equal round and reactive to light [Cranial Nerves Oculomotor (III)] : extraocular motion intact [Cranial Nerves Trigeminal (V)] : facial sensation intact symmetrically [Cranial Nerves Facial (VII)] : face symmetrical [Cranial Nerves Vestibulocochlear (VIII)] : hearing was intact bilaterally [Cranial Nerves Accessory (XI - Cranial And Spinal)] : head turning and shoulder shrug symmetric [Cranial Nerves Hypoglossal (XII)] : there was no tongue deviation with protrusion [Motor Tone] : muscle tone was normal in all four extremities [Motor Strength] : muscle strength was normal in all four extremities [Sclera] : the sclera and conjunctiva were normal [Extraocular Movements] : extraocular movements were intact [Outer Ear] : the ears and nose were normal in appearance [Neck Appearance] : the appearance of the neck was normal [] : no respiratory distress [Apical Impulse] : the apical impulse was normal [Arterial Pulses Carotid] : carotid pulses were normal with no bruits [Bowel Sounds] : normal bowel sounds [Involuntary Movements] : no involuntary movements were seen [FreeTextEntry6] : No pronator drift [FreeTextEntry8] : has short-stepped, shuffling gait, but alternating and symmetrical, slow. [FreeTextEntry1] : Difficulty with eye tracking on left lateral gaze

## 2024-01-11 NOTE — ED ADULT NURSE NOTE - CHPI ED NUR DURATION
today

## 2024-02-10 ENCOUNTER — OUTPATIENT (OUTPATIENT)
Dept: OUTPATIENT SERVICES | Facility: HOSPITAL | Age: 69
LOS: 1 days | End: 2024-02-10
Payer: MEDICARE

## 2024-02-10 ENCOUNTER — APPOINTMENT (OUTPATIENT)
Dept: MRI IMAGING | Facility: HOSPITAL | Age: 69
End: 2024-02-10
Payer: MEDICARE

## 2024-02-10 DIAGNOSIS — Z98.890 OTHER SPECIFIED POSTPROCEDURAL STATES: Chronic | ICD-10-CM

## 2024-02-10 DIAGNOSIS — S06.5X9A TRAUMATIC SUBDURAL HEMORRHAGE WITH LOSS OF CONSCIOUSNESS OF UNSPECIFIED DURATION, INITIAL ENCOUNTER: Chronic | ICD-10-CM

## 2024-02-10 DIAGNOSIS — Z86.79 PERSONAL HISTORY OF OTHER DISEASES OF THE CIRCULATORY SYSTEM: Chronic | ICD-10-CM

## 2024-02-10 DIAGNOSIS — S06.5XAA TRAUMATIC SUBDURAL HEMORRHAGE WITH LOSS OF CONSCIOUSNESS STATUS UNKNOWN, INITIAL ENCOUNTER: ICD-10-CM

## 2024-02-10 DIAGNOSIS — Z98.890 OTHER SPECIFIED POSTPROCEDURAL STATES: ICD-10-CM

## 2024-02-10 PROCEDURE — 70551 MRI BRAIN STEM W/O DYE: CPT

## 2024-02-10 PROCEDURE — 70551 MRI BRAIN STEM W/O DYE: CPT | Mod: 26

## 2024-02-16 ENCOUNTER — APPOINTMENT (OUTPATIENT)
Dept: NEUROSURGERY | Facility: CLINIC | Age: 69
End: 2024-02-16
Payer: MEDICARE

## 2024-02-16 VITALS — SYSTOLIC BLOOD PRESSURE: 151 MMHG | HEART RATE: 90 BPM | DIASTOLIC BLOOD PRESSURE: 80 MMHG

## 2024-02-16 VITALS
WEIGHT: 107 LBS | OXYGEN SATURATION: 97 % | SYSTOLIC BLOOD PRESSURE: 142 MMHG | DIASTOLIC BLOOD PRESSURE: 87 MMHG | HEART RATE: 102 BPM | BODY MASS INDEX: 19.69 KG/M2 | HEIGHT: 62 IN

## 2024-02-16 VITALS — HEART RATE: 93 BPM | SYSTOLIC BLOOD PRESSURE: 157 MMHG | DIASTOLIC BLOOD PRESSURE: 81 MMHG

## 2024-02-16 VITALS — DIASTOLIC BLOOD PRESSURE: 76 MMHG | HEART RATE: 92 BPM | SYSTOLIC BLOOD PRESSURE: 136 MMHG

## 2024-02-16 VITALS — SYSTOLIC BLOOD PRESSURE: 137 MMHG | HEART RATE: 96 BPM | DIASTOLIC BLOOD PRESSURE: 77 MMHG

## 2024-02-16 DIAGNOSIS — R42 DIZZINESS AND GIDDINESS: ICD-10-CM

## 2024-02-16 PROCEDURE — 99214 OFFICE O/P EST MOD 30 MIN: CPT

## 2024-02-26 NOTE — HISTORY OF PRESENT ILLNESS
[FreeTextEntry1] : 67 years old F PMH of DM, HTN, UTI who had a fall and suffered SDH s/p craniotomy 07/26/20 and recovered well, and following with related dizziness. Pt underwent EMG which showed unilateral weakness of 22% on the left side.  Consistent with peripheral vestibular pathology under the treatment of ENT with PT.   Pt returned with new MRI Head for follow up chronic SDH. Pt still continuing with occasional dizziness, but no headache. She is continuing on the balance therapy by ENT. She does not think the therapy is helping for her dizziness. Pt has no HA at this time, but HAs are less frequent than before.

## 2024-02-26 NOTE — REASON FOR VISIT
[Follow-Up: _____] : a [unfilled] follow-up visit [Family Member] : family member [FreeTextEntry1] : SDH s/p craniotomy

## 2024-02-26 NOTE — PHYSICAL EXAM
[General Appearance - Alert] : alert [General Appearance - In No Acute Distress] : in no acute distress [Impaired Insight] : insight and judgment were intact [Affect] : the affect was normal [Person] : oriented to person [Place] : oriented to place [Time] : oriented to time [Short Term Intact] : short term memory intact [Cranial Nerves Optic (II)] : visual acuity intact bilaterally,  pupils equal round and reactive to light [Cranial Nerves Oculomotor (III)] : extraocular motion intact [Cranial Nerves Trigeminal (V)] : facial sensation intact symmetrically [Cranial Nerves Facial (VII)] : face symmetrical [Cranial Nerves Vestibulocochlear (VIII)] : hearing was intact bilaterally [Cranial Nerves Accessory (XI - Cranial And Spinal)] : head turning and shoulder shrug symmetric [Cranial Nerves Hypoglossal (XII)] : there was no tongue deviation with protrusion [Motor Tone] : muscle tone was normal in all four extremities [Motor Strength] : muscle strength was normal in all four extremities [Sclera] : the sclera and conjunctiva were normal [Extraocular Movements] : extraocular movements were intact [Outer Ear] : the ears and nose were normal in appearance [Neck Appearance] : the appearance of the neck was normal [] : no respiratory distress [Apical Impulse] : the apical impulse was normal [Arterial Pulses Carotid] : carotid pulses were normal with no bruits [Bowel Sounds] : normal bowel sounds [Involuntary Movements] : no involuntary movements were seen [FreeTextEntry6] : No pronator drift [FreeTextEntry8] : small steps walking, shuffling gait, but alternating and symmetrical, slow. [FreeTextEntry1] : Difficulty with eye tracking on left lateral gaze

## 2024-02-26 NOTE — ASSESSMENT
[FreeTextEntry1] : IMPRESSION:  68 years old Female PMH of DM, HTN, UTI who had a fall and suffered SDH s/p craniotomy 07/26/20. PT today reports continued dizziness which is unchanged. ENT examination with left vestibular dysfunction with possible ocular issues, continuing on VT. Pt   completed neuro psych evaluation with no major deficit. New MRI head shows stable SD collection . No further imaging needed at this time. Orthostatic BP recorded today which showed 20-point difference in systolic BP while standing from lying down(see vital signs section for readings).   PLAN: No further imaging needed at this time.  Referred Cardiology for evaluation for possible orthostatic hypotension and medication adjustment.  F/U in 3 months

## 2024-04-19 ENCOUNTER — NON-APPOINTMENT (OUTPATIENT)
Age: 69
End: 2024-04-19

## 2024-04-23 NOTE — CHART NOTE - NSCHARTNOTEFT_GEN_A_CORE
Trauma Surgery Post Op Check    65 yo woman with a hx of DM type II, HTN now 4 hrs s/p Lap bhavna for acute cholecystitis.    S:  -Pt resting comfortable in PACU w/ family at bedside  -Denies chest pain, SOB  -Endorses some nausea, relieved by Zofran    O:  GEN: Comfortable, NAD  RESP: No increased WOB on NC  Abd: Soft, appropriately tender, ND.  Umbilicus dressing c/d/I  Band aids over additional port sites c/d/i.  No signs of fluid collection or hematoma    A/P:   65 yo woman with a hx of DM type II, HTN now s/p Lap bhavna for acute cholecystitis. Doing well      Will Observe overnight and d/c tomorrow morning  Pain control    ATP  [id number]
STEPDOWN

## 2024-04-24 ENCOUNTER — APPOINTMENT (OUTPATIENT)
Age: 69
End: 2024-04-24
Payer: MEDICARE

## 2024-04-24 DIAGNOSIS — N95.2 POSTMENOPAUSAL ATROPHIC VAGINITIS: ICD-10-CM

## 2024-04-24 DIAGNOSIS — N39.0 URINARY TRACT INFECTION, SITE NOT SPECIFIED: ICD-10-CM

## 2024-04-24 DIAGNOSIS — R39.9 UNSPECIFIED SYMPTOMS AND SIGNS INVOLVING THE GENITOURINARY SYSTEM: ICD-10-CM

## 2024-04-24 PROCEDURE — 99214 OFFICE O/P EST MOD 30 MIN: CPT

## 2024-04-24 NOTE — HISTORY OF PRESENT ILLNESS
[FreeTextEntry1] : 64 yo Greenlandic speaking F presents with persistent UTI. Had issues with recurrent UTI many years ago after her second child but was doing well for years with no issues. Starting last , has been having some dysuria and found to have UTI. Since then, she has been treated with antibiotics which helps with symptoms but will recur shortly after completing the antibiotics. Review of labworks shows perisistent e.coli UTI twice in February and most recently a few weeks ago. Last course of antibiotics was 10 days ago. Hasn't noticed exacerbation of LUTS but most bothersome is the urethral discomfort. Drinks multiple bottles of water per day and voids every 2 hrs or so. Denies any incontinence, history of gross hematuria, fever, chills, flank pain. Does have issues with constipation and will often have to perform enemas in order to feel comfortable. 2 children, , LMP was more than 10 yrs ago  18 Interval history: Since last visit, pt has been doing well with no minimal issues. Cysto at last visit was unremarkable  19 Interval history: 1 month ago had routine UA done with PCP and found to have UTI States that she was given abx but never started it because she didn't have any symptoms Has been completely asymptomatic for the last 6 months Shoulder surgery in  and has needed to take some pain meds recently for this Some constipation issues due to the pain meds No incontinence, no more urethral discomfort Has been double voiding  19 Interval history: s/p cholecystectomy and had some postop urinary retention Now voiding well. Still doing double voids A little dysuria Still having constipation Drinking 7-8 cups of water  20 Interval history: Pt hospitalized recently after a fall and ended up getting an urgent craniotomy secondary to subdural hematoma Pt was noted to have incomplete bladder emptying during hospitalization and had indwelling catheter placed Denies any urinary issues since hospital discharge with drainage of good clear yellow urine   20 Interval history: Pt recently presented to hospital with sepsis from urinary source and hospitalized for antibiotics During hospitalization, pt subsequently passed TOV Currently feeling well and denies any bothersome LUTS Pt states she feels like she is getting stronger  20 Interval history: No issues since last visit Feels like she is voiding adequately No dysuria, no gross hematuria no flank pain  21 Interval history: sometimes incomplete bladder emptying sensation no gross hematuria, no dysuria Drinks at least 1 L of water, 1 cup of coffee Nocturia 1-2/night Uses pull-ups for safety when out of the house chronic constipation  21 Interval history: Had some dysuria about 1 month ago and found to have a UTI Was also having some bowel issues at that time Feeling better after course of abx Did go to ER yesterday for dizziness - found to have hyponatremia Drinks 8 cups of water daily Voiding every hour, nocturia 1-2/night no incontinence chronic constipation  3/25/22 Interval history: Denies any significant issues since last visit But sometimes having incomplete bladder emptying sensation lately   22 Interval history: Sometimes has incomplete bladder emptying sensation Constipation a little better Nocturia 1-2/night, voiding every 2-3 hours but feeling like she has to void multiple times each time Using estrace  3/29/23 Interval history: Presents with several week history of dysuria and cloudy urine no fever or chills, no gross hematuria Stable LUTS  10/18/23 Interval history: Was doing well after abx at last visit until 2-3 weeks ago urinary frequency, urgency nocturia every 2 hours some dysuria no fever chronic constipation  24 Interval history: some pelvic pressure and irritative LUTS since last week No fever, chills Drinks 5 cups of water chronic constipation No abx since last visit

## 2024-04-24 NOTE — ASSESSMENT
[FreeTextEntry1] : 67 yo F with recurrent UTI, irritative LUTS  - PVR = 35ml - UA, culture - discussed possible etiologies for UTI. Spent extensive period of time discussed behavioral modification including adequate hydration, cutting back on caffeine intake, timed voiding during the day, importance of controlling any diabetes and chronic constipation and how all of these things could potentially increase risk for persistent or recurrent UTI. - Recommended D-mannose until final cultures obtaines - Reviewed indications for seeking immediate medical attention - e.g. fever, chills - Continue estrace

## 2024-04-24 NOTE — PHYSICAL EXAM
[Normal Appearance] : normal appearance [Well Groomed] : well groomed [General Appearance - In No Acute Distress] : no acute distress [Edema] : no peripheral edema [Respiration, Rhythm And Depth] : normal respiratory rhythm and effort [Exaggerated Use Of Accessory Muscles For Inspiration] : no accessory muscle use [Abdomen Soft] : soft [Abdomen Tenderness] : non-tender [Costovertebral Angle Tenderness] : no ~M costovertebral angle tenderness [Urinary Bladder Findings] : the bladder was normal on palpation [] : no rash [Oriented To Time, Place, And Person] : oriented to person, place, and time [Affect] : the affect was normal [Mood] : the mood was normal

## 2024-04-29 LAB
APPEARANCE: ABNORMAL
BACTERIA UR CULT: ABNORMAL
BACTERIA: NEGATIVE /HPF
BILIRUBIN URINE: NEGATIVE
BLOOD URINE: NEGATIVE
CAST: 4 /LPF
COLOR: YELLOW
EPITHELIAL CELLS: 2 /HPF
GLUCOSE QUALITATIVE U: 250 MG/DL
KETONES URINE: NEGATIVE MG/DL
LEUKOCYTE ESTERASE URINE: ABNORMAL
MICROSCOPIC-UA: NORMAL
NITRITE URINE: POSITIVE
PH URINE: >=9
PROTEIN URINE: 30 MG/DL
RED BLOOD CELLS URINE: 1 /HPF
REVIEW: NORMAL
SPECIFIC GRAVITY URINE: 1.02
TRIPLE PHOSPHATE CRYSTALS: PRESENT
UROBILINOGEN URINE: 0.2 MG/DL
WHITE BLOOD CELLS URINE: 0 /HPF

## 2024-04-29 RX ORDER — SULFAMETHOXAZOLE AND TRIMETHOPRIM 800; 160 MG/1; MG/1
800-160 TABLET ORAL TWICE DAILY
Qty: 14 | Refills: 0 | Status: ACTIVE | COMMUNITY
Start: 2024-04-29 | End: 1900-01-01

## 2024-06-14 ENCOUNTER — APPOINTMENT (OUTPATIENT)
Dept: NEUROSURGERY | Facility: CLINIC | Age: 69
End: 2024-06-14
Payer: MEDICARE

## 2024-06-14 VITALS
WEIGHT: 107 LBS | DIASTOLIC BLOOD PRESSURE: 79 MMHG | HEIGHT: 62 IN | HEART RATE: 95 BPM | OXYGEN SATURATION: 97 % | BODY MASS INDEX: 19.69 KG/M2 | SYSTOLIC BLOOD PRESSURE: 139 MMHG

## 2024-06-14 PROCEDURE — 99214 OFFICE O/P EST MOD 30 MIN: CPT

## 2024-06-17 NOTE — PHYSICAL EXAM
[General Appearance - Alert] : alert [Oriented To Time, Place, And Person] : oriented to person, place, and time [Impaired Insight] : insight and judgment were intact [FreeTextEntry1] : jessie faces [Motor Tone] : muscle tone was normal in all four extremities [Motor Strength] : muscle strength was normal in all four extremities [FreeTextEntry6] : Possible cogwheeling in both UE.  No drift, good , JENSEN.  Mild tremor of right hand while arms outstretched during drift testing.  Also some tremors at rest. [FreeTextEntry8] : gait with hands at side, short-stepped, narrow, alternating with mild shuffling.

## 2024-06-17 NOTE — PRE-ANESTHESIA EVALUATION ADULT - NSPROPOSEDPROCEDFT_GEN_ALL_CORE
Called patient and left a voicemail with the name and phone number of the doctor for her thyroid ultrasound and FNA  
Patient last seen April 2023.  Recommend thyroid ultrasound prior to proceeding with biopsy.  Advised to schedule follow-up after ultrasound.  
Pt requesting a call back regarding recommended dr for thyroid biopsy. Please assist.   
Sent patient a message regarding US and FNA  
Craniotomy
craniectomy

## 2024-06-17 NOTE — ASSESSMENT
[FreeTextEntry1] : 68 years old Female PMH of DM, HTN, UTI who had a fall and suffered SDH s/p craniotomy 07/26/20.  Having increased frequency of headaches.  Pt with right arm resting tremor and possible cogwheeling.  Given masked faces and gait, which resembles a Parkinsonian gait, recommend neurology evaluation.  Pt saw local neurologist for her dizziness.  MRI brain  Return in 1 month

## 2024-06-17 NOTE — HISTORY OF PRESENT ILLNESS
[FreeTextEntry1] : 67 years old F PMH of DM, HTN, UTI who had a fall and suffered SDH s/p craniotomy 07/26/20 and recovered well, and following with related dizziness. Pt underwent EMG which showed unilateral weakness of 22% on the left side. Consistent with peripheral vestibular pathology under the treatment of ENT with PT.  Today, pt reports that she is having a little headache and dizziness but overall a little better.  Pt getting headahces, a little pressure in the head everyday.  Said headaches going on since previous visit.  Headaches are more frequent now.

## 2024-07-13 ENCOUNTER — APPOINTMENT (OUTPATIENT)
Dept: MRI IMAGING | Facility: HOSPITAL | Age: 69
End: 2024-07-13
Payer: MEDICARE

## 2024-07-13 ENCOUNTER — OUTPATIENT (OUTPATIENT)
Dept: OUTPATIENT SERVICES | Facility: HOSPITAL | Age: 69
LOS: 1 days | End: 2024-07-13
Payer: MEDICARE

## 2024-07-13 DIAGNOSIS — Z86.79 PERSONAL HISTORY OF OTHER DISEASES OF THE CIRCULATORY SYSTEM: Chronic | ICD-10-CM

## 2024-07-13 DIAGNOSIS — S06.5XAA TRAUMATIC SUBDURAL HEMORRHAGE WITH LOSS OF CONSCIOUSNESS STATUS UNKNOWN, INITIAL ENCOUNTER: ICD-10-CM

## 2024-07-13 DIAGNOSIS — S06.5X9A TRAUMATIC SUBDURAL HEMORRHAGE WITH LOSS OF CONSCIOUSNESS OF UNSPECIFIED DURATION, INITIAL ENCOUNTER: Chronic | ICD-10-CM

## 2024-07-13 DIAGNOSIS — Z98.890 OTHER SPECIFIED POSTPROCEDURAL STATES: Chronic | ICD-10-CM

## 2024-07-13 PROCEDURE — 70551 MRI BRAIN STEM W/O DYE: CPT | Mod: 26

## 2024-07-13 PROCEDURE — 70551 MRI BRAIN STEM W/O DYE: CPT

## 2024-08-09 ENCOUNTER — APPOINTMENT (OUTPATIENT)
Dept: NEUROSURGERY | Facility: CLINIC | Age: 69
End: 2024-08-09

## 2024-08-09 PROCEDURE — 99214 OFFICE O/P EST MOD 30 MIN: CPT

## 2024-08-14 NOTE — PHYSICAL EXAM
[General Appearance - Alert] : alert [Oriented To Time, Place, And Person] : oriented to person, place, and time [Impaired Insight] : insight and judgment were intact [Motor Tone] : muscle tone was normal in all four extremities [Motor Strength] : muscle strength was normal in all four extremities [FreeTextEntry1] : jessie faces [FreeTextEntry6] : Possible cogwheeling in both UE.  No drift, good , JENSEN.  little to no tremor [FreeTextEntry8] : gait with hands at side, short-stepped, narrow, alternating with mild shuffling, brisker than before,

## 2024-08-14 NOTE — ASSESSMENT
[FreeTextEntry1] : IMPRESSION 68 years old Female PMH of DM, HTN, UTI who had a fall and suffered SDH s/p craniotomy 07/26/20.  Having increased frequency of headaches.  Pt with right arm resting tremor and possible cogwheeling.  Given masked faces and gait, which resembles a Parkinsonian gait, recommend neurology evaluation.  Pt saw local neurologist for her dizziness and Parkinson, which was ruled out.  Pt had MRI with no change in extraaxial collection or ischemic wm changes Return in 6 month.

## 2024-08-14 NOTE — HISTORY OF PRESENT ILLNESS
[FreeTextEntry1] : 67 years old F PMH of DM, HTN, UTI who had a fall and suffered SDH s/p craniotomy 07/26/20 and recovered well, and following with related dizziness. Pt underwent EMG which showed unilateral weakness of 22% on the left side. Consistent with peripheral vestibular pathology under the treatment of ENT with PT.  Today, pt reports that dizziness is tiny bit better than before.  When she is doing something, she feels dizzy.  When at rest, no headache or dizziness.  She saw a neurologist who said that he was not concerned about Parkinson's and ordered carotid duplex.  The tremor has gotten better.

## 2024-09-20 NOTE — DISCHARGE NOTE PROVIDER - NSDCMRMEDTOKEN_GEN_ALL_CORE_FT
Cecile Davey (Resident) Amitiza 24 mcg oral capsule: 1 cap(s) orally 2 times a day  aspirin 81 mg oral tablet: 1 tab(s) orally once a day  Calcium 500+D oral tablet, chewable: 1 tab(s) orally 2 times a day  commmode: s/p Left SDH evacuation   gabapentin 300 mg oral capsule: 1 tab(s) orally once a day (at bedtime)  glimepiride 2 mg oral tablet: 1 tab(s) orally once a day  lisinopril 5 mg oral tablet: 1 tab(s) orally once a day  metFORMIN 500 mg oral tablet: 1 tab(s) orally 2 times a day  Rolling Walker: s/p L SDH evacuation   Senna 8.6 mg oral tablet: 1 tab(s) orally 2 times a day  shower chair: Left  SDH evacuation acetaminophen 160 mg/5 mL oral suspension: 20.31 milliliter(s) orally every 6 hours, As needed, Temp greater or equal to 38C (100.4F), Mild Pain (1 - 3)  bisacodyl 5 mg oral delayed release tablet: 1 tab(s) orally every 12 hours, As needed, Constipation  Calcium 500+D oral tablet, chewable: 1 tab(s) orally 2 times a day  commmode: s/p Left SDH evacuation   glimepiride 2 mg oral tablet: 1 tab(s) orally once a day  levETIRAcetam 500 mg oral tablet: 1 tab(s) orally 2 times a day  lisinopril 5 mg oral tablet: 1 tab(s) orally once a day  metFORMIN 500 mg oral tablet: 1 tab(s) orally 2 times a day  ocular lubricant ophthalmic solution: 1 drop(s) to each affected eye every 4 hours  oxyCODONE 5 mg oral tablet: 1 tab(s) orally every 4 hours, As needed, Moderate Pain (4 - 6) MDD:6  Rolling Walker: s/p L SDH evacuation   Senna 8.6 mg oral tablet: 1 tab(s) orally 2 times a day  shower chair: Left  SDH evacuation   tamsulosin 0.4 mg oral capsule: 1 cap(s) orally once a day (at bedtime)  wheel chair : DX subdural hematoma hematoma   gait ataxia

## 2024-10-25 ENCOUNTER — APPOINTMENT (OUTPATIENT)
Age: 69
End: 2024-10-25
Payer: MEDICARE

## 2024-10-25 VITALS
HEART RATE: 96 BPM | TEMPERATURE: 97.4 F | OXYGEN SATURATION: 96 % | SYSTOLIC BLOOD PRESSURE: 146 MMHG | DIASTOLIC BLOOD PRESSURE: 75 MMHG

## 2024-10-25 DIAGNOSIS — N39.0 URINARY TRACT INFECTION, SITE NOT SPECIFIED: ICD-10-CM

## 2024-10-25 DIAGNOSIS — N95.2 POSTMENOPAUSAL ATROPHIC VAGINITIS: ICD-10-CM

## 2024-10-25 PROCEDURE — 99214 OFFICE O/P EST MOD 30 MIN: CPT

## 2024-10-25 PROCEDURE — G2211 COMPLEX E/M VISIT ADD ON: CPT

## 2024-10-29 LAB
APPEARANCE: ABNORMAL
BACTERIA UR CULT: ABNORMAL
BACTERIA: ABNORMAL /HPF
BILIRUBIN URINE: NEGATIVE
BLOOD URINE: NEGATIVE
CAST: 0 /LPF
COLOR: YELLOW
EPITHELIAL CELLS: 3 /HPF
GLUCOSE QUALITATIVE U: 250 MG/DL
KETONES URINE: NEGATIVE MG/DL
LEUKOCYTE ESTERASE URINE: ABNORMAL
MICROSCOPIC-UA: NORMAL
NITRITE URINE: POSITIVE
PH URINE: >=9
PROTEIN URINE: 100 MG/DL
RED BLOOD CELLS URINE: 2 /HPF
REVIEW: NORMAL
SPECIFIC GRAVITY URINE: 1.01
TRIPLE PHOSPHATE CRYSTALS: PRESENT
UROBILINOGEN URINE: 0.2 MG/DL
WHITE BLOOD CELLS URINE: 7 /HPF

## 2024-10-29 RX ORDER — SULFAMETHOXAZOLE AND TRIMETHOPRIM 800; 160 MG/1; MG/1
800-160 TABLET ORAL
Qty: 14 | Refills: 0 | Status: ACTIVE | COMMUNITY
Start: 2024-10-29 | End: 1900-01-01

## 2024-11-10 NOTE — ED ADULT NURSE NOTE - CHIEF COMPLAINT QUOTE
Patient c/o right lower chest vs RUQ pain accompanied with SOB/ nausea/vomiting and belching, symptoms started at 4 am Yes

## 2024-11-25 NOTE — ED ADULT TRIAGE NOTE - PRO INTERPRETER NEED 2
Improving, likely BPV vs HTN emergency now improved Stroke ruled out, ddx includes HTN emergency, TIA, BPV, or Arrhythmia  - Cardiac monitoring  - BP control      No [place]

## 2024-12-05 NOTE — PROVIDER CONTACT NOTE (CHANGE IN STATUS NOTIFICATION) - DATE AND TIME:
Refill Decision Note   Alverto Ramirez  is requesting a refill authorization.  Brief Assessment and Rationale for Refill:  Quick Discontinue     Medication Therapy Plan:  discontinued on 7/30/2024 by Derrell Wagner MD.      Comments:     Note composed:2:58 PM 12/05/2024            31-Jul-2020 05:00

## 2024-12-16 NOTE — PROGRESS NOTE ADULT - PROBLEM SELECTOR PROBLEM 6
Opt out Type 2 diabetes mellitus with hyperglycemia, without long-term current use of insulin Subdural hematoma

## 2025-02-07 ENCOUNTER — APPOINTMENT (OUTPATIENT)
Dept: NEUROSURGERY | Facility: CLINIC | Age: 70
End: 2025-02-07

## 2025-02-07 ENCOUNTER — NON-APPOINTMENT (OUTPATIENT)
Age: 70
End: 2025-02-07

## 2025-02-07 VITALS
HEIGHT: 62.2 IN | SYSTOLIC BLOOD PRESSURE: 137 MMHG | OXYGEN SATURATION: 97 % | BODY MASS INDEX: 21.23 KG/M2 | RESPIRATION RATE: 16 BRPM | HEART RATE: 91 BPM | WEIGHT: 116.84 LBS | DIASTOLIC BLOOD PRESSURE: 78 MMHG

## 2025-02-07 DIAGNOSIS — R42 DIZZINESS AND GIDDINESS: ICD-10-CM

## 2025-02-07 DIAGNOSIS — S06.5XAA TRAUMATIC SUBDURAL HEMORRHAGE WITH LOSS OF CONSCIOUSNESS STATUS UNKNOWN, INITIAL ENCOUNTER: ICD-10-CM

## 2025-02-07 PROCEDURE — 99214 OFFICE O/P EST MOD 30 MIN: CPT

## 2025-04-19 ENCOUNTER — APPOINTMENT (OUTPATIENT)
Dept: MRI IMAGING | Facility: HOSPITAL | Age: 70
End: 2025-04-19
Payer: MEDICARE

## 2025-04-19 ENCOUNTER — OUTPATIENT (OUTPATIENT)
Dept: OUTPATIENT SERVICES | Facility: HOSPITAL | Age: 70
LOS: 1 days | End: 2025-04-19
Payer: MEDICARE

## 2025-04-19 DIAGNOSIS — Z86.79 PERSONAL HISTORY OF OTHER DISEASES OF THE CIRCULATORY SYSTEM: Chronic | ICD-10-CM

## 2025-04-19 DIAGNOSIS — S06.5XAA TRAUMATIC SUBDURAL HEMORRHAGE WITH LOSS OF CONSCIOUSNESS STATUS UNKNOWN, INITIAL ENCOUNTER: ICD-10-CM

## 2025-04-19 DIAGNOSIS — R42 DIZZINESS AND GIDDINESS: ICD-10-CM

## 2025-04-19 DIAGNOSIS — S06.5X9A TRAUMATIC SUBDURAL HEMORRHAGE WITH LOSS OF CONSCIOUSNESS OF UNSPECIFIED DURATION, INITIAL ENCOUNTER: Chronic | ICD-10-CM

## 2025-04-19 DIAGNOSIS — Z98.890 OTHER SPECIFIED POSTPROCEDURAL STATES: Chronic | ICD-10-CM

## 2025-04-19 PROCEDURE — 70551 MRI BRAIN STEM W/O DYE: CPT

## 2025-04-19 PROCEDURE — 70551 MRI BRAIN STEM W/O DYE: CPT | Mod: 26

## 2025-04-25 ENCOUNTER — APPOINTMENT (OUTPATIENT)
Dept: NEUROSURGERY | Facility: CLINIC | Age: 70
End: 2025-04-25
Payer: MEDICARE

## 2025-04-25 VITALS
SYSTOLIC BLOOD PRESSURE: 136 MMHG | HEIGHT: 62 IN | WEIGHT: 115 LBS | HEART RATE: 92 BPM | OXYGEN SATURATION: 97 % | BODY MASS INDEX: 21.16 KG/M2 | DIASTOLIC BLOOD PRESSURE: 67 MMHG

## 2025-04-25 DIAGNOSIS — M54.2 CERVICALGIA: ICD-10-CM

## 2025-04-25 DIAGNOSIS — S06.5XAA TRAUMATIC SUBDURAL HEMORRHAGE WITH LOSS OF CONSCIOUSNESS STATUS UNKNOWN, INITIAL ENCOUNTER: ICD-10-CM

## 2025-04-25 PROCEDURE — 99214 OFFICE O/P EST MOD 30 MIN: CPT

## 2025-04-25 RX ORDER — METHOCARBAMOL 500 MG/1
500 TABLET, FILM COATED ORAL
Qty: 90 | Refills: 1 | Status: DISCONTINUED | COMMUNITY
Start: 2025-04-25 | End: 2025-04-25

## 2025-04-25 RX ORDER — METHOCARBAMOL 500 MG/1
500 TABLET, FILM COATED ORAL 4 TIMES DAILY
Qty: 60 | Refills: 0 | Status: ACTIVE | COMMUNITY
Start: 2025-04-25 | End: 1900-01-01

## 2025-05-02 ENCOUNTER — APPOINTMENT (OUTPATIENT)
Age: 70
End: 2025-05-02
Payer: MEDICARE

## 2025-05-02 DIAGNOSIS — N95.2 POSTMENOPAUSAL ATROPHIC VAGINITIS: ICD-10-CM

## 2025-05-02 DIAGNOSIS — R39.9 UNSPECIFIED SYMPTOMS AND SIGNS INVOLVING THE GENITOURINARY SYSTEM: ICD-10-CM

## 2025-05-02 DIAGNOSIS — N39.0 URINARY TRACT INFECTION, SITE NOT SPECIFIED: ICD-10-CM

## 2025-05-02 PROCEDURE — G2211 COMPLEX E/M VISIT ADD ON: CPT

## 2025-05-02 PROCEDURE — 99214 OFFICE O/P EST MOD 30 MIN: CPT

## 2025-05-05 LAB
APPEARANCE: ABNORMAL
BACTERIA UR CULT: ABNORMAL
BACTERIA: ABNORMAL /HPF
BILIRUBIN URINE: NEGATIVE
BLOOD URINE: ABNORMAL
CAST: 0 /LPF
COLOR: YELLOW
EPITHELIAL CELLS: 0 /HPF
GLUCOSE QUALITATIVE U: >=1000 MG/DL
KETONES URINE: NEGATIVE MG/DL
LEUKOCYTE ESTERASE URINE: ABNORMAL
MICROSCOPIC-UA: NORMAL
NITRITE URINE: NEGATIVE
PH URINE: 7
PROTEIN URINE: NEGATIVE MG/DL
RED BLOOD CELLS URINE: 11 /HPF
SPECIFIC GRAVITY URINE: 1.01
UROBILINOGEN URINE: 0.2 MG/DL
WHITE BLOOD CELLS URINE: 291 /HPF

## 2025-06-24 ENCOUNTER — OUTPATIENT (OUTPATIENT)
Dept: OUTPATIENT SERVICES | Facility: HOSPITAL | Age: 70
LOS: 1 days | End: 2025-06-24
Payer: MEDICARE

## 2025-06-24 ENCOUNTER — APPOINTMENT (OUTPATIENT)
Dept: MRI IMAGING | Facility: HOSPITAL | Age: 70
End: 2025-06-24
Payer: MEDICARE

## 2025-06-24 DIAGNOSIS — Z98.890 OTHER SPECIFIED POSTPROCEDURAL STATES: Chronic | ICD-10-CM

## 2025-06-24 DIAGNOSIS — Z98.890 OTHER SPECIFIED POSTPROCEDURAL STATES: ICD-10-CM

## 2025-06-24 DIAGNOSIS — R42 DIZZINESS AND GIDDINESS: ICD-10-CM

## 2025-06-24 DIAGNOSIS — Z86.79 PERSONAL HISTORY OF OTHER DISEASES OF THE CIRCULATORY SYSTEM: Chronic | ICD-10-CM

## 2025-06-24 DIAGNOSIS — S06.5X9A TRAUMATIC SUBDURAL HEMORRHAGE WITH LOSS OF CONSCIOUSNESS OF UNSPECIFIED DURATION, INITIAL ENCOUNTER: Chronic | ICD-10-CM

## 2025-06-24 DIAGNOSIS — S06.5XAA TRAUMATIC SUBDURAL HEMORRHAGE WITH LOSS OF CONSCIOUSNESS STATUS UNKNOWN, INITIAL ENCOUNTER: ICD-10-CM

## 2025-06-24 PROCEDURE — 72141 MRI NECK SPINE W/O DYE: CPT

## 2025-06-24 PROCEDURE — 72141 MRI NECK SPINE W/O DYE: CPT | Mod: 26

## 2025-06-27 ENCOUNTER — NON-APPOINTMENT (OUTPATIENT)
Age: 70
End: 2025-06-27

## 2025-06-27 ENCOUNTER — APPOINTMENT (OUTPATIENT)
Dept: NEUROSURGERY | Facility: CLINIC | Age: 70
End: 2025-06-27

## 2025-06-27 VITALS
HEART RATE: 94 BPM | DIASTOLIC BLOOD PRESSURE: 77 MMHG | RESPIRATION RATE: 16 BRPM | WEIGHT: 115 LBS | OXYGEN SATURATION: 98 % | HEIGHT: 62 IN | BODY MASS INDEX: 21.16 KG/M2 | SYSTOLIC BLOOD PRESSURE: 151 MMHG

## 2025-06-27 PROCEDURE — 99214 OFFICE O/P EST MOD 30 MIN: CPT

## 2025-08-03 ENCOUNTER — EMERGENCY (EMERGENCY)
Facility: HOSPITAL | Age: 70
LOS: 1 days | End: 2025-08-03
Attending: STUDENT IN AN ORGANIZED HEALTH CARE EDUCATION/TRAINING PROGRAM
Payer: MEDICARE

## 2025-08-03 VITALS
DIASTOLIC BLOOD PRESSURE: 85 MMHG | TEMPERATURE: 99 F | OXYGEN SATURATION: 97 % | RESPIRATION RATE: 18 BRPM | HEART RATE: 100 BPM | SYSTOLIC BLOOD PRESSURE: 156 MMHG

## 2025-08-03 VITALS
TEMPERATURE: 98 F | SYSTOLIC BLOOD PRESSURE: 153 MMHG | OXYGEN SATURATION: 97 % | WEIGHT: 120.15 LBS | DIASTOLIC BLOOD PRESSURE: 78 MMHG | RESPIRATION RATE: 20 BRPM | HEART RATE: 111 BPM

## 2025-08-03 DIAGNOSIS — Z98.890 OTHER SPECIFIED POSTPROCEDURAL STATES: Chronic | ICD-10-CM

## 2025-08-03 DIAGNOSIS — S06.5X9A TRAUMATIC SUBDURAL HEMORRHAGE WITH LOSS OF CONSCIOUSNESS OF UNSPECIFIED DURATION, INITIAL ENCOUNTER: Chronic | ICD-10-CM

## 2025-08-03 DIAGNOSIS — Z86.79 PERSONAL HISTORY OF OTHER DISEASES OF THE CIRCULATORY SYSTEM: Chronic | ICD-10-CM

## 2025-08-03 LAB
ADD ON TEST-SPECIMEN IN LAB: SIGNIFICANT CHANGE UP
ALBUMIN SERPL ELPH-MCNC: 4 G/DL — SIGNIFICANT CHANGE UP (ref 3.3–5)
ALP SERPL-CCNC: 47 U/L — SIGNIFICANT CHANGE UP (ref 40–120)
ALT FLD-CCNC: 33 U/L — SIGNIFICANT CHANGE UP (ref 10–45)
ANION GAP SERPL CALC-SCNC: 15 MMOL/L — SIGNIFICANT CHANGE UP (ref 5–17)
APPEARANCE UR: CLEAR — SIGNIFICANT CHANGE UP
APTT BLD: 25.8 SEC — LOW (ref 26.1–36.8)
AST SERPL-CCNC: 30 U/L — SIGNIFICANT CHANGE UP (ref 10–40)
BACTERIA # UR AUTO: NEGATIVE /HPF — SIGNIFICANT CHANGE UP
BASOPHILS # BLD AUTO: 0.02 K/UL — SIGNIFICANT CHANGE UP (ref 0–0.2)
BASOPHILS NFR BLD AUTO: 0.3 % — SIGNIFICANT CHANGE UP (ref 0–2)
BILIRUB SERPL-MCNC: 0.6 MG/DL — SIGNIFICANT CHANGE UP (ref 0.2–1.2)
BILIRUB UR-MCNC: NEGATIVE — SIGNIFICANT CHANGE UP
BUN SERPL-MCNC: 19 MG/DL — SIGNIFICANT CHANGE UP (ref 7–23)
CALCIUM SERPL-MCNC: 9.2 MG/DL — SIGNIFICANT CHANGE UP (ref 8.4–10.5)
CAST: 0 /LPF — SIGNIFICANT CHANGE UP (ref 0–4)
CHLORIDE SERPL-SCNC: 95 MMOL/L — LOW (ref 96–108)
CO2 SERPL-SCNC: 20 MMOL/L — LOW (ref 22–31)
COLOR SPEC: YELLOW — SIGNIFICANT CHANGE UP
CREAT SERPL-MCNC: 0.69 MG/DL — SIGNIFICANT CHANGE UP (ref 0.5–1.3)
DIFF PNL FLD: ABNORMAL
EGFR: 92 ML/MIN/1.73M2 — SIGNIFICANT CHANGE UP
EGFR: 92 ML/MIN/1.73M2 — SIGNIFICANT CHANGE UP
EOSINOPHIL # BLD AUTO: 0.03 K/UL — SIGNIFICANT CHANGE UP (ref 0–0.5)
EOSINOPHIL NFR BLD AUTO: 0.4 % — SIGNIFICANT CHANGE UP (ref 0–6)
FLUAV AG NPH QL: SIGNIFICANT CHANGE UP
FLUBV AG NPH QL: SIGNIFICANT CHANGE UP
GAS PNL BLDV: SIGNIFICANT CHANGE UP
GLUCOSE SERPL-MCNC: 207 MG/DL — HIGH (ref 70–99)
GLUCOSE UR QL: >=1000 MG/DL
HCT VFR BLD CALC: 36.4 % — SIGNIFICANT CHANGE UP (ref 34.5–45)
HGB BLD-MCNC: 12.3 G/DL — SIGNIFICANT CHANGE UP (ref 11.5–15.5)
IMM GRANULOCYTES # BLD AUTO: 0.05 K/UL — SIGNIFICANT CHANGE UP (ref 0–0.07)
IMM GRANULOCYTES NFR BLD AUTO: 0.7 % — SIGNIFICANT CHANGE UP (ref 0–0.9)
INR BLD: 1.04 RATIO — SIGNIFICANT CHANGE UP (ref 0.85–1.16)
KETONES UR QL: ABNORMAL MG/DL
LEUKOCYTE ESTERASE UR-ACNC: NEGATIVE — SIGNIFICANT CHANGE UP
LYMPHOCYTES # BLD AUTO: 1.61 K/UL — SIGNIFICANT CHANGE UP (ref 1–3.3)
LYMPHOCYTES NFR BLD AUTO: 21.4 % — SIGNIFICANT CHANGE UP (ref 13–44)
MAGNESIUM SERPL-MCNC: 1.8 MG/DL — SIGNIFICANT CHANGE UP (ref 1.6–2.6)
MCHC RBC-ENTMCNC: 29 PG — SIGNIFICANT CHANGE UP (ref 27–34)
MCHC RBC-ENTMCNC: 33.8 G/DL — SIGNIFICANT CHANGE UP (ref 32–36)
MCV RBC AUTO: 85.8 FL — SIGNIFICANT CHANGE UP (ref 80–100)
MONOCYTES # BLD AUTO: 0.59 K/UL — SIGNIFICANT CHANGE UP (ref 0–0.9)
MONOCYTES NFR BLD AUTO: 7.9 % — SIGNIFICANT CHANGE UP (ref 2–14)
NEUTROPHILS # BLD AUTO: 5.21 K/UL — SIGNIFICANT CHANGE UP (ref 1.8–7.4)
NEUTROPHILS NFR BLD AUTO: 69.3 % — SIGNIFICANT CHANGE UP (ref 43–77)
NITRITE UR-MCNC: NEGATIVE — SIGNIFICANT CHANGE UP
NRBC # BLD AUTO: 0 K/UL — SIGNIFICANT CHANGE UP (ref 0–0)
NRBC # FLD: 0 K/UL — SIGNIFICANT CHANGE UP (ref 0–0)
NRBC BLD AUTO-RTO: 0 /100 WBCS — SIGNIFICANT CHANGE UP (ref 0–0)
NT-PROBNP SERPL-SCNC: 52 PG/ML — SIGNIFICANT CHANGE UP (ref 0–300)
PH UR: 6.5 — SIGNIFICANT CHANGE UP (ref 5–8)
PLATELET # BLD AUTO: 157 K/UL — SIGNIFICANT CHANGE UP (ref 150–400)
PMV BLD: 10.8 FL — SIGNIFICANT CHANGE UP (ref 7–13)
POTASSIUM SERPL-MCNC: 4 MMOL/L — SIGNIFICANT CHANGE UP (ref 3.5–5.3)
POTASSIUM SERPL-SCNC: 4 MMOL/L — SIGNIFICANT CHANGE UP (ref 3.5–5.3)
PROT SERPL-MCNC: 7.1 G/DL — SIGNIFICANT CHANGE UP (ref 6–8.3)
PROT UR-MCNC: SIGNIFICANT CHANGE UP MG/DL
PROTHROM AB SERPL-ACNC: 11.9 SEC — SIGNIFICANT CHANGE UP (ref 9.9–13.4)
RBC # BLD: 4.24 M/UL — SIGNIFICANT CHANGE UP (ref 3.8–5.2)
RBC # FLD: 12.2 % — SIGNIFICANT CHANGE UP (ref 10.3–14.5)
RBC CASTS # UR COMP ASSIST: 53 /HPF — HIGH (ref 0–4)
RSV RNA NPH QL NAA+NON-PROBE: SIGNIFICANT CHANGE UP
SARS-COV-2 RNA SPEC QL NAA+PROBE: DETECTED
SODIUM SERPL-SCNC: 130 MMOL/L — LOW (ref 135–145)
SOURCE RESPIRATORY: SIGNIFICANT CHANGE UP
SP GR SPEC: 1.01 — SIGNIFICANT CHANGE UP (ref 1–1.03)
SQUAMOUS # UR AUTO: 1 /HPF — SIGNIFICANT CHANGE UP (ref 0–5)
TROPONIN T, HIGH SENSITIVITY RESULT: 9 NG/L — SIGNIFICANT CHANGE UP (ref 0–51)
UROBILINOGEN FLD QL: 0.2 MG/DL — SIGNIFICANT CHANGE UP (ref 0.2–1)
WBC # BLD: 7.51 K/UL — SIGNIFICANT CHANGE UP (ref 3.8–10.5)
WBC # FLD AUTO: 7.51 K/UL — SIGNIFICANT CHANGE UP (ref 3.8–10.5)
WBC UR QL: 1 /HPF — SIGNIFICANT CHANGE UP (ref 0–5)

## 2025-08-03 PROCEDURE — 70450 CT HEAD/BRAIN W/O DYE: CPT

## 2025-08-03 PROCEDURE — 81001 URINALYSIS AUTO W/SCOPE: CPT

## 2025-08-03 PROCEDURE — 82947 ASSAY GLUCOSE BLOOD QUANT: CPT

## 2025-08-03 PROCEDURE — 99284 EMERGENCY DEPT VISIT MOD MDM: CPT

## 2025-08-03 PROCEDURE — 99285 EMERGENCY DEPT VISIT HI MDM: CPT | Mod: 25

## 2025-08-03 PROCEDURE — 93010 ELECTROCARDIOGRAM REPORT: CPT

## 2025-08-03 PROCEDURE — 82803 BLOOD GASES ANY COMBINATION: CPT

## 2025-08-03 PROCEDURE — 85610 PROTHROMBIN TIME: CPT

## 2025-08-03 PROCEDURE — 93005 ELECTROCARDIOGRAM TRACING: CPT

## 2025-08-03 PROCEDURE — 83880 ASSAY OF NATRIURETIC PEPTIDE: CPT

## 2025-08-03 PROCEDURE — 83735 ASSAY OF MAGNESIUM: CPT

## 2025-08-03 PROCEDURE — 85025 COMPLETE CBC W/AUTO DIFF WBC: CPT

## 2025-08-03 PROCEDURE — 80053 COMPREHEN METABOLIC PANEL: CPT

## 2025-08-03 PROCEDURE — 83605 ASSAY OF LACTIC ACID: CPT

## 2025-08-03 PROCEDURE — 82435 ASSAY OF BLOOD CHLORIDE: CPT

## 2025-08-03 PROCEDURE — 82330 ASSAY OF CALCIUM: CPT

## 2025-08-03 PROCEDURE — 87637 SARSCOV2&INF A&B&RSV AMP PRB: CPT

## 2025-08-03 PROCEDURE — 71045 X-RAY EXAM CHEST 1 VIEW: CPT

## 2025-08-03 PROCEDURE — 85730 THROMBOPLASTIN TIME PARTIAL: CPT

## 2025-08-03 PROCEDURE — 70450 CT HEAD/BRAIN W/O DYE: CPT | Mod: 26

## 2025-08-03 PROCEDURE — 71045 X-RAY EXAM CHEST 1 VIEW: CPT | Mod: 26

## 2025-08-03 PROCEDURE — 85018 HEMOGLOBIN: CPT

## 2025-08-03 PROCEDURE — 84132 ASSAY OF SERUM POTASSIUM: CPT

## 2025-08-03 PROCEDURE — 84295 ASSAY OF SERUM SODIUM: CPT

## 2025-08-03 PROCEDURE — 85014 HEMATOCRIT: CPT

## 2025-08-03 PROCEDURE — 84484 ASSAY OF TROPONIN QUANT: CPT

## 2025-08-03 PROCEDURE — 96375 TX/PRO/DX INJ NEW DRUG ADDON: CPT

## 2025-08-03 PROCEDURE — 96374 THER/PROPH/DIAG INJ IV PUSH: CPT

## 2025-08-03 PROCEDURE — 85379 FIBRIN DEGRADATION QUANT: CPT

## 2025-08-03 PROCEDURE — 87040 BLOOD CULTURE FOR BACTERIA: CPT

## 2025-08-03 RX ORDER — ACETAMINOPHEN 500 MG/5ML
1000 LIQUID (ML) ORAL ONCE
Refills: 0 | Status: COMPLETED | OUTPATIENT
Start: 2025-08-03 | End: 2025-08-03

## 2025-08-03 RX ORDER — METOCLOPRAMIDE HCL 10 MG
10 TABLET ORAL ONCE
Refills: 0 | Status: COMPLETED | OUTPATIENT
Start: 2025-08-03 | End: 2025-08-03

## 2025-08-03 RX ADMIN — Medication 400 MILLIGRAM(S): at 05:00

## 2025-08-03 RX ADMIN — Medication 10 MILLIGRAM(S): at 04:07

## 2025-08-03 RX ADMIN — Medication 1000 MILLILITER(S): at 04:07

## 2025-09-19 ENCOUNTER — APPOINTMENT (OUTPATIENT)
Dept: NEUROSURGERY | Facility: CLINIC | Age: 70
End: 2025-09-19
Payer: MEDICARE

## 2025-09-19 ENCOUNTER — NON-APPOINTMENT (OUTPATIENT)
Age: 70
End: 2025-09-19

## 2025-09-19 VITALS
BODY MASS INDEX: 21.5 KG/M2 | OXYGEN SATURATION: 98 % | DIASTOLIC BLOOD PRESSURE: 66 MMHG | RESPIRATION RATE: 16 BRPM | SYSTOLIC BLOOD PRESSURE: 113 MMHG | HEART RATE: 92 BPM | HEIGHT: 62 IN | WEIGHT: 116.84 LBS

## 2025-09-19 DIAGNOSIS — S06.5XAA TRAUMATIC SUBDURAL HEMORRHAGE WITH LOSS OF CONSCIOUSNESS STATUS UNKNOWN, INITIAL ENCOUNTER: ICD-10-CM

## 2025-09-19 PROCEDURE — 99213 OFFICE O/P EST LOW 20 MIN: CPT

## 2025-09-19 RX ORDER — CARBIDOPA AND LEVODOPA 10; 100 MG/1; MG/1
10-100 TABLET ORAL
Refills: 0 | Status: ACTIVE | COMMUNITY